# Patient Record
Sex: MALE | Race: WHITE | NOT HISPANIC OR LATINO | Employment: OTHER | URBAN - METROPOLITAN AREA
[De-identification: names, ages, dates, MRNs, and addresses within clinical notes are randomized per-mention and may not be internally consistent; named-entity substitution may affect disease eponyms.]

---

## 2017-01-11 ENCOUNTER — OFFICE VISIT (OUTPATIENT)
Dept: UROLOGY | Facility: CLINIC | Age: 82
End: 2017-01-11
Payer: MEDICARE

## 2017-01-11 VITALS — RESPIRATION RATE: 14 BRPM | HEIGHT: 66 IN | WEIGHT: 182.13 LBS | BODY MASS INDEX: 29.27 KG/M2

## 2017-01-11 DIAGNOSIS — N13.8 BPH WITH URINARY OBSTRUCTION: Primary | ICD-10-CM

## 2017-01-11 DIAGNOSIS — N40.1 BPH WITH URINARY OBSTRUCTION: Primary | ICD-10-CM

## 2017-01-11 PROCEDURE — 99999 PR PBB SHADOW E&M-EST. PATIENT-LVL III: CPT | Mod: PBBFAC,,, | Performed by: UROLOGY

## 2017-01-11 PROCEDURE — 1125F AMNT PAIN NOTED PAIN PRSNT: CPT | Mod: S$GLB,,, | Performed by: UROLOGY

## 2017-01-11 PROCEDURE — 1157F ADVNC CARE PLAN IN RCRD: CPT | Mod: S$GLB,,, | Performed by: UROLOGY

## 2017-01-11 PROCEDURE — 99213 OFFICE O/P EST LOW 20 MIN: CPT | Mod: S$GLB,,, | Performed by: UROLOGY

## 2017-01-11 PROCEDURE — 1160F RVW MEDS BY RX/DR IN RCRD: CPT | Mod: S$GLB,,, | Performed by: UROLOGY

## 2017-01-11 PROCEDURE — 1159F MED LIST DOCD IN RCRD: CPT | Mod: S$GLB,,, | Performed by: UROLOGY

## 2017-01-11 RX ORDER — TROSPIUM CHLORIDE 20 MG/1
20 TABLET, FILM COATED ORAL 2 TIMES DAILY
Qty: 60 TABLET | Refills: 11 | Status: SHIPPED | OUTPATIENT
Start: 2017-01-11 | End: 2018-02-09 | Stop reason: SDUPTHER

## 2017-01-11 RX ORDER — FINASTERIDE 5 MG/1
5 TABLET, FILM COATED ORAL DAILY
Qty: 90 TABLET | Refills: 3 | Status: SHIPPED | OUTPATIENT
Start: 2017-01-11 | End: 2018-01-12 | Stop reason: SDUPTHER

## 2017-01-11 NOTE — LETTER
January 11, 2017      Tiago Borja MD  [address]  [facility] [address]           [facility] - Urology 4th Floor  [address]  [facility] [address]  Phone: [phone]          Patient: Randall Strickland Jr.   MR Number: [id number]   YOB: 1927   Date of Visit: 1/11/2017       Dear Dr. Tiago Borja:    Thank you for referring Randall Strickland to me for evaluation. Attached you will find relevant portions of my assessment and plan of care.    If you have questions, please do not hesitate to call me. I look forward to following Randall Strickland along with you.    Sincerely,    Kev Srinivasan Jr., MD    Enclosure  CC:  No Recipients    If you would like to receive this communication electronically, please contact [email or web address] or [phone] to request more information on [employer] Link access.    For providers and/or their staff who would like to refer a patient to [facility], please contact us through our one-stop-shop provider referral line, [facility], at [phone].    If you feel you have received this communication in error or would no longer like to receive these types of communications, please e-mail [email or web address]

## 2017-01-11 NOTE — PROGRESS NOTES
Subjective:       Patient ID: Randall Strickland Jr. is a 89 y.o. male.    Chief Complaint: medication refills (ep of el . pt does not want to return to dr calvo)    HPI a she is here for refills on trospium and finasteride.  He is voiding well not having any frequency.  He has good stream and empties his bladder.  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    Stenosis of aortic and mitral valves      aortic DAHLIA 1.1 CM       Past Surgical History   Procedure Laterality Date    Knee surgery       Bilateral    Total hip arthroplasty       Bilaterial    Cararact  car    Cataract extraction bilateral w/ anterior vitrectomy       bilaterial    Cholecystectomy      Appendectomy      Eye surgery      Joint replacement      Cataract extraction w/  intraocular lens implant Bilateral     Cardiac catheterization      Cardiac valve surgery         Family History   Problem Relation Age of Onset    No Known Problems Father     No Known Problems Mother     No Known Problems Sister     No Known Problems Brother     No Known Problems Maternal Aunt     No Known Problems Maternal Uncle     No Known Problems Paternal Aunt     No Known Problems Paternal Uncle     No Known Problems Maternal Grandmother     No Known Problems Maternal Grandfather     No Known Problems Paternal Grandmother     No Known Problems Paternal Grandfather     Amblyopia Neg Hx     Blindness Neg Hx     Cancer Neg Hx     Cataracts Neg Hx     Diabetes Neg Hx     Glaucoma Neg Hx     Hypertension Neg Hx     Macular degeneration Neg Hx     Retinal detachment Neg Hx     Strabismus Neg Hx     Stroke Neg Hx     Thyroid disease Neg Hx     Anemia Neg Hx     Arrhythmia Neg Hx     Asthma Neg Hx     Clotting disorder Neg Hx      Fainting Neg Hx     Heart attack Neg Hx     Heart disease Neg Hx     Heart failure Neg Hx     Hyperlipidemia Neg Hx     Atrial Septal Defect Neg Hx        Social History     Social History    Marital status:      Spouse name: Sade    Number of children: N/A    Years of education: N/A     Occupational History    Retired Retired 1992     Social History Main Topics    Smoking status: Never Smoker    Smokeless tobacco: Never Used    Alcohol use Yes      Comment: occasional    Drug use: No    Sexual activity: Not on file     Other Topics Concern    Not on file     Social History Narrative       Allergies:  Review of patient's allergies indicates no known allergies.    Medications:    Current Outpatient Prescriptions:     amlodipine (NORVASC) 5 MG tablet, Take 1 tablet (5 mg total) by mouth once daily., Disp: 30 tablet, Rfl: 6    aspirin 81 MG Chew, Take 81 mg by mouth once daily.  , Disp: , Rfl:     CHOLESTYRAMINE LIGHT 4 gram packet, DISSOLVE 1 PACKET IN LIQUID AND DRINK TWICE DAILY, Disp: 60 packet, Rfl: 0    clopidogrel (PLAVIX) 75 mg tablet, Take 1 tablet (75 mg total) by mouth once daily. Take 4 tablets the night before the procedure, then 1 tab PO daily., Disp: 30 tablet, Rfl: 11    finasteride (PROSCAR) 5 mg tablet, Take 1 tablet (5 mg total) by mouth once daily., Disp: 90 tablet, Rfl: 3    gabapentin (NEURONTIN) 100 MG capsule, TAKE 1 CAPSULE BY MOUTH EVERY EVENING, Disp: 90 capsule, Rfl: 0    ipratropium (ATROVENT) 0.03 % nasal spray, 1-2 sprays @ lateral nostril BID prn rhinitis/nasal drip, Disp: 30 mL, Rfl: 2    omeprazole (PRILOSEC) 40 MG capsule, TAKE 1 CAPSULE(40 MG) BY MOUTH EVERY MORNING, Disp: 30 capsule, Rfl: 0    tamsulosin (FLOMAX) 0.4 mg Cp24, Take 1 capsule (0.4 mg total) by mouth once daily., Disp: 90 capsule, Rfl: 3    torsemide (DEMADEX) 5 MG Tab, Take 1 tablet (5 mg total) by mouth once daily., Disp: 30 tablet, Rfl: 3    trospium (SANCTURA) 20 mg Tab tablet,  Take 1 tablet (20 mg total) by mouth 2 (two) times daily., Disp: 60 tablet, Rfl: 11    Current Facility-Administered Medications:     dextranomer-hyaluronate-NaCl (SOLESTA) 50-15 mg/mL (4) GlIm 1 kit, 1 kit, Submucosal anal canal, 1 time in Clinic/HOD, Meir Bran MD    Review of Systems   Constitutional: Negative for activity change, appetite change, chills, diaphoresis, fatigue, fever and unexpected weight change.   HENT: Negative for congestion, dental problem, hearing loss, mouth sores, postnasal drip, rhinorrhea, sinus pressure and trouble swallowing.    Eyes: Negative for pain, discharge and itching.   Respiratory: Negative for apnea, cough, choking, chest tightness, shortness of breath and wheezing.    Cardiovascular: Negative for chest pain, palpitations and leg swelling.   Gastrointestinal: Negative for abdominal distention, abdominal pain, anal bleeding, blood in stool, constipation, diarrhea, nausea, rectal pain and vomiting.   Endocrine: Negative for polydipsia and polyuria.   Genitourinary: Negative for decreased urine volume, difficulty urinating, discharge, dysuria, enuresis, flank pain, frequency, genital sores, hematuria, penile pain, penile swelling, scrotal swelling, testicular pain and urgency.   Musculoskeletal: Negative for arthralgias, back pain and myalgias.   Skin: Negative for color change, rash and wound.   Neurological: Negative for dizziness, syncope, speech difficulty, light-headedness and headaches.   Hematological: Negative for adenopathy. Does not bruise/bleed easily.   Psychiatric/Behavioral: Negative for behavioral problems, confusion, hallucinations and sleep disturbance.       Objective:      Physical Exam   Constitutional: He appears well-developed.   HENT:   Head: Normocephalic.   Cardiovascular: Normal rate.    Pulmonary/Chest: Effort normal.   Abdominal: Soft.   Genitourinary: Prostate normal.   Genitourinary Comments: 30 g benign   Neurological: He is alert.   Skin:  Skin is warm.     Psychiatric: He has a normal mood and affect.       Assessment:       1. BPH with urinary obstruction        Plan:       Randall was seen today for medication refills.    Diagnoses and all orders for this visit:    BPH with urinary obstruction    Other orders  -     finasteride (PROSCAR) 5 mg tablet; Take 1 tablet (5 mg total) by mouth once daily.  -     trospium (SANCTURA) 20 mg Tab tablet; Take 1 tablet (20 mg total) by mouth 2 (two) times daily.        yearly check

## 2017-01-17 DIAGNOSIS — J30.0 VASOMOTOR RHINITIS: ICD-10-CM

## 2017-01-18 RX ORDER — IPRATROPIUM BROMIDE 21 UG/1
SPRAY, METERED NASAL
Qty: 30 ML | Refills: 0 | Status: SHIPPED | OUTPATIENT
Start: 2017-01-18 | End: 2017-07-06 | Stop reason: SDUPTHER

## 2017-01-24 RX ORDER — OMEPRAZOLE 40 MG/1
CAPSULE, DELAYED RELEASE ORAL
Qty: 30 CAPSULE | Refills: 0 | Status: SHIPPED | OUTPATIENT
Start: 2017-01-24 | End: 2017-02-23 | Stop reason: SDUPTHER

## 2017-02-05 RX ORDER — TAMSULOSIN HYDROCHLORIDE 0.4 MG/1
CAPSULE ORAL
Qty: 90 CAPSULE | Refills: 0 | Status: SHIPPED | OUTPATIENT
Start: 2017-02-05 | End: 2017-05-04 | Stop reason: SDUPTHER

## 2017-02-09 ENCOUNTER — TELEPHONE (OUTPATIENT)
Dept: UROLOGY | Facility: CLINIC | Age: 82
End: 2017-02-09

## 2017-02-09 ENCOUNTER — HOSPITAL ENCOUNTER (INPATIENT)
Facility: HOSPITAL | Age: 82
LOS: 1 days | Discharge: HOME | DRG: 872 | End: 2017-02-10
Attending: EMERGENCY MEDICINE | Admitting: HOSPITALIST
Payer: MEDICARE

## 2017-02-09 ENCOUNTER — OFFICE VISIT (OUTPATIENT)
Dept: CARDIOLOGY | Facility: CLINIC | Age: 82
DRG: 872 | End: 2017-02-09
Payer: MEDICARE

## 2017-02-09 VITALS
HEART RATE: 104 BPM | SYSTOLIC BLOOD PRESSURE: 141 MMHG | HEIGHT: 66 IN | WEIGHT: 185.44 LBS | OXYGEN SATURATION: 96 % | DIASTOLIC BLOOD PRESSURE: 63 MMHG | BODY MASS INDEX: 29.8 KG/M2

## 2017-02-09 DIAGNOSIS — I10 HYPERTENSION, ESSENTIAL: ICD-10-CM

## 2017-02-09 DIAGNOSIS — N13.8 BPH WITH URINARY OBSTRUCTION: ICD-10-CM

## 2017-02-09 DIAGNOSIS — N30.01 ACUTE CYSTITIS WITH HEMATURIA: Primary | ICD-10-CM

## 2017-02-09 DIAGNOSIS — I35.0 NONRHEUMATIC AORTIC VALVE STENOSIS: ICD-10-CM

## 2017-02-09 DIAGNOSIS — R50.9 FEVER: ICD-10-CM

## 2017-02-09 DIAGNOSIS — I50.32 CHRONIC DIASTOLIC CONGESTIVE HEART FAILURE: ICD-10-CM

## 2017-02-09 DIAGNOSIS — N17.9 AKI (ACUTE KIDNEY INJURY): ICD-10-CM

## 2017-02-09 DIAGNOSIS — N40.1 BPH WITH URINARY OBSTRUCTION: ICD-10-CM

## 2017-02-09 DIAGNOSIS — R31.9 URINARY TRACT INFECTION WITH HEMATURIA, SITE UNSPECIFIED: Primary | ICD-10-CM

## 2017-02-09 DIAGNOSIS — Z95.2 S/P AORTIC VALVE REPLACEMENT: ICD-10-CM

## 2017-02-09 DIAGNOSIS — N39.0 URINARY TRACT INFECTION WITH HEMATURIA, SITE UNSPECIFIED: Primary | ICD-10-CM

## 2017-02-09 DIAGNOSIS — D72.829 LEUKOCYTOSIS, UNSPECIFIED TYPE: ICD-10-CM

## 2017-02-09 PROBLEM — A41.9 SEPSIS: Status: ACTIVE | Noted: 2017-02-09

## 2017-02-09 PROBLEM — N18.30 CHRONIC KIDNEY DISEASE, STAGE III (MODERATE): Status: ACTIVE | Noted: 2017-02-09

## 2017-02-09 PROBLEM — E86.9 VOLUME DEPLETION: Status: ACTIVE | Noted: 2017-02-09

## 2017-02-09 LAB
ALBUMIN SERPL BCP-MCNC: 3.6 G/DL
ALP SERPL-CCNC: 100 U/L
ALT SERPL W/O P-5'-P-CCNC: 34 U/L
AMORPH CRY UR QL COMP ASSIST: ABNORMAL
ANION GAP SERPL CALC-SCNC: 7 MMOL/L
AST SERPL-CCNC: 44 U/L
BACTERIA #/AREA URNS AUTO: ABNORMAL /HPF
BASOPHILS # BLD AUTO: 0.02 K/UL
BASOPHILS NFR BLD: 0.1 %
BILIRUB SERPL-MCNC: 1.9 MG/DL
BILIRUB UR QL STRIP: NEGATIVE
BUN SERPL-MCNC: 24 MG/DL
CALCIUM SERPL-MCNC: 9.2 MG/DL
CHLORIDE SERPL-SCNC: 102 MMOL/L
CLARITY UR REFRACT.AUTO: ABNORMAL
CO2 SERPL-SCNC: 26 MMOL/L
COLOR UR AUTO: YELLOW
CREAT SERPL-MCNC: 1.7 MG/DL
DIFFERENTIAL METHOD: ABNORMAL
EOSINOPHIL # BLD AUTO: 0 K/UL
EOSINOPHIL NFR BLD: 0 %
ERYTHROCYTE [DISTWIDTH] IN BLOOD BY AUTOMATED COUNT: 13 %
EST. GFR  (AFRICAN AMERICAN): 40.4 ML/MIN/1.73 M^2
EST. GFR  (NON AFRICAN AMERICAN): 35 ML/MIN/1.73 M^2
GLUCOSE SERPL-MCNC: 106 MG/DL
GLUCOSE UR QL STRIP: NEGATIVE
HCT VFR BLD AUTO: 37.2 %
HGB BLD-MCNC: 12.7 G/DL
HGB UR QL STRIP: ABNORMAL
HYALINE CASTS UR QL AUTO: 1 /LPF
KETONES UR QL STRIP: NEGATIVE
LACTATE SERPL-SCNC: 1.9 MMOL/L
LEUKOCYTE ESTERASE UR QL STRIP: ABNORMAL
LYMPHOCYTES # BLD AUTO: 2.7 K/UL
LYMPHOCYTES NFR BLD: 9.8 %
MCH RBC QN AUTO: 33.6 PG
MCHC RBC AUTO-ENTMCNC: 34.1 %
MCV RBC AUTO: 98 FL
MICROSCOPIC COMMENT: ABNORMAL
MONOCYTES # BLD AUTO: 3.3 K/UL
MONOCYTES NFR BLD: 12.1 %
NEUTROPHILS # BLD AUTO: 21.4 K/UL
NEUTROPHILS NFR BLD: 78 %
NITRITE UR QL STRIP: POSITIVE
PH UR STRIP: 6 [PH] (ref 5–8)
PLATELET # BLD AUTO: 162 K/UL
PMV BLD AUTO: 10.6 FL
POTASSIUM SERPL-SCNC: 5.1 MMOL/L
PROT SERPL-MCNC: 8.1 G/DL
PROT UR QL STRIP: ABNORMAL
RBC # BLD AUTO: 3.78 M/UL
RBC #/AREA URNS AUTO: 12 /HPF (ref 0–4)
SODIUM SERPL-SCNC: 135 MMOL/L
SP GR UR STRIP: 1.01 (ref 1–1.03)
SQUAMOUS #/AREA URNS AUTO: 1 /HPF
URN SPEC COLLECT METH UR: ABNORMAL
UROBILINOGEN UR STRIP-ACNC: NEGATIVE EU/DL
WBC # BLD AUTO: 27.54 K/UL
WBC #/AREA URNS AUTO: >100 /HPF (ref 0–5)
WBC CLUMPS UR QL AUTO: ABNORMAL

## 2017-02-09 PROCEDURE — 81001 URINALYSIS AUTO W/SCOPE: CPT

## 2017-02-09 PROCEDURE — 87040 BLOOD CULTURE FOR BACTERIA: CPT | Mod: 59

## 2017-02-09 PROCEDURE — 83605 ASSAY OF LACTIC ACID: CPT

## 2017-02-09 PROCEDURE — 80053 COMPREHEN METABOLIC PANEL: CPT

## 2017-02-09 PROCEDURE — 87086 URINE CULTURE/COLONY COUNT: CPT

## 2017-02-09 PROCEDURE — 87088 URINE BACTERIA CULTURE: CPT

## 2017-02-09 PROCEDURE — 25000003 PHARM REV CODE 250: Performed by: HOSPITALIST

## 2017-02-09 PROCEDURE — 99220 PR INITIAL OBSERVATION CARE,LEVL III: CPT | Mod: ,,, | Performed by: HOSPITALIST

## 2017-02-09 PROCEDURE — 99214 OFFICE O/P EST MOD 30 MIN: CPT | Mod: S$GLB,,, | Performed by: INTERNAL MEDICINE

## 2017-02-09 PROCEDURE — 1126F AMNT PAIN NOTED NONE PRSNT: CPT | Mod: S$GLB,,, | Performed by: INTERNAL MEDICINE

## 2017-02-09 PROCEDURE — 1160F RVW MEDS BY RX/DR IN RCRD: CPT | Mod: S$GLB,,, | Performed by: INTERNAL MEDICINE

## 2017-02-09 PROCEDURE — 1157F ADVNC CARE PLAN IN RCRD: CPT | Mod: S$GLB,,, | Performed by: INTERNAL MEDICINE

## 2017-02-09 PROCEDURE — 63600175 PHARM REV CODE 636 W HCPCS: Performed by: PHYSICIAN ASSISTANT

## 2017-02-09 PROCEDURE — 99499 UNLISTED E&M SERVICE: CPT | Mod: S$GLB,,, | Performed by: INTERNAL MEDICINE

## 2017-02-09 PROCEDURE — G0378 HOSPITAL OBSERVATION PER HR: HCPCS

## 2017-02-09 PROCEDURE — 87077 CULTURE AEROBIC IDENTIFY: CPT

## 2017-02-09 PROCEDURE — 99999 PR PBB SHADOW E&M-EST. PATIENT-LVL III: CPT | Mod: PBBFAC,,, | Performed by: INTERNAL MEDICINE

## 2017-02-09 PROCEDURE — 63600175 PHARM REV CODE 636 W HCPCS: Performed by: HOSPITALIST

## 2017-02-09 PROCEDURE — 99285 EMERGENCY DEPT VISIT HI MDM: CPT | Mod: ,,, | Performed by: EMERGENCY MEDICINE

## 2017-02-09 PROCEDURE — 85025 COMPLETE CBC W/AUTO DIFF WBC: CPT

## 2017-02-09 PROCEDURE — 87186 SC STD MICRODIL/AGAR DIL: CPT

## 2017-02-09 PROCEDURE — 1159F MED LIST DOCD IN RCRD: CPT | Mod: S$GLB,,, | Performed by: INTERNAL MEDICINE

## 2017-02-09 RX ORDER — ENOXAPARIN SODIUM 100 MG/ML
INJECTION SUBCUTANEOUS
Status: DISPENSED
Start: 2017-02-09 | End: 2017-02-10

## 2017-02-09 RX ORDER — ENOXAPARIN SODIUM 100 MG/ML
30 INJECTION SUBCUTANEOUS EVERY 24 HOURS
Status: DISCONTINUED | OUTPATIENT
Start: 2017-02-09 | End: 2017-02-10 | Stop reason: HOSPADM

## 2017-02-09 RX ORDER — NAPROXEN SODIUM 220 MG/1
81 TABLET, FILM COATED ORAL DAILY
Status: DISCONTINUED | OUTPATIENT
Start: 2017-02-10 | End: 2017-02-10 | Stop reason: HOSPADM

## 2017-02-09 RX ORDER — TAMSULOSIN HYDROCHLORIDE 0.4 MG/1
1 CAPSULE ORAL DAILY
Status: DISCONTINUED | OUTPATIENT
Start: 2017-02-10 | End: 2017-02-10 | Stop reason: HOSPADM

## 2017-02-09 RX ORDER — SODIUM CHLORIDE, SODIUM LACTATE, POTASSIUM CHLORIDE, CALCIUM CHLORIDE 600; 310; 30; 20 MG/100ML; MG/100ML; MG/100ML; MG/100ML
INJECTION, SOLUTION INTRAVENOUS CONTINUOUS
Status: DISCONTINUED | OUTPATIENT
Start: 2017-02-09 | End: 2017-02-10

## 2017-02-09 RX ORDER — TORSEMIDE 5 MG/1
5 TABLET ORAL DAILY
Status: DISCONTINUED | OUTPATIENT
Start: 2017-02-10 | End: 2017-02-10 | Stop reason: HOSPADM

## 2017-02-09 RX ORDER — CHOLESTYRAMINE 4 G/4.8G
1 POWDER, FOR SUSPENSION ORAL 2 TIMES DAILY
Status: DISCONTINUED | OUTPATIENT
Start: 2017-02-09 | End: 2017-02-10 | Stop reason: HOSPADM

## 2017-02-09 RX ORDER — AMLODIPINE BESYLATE 5 MG/1
5 TABLET ORAL DAILY
Status: DISCONTINUED | OUTPATIENT
Start: 2017-02-10 | End: 2017-02-10 | Stop reason: HOSPADM

## 2017-02-09 RX ORDER — ONDANSETRON 4 MG/1
8 TABLET, ORALLY DISINTEGRATING ORAL EVERY 8 HOURS PRN
Status: DISCONTINUED | OUTPATIENT
Start: 2017-02-09 | End: 2017-02-10 | Stop reason: HOSPADM

## 2017-02-09 RX ORDER — FINASTERIDE 5 MG/1
5 TABLET, FILM COATED ORAL DAILY
Status: DISCONTINUED | OUTPATIENT
Start: 2017-02-10 | End: 2017-02-10 | Stop reason: HOSPADM

## 2017-02-09 RX ORDER — CLOPIDOGREL BISULFATE 75 MG/1
75 TABLET ORAL DAILY
Status: DISCONTINUED | OUTPATIENT
Start: 2017-02-10 | End: 2017-02-10 | Stop reason: HOSPADM

## 2017-02-09 RX ORDER — GABAPENTIN 100 MG/1
100 CAPSULE ORAL NIGHTLY
Status: DISCONTINUED | OUTPATIENT
Start: 2017-02-09 | End: 2017-02-10 | Stop reason: HOSPADM

## 2017-02-09 RX ORDER — SODIUM CHLORIDE 0.9 % (FLUSH) 0.9 %
3 SYRINGE (ML) INJECTION EVERY 6 HOURS PRN
Status: DISCONTINUED | OUTPATIENT
Start: 2017-02-09 | End: 2017-02-10 | Stop reason: HOSPADM

## 2017-02-09 RX ORDER — TROSPIUM CHLORIDE 20 MG/1
20 TABLET, FILM COATED ORAL DAILY
Status: DISCONTINUED | OUTPATIENT
Start: 2017-02-10 | End: 2017-02-09 | Stop reason: RX

## 2017-02-09 RX ORDER — PANTOPRAZOLE SODIUM 40 MG/1
40 TABLET, DELAYED RELEASE ORAL DAILY
Status: DISCONTINUED | OUTPATIENT
Start: 2017-02-10 | End: 2017-02-10 | Stop reason: HOSPADM

## 2017-02-09 RX ORDER — ACETAMINOPHEN 325 MG/1
650 TABLET ORAL EVERY 4 HOURS PRN
Status: DISCONTINUED | OUTPATIENT
Start: 2017-02-09 | End: 2017-02-10 | Stop reason: HOSPADM

## 2017-02-09 RX ADMIN — ENOXAPARIN SODIUM 30 MG: 100 INJECTION SUBCUTANEOUS at 05:02

## 2017-02-09 RX ADMIN — CEFTRIAXONE 1 G: 1 INJECTION, SOLUTION INTRAVENOUS at 04:02

## 2017-02-09 RX ADMIN — GABAPENTIN 100 MG: 100 CAPSULE ORAL at 11:02

## 2017-02-09 RX ADMIN — SODIUM CHLORIDE, SODIUM LACTATE, POTASSIUM CHLORIDE, AND CALCIUM CHLORIDE: .6; .31; .03; .02 INJECTION, SOLUTION INTRAVENOUS at 05:02

## 2017-02-09 NOTE — SUBJECTIVE & OBJECTIVE
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    Stenosis of aortic and mitral valves      aortic DAHLIA 1.1 CM       Past Surgical History   Procedure Laterality Date    Knee surgery       Bilateral    Total hip arthroplasty       Bilaterial    Cararact  car    Cataract extraction bilateral w/ anterior vitrectomy       bilaterial    Cholecystectomy      Appendectomy      Eye surgery      Joint replacement      Cataract extraction w/  intraocular lens implant Bilateral     Cardiac catheterization      Cardiac valve surgery         Review of patient's allergies indicates:  No Known Allergies    Current Facility-Administered Medications on File Prior to Encounter   Medication    [DISCONTINUED] dextranomer-hyaluronate-NaCl (SOLESTA) 50-15 mg/mL (4) GlIm 1 kit     Current Outpatient Prescriptions on File Prior to Encounter   Medication Sig    amlodipine (NORVASC) 5 MG tablet Take 1 tablet (5 mg total) by mouth once daily.    aspirin 81 MG Chew Take 81 mg by mouth once daily.      CHOLESTYRAMINE LIGHT 4 gram packet DISSOLVE 1 PACKET IN LIQUID AND DRINK TWICE DAILY    clopidogrel (PLAVIX) 75 mg tablet Take 1 tablet (75 mg total) by mouth once daily. Take 4 tablets the night before the procedure, then 1 tab PO daily.    finasteride (PROSCAR) 5 mg tablet Take 1 tablet (5 mg total) by mouth once daily.    gabapentin (NEURONTIN) 100 MG capsule TAKE 1 CAPSULE BY MOUTH EVERY EVENING    ipratropium (ATROVENT) 0.03 % nasal spray USE 1 TO 2 SPRAYS IN EACH NOSTRIL TWICE DAILY AS NEEDED    omeprazole (PRILOSEC) 40 MG capsule TAKE 1 CAPSULE(40 MG) BY MOUTH EVERY MORNING    tamsulosin (FLOMAX) 0.4 mg Cp24 TAKE ONE CAPSULE BY MOUTH DAILY    torsemide (DEMADEX) 5 MG Tab Take 1 tablet (5 mg total) by mouth once daily.    trospium  (SANCTURA) 20 mg Tab tablet Take 1 tablet (20 mg total) by mouth 2 (two) times daily.     Family History     Problem Relation (Age of Onset)    No Known Problems Father, Mother, Sister, Brother, Maternal Aunt, Maternal Uncle, Paternal Aunt, Paternal Uncle, Maternal Grandmother, Maternal Grandfather, Paternal Grandmother, Paternal Grandfather        Social History Main Topics    Smoking status: Never Smoker    Smokeless tobacco: Never Used    Alcohol use Yes      Comment: occasional    Drug use: No    Sexual activity: Not on file     Review of Systems   Constitutional: Positive for fever.   HENT: Negative for congestion.    Eyes: Negative for visual disturbance.   Respiratory: Negative for shortness of breath.    Cardiovascular: Negative for chest pain.   Gastrointestinal: Negative for abdominal pain.   Endocrine: Negative for cold intolerance.   Genitourinary: Positive for dysuria, frequency and hematuria. Negative for flank pain.   Musculoskeletal: Negative for arthralgias.   Skin: Negative for color change.   Allergic/Immunologic: Negative for immunocompromised state.   Neurological: Negative for dizziness.   Hematological: Does not bruise/bleed easily.   Psychiatric/Behavioral: Negative for agitation.     Objective:     Vital Signs (Most Recent):  Temp: 99.2 °F (37.3 °C) (02/09/17 1622)  Pulse: 91 (02/09/17 1550)  Resp: 18 (02/09/17 1550)  BP: 134/60 (02/09/17 1550)  SpO2: 98 % (02/09/17 1550) Vital Signs (24h Range):  Temp:  [98.8 °F (37.1 °C)-99.2 °F (37.3 °C)] 99.2 °F (37.3 °C)  Pulse:  [] 91  Resp:  [16-18] 18  SpO2:  [96 %-98 %] 98 %  BP: (125-141)/(60-63) 134/60     Weight: 81.6 kg (180 lb)  Body mass index is 29.05 kg/(m^2).    Physical Exam   Constitutional: He is oriented to person, place, and time. No distress.   HENT:   Head: Atraumatic.   Eyes: No scleral icterus.   Neck: No JVD present.   Cardiovascular: Normal rate and regular rhythm.    Murmur heard.  Pulmonary/Chest: Effort normal and  breath sounds normal. No respiratory distress.   Abdominal: Soft. Bowel sounds are normal. He exhibits no distension.   Musculoskeletal: He exhibits no edema.   Neurological: He is alert and oriented to person, place, and time.   Skin: Skin is warm and dry.   Psychiatric: He has a normal mood and affect.        Significant Labs: All pertinent labs within the past 24 hours have been reviewed.  None    Significant Imaging: I have reviewed and interpreted all pertinent imaging results/findings within the past 24 hours.

## 2017-02-09 NOTE — TELEPHONE ENCOUNTER
Pt with c/o dysuria and frequency. Pt is requesting a rx for treatment. P/ dr edouard pt will need appt for evaluation. appt made for today with sandra pennington np. Pt is agreeable

## 2017-02-09 NOTE — ED NOTES
Pt's wife:     [phone]    Went to  grandson and will call back r/t dc, or would like to be called on dc

## 2017-02-09 NOTE — IP AVS SNAPSHOT
[facility]  [address]  [facility] [address]  Phone: [phone]           Patient Discharge Instructions     Our goal is to set you up for success. This packet includes information on your condition, medications, and your home care. It will help you to care for yourself so you don't get sicker and need to go back to the hospital.     Please ask your nurse if you have any questions.        There are many details to remember when preparing to leave the hospital. Here is what you will need to do:    1. Take your medicine. If you are prescribed medications, review your Medication List in the following pages. You may have new medications to  at the pharmacy and others that you'll need to stop taking. Review the instructions for how and when to take your medications. Talk with your doctor or nurses if you are unsure of what to do.     2. Go to your follow-up appointments. Specific follow-up information is listed in the following pages. Your may be contacted by a transition nurse or clinical provider about future appointments. Be sure we have all of the phone numbers to reach you, if needed. Please contact your provider's office if you are unable to make an appointment.     3. Watch for warning signs. Your doctor or nurse will give you detailed warning signs to watch for and when to call for assistance. These instructions may also include educational information about your condition. If you experience any of warning signs to your health, call your doctor.               [facility]  Unless otherwise directed by your provider, please contact [facility], our nurse care line that is available for 24/7 assistance.     [phone] (toll-free)    Registered nurses in the [facility] provide clinical advisement, health education, appointment booking, and other advisory services.                    ** Verify the list of medication(s) below is accurate and up  to date. Carry this with you in case of emergency. If your medications have changed, please notify your healthcare provider.             Medication List      START taking these medications        Additional Info                      ciprofloxacin HCl 500 MG tablet   Commonly known as:  CIPRO   Quantity:  24 tablet   Refills:  0   Dose:  500 mg    Instructions:  Take 1 tablet (500 mg total) by mouth 2 (two) times daily.     Begin Date    AM    Noon    PM    Bedtime         CONTINUE taking these medications        Additional Info                      amlodipine 5 MG tablet   Commonly known as:  NORVASC   Quantity:  30 tablet   Refills:  6   Dose:  5 mg    Last time this was given:  5 mg on 2/10/2017  9:37 AM   Instructions:  Take 1 tablet (5 mg total) by mouth once daily.     Begin Date    AM    Noon    PM    Bedtime       aspirin 81 MG Chew   Refills:  0   Dose:  81 mg    Last time this was given:  81 mg on 2/10/2017  9:37 AM   Instructions:  Take 81 mg by mouth once daily.     Begin Date    AM    Noon    PM    Bedtime       CHOLESTYRAMINE LIGHT 4 gram Pwpk   Quantity:  60 packet   Refills:  0   Generic drug:  cholestyramine-aspartame    Instructions:  DISSOLVE 1 PACKET IN LIQUID AND DRINK TWICE DAILY     Begin Date    AM    Noon    PM    Bedtime       clopidogrel 75 mg tablet   Commonly known as:  PLAVIX   Quantity:  30 tablet   Refills:  11   Dose:  75 mg    Last time this was given:  75 mg on 2/10/2017  9:37 AM   Instructions:  Take 1 tablet (75 mg total) by mouth once daily. Take 4 tablets the night before the procedure, then 1 tab PO daily.     Begin Date    AM    Noon    PM    Bedtime       finasteride 5 mg tablet   Commonly known as:  PROSCAR   Quantity:  90 tablet   Refills:  3   Dose:  5 mg    Last time this was given:  5 mg on 2/10/2017  9:37 AM   Instructions:  Take 1 tablet (5 mg total) by mouth once daily.     Begin Date    AM    Noon    PM    Bedtime       gabapentin 100 MG capsule   Commonly known as:   NEURONTIN   Quantity:  90 capsule   Refills:  0    Last time this was given:  100 mg on 2/9/2017 11:41 PM   Instructions:  TAKE 1 CAPSULE BY MOUTH EVERY EVENING     Begin Date    AM    Noon    PM    Bedtime       ipratropium 0.03 % nasal spray   Commonly known as:  ATROVENT   Quantity:  30 mL   Refills:  0    Instructions:  USE 1 TO 2 SPRAYS IN EACH NOSTRIL TWICE DAILY AS NEEDED     Begin Date    AM    Noon    PM    Bedtime       omeprazole 40 MG capsule   Commonly known as:  PRILOSEC   Quantity:  30 capsule   Refills:  0    Instructions:  TAKE 1 CAPSULE(40 MG) BY MOUTH EVERY MORNING     Begin Date    AM    Noon    PM    Bedtime       tamsulosin 0.4 mg Cp24   Commonly known as:  FLOMAX   Quantity:  90 capsule   Refills:  0    Last time this was given:  0.4 mg on 2/10/2017  9:37 AM   Instructions:  TAKE ONE CAPSULE BY MOUTH DAILY     Begin Date    AM    Noon    PM    Bedtime       torsemide 5 MG Tab   Commonly known as:  DEMADEX   Quantity:  30 tablet   Refills:  3   Dose:  5 mg    Last time this was given:  5 mg on 2/10/2017  9:37 AM   Instructions:  Take 1 tablet (5 mg total) by mouth once daily.     Begin Date    AM    Noon    PM    Bedtime       trospium 20 mg Tab tablet   Commonly known as:  sanctura   Quantity:  60 tablet   Refills:  11   Dose:  20 mg    Instructions:  Take 1 tablet (20 mg total) by mouth 2 (two) times daily.     Begin Date    AM    Noon    PM    Bedtime            Where to Get Your Medications      These medications were sent to [facility] [address] [place] [address] [address] [place] [address]  [address] [place] [address]     Phone:  [phone]     ciprofloxacin HCl 500 MG tablet                  Please bring to all follow up appointments:    1. A copy of your discharge instructions.  2. All medicines you are currently taking in their original bottles.  3. Identification and insurance card.    Please arrive 15 minutes ahead of  scheduled appointment time.    Please call 24 hours in advance if you must reschedule your appointment and/or time.        Your Scheduled Appointments     Feb 22, 2017  9:00 AM CST   Established Patient Visit with Tiago Borja MD   [facility] ([facility])    [address]  [place] [address]   [phone]            Feb 22, 2017 10:00 AM CST   Health Assessment with HRA, NOM 3   [facility] ([facility])    [address]  [place] [address]   [phone]              Follow-up Information     Follow up with Hieu Monk MD. Schedule an appointment as soon as possible for a visit on 2/22/2017.    Specialty:  Cardiology    Why:  HOSPITAL FOLLOW UP with Dr Monk 2/22/2017 @ 9:00am     Contact information:    [address]  [place] [address]  [phone]          Discharge Instructions     Future Orders    Activity as tolerated     Call MD for:  difficulty breathing or increased cough     Call MD for:  increased confusion or weakness     Call MD for:  persistent dizziness, light-headedness, or visual disturbances     Call MD for:  persistent nausea and vomiting or diarrhea     Call MD for:  redness, tenderness, or signs of infection (pain, swelling, redness, odor or green/yellow discharge around incision site)     Call MD for:  severe persistent headache     Call MD for:  severe uncontrolled pain     Call MD for:  temperature >100.4     Call MD for:  worsening rash     Diet general     Questions:    Total calories:      Fat restriction, if any:      Protein restriction, if any:      Na restriction, if any:      Fluid restriction:      Additional restrictions:          Primary Diagnosis     Your primary diagnosis was:  Infection Or Inflammation Of Bladder      Admission Information     Date & Time Provider Department CSN    2/9/2017 12:32 PM Mando Garibay MD [facility]  "[facility] [id number]      Care Providers     Provider Role Specialty Primary office phone    Mando Garibay MD Attending Provider Hospitalist [phone]    Mando Garibay MD Team Attending  Hospitalist [phone]    Kaylin Boudreaux MD Team Attending  Hospitalist [phone]    Jaycee Worley MD Team Attending  Hospitalist [phone]      Your Vitals Were     BP Pulse Temp Resp Height Weight    144/70 (BP Location: Right arm, Patient Position: Lying, BP Method: Automatic) 89 99.1 °F (37.3 °C) (Oral) 18 5' 6" (1.676 m) 83.8 kg (184 lb 11.9 oz)    SpO2 BMI             95% 29.82 kg/m2         Recent Lab Values        3/26/2009 3/31/2010 4/8/2011 4/4/2012 4/10/2014               8:38 AM 10:05 AM 11:21 AM 10:36 AM 11:52 AM       A1C 5.6 5.6 5.6 5.8 5.7               Pending Labs     Order Current Status    Urine culture In process    Blood culture #1 **CANNOT BE ORDERED STAT** Preliminary result    Blood culture #2 **CANNOT BE ORDERED STAT** Preliminary result      Allergies as of 2/10/2017     No Known Allergies      Advance Directives     An advance directive is a document which, in the event you are no longer able to make decisions for yourself, tells your healthcare team what kind of treatment you do or do not want to receive, or who you would like to make those decisions for you.  If you do not currently have an advance directive, [facility] encourages you to create one.  For more information call:  [phone]), [phone] ([phone]),  or log on to [email or web address].        Language Assistance Services     ATTENTION: Language assistance services are available, free of charge. Please call [phone].      ATENCIÓN: Si habla español, tiene a clayton disposición servicios gratuitos de asistencia lingüística. Llame al [phone].     CHÚ Ý: N?u b?n nói Ti?ng Vi?t, có các d?ch v? h? tr? ngôn ng? mi?n phí dành cho b?n. G?i s? [phone].        Heart Failure " Education       Heart Failure: Being Active  You have a condition called heart failure. Being active doesnt mean that you have to wear yourself out. Even a little movement each day helps to strengthen your heart. If you cant get out to exercise, you can do simple stretching and strengthening exercises at home. These are good ways to keep you well-conditioned and prevent you and your heart from becoming excessively weak.    Ideas to get you started  · Add a little movement to things you do now. Walk to mail letters. Park your car at the far end of the parking lot and walk to the store. Walk up a flight of stairs instead of taking the elevator.  · Choose activities you enjoy. You might walk, swim, or ride an exercise bike. Things like gardening and washing the car count, too. Other possibilities include: washing dishes, walking the dog, walking around the mall, and doing aerobic activities with friends.  · Join a group exercise program at a [facility] or [facility], a senior center, or a community center. Or look into a hospital cardiac rehabilitation program. Ask your doctor if you qualify.  Tips to keep you going  · Get up and get dressed each day. Go to a coffee shop and read a newspaper or go somewhere that you'll be in the presence of other active people. Youll feel more like being active.  · Make a plan. Choose one or more activities that you enjoy and that you can easily do. Then plan to do at least one each day. You might write your plan on a calendar.  · Go with a friend or a group if you like company. This can help you feel supported and stay motivated, too.  · Plan social events that you enjoy. This will keep you mentally engaged as well as physically motivated to do things you find pleasure in.  For your safety  · Talk with your healthcare provider before starting an exercise program.  · Exercise indoors when its too hot or too cold outside, or when the air quality is poor. Try walking at a shopping mall.  · Wear  socks and sturdy shoes to maintain your balance and prevent falls.  · Start slowly. Do a few minutes several times a day at first. Increase your time and speed little by little.  · Stop and rest whenever you feel tired or get short of breath.  · Dont push yourself on days when you dont feel well.  Date Last Reviewed: 3/20/2016  © 1638-8849 [employer]. [address] [place] [address], [place] [address]. All rights reserved. This information is not intended as a substitute for professional medical care. Always follow your healthcare professional's instructions.              Heart Failure: Evaluating Your Heart  You have a condition called heart failure. To evaluate your condition, your doctor will examine you, ask questions, and do some tests. Along with looking for signs of heart failure, the doctor looks for any other health problems that may have led to heart failure. The results of your evaluation will help your doctor form a treatment plan.  Health history and physical exam  Your visit will start with a health history. Tell the doctor about any symptoms youve noticed and about all medicines you take. Then youll have a physical exam. This includes listening to your heartbeat and breathing. Youll also be checked for swelling (edema) in your legs and neck. When you have fluid buildup or fluid in the lungs, it may be called congestive heart failure.  Diagnosing heart failure     During an echocardiogram, sound waves bounce off the heart. These are converted into a picture on the screen.   The following may be done to help your doctor form a diagnosis:  · X-rays show the size and shape of your heart. These pictures can also show fluid in your lungs.  · An electrocardiogram (ECG or EKG) shows the pattern of your heartbeat. Small pads (electrodes) are placed on your chest, arms, and legs. Wires connect the pads to the ECG machine, which records your hearts electrical signals. This can give the doctor  information about heart function.  · An echocardiogram uses ultrasound waves to show the structure and movement of your heart muscle. This shows how well the heart pumps. It also shows the thickness of the heart walls, and if the heart is enlarged. It is one of the most useful, non-invasive tests as it provides information about the heart's general function. This helps your doctor make treatment decisions.  · Lab tests evaluate small amounts of blood or urine for signs of problems. A BNP lab test can help diagnose and evaluate heart failure. BNP stands for B-type natriuretic peptide. The ventricles secrete more BNP when heart failure worsens. Lab tests can also provide information about metabolic dysfunction or heart dysfunction.  Your treatment plan  Based on the results of your evaluation and tests, your doctor will develop a treatment plan. This plan is designed to relieve some of your heart failure symptoms and help make you more comfortable. Your treatment plan may include:  · Medicine to help your heart work better and improve your quality of life  · Changes in what you eat and drink to help prevent fluid from backing up in your body  · Daily monitoring of your weight and heart failure symptoms to see how well your treatment plan is working  · Exercise to help you stay healthy  · Help with quitting smoking  · Emotional and psychological support to help adjust to the changes  · Referrals to other specialists to make sure you are being treated comprehensively  Date Last Reviewed: 3/21/2016  © 3814-9531 [employer]. [address] [place] [address], [place] [address]. All rights reserved. This information is not intended as a substitute for professional medical care. Always follow your healthcare professional's instructions.              Heart Failure: Making Changes to Your Diet  You have a condition called heart failure. When you have heart failure, excess fluid is more likely to build up in your body  because your heart isn't working well. This makes the heart work harder to pump blood. Fluid buildup causes symptoms such as shortness of breath and swelling (edema). This is often referred to as congestive heart failure or CHF. Controlling the amount of salt (sodium) you eat may help stop fluid from building up. Your doctor may also tell you to reduce the amount of fluid you drink.  Reading food labels    Your healthcare provider will tell you how much sodium you can eat each day. Read food labels to keep track. Keep in mind that certain foods are high in salt. These include canned, frozen, and processed foods. Check the amount of sodium in each serving. Watch out for high-sodium ingredients. These include MSG (monosodium glutamate), baking soda, and sodium phosphate.   Eating less salt  Give yourself time to get used to eating less salt. It may take a little while. Here are some tips to help:  · Take the saltshaker off the table. Replace it with salt-free herb mixes and spices.  · Eat fresh or plain frozen vegetables. These have much less salt than canned vegetables.  · Choose low-sodium snacks like sodium-free pretzels, crackers, or air-popped popcorn.  · Dont add salt to your food when youre cooking. Instead, season your foods with pepper, lemon, garlic, or onion.  · When you eat out, ask that your food be cooked without added salt.  · Avoid eating fried foods as these often have a great deal of salt.  If youre told to limit fluids  You may need to limit how much fluid you have to help prevent swelling. This includes anything that is liquid at room temperature, such as ice cream and soup. If your doctor tells you to limit fluid, try these tips:  · Measure drinks in a measuring cup before you drink them. This will help you meet daily goals.  · Chill drinks to make them more refreshing.  · Suck on frozen lemon wedges to quench thirst.  · Only drink when youre thirsty.  · Chew sugarless gum or suck on hard  candy to keep your mouth moist.  · Weigh yourself daily to know if your body's fluid content is rising.  My sodium goal  Your healthcare provider may give you a sodium goal to meet each day. This includes sodium found in food as well as salt that you add. My goal is to eat no more than ___________ mg of sodium per day.     When to call your doctor  Call your doctor right away if you have any symptoms of worsening heart failure. These can include:  · Sudden weight gain  · Increased swelling of your legs or ankles  · Trouble breathing when youre resting or at night  · Increase in the number of pillows you have to sleep on  · Chest pain, pressure, discomfort, or pain in the jaw, neck, or back   Date Last Reviewed: 3/21/2016  © 8985-1271 [employer]. [address] [place] [address]. All rights reserved. This information is not intended as a substitute for professional medical care. Always follow your healthcare professional's instructions.              Heart Failure: Medicines to Help Your Heart    You have a condition called heart failure (also known as congestive heart failure, or CHF). Your doctor will likely prescribe medicines for heart failure and any underlying health problems you have. Most heart failure patients take one or more types of medicinen. Your healthcare provider will work to find the combination of medicines that works best for you.  Heart failure medicines  Here are the most common heart failure medicines:  · ACE inhibitors lower blood pressure and decrease strain on the heart. This makes it easier for the heart to pump. Angiotensin receptor blockers have similar effects. These are prescribed for some patients instead of ACE inhibitors.  · Beta-blockers relieve stress on the heart. They also improve symptoms. They may also improve the heart's pumping action over time.  · Diuretics (also called water pills) help rid your body of excess water. This can help rid your body of  swelling (edema). Having less fluid to pump means your heart doesnt have to work as hard. Some diuretics make your body lose a mineral called potassium. Your doctor will tell you if you need to take supplements or eat more foods high in potassium.  · Digoxin helps your heart pump with more strength. This helps your heart pump more blood with each beat. So, more oxygen-rich blood travels to the rest of the body.  · Aldosterone antagonists help alter hormones and decrease strain on the heart.  · Hydralazine and nitrates are two separate medicines used together to treat heart failure. They may come in one combination pill. They lower blood pressure and decrease how hard the heart has to pump.  Medicines for related conditions  Controlling other heart problems helps keep heart failure under control, too. Depending on other heart problems you have, medicines may be prescribed to:  · Lower blood pressure (antihypertensives).  · Lower cholesterol levels (statins).  · Prevent blood clots (anticoagulants or aspirin).  · Keep the heartbeat steady (antiarrhythmics).  Date Last Reviewed: 3/5/2016  © 5564-4561 [employer]. [address] [place] [address]. All rights reserved. This information is not intended as a substitute for professional medical care. Always follow your healthcare professional's instructions.              Heart Failure: Procedures That May Help    The heart is a muscle that pumps oxygen-rich blood to all parts of the body. When you have heart failure, the heart is not able to pump as well as it should. Blood and fluid may back up into the lungs (congestive heart failure), and some parts of the body dont get enough oxygen-rich blood to work normally. These problems lead to the symptoms of heart failure.     Certain procedures may help the heart pump better in some cases of heart failure. Some procedures are done to treat health problems that may have caused the heart failure such as  coronary artery disease or heart rhythm problems. For more serious heart failure, other options are available.  Treating artery and valve problems  If you have coronary artery disease or valve disease, procedures may be done to improve blood flow. This helps the heart pump better, which can improve heart failure symptoms. First, your doctor may do a cardiac catheterization to help detect clogged blood vessels or valve damage. During this procedure, a  thin tube (catheter) in inserted into a blood vessel and guided to the heart. There a dye is injected and a special type of X-ray (angiogram) is taken of the blood vessels. Procedures to open a blocked artery or fix damaged valves can also be done using catheterization.  · Angioplasty uses a balloon-tipped instrument at the end of the catheter. The balloon is inflated to widen the narrowed artery. In many cases, a stent is expanded to further support the narrowed artery. A stent is a metal mesh tube.  · Valve surgery repairs or replacement of faulty valves can also be done during catheterization so blood can flow properly through the chambers of the heart.  Bypass surgery is another option to help treat blocked arteries. It uses a healthy blood vessel from elsewhere in the body. The healthy blood vessel is attached above and below the blocked area so that blood can flow around the blocked artery.  Treating heart rhythm problems  A device may be placed in the chest to help a weak heart maintain a healthy, heartbeat so the heart can pump more effectively:  · Pacemaker. A pacemaker is an implanted device that regulates your heartbeat electronically. It monitors your heart's rhythm and generates a painless electric impulse that helps the heart beat in a regular rhythm. A pacemaker is programmed to meet your specific heart rhythm needs.  · Biventricular pacing/cardiac resynchronization therapy. A type of pacemaker that paces both pumping chambers of the heart at the same  time to coordinate contractions and to improve the heart's function. Some people with heart failure are candidates for this therapy.  · Implantable cardioverter defibrillator. A device similar to a pacemaker that senses when the heart is beating too fast and delivers an electrical shock to convert the fast rhythm to a normal rhythm. This can be a life saving device.  In severe cases  In more serious cases of heart failure when other treatments no longer work, other options may include:  · Ventricular assist devices (VADs). These are mechanical devices used to take over the pumping function for one or both of the heart's ventricles, or pumping chambers. A VAD may be necessary when heart failure progresses to the point that medicines and other treatments no longer help. In some cases, a VAD may be used as a bridge to transplant.  · Heart transplant. This is replacing the diseased heart with a healthy one from a donor. This is an option for a few people who are very sick. A heart transplant is very serious and not an option for all patients. Your doctor can tell you more.  Date Last Reviewed: 3/20/2016  © 2718-8075 [employer]. [address] [place] [address]. All rights reserved. This information is not intended as a substitute for professional medical care. Always follow your healthcare professional's instructions.              Heart Failure: Tracking Your Weight  You have a condition called heart failure. When you have heart failure, a sudden weight gain or a steady rise in weight is a warning sign that your body is retaining too much water and salt. This could mean your heart failure is getting worse. If left untreated, it can cause problems for your lungs and result in shortness of breath. Weighing yourself each day is the best way to know if youre retaining water. If your weight goes up quickly, call your doctor. You will be given instructions on how to get rid of the excess water. You  will likely need medicines and to avoid salt. This will help your heart work better.  Call your doctor if you gain more than 2 pounds in 1 day, more than 5 pounds in 1 week, or whatever weight gain you were told to report by your doctor. This is often a sign of worsening heart failure and needs to be evaluated and treated. Your doctor will tell you what to do next.   Tips for weighing yourself    · Weigh yourself at the same time each morning, wearing the same clothes. Weigh yourself after urinating and before eating.  · Use the same scale each day. Make sure the numbers are easy to read. Put the scale on a flat, hard surface -- not on a rug or carpet.  · Do not stop weighing yourself. If you forget one day, weigh again the next morning.  How to use your weight chart  · Keep your weight chart near the scale. Write your weight on the chart as soon as you get off the scale.  · Fill in the month and the start date on the chart. Then write down your weight each day. Your chart will look like this:    · If you miss a day, leave the space blank. Weigh yourself the next day and write your weight in the next space.  · Take your weight chart with you when you go to see your doctor.  Date Last Reviewed: 3/20/2016  © 2335-6215 [employer]. [address] [place] [address], [place] [address]. All rights reserved. This information is not intended as a substitute for professional medical care. Always follow your healthcare professional's instructions.              Heart Failure: Warning Signs of a Flare-Up  You have a condition called heart failure. Once you have heart failure, flare-ups can happen. Below are signs that can mean your heart failure is getting worse. If you notice any of these warning signs, call your healthcare provider.  Swelling    · Your feet, ankles, or lower legs get puffier.  · You notice skin changes on your lower legs.  · Your shoes feel too tight.  · Your clothes are tighter in the waist.  · You have  trouble getting rings on or off your fingers.  Shortness of breath  · You have to breathe harder even when youre doing your normal activities or when youre resting.  · You are short of breath walking up stairs or even short distances.  · You wake up at night short of breath or coughing.  · You need to use more pillows or sit up to sleep.  · You wake up tired or restless.  Other warning signs  · You feel weaker, dizzy, or more tired.  · You have chest pain or changes in your heartbeat.  · You have a cough that wont go away.  · You cant remember things or dont feel like eating.  Tracking your weight  Gaining weight is often the first warning sign that heart failure is getting worse. Gaining even a few pounds can be a sign that your body is retaining excess water and salt. Weighing yourself each day in the morning after you urinate and before you eat, is the best way to know if you're retaining water. Get a scale that is easy to read and make sure you wear the same clothes and use the same scale every time you weigh. Your healthcare provider will show you how to track your weight. Call your doctor if you gain more than 2 pounds in 1 day, 5 pounds in 1 week, or whatever weight gain you were told to report by your doctor. This is often a sign of worsening heart failure and needs to be evaluated and treated before it compromises your breathing. Your doctor will tell you what to do next.    Date Last Reviewed: 3/15/2016  © 6097-0149 [employer]. [address] [place] [address], [place] [address]. All rights reserved. This information is not intended as a substitute for professional medical care. Always follow your healthcare professional's instructions.              Chronic Kindey Disease Education             [employer] Sign-Up     Activating your [facility] account is as easy as 1-2-3!     1) Visit [email or web address], select Sign Up Now, enter this activation code and your date of birth, then select  Next.  [email or web address]  Expires: 3/26/2017  9:28 AM      2) Create a username and password to use when you visit [facility] in the future and select a security question in case you lose your password and select Next.    3) Enter your e-mail address and click Sign Up!    Additional Information  If you have questions, please e-mail [email or web address] or call [phone] to talk to our [facility] staff. Remember, [facility] is NOT to be used for urgent needs. For medical emergencies, dial 911.          [facility] complies with applicable Federal civil rights laws and does not discriminate on the basis of race, color, national origin, age, disability, or sex.

## 2017-02-09 NOTE — PROGRESS NOTES
[facility] Hospital Medicine  Progress Note    Patient Name: Randall Strickland Jr.  MRN: [id number]  Patient Class: OP- Observation   Admission Date: 2/9/2017  Length of Stay: 0 days  Attending Physician: Mando Garibay MD  Primary Care Provider: Hieu Monk MD    [facility] Medicine Team: [facility] MED  Mando Garibay MD    Subjective:     Principal Problem:Acute cystitis with hematuria    HPI:   Patient is a 89-year-old male with past medical history of HTN, congestive heart, aortic stenosis status post TAVR in Aug 2016 who presents the ED with fever and urinary difficulties and referral to ED from cardiologist.  Patient states that his symptoms started 2 days ago.  Initially he had increased urinary frequency with incontinence, then had dysuria and some spotty hematuria.  Last day his voiding decreased a lot  Patient had a fever of 101.3 last night.    He has been taking Excedrin and aspirin.  He denies any abdominal plain or flank pain.  He denies any diaphoresis, cough, SOB, chest pain, abdominal pain, nausea, vomiting, diarrhea, flank pain, paresthesias, or weakness.     Patient states that he saw his cardiologist today, and was told to report to the ED for further evaluation and management.       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    Stenosis of aortic and mitral valves      aortic DAHLIA 1.1 CM       Past Surgical History   Procedure Laterality Date    Knee surgery       Bilateral    Total hip arthroplasty       Bilaterial    Cararact  car    Cataract extraction bilateral w/ anterior vitrectomy       bilaterial    Cholecystectomy      Appendectomy      Eye surgery      Joint replacement      Cataract extraction w/  intraocular lens implant Bilateral      Cardiac catheterization      Cardiac valve surgery         Review of patient's allergies indicates:  No Known Allergies    Current Facility-Administered Medications on File Prior to Encounter   Medication    [DISCONTINUED] dextranomer-hyaluronate-NaCl (SOLESTA) 50-15 mg/mL (4) GlIm 1 kit     Current Outpatient Prescriptions on File Prior to Encounter   Medication Sig    amlodipine (NORVASC) 5 MG tablet Take 1 tablet (5 mg total) by mouth once daily.    aspirin 81 MG Chew Take 81 mg by mouth once daily.      CHOLESTYRAMINE LIGHT 4 gram packet DISSOLVE 1 PACKET IN LIQUID AND DRINK TWICE DAILY    clopidogrel (PLAVIX) 75 mg tablet Take 1 tablet (75 mg total) by mouth once daily. Take 4 tablets the night before the procedure, then 1 tab PO daily.    finasteride (PROSCAR) 5 mg tablet Take 1 tablet (5 mg total) by mouth once daily.    gabapentin (NEURONTIN) 100 MG capsule TAKE 1 CAPSULE BY MOUTH EVERY EVENING    ipratropium (ATROVENT) 0.03 % nasal spray USE 1 TO 2 SPRAYS IN EACH NOSTRIL TWICE DAILY AS NEEDED    omeprazole (PRILOSEC) 40 MG capsule TAKE 1 CAPSULE(40 MG) BY MOUTH EVERY MORNING    tamsulosin (FLOMAX) 0.4 mg Cp24 TAKE ONE CAPSULE BY MOUTH DAILY    torsemide (DEMADEX) 5 MG Tab Take 1 tablet (5 mg total) by mouth once daily.    trospium (SANCTURA) 20 mg Tab tablet Take 1 tablet (20 mg total) by mouth 2 (two) times daily.     Family History     Problem Relation (Age of Onset)    No Known Problems Father, Mother, Sister, Brother, Maternal Aunt, Maternal Uncle, Paternal Aunt, Paternal Uncle, Maternal Grandmother, Maternal Grandfather, Paternal Grandmother, Paternal Grandfather        Social History Main Topics    Smoking status: Never Smoker    Smokeless tobacco: Never Used    Alcohol use Yes      Comment: occasional    Drug use: No    Sexual activity: Not on file     Review of Systems   Constitutional: Positive for fever.   HENT: Negative for congestion.    Eyes: Negative for visual disturbance.    Respiratory: Negative for shortness of breath.    Cardiovascular: Negative for chest pain.   Gastrointestinal: Negative for abdominal pain.   Endocrine: Negative for cold intolerance.   Genitourinary: Positive for dysuria, frequency and hematuria. Negative for flank pain.   Musculoskeletal: Negative for arthralgias.   Skin: Negative for color change.   Allergic/Immunologic: Negative for immunocompromised state.   Neurological: Negative for dizziness.   Hematological: Does not bruise/bleed easily.   Psychiatric/Behavioral: Negative for agitation.     Objective:     Vital Signs (Most Recent):  Temp: 99.2 °F (37.3 °C) (02/09/17 1622)  Pulse: 91 (02/09/17 1550)  Resp: 18 (02/09/17 1550)  BP: 134/60 (02/09/17 1550)  SpO2: 98 % (02/09/17 1550) Vital Signs (24h Range):  Temp:  [98.8 °F (37.1 °C)-99.2 °F (37.3 °C)] 99.2 °F (37.3 °C)  Pulse:  [] 91  Resp:  [16-18] 18  SpO2:  [96 %-98 %] 98 %  BP: (125-141)/(60-63) 134/60     Weight: 81.6 kg (180 lb)  Body mass index is 29.05 kg/(m^2).    Physical Exam   Constitutional: He is oriented to person, place, and time. No distress.   HENT:   Head: Atraumatic.   Eyes: No scleral icterus.   Neck: No JVD present.   Cardiovascular: Normal rate and regular rhythm.    Murmur heard.  Pulmonary/Chest: Effort normal and breath sounds normal. No respiratory distress.   Abdominal: Soft. Bowel sounds are normal. He exhibits no distension.   Musculoskeletal: He exhibits no edema.   Neurological: He is alert and oriented to person, place, and time.   Skin: Skin is warm and dry.   Psychiatric: He has a normal mood and affect.        Significant Labs: All pertinent labs within the past 24 hours have been reviewed.  None    Significant Imaging: I have reviewed and interpreted all pertinent imaging results/findings within the past 24 hours.    Assessment/Plan:      * Acute cystitis with hematuria  BPH with urinary obstruction  Sepsis  UA >100 WBC, WBC 27k, fever at home, and mild  tachycardia        - abx and f/u culture  - bladder scan in case pt has obstruction and needs blevins          Chronic kidney disease, stage III (moderate)  Volume depletion  Cr 1.4 -> 1.7  - LR for one liter      Hypertension, essential  -- acceptable; continue with current treatment & monitor       Chronic diastolic congestive heart failure  -- chronic and stable; continue with home treatments & monitor       VTE Risk Mitigation         Ordered     enoxaparin injection 30 mg  Daily     Route:  Subcutaneous        02/09/17 1706     Medium Risk of VTE  Once      02/09/17 1706          Mando Garibay MD  Department of Hospital Medicine   [facility]

## 2017-02-09 NOTE — ED TRIAGE NOTES
Pt has been having urgency and frequency for several days.   Pt saw Dr. Monk in cards today and was sent to ER for admit for urinary frequency and urgency + fever.

## 2017-02-09 NOTE — ED NOTES
PT AAOx3, sitting in chair. No acute distress noted. Respirations appear even and unlabored. No current complaints voiced. Updated on plan of care. Will continue to monitor.

## 2017-02-09 NOTE — MR AVS SNAPSHOT
[facility] - Cardiology  [address]  [facility] [address]  Phone: [phone]                  Randall Strickland JrEllis   2017 9:30 AM   Office Visit    Description:  Male : 4/3/1927   Provider:  Hieu Monk MD   Department:  [facility] - Card[facility]           Reason for Visit     RUDOLPH (dyspnea on exertion)     Chronic diastolic congestive heart failure           Diagnoses this Visit        Comments    Acute cystitis with hematuria    -  Primary     BPH with urinary obstruction         Hypertension, essential         Nonrheumatic aortic valve stenosis         Chronic diastolic congestive heart failure         S/P aortic valve replacement                To Do List           Future Appointments        Provider Department Dept Phone    2017 1:20 PM Kimberly Machado NP [facility] Urolog [place] [phone]    2017 9:00 AM Tiago Borja MD [facility] Internal Medicine [phone]    2017 10:00 AM HRA, NOM 3 [facility] Internal [facility] [phone]      Goals (5 Years of Data)     None      Follow-Up and Disposition     Return in about 6 months (around 2017).      [facility]     [facility] On Call Nurse [facility] - 24/7 Assistance  Registered nurses in the [facility] On Call Center provide clinical advisement, health education, appointment booking, and other advisory services.  Call for this free service at [phone].             Medications           Message regarding Medications     Verify the changes and/or additions to your medication regime listed below are the same as discussed with your clinician today.  If any of these changes or additions are incorrect, please notify your healthcare provider.             Verify that the below list of medications is an accurate representation of the medications you are currently taking.  If none reported, the list may be blank. If incorrect, please contact your healthcare provider. Carry this list with you in case of emergency.     "       Current Medications     amlodipine (NORVASC) 5 MG tablet Take 1 tablet (5 mg total) by mouth once daily.    aspirin 81 MG Chew Take 81 mg by mouth once daily.      CHOLESTYRAMINE LIGHT 4 gram packet DISSOLVE 1 PACKET IN LIQUID AND DRINK TWICE DAILY    clopidogrel (PLAVIX) 75 mg tablet Take 1 tablet (75 mg total) by mouth once daily. Take 4 tablets the night before the procedure, then 1 tab PO daily.    finasteride (PROSCAR) 5 mg tablet Take 1 tablet (5 mg total) by mouth once daily.    gabapentin (NEURONTIN) 100 MG capsule TAKE 1 CAPSULE BY MOUTH EVERY EVENING    ipratropium (ATROVENT) 0.03 % nasal spray USE 1 TO 2 SPRAYS IN EACH NOSTRIL TWICE DAILY AS NEEDED    omeprazole (PRILOSEC) 40 MG capsule TAKE 1 CAPSULE(40 MG) BY MOUTH EVERY MORNING    tamsulosin (FLOMAX) 0.4 mg Cp24 TAKE ONE CAPSULE BY MOUTH DAILY    torsemide (DEMADEX) 5 MG Tab Take 1 tablet (5 mg total) by mouth once daily.    trospium (SANCTURA) 20 mg Tab tablet Take 1 tablet (20 mg total) by mouth 2 (two) times daily.           Clinical Reference Information           Your Vitals Were     BP Pulse Height Weight SpO2 BMI    141/63 (BP Location: Left arm, Patient Position: Sitting, BP Method: Automatic) 104 5' 6" (1.676 m) 84.1 kg (185 lb 6.5 oz) 96% 29.93 kg/m2      Blood Pressure          Most Recent Value    Right Arm BP - Sitting  131/65    Left Arm BP - Sitting  141/63    BP  (!)  141/63      Allergies as of 2/9/2017     No Known Allergies      Immunizations Administered on Date of Encounter - 2/9/2017     None      Orders Placed During Today's Visit     Future Labs/Procedures Expected by Expires    CBC auto differential  8/11/2017 2/9/2018    Comprehensive metabolic panel  8/11/2017 (Approximate) 2/9/2018      [facility] Sign-Up     Activating your [facility] account is as easy as 1-2-3!     1) Visit [email or web address], select Sign Up Now, enter this activation code and your date of birth, then select Next.  [email or web address]  Expires: 3/26/2017  " 9:28 AM      2) Create a username and password to use when you visit [facility] in the future and select a security question in case you lose your password and select Next.    3) Enter your e-mail address and click Sign Up!    Additional Information  If you have questions, please e-mail [email or web address] or call [phone] to talk to our [email or web address] staff. Remember, [facility] is NOT to be used for urgent needs. For medical emergencies, dial 911.         Language Assistance Services     ATTENTION: Language assistance services are available, free of charge. Please call [phone].      ATENCIÓN: Si habla estefaníareilly, tiene a clayton disposición servicios gratuitos de asistencia lingüística. Llame al [phone].     CHÚ Ý: N?u b?n nói Ti?ng Vi?t, có các d?ch v? h? tr? ngôn ng? mi?n phí dành cho b?n. G?i s? [phone].         [facility] complies with applicable Federal civil rights laws and does not discriminate on the basis of race, color, national origin, age, disability, or sex.

## 2017-02-09 NOTE — ED NOTES
Pt identifiers Randall Strickland Jr. was checked and is correct  LOC: The patient is awake, alert, aware of environment with an appropriate affect. Oriented x3, speaking appropriately  APPEARANCE: pt complaints of urinary frequency and urgency with hematuria.  Is sent to ER for admission from Dr. Monk office.  pt in no acute distress, pt is clean and well groomed, clothing properly fastened  SKIN: Skin warm, dry and intact, normal skin turgor, moist mucus membranes  RESPIRATORY: Airway is open and patent, respirations are spontaneous, even and unlabored, normal effort and rate.  Lungs clear bilaterally.   CARDIAC: Normal rate, murmur noted.    ABDOMEN: Soft, nontender, nondistended. Bowel sounds present. Pt complaints of urinary frequency and urgency x several days with hematuria.   NEUROLOGIC: patient moving all extremities spontaneously.  Follows all commands appropriately  MUSCULOSKELETAL: No obvious deformities.

## 2017-02-09 NOTE — TELEPHONE ENCOUNTER
----- Message from Tammy Machado sent at 2/9/2017  8:23 AM CST -----  Contact: pt [phone]  Pt is asking to speak with Dr Craig liao and will not give anymore details. Please call pt.

## 2017-02-09 NOTE — PROGRESS NOTES
Subjective:    Patient ID:  Randall Strickland Jr. is a 89 y.o. male who presents for follow-up of RUDOLPH (dyspnea on exertion) (3 months fu) and Chronic diastolic congestive heart failure      HPI  89 year old male who is remarkedly active is followed with progressive AS and referred for evaluation of TAVR. His LHC was unremarkable and is scheduled for the TAVR 8/30. His RUDOLPH as improves some. He continues to go the gym 2-3 times a week. He has yet resume tennis. He has 2 days of fever frequency urgency and hematuria for 2 days. No chills.    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    aspirin 81 MG Chew Take 81 mg by mouth once daily.      CHOLESTYRAMINE LIGHT 4 gram packet DISSOLVE 1 PACKET IN LIQUID AND DRINK TWICE DAILY    clopidogrel (PLAVIX) 75 mg tablet Take 1 tablet (75 mg total) by mouth once daily. Take 4 tablets the night before the procedure, then 1 tab PO daily.    finasteride (PROSCAR) 5 mg tablet Take 1 tablet (5 mg total) by mouth once daily.    gabapentin (NEURONTIN) 100 MG capsule TAKE 1 CAPSULE BY MOUTH EVERY EVENING    ipratropium (ATROVENT) 0.03 % nasal spray USE 1 TO 2 SPRAYS IN EACH NOSTRIL TWICE DAILY AS NEEDED    omeprazole (PRILOSEC) 40 MG capsule TAKE 1 CAPSULE(40 MG) BY MOUTH EVERY MORNING    tamsulosin (FLOMAX) 0.4 mg Cp24 TAKE ONE CAPSULE BY MOUTH DAILY    torsemide (DEMADEX) 5 MG Tab Take 1 tablet (5 mg total) by mouth once daily.    trospium (SANCTURA) 20 mg Tab tablet Take 1 tablet (20 mg total) by mouth 2 (two) times daily.     Current Facility-Administered Medications   Medication     "dextranomer-hyaluronate-NaCl (SOLESTA) 50-15 mg/mL (4) GlIm 1 kit             Review of Systems   Constitution: Positive for fever. Negative for decreased appetite, diaphoresis, weakness, malaise/fatigue, weight gain and weight loss.   HENT: Negative for congestion, ear discharge, ear pain, headaches and nosebleeds.    Eyes: Negative for blurred vision, double vision and visual disturbance.   Cardiovascular: Positive for dyspnea on exertion. Negative for chest pain, claudication, cyanosis, irregular heartbeat, leg swelling, near-syncope, orthopnea, palpitations, paroxysmal nocturnal dyspnea and syncope.   Respiratory: Negative for cough, hemoptysis, shortness of breath, sleep disturbances due to breathing, snoring, sputum production and wheezing.    Endocrine: Negative for polydipsia, polyphagia and polyuria.   Hematologic/Lymphatic: Negative for adenopathy and bleeding problem. Does not bruise/bleed easily.   Skin: Negative for color change, nail changes, poor wound healing and rash.   Musculoskeletal: Negative for muscle cramps and muscle weakness.   Gastrointestinal: Negative for abdominal pain, anorexia, change in bowel habit, hematochezia, nausea and vomiting.   Genitourinary: Positive for bladder incontinence, dysuria, frequency, hematuria, hesitancy and incomplete emptying.   Neurological: Negative for brief paralysis, difficulty with concentration, excessive daytime sleepiness, dizziness, focal weakness, light-headedness, seizures and vertigo.   Psychiatric/Behavioral: Negative for altered mental status and depression.   Allergic/Immunologic: Negative for persistent infections.        Objective:  Visit Vitals    BP (!) 141/63 (BP Location: Left arm, Patient Position: Sitting, BP Method: Automatic)    Pulse 104    Ht 5' 6" (1.676 m)    Wt 84.1 kg (185 lb 6.5 oz)    SpO2 96%    BMI 29.93 kg/m2       Physical Exam   Constitutional: He is oriented to person, place, and time. He appears well-developed and " well-nourished.   Appears ill   HENT:   Head: Normocephalic.   Right Ear: External ear normal.   Left Ear: External ear normal.   Nose: Nose normal.   Inspection of lips, teeth and gums normal   Eyes: EOM are normal. Pupils are equal, round, and reactive to light. No scleral icterus.   Neck: Normal range of motion. Neck supple. No JVD present. No tracheal deviation present. No thyromegaly present.   Cardiovascular: Normal rate, regular rhythm and intact distal pulses.  Exam reveals no gallop and no friction rub.    Murmur heard.   Harsh midsystolic murmur is present with a grade of 1/6  at the upper right sternal border, upper left sternal border  Pulmonary/Chest: Effort normal and breath sounds normal.   Abdominal: Bowel sounds are normal. He exhibits no distension. There is no hepatosplenomegaly. There is no tenderness. There is no guarding.   Musculoskeletal: Normal range of motion. He exhibits no edema or tenderness.   Lymphadenopathy:   Palpation of neck and groin lymph nodes normal   Neurological: He is alert and oriented to person, place, and time. No cranial nerve deficit. He exhibits normal muscle tone. Coordination normal.   Skin: Skin is dry.   Palpation of skin normal   Psychiatric: His behavior is normal. Judgment and thought content normal.         Assessment:       1. Acute cystitis with hematuria    2. BPH with urinary obstruction    3. Hypertension, essential    4. Nonrheumatic aortic valve stenosis    5. Chronic diastolic congestive heart failure    6. S/P aortic valve replacement         Plan:       Randall was seen today for victoria (dyspnea on exertion) and chronic diastolic congestive heart failure.    Diagnoses and all orders for this visit:    Acute cystitis with hematuria    BPH with urinary obstruction    Hypertension, essential  -     Comprehensive metabolic panel; Future; Expected date: 8/11/17  -     CBC auto differential; Future; Expected date: 8/11/17    Nonrheumatic aortic valve stenosis  -      CBC auto differential; Future; Expected date: 8/11/17    Chronic diastolic congestive heart failure    S/P aortic valve replacement  -     CBC auto differential; Future; Expected date: 8/11/17    To ED for admit. Urology notified

## 2017-02-09 NOTE — ED PROVIDER NOTES
"Encounter Date: 2/9/2017       History     Chief Complaint   Patient presents with    Urinary Frequency     "I have been urinating a lot and they say I have a little fever" x 1 week.      Review of patient's allergies indicates:  No Known Allergies  HPI Comments: Patient is a 89-year-old male with past medical history of HTN, congestive heart failure, aortic stenosis s/p TAVR in Aug 2016 who presents the ED with fever, urinary difficulties, and referral to ED from cardiologist.  Patient states that his symptoms started 2 days ago.  He complains of urinary frequency, incontinence, dysuria, and hematuria.  Patient had a fever of 101.3 last night; he has been taking Excedrin and aspirin.  He denies any abdominal plain or flank pain.  Patient states that he saw his cardiologist today, and was told to report to the ED for further evaluation and management for concerns of infection and recent valve replacement.  He denies any diaphoresis, cough, SOB, chest pain, abdominal pain, nausea, vomiting, diarrhea, flank pain, paresthesias, or weakness.    The history is provided by the patient.     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    Stenosis of aortic and mitral valves      aortic DAHLIA 1.1 CM     Past Medical History Pertinent Negatives   Diagnosis Date Noted    Amblyopia 4/4/2014    Diabetes mellitus 4/4/2014    Diabetic retinopathy 4/4/2014    Glaucoma 4/4/2014    Retinal detachment 4/4/2014    Strabismus 4/4/2014    Uveitis 4/4/2014     Past Surgical History   Procedure Laterality Date    Knee surgery       Bilateral    Total hip arthroplasty       Bilaterial    Cararact  car    Cataract extraction bilateral w/ anterior vitrectomy       bilaterial    Cholecystectomy      Appendectomy      Eye surgery      Joint " replacement      Cataract extraction w/  intraocular lens implant Bilateral     Cardiac catheterization      Cardiac valve surgery       Family History   Problem Relation Age of Onset    No Known Problems Father     No Known Problems Mother     No Known Problems Sister     No Known Problems Brother     No Known Problems Maternal Aunt     No Known Problems Maternal Uncle     No Known Problems Paternal Aunt     No Known Problems Paternal Uncle     No Known Problems Maternal Grandmother     No Known Problems Maternal Grandfather     No Known Problems Paternal Grandmother     No Known Problems Paternal Grandfather     Amblyopia Neg Hx     Blindness Neg Hx     Cancer Neg Hx     Cataracts Neg Hx     Diabetes Neg Hx     Glaucoma Neg Hx     Hypertension Neg Hx     Macular degeneration Neg Hx     Retinal detachment Neg Hx     Strabismus Neg Hx     Stroke Neg Hx     Thyroid disease Neg Hx     Anemia Neg Hx     Arrhythmia Neg Hx     Asthma Neg Hx     Clotting disorder Neg Hx     Fainting Neg Hx     Heart attack Neg Hx     Heart disease Neg Hx     Heart failure Neg Hx     Hyperlipidemia Neg Hx     Atrial Septal Defect Neg Hx      Social History   Substance Use Topics    Smoking status: Never Smoker    Smokeless tobacco: Never Used    Alcohol use Yes      Comment: occasional     Review of Systems   Constitutional: Positive for chills and fever.   HENT: Negative for ear pain and sore throat.    Respiratory: Negative for cough and shortness of breath.         RUDOLPH at baseline   Cardiovascular: Negative for chest pain, palpitations and leg swelling.   Gastrointestinal: Negative for abdominal pain.   Endocrine: Negative for polyphagia.   Genitourinary: Positive for difficulty urinating, dysuria, frequency, hematuria and urgency. Negative for flank pain.   Musculoskeletal: Negative for back pain and neck pain.   Skin: Negative for pallor, rash and wound.   Neurological: Negative for weakness,  light-headedness, numbness and headaches.       Physical Exam   Initial Vitals   BP Pulse Resp Temp SpO2   02/09/17 1038 02/09/17 1038 02/09/17 1038 02/09/17 1038 02/09/17 1038   125/60 89 16 98.8 °F (37.1 °C) 96 %     Physical Exam    Nursing note and vitals reviewed.  Constitutional: He appears well-developed and well-nourished.   HENT:   Head: Normocephalic and atraumatic.   Nose: Nose normal.   Eyes: Conjunctivae and EOM are normal.   Neck: Normal range of motion.   Cardiovascular: Normal rate, regular rhythm and intact distal pulses. Exam reveals no friction rub.    Murmur heard.  Pulmonary/Chest: Breath sounds normal. No respiratory distress. He has no wheezes. He has no rales.   Abdominal: Soft. Bowel sounds are normal. He exhibits no distension. There is no tenderness.   Musculoskeletal: Normal range of motion.   Neurological: He is alert and oriented to person, place, and time. He has normal strength. No sensory deficit.   Skin: Skin is warm and dry. No erythema.   Psychiatric: He has a normal mood and affect. His behavior is normal. Judgment and thought content normal.         ED Course   Procedures  Labs Reviewed   CULTURE, BLOOD   CULTURE, BLOOD   URINALYSIS   CBC W/ AUTO DIFFERENTIAL   COMPREHENSIVE METABOLIC PANEL   LACTIC ACID, PLASMA             Medical Decision Making:   History:   Old Medical Records: I decided to obtain old medical records.  Clinical Tests:   Lab Tests: Ordered and Reviewed  Radiological Study: Ordered and Reviewed    Imaging Results         X-Ray Chest PA And Lateral (Final result) Result time:  02/09/17 13:50:20    Final result by Deidre Rahman Jr., MD (02/09/17 13:50:20)    Impression:     No acute abnormality.      Electronically signed by: DEIDRE RAHMAN MD  Date:     02/09/17  Time:    13:50     Narrative:    Chest 2 views compared to May 2009.  Heart size and pulmonary vascularity is similar.  The lungs are fairly well aerated.  No significant pleural fluid.  Aortic valve  replacement noted.                 APC / Resident Notes:   Patient is a 89-year-old male with past medical history of HTN, congestive heart failure, aortic stenosis s/p TAVR in Aug 2016 who presents the ED with fever, urinary difficulties, and referral to ED from cardiologist.  Temperature of 98.8.  Regular rate and rhythm.  Murmur heard.  Abdomen soft, nontender nondistended.  DDX includes but is not limited to UTI and pyelonephritis.  Will order labs and imaging, blood culture, chest x-ray, and reassess.    UA shows positive nitrite, 3+ leukocytes, and 2+ blood.  12 RBCs, > 100 WBCs, and moderate bacteria on microscopy.  Urine culture pending.  Lactic acid WNL at 1.9.  CBC with leukocytosis of 27.54 with a left shift at 78%. H/H at 12.7/37.2.  CMP with no significant electrolyte abnormalities.  Creatinine of 1.7; last creatinine 4 months ago was 1.4.  Chest x-ray shows no acute abnormality.    1g of Rocephin given here.  Patient will be placed in observation with hospital medicine  for further management and evaluationfor of UTI and ALICIA.              ED Course     Clinical Impression:   The primary encounter diagnosis was Urinary tract infection with hematuria, site unspecified. Diagnoses of Fever, ALICIA (acute kidney injury), and Leukocytosis, unspecified type were also pertinent to this visit.    Disposition:   Disposition: Placed in Observation  Condition: Stable       Ksenia Hoang PA-C  02/09/17 1053

## 2017-02-09 NOTE — ASSESSMENT & PLAN NOTE
BPH with urinary obstruction  Sepsis  UA >100 WBC, WBC 27k, fever at home, and mild tachycardia        - abx and f/u culture  - bladder scan in case pt has obstruction and needs blevins

## 2017-02-10 VITALS
DIASTOLIC BLOOD PRESSURE: 70 MMHG | SYSTOLIC BLOOD PRESSURE: 144 MMHG | TEMPERATURE: 99 F | RESPIRATION RATE: 18 BRPM | WEIGHT: 184.75 LBS | BODY MASS INDEX: 29.69 KG/M2 | HEART RATE: 89 BPM | HEIGHT: 66 IN | OXYGEN SATURATION: 95 %

## 2017-02-10 PROBLEM — E83.39 HYPOPHOSPHATEMIA: Status: ACTIVE | Noted: 2017-02-10

## 2017-02-10 LAB
ALBUMIN SERPL BCP-MCNC: 2.9 G/DL
ALP SERPL-CCNC: 84 U/L
ALT SERPL W/O P-5'-P-CCNC: 24 U/L
ANION GAP SERPL CALC-SCNC: 6 MMOL/L
ANISOCYTOSIS BLD QL SMEAR: SLIGHT
AST SERPL-CCNC: 35 U/L
BASOPHILS # BLD AUTO: ABNORMAL K/UL
BASOPHILS NFR BLD: 0 %
BILIRUB SERPL-MCNC: 1.8 MG/DL
BUN SERPL-MCNC: 27 MG/DL
CALCIUM SERPL-MCNC: 8.5 MG/DL
CHLORIDE SERPL-SCNC: 103 MMOL/L
CO2 SERPL-SCNC: 27 MMOL/L
CREAT SERPL-MCNC: 1.6 MG/DL
DIFFERENTIAL METHOD: ABNORMAL
EOSINOPHIL # BLD AUTO: ABNORMAL K/UL
EOSINOPHIL NFR BLD: 1 %
ERYTHROCYTE [DISTWIDTH] IN BLOOD BY AUTOMATED COUNT: 13.1 %
EST. GFR  (AFRICAN AMERICAN): 43.5 ML/MIN/1.73 M^2
EST. GFR  (NON AFRICAN AMERICAN): 37.6 ML/MIN/1.73 M^2
GLUCOSE SERPL-MCNC: 96 MG/DL
HCT VFR BLD AUTO: 33.4 %
HGB BLD-MCNC: 11.2 G/DL
HYPOCHROMIA BLD QL SMEAR: ABNORMAL
LYMPHOCYTES # BLD AUTO: ABNORMAL K/UL
LYMPHOCYTES NFR BLD: 16 %
MAGNESIUM SERPL-MCNC: 1.7 MG/DL
MCH RBC QN AUTO: 33.1 PG
MCHC RBC AUTO-ENTMCNC: 33.5 %
MCV RBC AUTO: 99 FL
MONOCYTES # BLD AUTO: ABNORMAL K/UL
MONOCYTES NFR BLD: 8 %
MYELOCYTES NFR BLD MANUAL: 1 %
NEUTROPHILS NFR BLD: 74 %
OVALOCYTES BLD QL SMEAR: ABNORMAL
PHOSPHATE SERPL-MCNC: 2 MG/DL
PLATELET # BLD AUTO: 133 K/UL
PMV BLD AUTO: 10.5 FL
POIKILOCYTOSIS BLD QL SMEAR: SLIGHT
POLYCHROMASIA BLD QL SMEAR: ABNORMAL
POTASSIUM SERPL-SCNC: 3.7 MMOL/L
PROT SERPL-MCNC: 6.4 G/DL
RBC # BLD AUTO: 3.38 M/UL
SODIUM SERPL-SCNC: 136 MMOL/L
WBC # BLD AUTO: 19.25 K/UL

## 2017-02-10 PROCEDURE — 83735 ASSAY OF MAGNESIUM: CPT

## 2017-02-10 PROCEDURE — 36415 COLL VENOUS BLD VENIPUNCTURE: CPT

## 2017-02-10 PROCEDURE — 99217 PR OBSERVATION CARE DISCHARGE: CPT | Mod: ,,, | Performed by: HOSPITALIST

## 2017-02-10 PROCEDURE — 25000003 PHARM REV CODE 250: Performed by: HOSPITALIST

## 2017-02-10 PROCEDURE — 85025 COMPLETE CBC W/AUTO DIFF WBC: CPT

## 2017-02-10 PROCEDURE — 84100 ASSAY OF PHOSPHORUS: CPT

## 2017-02-10 PROCEDURE — 11000001 HC ACUTE MED/SURG PRIVATE ROOM

## 2017-02-10 PROCEDURE — 63600175 PHARM REV CODE 636 W HCPCS: Performed by: HOSPITALIST

## 2017-02-10 PROCEDURE — 80053 COMPREHEN METABOLIC PANEL: CPT

## 2017-02-10 RX ORDER — CIPROFLOXACIN 500 MG/1
500 TABLET ORAL 2 TIMES DAILY
Qty: 24 TABLET | Refills: 0 | Status: SHIPPED | OUTPATIENT
Start: 2017-02-10 | End: 2017-02-22

## 2017-02-10 RX ORDER — SODIUM CHLORIDE 9 MG/ML
INJECTION, SOLUTION INTRAVENOUS CONTINUOUS
Status: DISCONTINUED | OUTPATIENT
Start: 2017-02-10 | End: 2017-02-10

## 2017-02-10 RX ADMIN — AMLODIPINE BESYLATE 5 MG: 5 TABLET ORAL at 09:02

## 2017-02-10 RX ADMIN — POTASSIUM PHOSPHATE, MONOBASIC AND POTASSIUM PHOSPHATE, DIBASIC 40 MMOL: 224; 236 INJECTION, SOLUTION, CONCENTRATE INTRAVENOUS at 11:02

## 2017-02-10 RX ADMIN — TAMSULOSIN HYDROCHLORIDE 0.4 MG: 0.4 CAPSULE ORAL at 09:02

## 2017-02-10 RX ADMIN — ASPIRIN 81 MG CHEWABLE TABLET 81 MG: 81 TABLET CHEWABLE at 09:02

## 2017-02-10 RX ADMIN — TORSEMIDE 5 MG: 5 TABLET ORAL at 09:02

## 2017-02-10 RX ADMIN — PANTOPRAZOLE SODIUM 40 MG: 40 TABLET, DELAYED RELEASE ORAL at 09:02

## 2017-02-10 RX ADMIN — SODIUM CHLORIDE: 0.9 INJECTION, SOLUTION INTRAVENOUS at 09:02

## 2017-02-10 RX ADMIN — CEFTRIAXONE 1 G: 1 INJECTION, SOLUTION INTRAVENOUS at 09:02

## 2017-02-10 RX ADMIN — CLOPIDOGREL 75 MG: 75 TABLET, FILM COATED ORAL at 09:02

## 2017-02-10 RX ADMIN — FINASTERIDE 5 MG: 5 TABLET, FILM COATED ORAL at 09:02

## 2017-02-10 NOTE — ASSESSMENT & PLAN NOTE
BPH with urinary obstruction  Sepsis  UA >100 WBC, WBC 27k, fever at home, and mild tachycardia.  After a liter of fluids and IV ceftriaxone he felt better and his UOP improved.  UCx was NGTD at [place].

## 2017-02-10 NOTE — ED NOTES
The following is an abbreviated admit report that is to be used to help guide a smooth transition to inpatient status from the Emergency Department:    Admitting Team: Hospital Med L  Diagnosis for Admit:  UTI with hematuria, fever, ALICIA, leukocytosis  Pertinent History: CHF, HTN, BPH, aortic and mitral stenosis  Situation leading to ED visit: urinary frequency  Focused Assessment: urinary frequency, urgency, alert, oriented, ambulatory, unable to urinate in ED- straight cath urine  Current Vital Signs and Pertinent Trends (include pain scale): 0/10 temp 99.2 HR 88 R 186o2 96% room air /65  Oxygen supplement?: no  Pertinent Lab Values (Glucose & Abnormals): na 135, BUN 24 Creatinine 1.7 bili 1.9, WBC 27.5, H/h 12.7/37.2, urine positive for nitrite, leukocytes, bacteria  Procedures/Tests completed: x-ray  Procedures/Tests outstanding: none  Medications due: 0900; received lovenox and rocephin in ED  IV lines in place and drips running L AC LR 125ml/hr  Code Status: full  Diet: regular  Toileting Status (Incontinent/Gold/Independent): urge incontinence   Ambulation Status: ambulatory    Forms/Consents/Ticket to ride with patient !!!    Emergency Department Direct Line - [phone]  Emergency Nurse SpectraLink #: [id number]

## 2017-02-10 NOTE — PLAN OF CARE
Patient is an 89 year old male admitted through the ER from his Cardiologist's appointment with Acute Cystitis.  Patient treated with antibiotics and is expected to discharge home today with no needs.    Patient lives in a one story home with his wife, Sade, who will care for him and obtain anything he needs after discharge. Follow up appts on AVS.    Future Appointments  Date Time Provider Department Center   2/22/2017 9:00 AM Tiago Borja MD [facility]   2/22/2017 10:00 AM [facility]O [facility] 3 [facility]       PCP  Hieu Monk MD  [address] [facility] [address] [phone]      [facility] [id number] [place] [address] [address] [place] [address]  [address]  [facility] [address]  Phone: [phone] Fax: [phone]      Extended Emergency Contact Information  Primary Emergency Contact: Sade Strickland  Address: [address] [place] [address] [facility]  Home Phone: [phone]  Relation: Spouse       02/10/17 1607   Discharge Assessment   Assessment Type Discharge Planning Assessment   Confirmed/corrected address and phone number on facesheet? Yes   Assessment information obtained from? Patient;Medical Record   Expected Length of Stay (days) 2   Prior to hospitilization cognitive status: Alert/Oriented   Prior to hospitalization functional status: Independent   Current cognitive status: Alert/Oriented   Current Functional Status: Independent   Arrived From home or self-care   Lives With spouse   Able to Return to Prior Arrangements yes   Is patient able to care for self after discharge? Yes   How many people do you have in your home that can help with your care after discharge? 1   Who are your caregiver(s) and their phone number(s)? wife   Patient's perception of discharge disposition home or selfcare   Does the patient currently use HME? No   Equipment Currently Used at  Home shower chair;walker, rolling   Do you have any problems affording any of your prescribed medications? No   Is the patient taking medications as prescribed? yes   Do you have any financial concerns preventing you from receiving the healthcare you need? No   Does the patient have transportation to healthcare appointments? Yes   Transportation Available family or friend will provide   On Dialysis? No   Does the patient receive services at the Coumadin Clinic? No   Are there any open cases? No   Discharge Plan A Home with family   Discharge Plan B Home with family;Home Health   Patient/Family In Agreement With Plan yes

## 2017-02-10 NOTE — SUBJECTIVE & OBJECTIVE
Interval History:   Feels better, urinating better      Review of Systems   Constitutional: Negative for fever.   Genitourinary: Negative for dysuria.     Objective:     Vital Signs (Most Recent):  Temp: 99.1 °F (37.3 °C) (02/10/17 0816)  Pulse: 89 (02/10/17 0816)  Resp: 18 (02/10/17 0816)  BP: (!) 144/70 (02/10/17 0816)  SpO2: 95 % (02/10/17 0816) Vital Signs (24h Range):  Temp:  [98.3 °F (36.8 °C)-99.3 °F (37.4 °C)] 99.1 °F (37.3 °C)  Pulse:  [77-95] 89  Resp:  [16-20] 18  SpO2:  [95 %-98 %] 95 %  BP: (112-144)/(53-87) 144/70     Weight: 83.8 kg (184 lb 11.9 oz)  Body mass index is 29.82 kg/(m^2).    Intake/Output Summary (Last 24 hours) at 02/10/17 1030  Last data filed at 02/10/17 0600   Gross per 24 hour   Intake             1000 ml   Output              375 ml   Net              625 ml      Physical Exam   Constitutional: No distress.   Pulmonary/Chest: No respiratory distress. He has no wheezes. He has no rales.       Significant Labs: All pertinent labs within the past 24 hours have been reviewed.    Significant Imaging: I have reviewed and interpreted all pertinent imaging results/findings within the past 24 hours.

## 2017-02-10 NOTE — PROGRESS NOTES
"Patient arrived on the floor at 2003, patient "dropped off" and no report received for patient.  Will report to house supervisor that transport did not put patient in bed, and that no report was called for patient.  "

## 2017-02-10 NOTE — PROGRESS NOTES
[facility] Hospital Medicine  Progress Note    Patient Name: Randall Strickland Jr.  MRN: [id number]  Patient Class: OP- Observation   Admission Date: 2/9/2017  Length of Stay: 0 days  Attending Physician: Mando Garibay MD  Primary Care Provider: Hieu Monk MD    [facility] Medicine Team: [facility] MED  Mando Garibay MD    Subjective:     Principal Problem:Acute cystitis with hematuria    HPI:   Patient is a 89-year-old male with past medical history of HTN, congestive heart, aortic stenosis status post TAVR in Aug 2016 who presents the ED with fever and urinary difficulties and referral to ED from cardiologist.  Patient states that his symptoms started 2 days ago.  Initially he had increased urinary frequency with incontinence, then had dysuria and some spotty hematuria.  Last day his voiding decreased a lot  Patient had a fever of 101.3 last night.    He has been taking Excedrin and aspirin.  He denies any abdominal plain or flank pain.  He denies any diaphoresis, cough, SOB, chest pain, abdominal pain, nausea, vomiting, diarrhea, flank pain, paresthesias, or weakness.     Patient states that he saw his cardiologist today, and was told to report to the ED for further evaluation and management.       Hospital Course:  See a/p    Interval History:   Feels better, urinating better      Review of Systems   Constitutional: Negative for fever.   Genitourinary: Negative for dysuria.     Objective:     Vital Signs (Most Recent):  Temp: 99.1 °F (37.3 °C) (02/10/17 0816)  Pulse: 89 (02/10/17 0816)  Resp: 18 (02/10/17 0816)  BP: (!) 144/70 (02/10/17 0816)  SpO2: 95 % (02/10/17 0816) Vital Signs (24h Range):  Temp:  [98.3 °F (36.8 °C)-99.3 °F (37.4 °C)] 99.1 °F (37.3 °C)  Pulse:  [77-95] 89  Resp:  [16-20] 18  SpO2:  [95 %-98 %] 95 %  BP: (112-144)/(53-87) 144/70     Weight: 83.8 kg (184 lb 11.9 oz)  Body mass index is 29.82 kg/(m^2).    Intake/Output Summary (Last 24 hours) at 02/10/17 1030  Last  data filed at 02/10/17 0600   Gross per 24 hour   Intake             1000 ml   Output              375 ml   Net              625 ml      Physical Exam   Constitutional: No distress.   Pulmonary/Chest: No respiratory distress. He has no wheezes. He has no rales.       Significant Labs: All pertinent labs within the past 24 hours have been reviewed.    Significant Imaging: I have reviewed and interpreted all pertinent imaging results/findings within the past 24 hours.    Assessment/Plan:      * Acute cystitis with hematuria  BPH with urinary obstruction  Sepsis  UA >100 WBC, WBC 27k, fever at home, and mild tachycardia.  After a liter of fluids and IV ceftriaxone he felt better and his UOP improved.  UCx was NGTD at [place].          Chronic kidney disease, stage III (moderate)  Volume depletion  Cr 1.4 -> 1.7 -> 1.6 after LR for one liter      Hypertension, essential  -- acceptable; continue with current treatment & monitor       Chronic diastolic congestive heart failure  -- chronic and stable; continue with home treatments & monitor       Hypophosphatemia  Replaced at IN      VTE Risk Mitigation         Ordered     enoxaparin injection 30 mg  Daily     Route:  Subcutaneous        02/09/17 1706     Medium Risk of VTE  Once      02/09/17 1706          Mando Garibay MD  Department of Hospital Medicine   [facility]

## 2017-02-10 NOTE — ASSESSMENT & PLAN NOTE
BPH with urinary obstruction  Sepsis  UA >100 WBC, WBC 27k, fever at home, and mild tachycardia.  After a liter of fluids and IV ceftriaxone he felt better and his UOP improved.  UCx was NGTD at OR.

## 2017-02-10 NOTE — DISCHARGE SUMMARY
[facility] Hospital Medicine  Discharge Summary      Patient Name: Randall Strickland Jr.  MRN: [id number]  Admission Date: 2/9/2017  Hospital Length of Stay: 0 days  Discharge Date and Time:  02/10/2017 10:34 AM  Attending Physician: Mando Garibay MD   Discharging Provider: Mando Garibay MD  Primary Care Provider: Hieu Monk MD  [facility] Medicine Team: [facility] MED  Mando Garibay MD    HPI:    Patient is a 89-year-old male with past medical history of HTN, congestive heart, aortic stenosis status post TAVR in Aug 2016 who presents the ED with fever and urinary difficulties and referral to ED from cardiologist.  Patient states that his symptoms started 2 days ago.  Initially he had increased urinary frequency with incontinence, then had dysuria and some spotty hematuria.  Last day his voiding decreased a lot  Patient had a fever of 101.3 last night.    He has been taking Excedrin and aspirin.  He denies any abdominal plain or flank pain.  He denies any diaphoresis, cough, SOB, chest pain, abdominal pain, nausea, vomiting, diarrhea, flank pain, paresthesias, or weakness.     Patient states that he saw his cardiologist today, and was told to report to the ED for further evaluation and management.       * No surgery found *      Indwelling Lines/Drains at time of discharge:   Lines/Drains/Airways          No matching active lines, drains, or airways        Hospital Course:   See a/p     Consults:         Pending Diagnostic Studies:     None        Final Active Diagnoses:    Diagnosis Date Noted POA    PRINCIPAL PROBLEM:  Acute cystitis with hematuria [N30.01] 02/09/2017 Yes    Sepsis [A41.9] 02/09/2017 Yes    BPH with urinary obstruction [N40.1, N13.8] 09/30/2013 Yes    Chronic kidney disease, stage III (moderate) [N18.3] 02/09/2017 Yes    Volume depletion [E86.9] 02/09/2017 Yes    Hypophosphatemia [E83.39] 02/10/2017 Yes    Chronic diastolic congestive heart failure [I50.32]  04/07/2016 Yes    Hypertension, essential [I10] 10/19/2015 Yes      Problems Resolved During this Admission:    Diagnosis Date Noted Date Resolved POA      * Acute cystitis with hematuria  BPH with urinary obstruction  Sepsis  UA >100 WBC, WBC 27k, fever at home, and mild tachycardia.  After a liter of fluids and IV ceftriaxone he felt better and his UOP improved.  UCx was NGTD at [place].          Chronic kidney disease, stage III (moderate)  Volume depletion  Cr 1.4 -> 1.7 -> 1.6 after LR for one liter      Hypertension, essential  -- acceptable; continue with current treatment & monitor       Chronic diastolic congestive heart failure  -- chronic and stable; continue with home treatments & monitor       Hypophosphatemia  Replaced at [place]        Discharged Condition: good    Disposition: Home or Self Care    Follow Up:  Follow-up Information     Schedule an appointment as soon as possible for a visit with Hieu Monk MD.    Specialty:  Cardiology    Contact information:    [id number]  [facility] [address]  [phone]          Patient Instructions:     Diet general     Activity as tolerated     Call MD for:  increased confusion or weakness     Call MD for:  persistent dizziness, light-headedness, or visual disturbances     Call MD for:  worsening rash     Call MD for:  severe persistent headache     Call MD for:  difficulty breathing or increased cough     Call MD for:  redness, tenderness, or signs of infection (pain, swelling, redness, odor or green/yellow discharge around incision site)     Call MD for:  severe uncontrolled pain     Call MD for:  persistent nausea and vomiting or diarrhea     Call MD for:  temperature >100.4       Medications:  Reconciled Home Medications:   Current Discharge Medication List      START taking these medications    Details   ciprofloxacin HCl (CIPRO) 500 MG tablet Take 1 tablet (500 mg total) by mouth 2 (two) times daily.  Qty: 24 tablet, Refills: 0         CONTINUE  these medications which have NOT CHANGED    Details   amlodipine (NORVASC) 5 MG tablet Take 1 tablet (5 mg total) by mouth once daily.  Qty: 30 tablet, Refills: 6      aspirin 81 MG Chew Take 81 mg by mouth once daily.        CHOLESTYRAMINE LIGHT 4 gram packet DISSOLVE 1 PACKET IN LIQUID AND DRINK TWICE DAILY  Qty: 60 packet, Refills: 0      clopidogrel (PLAVIX) 75 mg tablet Take 1 tablet (75 mg total) by mouth once daily. Take 4 tablets the night before the procedure, then 1 tab PO daily.  Qty: 30 tablet, Refills: 11      finasteride (PROSCAR) 5 mg tablet Take 1 tablet (5 mg total) by mouth once daily.  Qty: 90 tablet, Refills: 3      gabapentin (NEURONTIN) 100 MG capsule TAKE 1 CAPSULE BY MOUTH EVERY EVENING  Qty: 90 capsule, Refills: 0      ipratropium (ATROVENT) 0.03 % nasal spray USE 1 TO 2 SPRAYS IN EACH NOSTRIL TWICE DAILY AS NEEDED  Qty: 30 mL, Refills: 0    Associated Diagnoses: Vasomotor rhinitis      omeprazole (PRILOSEC) 40 MG capsule TAKE 1 CAPSULE(40 MG) BY MOUTH EVERY MORNING  Qty: 30 capsule, Refills: 0      tamsulosin (FLOMAX) 0.4 mg Cp24 TAKE ONE CAPSULE BY MOUTH DAILY  Qty: 90 capsule, Refills: 0      torsemide (DEMADEX) 5 MG Tab Take 1 tablet (5 mg total) by mouth once daily.  Qty: 30 tablet, Refills: 3    Associated Diagnoses: Nonrheumatic aortic valve stenosis; Chronic diastolic congestive heart failure      trospium (SANCTURA) 20 mg Tab tablet Take 1 tablet (20 mg total) by mouth 2 (two) times daily.  Qty: 60 tablet, Refills: 11           Time spent on the discharge of patient: 33 minutes    Mando Garibay MD  Department of Hospital Medicine  [facility]

## 2017-02-12 ENCOUNTER — PATIENT OUTREACH (OUTPATIENT)
Dept: ADMINISTRATIVE | Facility: CLINIC | Age: 82
End: 2017-02-12
Payer: MEDICARE

## 2017-02-12 LAB — BACTERIA UR CULT: NORMAL

## 2017-02-12 RX ORDER — CHOLESTYRAMINE LIGHT 4 G/5.7G
POWDER, FOR SUSPENSION ORAL
Qty: 60 PACKET | Refills: 2 | Status: SHIPPED | OUTPATIENT
Start: 2017-02-12 | End: 2017-08-31 | Stop reason: SDUPTHER

## 2017-02-12 NOTE — PROGRESS NOTES
Unable to schedule HOSFU appt with Dr. Monk, Informed pt that the appt. on 2/22/17 @ 9:00 hrs  in d/c materials is NOT for Dr. Monk but for Dr. Borja in [facility] IM and is an est. pt visit.  Plan to send message to the cardiology staff re: need for HOSFU appt.

## 2017-02-12 NOTE — PATIENT INSTRUCTIONS
Hematuria: Possible Causes     Many things can lead to blood in the urine (hematuria). The blood may be seen with the eye. Or it may only be seen when the urine is looked at under a microscope. Some of the most common causes of blood in the urine are listed below. Often, no cause for the blood can be found. This is called idiopathic hematuria.  · Kidney or bladder stones are collections of crystals. They form in the urine. Stones may be found anywhere in the urinary tract. But they form most often in the kidneys or bladder. In addition to blood in the urine, they can cause severe pain.  · BPH stands for benign prostatic hyperplasia. It is enlargement of the prostate gland. It happens as men age. BPH often causes problems with urination. It sometimes causes blood in the urine.  · A urinary tract infection (UTI) is due to bacteria growing in the urinary tract. It can cause blood in the urine. Other symptoms include burning or pain with urination. You may need to urinate often or urgently. You may also have a fever.  · Damage to the urinary tract may cause blood in the urine. This damage may be due to a blow or accident. It may also result from the use of a urinary catheter. Very hard exercise may sometimes irritate the urinary tract and cause bleeding.  · Cancer may occur anywhere in the urinary tract. A tumor may sometimes cause no symptoms other than bleeding.     Other possible causes of bleeding include:  · Prostatitis (infection of the prostate gland)  · Taking anticoagulant medications  · Blockage in the urinary tract  · Disease or inflammation of the kidney  · Cystic diseases of the kidneys  · Sickle cell anemia  · Tumors of the urinary tract  Date Last Reviewed: 9/12/2014  © 8263-8042 [employer]. [address] [place] [address], [place] [address]. All rights reserved. This information is not intended as a substitute for professional medical care. Always follow your healthcare professional's  instructions.        Blood in the Urine    Blood in the urine (hematuria) has many possible causes. If it occurs after an injury (such as a car accident or fall), it is most often a sign of bruising to the kidney or bladder. Common causes of blood in the urine include urinary tract infections, kidney stones, inflammation, tumors, or certain other diseases of the kidney or bladder. Menstruation can cause blood to appear in the urine sample, although it is not coming from the urinary tract.  If only a trace amount of blood is present, it will show up on the urine test, even though the urine may be yellow and not pink or red. This may occur with any of the above conditions, as well as heavy exercise or high fever. In this case, your doctor may want to repeat the urine test on another day. This will show if the blood is still present. If it is, then other tests can be done to find out the cause.  Home care  Follow these home care guidelines:  · If your urine does not appear bloody (pink, brown or red) then you do not need to restrict your activity in any way.  · If you can see blood in your urine, rest and avoid heavy exertion until your next exam. Do not use aspirin, blood thinners, or anti-platelet or anti-inflammatory medicines. These include ibuprofen and naproxen. These thin the blood and may increase bleeding.  Follow-up care  Follow up with your healthcare provider, or as advised. If you were injured and had blood in your urine, you should have a repeat urine test in 1 to 2 days. Contact your doctor for this test.  A radiologist will review any X-rays that were taken. You will be told of any new findings that may affect your care.  When to seek medical advice  Call your healthcare provider right away if any of these occur:  · Bright red blood or blood clots in the urine (if you did not have this before)  · Weakness, dizziness or fainting  · New groin, abdominal, or back pain  · Fever of 100.4ºF (38ºC) or higher,  or as directed by your healthcare provider  · Repeated vomiting  · Bleeding from the nose or gums or easy bruising  Date Last Reviewed: 9/1/2016 © 2000-2016 [employer]. [address] [place] [address], [place] [address]. All rights reserved. This information is not intended as a substitute for professional medical care. Always follow your healthcare professional's instructions.

## 2017-02-14 LAB
BACTERIA BLD CULT: NORMAL
BACTERIA BLD CULT: NORMAL

## 2017-02-22 ENCOUNTER — OFFICE VISIT (OUTPATIENT)
Dept: INTERNAL MEDICINE | Facility: CLINIC | Age: 82
End: 2017-02-22
Payer: MEDICARE

## 2017-02-22 VITALS
OXYGEN SATURATION: 97 % | DIASTOLIC BLOOD PRESSURE: 50 MMHG | SYSTOLIC BLOOD PRESSURE: 112 MMHG | HEIGHT: 66 IN | WEIGHT: 184.94 LBS | TEMPERATURE: 98 F | HEART RATE: 106 BPM | BODY MASS INDEX: 29.72 KG/M2

## 2017-02-22 VITALS
HEIGHT: 66 IN | HEART RATE: 106 BPM | BODY MASS INDEX: 29.72 KG/M2 | SYSTOLIC BLOOD PRESSURE: 112 MMHG | WEIGHT: 184.94 LBS | DIASTOLIC BLOOD PRESSURE: 50 MMHG

## 2017-02-22 DIAGNOSIS — I10 HYPERTENSION, ESSENTIAL: ICD-10-CM

## 2017-02-22 DIAGNOSIS — I42.9 CARDIOMYOPATHY, IDIOPATHIC: ICD-10-CM

## 2017-02-22 DIAGNOSIS — N39.0 URINARY TRACT INFECTION WITHOUT HEMATURIA, SITE UNSPECIFIED: ICD-10-CM

## 2017-02-22 DIAGNOSIS — Z95.2 S/P AORTIC VALVE REPLACEMENT: ICD-10-CM

## 2017-02-22 DIAGNOSIS — R06.09 DOE (DYSPNEA ON EXERTION): ICD-10-CM

## 2017-02-22 DIAGNOSIS — I35.0 NONRHEUMATIC AORTIC VALVE STENOSIS: ICD-10-CM

## 2017-02-22 DIAGNOSIS — E46 HYPOALBUMINEMIA DUE TO PROTEIN-CALORIE MALNUTRITION: ICD-10-CM

## 2017-02-22 DIAGNOSIS — M47.816 OSTEOARTHRITIS OF LUMBAR SPINE, UNSPECIFIED SPINAL OSTEOARTHRITIS COMPLICATION STATUS: ICD-10-CM

## 2017-02-22 DIAGNOSIS — M54.50 CHRONIC LOW BACK PAIN WITHOUT SCIATICA, UNSPECIFIED BACK PAIN LATERALITY: ICD-10-CM

## 2017-02-22 DIAGNOSIS — I50.32 CHRONIC DIASTOLIC CONGESTIVE HEART FAILURE: ICD-10-CM

## 2017-02-22 DIAGNOSIS — I77.9 BILATERAL CAROTID ARTERY DISEASE: ICD-10-CM

## 2017-02-22 DIAGNOSIS — R41.3 MEMORY LOSS: ICD-10-CM

## 2017-02-22 DIAGNOSIS — M19.019 OSTEOARTHRITIS OF SHOULDER, UNSPECIFIED LATERALITY, UNSPECIFIED OSTEOARTHRITIS TYPE: ICD-10-CM

## 2017-02-22 DIAGNOSIS — E88.09 HYPOALBUMINEMIA DUE TO PROTEIN-CALORIE MALNUTRITION: ICD-10-CM

## 2017-02-22 DIAGNOSIS — N18.30 CHRONIC KIDNEY DISEASE, STAGE III (MODERATE): ICD-10-CM

## 2017-02-22 DIAGNOSIS — M54.50 CHRONIC BILATERAL LOW BACK PAIN WITHOUT SCIATICA: Primary | ICD-10-CM

## 2017-02-22 DIAGNOSIS — G89.29 CHRONIC BILATERAL LOW BACK PAIN WITHOUT SCIATICA: Primary | ICD-10-CM

## 2017-02-22 DIAGNOSIS — G89.29 CHRONIC LOW BACK PAIN WITHOUT SCIATICA, UNSPECIFIED BACK PAIN LATERALITY: ICD-10-CM

## 2017-02-22 DIAGNOSIS — Z00.00 ENCOUNTER FOR PREVENTIVE HEALTH EXAMINATION: Primary | ICD-10-CM

## 2017-02-22 DIAGNOSIS — I70.0 AORTIC ATHEROSCLEROSIS: ICD-10-CM

## 2017-02-22 PROBLEM — N30.01 ACUTE CYSTITIS WITH HEMATURIA: Status: RESOLVED | Noted: 2017-02-09 | Resolved: 2017-02-22

## 2017-02-22 PROBLEM — A41.9 SEPSIS: Status: RESOLVED | Noted: 2017-02-09 | Resolved: 2017-02-22

## 2017-02-22 PROBLEM — E86.9 VOLUME DEPLETION: Status: RESOLVED | Noted: 2017-02-09 | Resolved: 2017-02-22

## 2017-02-22 PROCEDURE — 1157F ADVNC CARE PLAN IN RCRD: CPT | Mod: S$GLB,,, | Performed by: FAMILY MEDICINE

## 2017-02-22 PROCEDURE — 1126F AMNT PAIN NOTED NONE PRSNT: CPT | Mod: S$GLB,,, | Performed by: FAMILY MEDICINE

## 2017-02-22 PROCEDURE — 1159F MED LIST DOCD IN RCRD: CPT | Mod: S$GLB,,, | Performed by: FAMILY MEDICINE

## 2017-02-22 PROCEDURE — 99499 UNLISTED E&M SERVICE: CPT | Mod: S$GLB,,, | Performed by: NURSE PRACTITIONER

## 2017-02-22 PROCEDURE — 99999 PR PBB SHADOW E&M-EST. PATIENT-LVL IV: CPT | Mod: PBBFAC,,, | Performed by: NURSE PRACTITIONER

## 2017-02-22 PROCEDURE — 99999 PR PBB SHADOW E&M-EST. PATIENT-LVL III: CPT | Mod: PBBFAC,,, | Performed by: FAMILY MEDICINE

## 2017-02-22 PROCEDURE — 1160F RVW MEDS BY RX/DR IN RCRD: CPT | Mod: S$GLB,,, | Performed by: FAMILY MEDICINE

## 2017-02-22 PROCEDURE — G0439 PPPS, SUBSEQ VISIT: HCPCS | Mod: S$GLB,,, | Performed by: NURSE PRACTITIONER

## 2017-02-22 PROCEDURE — 99214 OFFICE O/P EST MOD 30 MIN: CPT | Mod: S$GLB,,, | Performed by: FAMILY MEDICINE

## 2017-02-22 PROCEDURE — 99499 UNLISTED E&M SERVICE: CPT | Mod: S$GLB,,, | Performed by: FAMILY MEDICINE

## 2017-02-22 NOTE — PATIENT INSTRUCTIONS
Counseling and Referral of Other Preventative  (Italic type indicates deductible and co-insurance are waived)    Patient Name: Randall Strickland  Today's Date: 2/22/2017      SERVICE LIMITATIONS RECOMMENDATION    Vaccines    · Pneumococcal (once after 65)    · Influenza (annually)    · Hepatitis B (if medium/high risk)    · Prevnar 13      Hepatitis B medium/high risk factors:       - End-stage renal disease       - Hemophiliacs who received Factor VII or         IX concentrates       - Clients of institutions for the mentally             retarded       - Persons who live in the same house as          a HepB carrier       - Homosexual men       - Illicit injectable drug abusers     Pneumococcal: Done, no repeat necessary     Influenza: Done, repeat in one year     Hepatitis B: N/A Not indicated     Prevnar 13: N/A Done, no repeat necessary    Prostate cancer screening (annually to age 75)     Prostate specific antigen (PSA) Shared decision making with Provider. Sometimes a co-pay may be required if the patient decides to have this test. The USPSTF no longer recommends prostate cancer screening routinely in medicine: not to follow    Colorectal cancer screening (to age 75)    · Fecal occult blood test (annual)  · Flexible sigmoidoscopy (5y)  · Screening colonoscopy (10y)  · Barium enema   N/A Not indicated    Diabetes self-management training (no USPSTF recommendations)  Requires referral by treating physician for patient with diabetes or renal disease. 10 hours of initial DSMT sessions of no less than 30 minutes each in a continuous 12-month period. 2 hours of follow-up DSMT in subsequent years.  N/A Not indicated    Glaucoma screening (no USPSTF recommendation)  Diabetes mellitus, family history   , age 50 or over    American, age 65 or over  Last done 4/18/16, recommend to repeat every 1  years    Medical nutrition therapy for diabetes or renal disease (no recommended schedule)  Requires referral  by treating physician for patient with diabetes or renal disease or kidney transplant within the past 3 years.  Can be provided in same year as diabetes self-management training (DSMT), and CMS recommends medical nutrition therapy take place after DSMT. Up to 3 hours for initial year and 2 hours in subsequent years.  N/A Not indicated    Cardiovascular screening blood tests (every 5 years)  · Fasting lipid panel  Order as a panel if possible  Last done 3/11/16, recommend to repeat every 3-5  years    Diabetes screening tests (at least every 3 years, Medicare covers annually or at 6-month intervals for prediabetic patients)  · Fasting blood sugar (FBS) or glucose tolerance test (GTT)  Patient must be diagnosed with one of the following:       - Hypertension       - Dyslipidemia       - Obesity (BMI 30kg/m2)       - Previous elevated impaired FBS or GTT       ... or any two of the following:       - Overweight (BMI 25 but <30)       - Family history of diabetes       - Age 65 or older       - History of gestational diabetes or birth of baby weighing more than 9 pounds  Last done 3/11/16, recommend to repeat every 1  years    Abdominal aortic aneurysm screening (once)  · Sonogram   Limited to patients who meet one of the following criteria:       - Men who are 65-75 years old and have smoked more than 100 cigarette in their lifetime       - Anyone with a family history of abdominal aortic aneurysm       - Anyone recommended for screening by the USPSTF  N/A Not indicated    HIV screening (annually for increased risk patients)  · HIV-1 and HIV-2 by EIA, or PATRICIA, rapid antibody test or oral mucosa transudate  Patients must be at increased risk for HIV infection per USPSTF guidelines or pregnant. Tests covered annually for patient at increased risk or as requested by the patient. Pregnant patients may receive up to 3 tests during pregnancy.  Risks discussed, screening is not recommended    Smoking cessation counseling (up  to 8 sessions per year)  Patients must be asymptomatic of tobacco-related conditions to receive as a preventative service.  Not indicated    Subsequent annual wellness visit  At least 12 months since last AWV  Return in one year     The following information is provided to all patients.  This information is to help you find resources for any of the problems found today that may be affecting your health:                Living healthy guide: www.[facility].[place].[facility]      Understanding Diabetes: www.diabetes.org      Eating healthy: www.cdc.gov/healthyweight      [facility] home safety checklist: www.cdc.gov/steadi/patient.html      Agency on Aging: www.goea.[place].[facility]      Alcoholics anonymous (AA): www.aa.org      Physical Activity: www.reji.nih.gov/xk8iaxz      Tobacco use: www.quitwithusla.org

## 2017-02-22 NOTE — PROGRESS NOTES
Patient, Randall Strickland Jr. (MRN #[id number]), presented with a recent Platelet count less than 150 K/uL consistent with the definition of thrombocytopenia (ICD10 - D69.6).    Platelets   Date Value Ref Range Status   02/10/2017 133 (L) 150 - 350 K/uL Final     The patient's thrombocytopenia was monitored, evaluated, addressed and/or treated. This addendum to the medical record is made on 02/22/2017.

## 2017-02-22 NOTE — PROGRESS NOTES
Subjective:       Patient ID: Randall Strickland Jr. is a 89 y.o. male.    Chief Complaint: Follow-up    HPI  Review of Systems   Constitutional: Positive for fatigue. Negative for chills and fever.   HENT: Negative for congestion and trouble swallowing.    Eyes: Negative for redness.   Respiratory: Positive for shortness of breath. Negative for cough and chest tightness.    Cardiovascular: Negative for chest pain, palpitations and leg swelling.   Gastrointestinal: Negative for abdominal pain and blood in stool.   Genitourinary: Negative for hematuria.   Musculoskeletal: Positive for arthralgias, back pain, neck pain and neck stiffness. Negative for gait problem, joint swelling and myalgias.   Skin: Negative for color change and rash.   Neurological: Negative for tremors, speech difficulty, weakness, numbness and headaches.   Hematological: Negative for adenopathy. Does not bruise/bleed easily.   Psychiatric/Behavioral: Negative for behavioral problems, confusion and sleep disturbance. The patient is not nervous/anxious.        Objective:      Physical Exam   Constitutional: He is oriented to person, place, and time. He appears well-developed and well-nourished. No distress.   Neck: Neck supple.   Pulmonary/Chest: Effort normal.   Musculoskeletal: He exhibits no edema.        Right shoulder: He exhibits normal range of motion, no tenderness and normal strength.        Left shoulder: He exhibits tenderness. He exhibits normal range of motion and normal strength.        Cervical back: He exhibits decreased range of motion. He exhibits no tenderness.        Right lower leg: He exhibits no edema.        Left lower leg: He exhibits no edema.   Neurological: He is alert and oriented to person, place, and time. No sensory deficit. He exhibits normal muscle tone. Coordination abnormal. Gait normal. GCS eye subscore is 4. GCS verbal subscore is 5. GCS motor subscore is 6.   Skin: Skin is warm and dry. No rash noted.   Psychiatric:  He has a normal mood and affect. His behavior is normal. Judgment and thought content normal.   Nursing note and vitals reviewed.      Assessment:       1. Chronic bilateral low back pain without sciatica    2. Hypertension, essential    3. Osteoarthritis of lumbar spine, unspecified spinal osteoarthritis complication status    4. S/P aortic valve replacement    5. Osteoarthritis of shoulder, unspecified laterality, unspecified osteoarthritis type    6. Nonrheumatic aortic valve stenosis    7. Urinary tract infection without hematuria, site unspecified    8. Chronic kidney disease, stage III (moderate)    9. RUDOLPH (dyspnea on exertion)        Plan:   Randall was seen today for follow-up.    Diagnoses and all orders for this visit:    Chronic bilateral low back pain without sciatica  -     Ambulatory referral to Pain Clinic    Hypertension, essential    Osteoarthritis of lumbar spine, unspecified spinal osteoarthritis complication status    S/P aortic valve replacement    Osteoarthritis of shoulder, unspecified laterality, unspecified osteoarthritis type  -     Ambulatory referral to Pain Clinic    Nonrheumatic aortic valve stenosis    Urinary tract infection without hematuria, site unspecified  -     CBC auto differential; Future    Chronic kidney disease, stage III (moderate)  -     Basic metabolic panel; Future    RUDOLPH (dyspnea on exertion)      See meds, orders, follow up, routing and instructions sections of encounter.  An  89-year-old established male patient.  He is in for followup.  He had a   recent UTI and was placed on ciprofloxacin, I asked him to continue that to   completion.  During that hospitalization, he had an elevated white blood cell   count and elevated creatinine, which we will recheck at this time.  The patient notes that he gets very little help from his gabapentin and we will   discontinue that.  He is on low dose, I do not think tapering at this point is   necessary.  He will follow up with me as  indicated, he has consultant   appointments pending.  He is complaining of chronic neck and back pain and we   will refer him back to the Pain Clinic to consider further injections.      LUCINA/HN  dd: 02/22/2017 10:37:22 (CST)  td: 02/23/2017 05:03:15 (CST)  Doc ID   #[id number]  Job ID #[id number]    CC:

## 2017-02-22 NOTE — MR AVS SNAPSHOT
[facility]  [address]  [facility] [address]  Phone: [phone]  Fax: [phone]                  Randall Strickland Jr.   2017 9:00 AM   Office Visit    Description:  Male : 4/3/1927   Provider:  Tiago Borja MD   Department:  [facility]           Reason for Visit     Follow-up           Diagnoses this Visit        Comments    Chronic bilateral low back pain without sciatica    -  Primary     Hypertension, essential         Osteoarthritis of lumbar spine, unspecified spinal osteoarthritis complication status         S/P aortic valve replacement         Osteoarthritis of shoulder, unspecified laterality, unspecified osteoarthritis type         Nonrheumatic aortic valve stenosis         Urinary tract infection without hematuria, site unspecified         Chronic kidney disease, stage III (moderate)         RUDOLPH (dyspnea on exertion)                To Do List           Goals (5 Years of Data)     None      Follow-Up and Disposition     Return in about 6 months (around 2017) for to reassess treatment for condition or medication, Keep appointments with specialty providers..    Follow-up and Disposition History      [facility]     [facility] Nurse Care Line -  Assistance  Registered nurses in the [facility] provide clinical advisement, health education, appointment booking, and other advisory services.  Call for this free service at [phone].             Medications           Message regarding Medications     Verify the changes and/or additions to your medication regime listed below are the same as discussed with your clinician today.  If any of these changes or additions are incorrect, please notify your healthcare provider.        STOP taking these medications     gabapentin (NEURONTIN) 100 MG capsule TAKE 1 CAPSULE BY MOUTH EVERY EVENING           Verify that the below list of medications is an accurate representation  "of the medications you are currently taking.  If none reported, the list may be blank. If incorrect, please contact your healthcare provider. Carry this list with you in case of emergency.           Current Medications     amlodipine (NORVASC) 5 MG tablet Take 1 tablet (5 mg total) by mouth once daily.    aspirin 81 MG Chew Take 81 mg by mouth once daily.      CHOLESTYRAMINE LIGHT 4 gram packet DISSOLVE ONE PACKET IN LIQUID AND DRINK TWICE DAILY    ciprofloxacin HCl (CIPRO) 500 MG tablet Take 1 tablet (500 mg total) by mouth 2 (two) times daily.    clopidogrel (PLAVIX) 75 mg tablet Take 1 tablet (75 mg total) by mouth once daily. Take 4 tablets the night before the procedure, then 1 tab PO daily.    finasteride (PROSCAR) 5 mg tablet Take 1 tablet (5 mg total) by mouth once daily.    ipratropium (ATROVENT) 0.03 % nasal spray USE 1 TO 2 SPRAYS IN EACH NOSTRIL TWICE DAILY AS NEEDED    omeprazole (PRILOSEC) 40 MG capsule TAKE 1 CAPSULE(40 MG) BY MOUTH EVERY MORNING    tamsulosin (FLOMAX) 0.4 mg Cp24 TAKE ONE CAPSULE BY MOUTH DAILY    torsemide (DEMADEX) 5 MG Tab Take 1 tablet (5 mg total) by mouth once daily.    trospium (SANCTURA) 20 mg Tab tablet Take 1 tablet (20 mg total) by mouth 2 (two) times daily.           Clinical Reference Information           Your Vitals Were     BP Pulse Temp Height Weight SpO2    112/50 106 97.7 °F (36.5 °C) 5' 6" (1.676 m) 83.9 kg (184 lb 15.5 oz) 97%    BMI                29.85 kg/m2          Blood Pressure          Most Recent Value    BP  (!)  112/50      Allergies as of 2/22/2017     No Known Allergies      Immunizations Administered on Date of Encounter - 2/22/2017     None      Orders Placed During Today's Visit      Normal Orders This Visit    Ambulatory referral to Pain Clinic     Future Labs/Procedures Expected by Expires    Basic metabolic panel  2/22/2017 2/22/2018    CBC auto differential  2/22/2017 2/22/2018      [facility] Sign-Up     Activating your [facility] account is as " easy as 1-2-3!     1) Visit [email or web address], select Sign Up Now, enter this activation code and your date of birth, then select Next.  [email or web address]  Expires: 3/26/2017  9:28 AM      2) Create a username and password to use when you visit [facility] in the future and select a security question in case you lose your password and select Next.    3) Enter your e-mail address and click Sign Up!    Additional Information  If you have questions, please e-mail "[email or web address] or call [phone] to talk to our [facility] staff. Remember, [facility] is NOT to be used for urgent needs. For medical emergencies, dial 911.         Language Assistance Services     ATTENTION: Language assistance services are available, free of charge. Please call [phone].      ATENCIÓN: Si habla español, tiene a clayton disposición servicios gratuitos de asistencia lingüística. Llame al [phone].     CHÚ Ý: N?u b?n nói Ti?ng Vi?t, có các d?ch v? h? tr? ngôn ng? mi?n phí dành cho b?n. G?i s? [phone].         [facility] complies with applicable Federal civil rights laws and does not discriminate on the basis of race, color, national origin, age, disability, or sex.

## 2017-02-22 NOTE — Clinical Note
Here for HRA today after seeing you. Mini-Cog score 2. He reported increasing memory problems. Stated he go lost driving here today - he said it was the first time this had happened. thanks

## 2017-02-22 NOTE — PROGRESS NOTES
"Randall Strickland presented for a  Medicare AWV and comprehensive Health Risk Assessment today. The following components were reviewed and updated:    · Medical history  · Family History  · Social history  · Allergies and Current Medications  · Health Risk Assessment  · Health Maintenance  · Care Team     ** See Completed Assessments for Annual Wellness Visit within the encounter summary.**       The following assessments were completed:  · Living Situation  · CAGE  · Depression Screening  · Timed Get Up and Go  · Whisper Test  · Cognitive Function Screening  ·   ·   ·   · Nutrition Screening  · ADL Screening  · PAQ Screening    Vitals:    02/22/17 1014   BP: (!) 112/50   BP Location: Left arm   Patient Position: Sitting   BP Method: Manual   Pulse: 106   Weight: 83.9 kg (184 lb 15.5 oz)   Height: 5' 6" (1.676 m)     Body mass index is 29.85 kg/(m^2).  Physical Exam   Constitutional: He is oriented to person, place, and time. He appears well-developed.   Musculoskeletal:   Gait antalgic   Neurological: He is alert and oriented to person, place, and time.   Skin: Skin is dry.         Diagnoses and health risks identified today and associated recommendations/orders:    1. Encounter for preventive health examination  Here for Health Risk Assessment. Follow up in one year.    2. Hypertension, essential  Chronic, stable on current medications. Followed by PCP.    3. Nonrheumatic aortic valve stenosis  Stable. S/P AVR. Followed by Cardiology.    4. Chronic diastolic congestive heart failure  Chronic, stable on current medications. Followed by Cardiology.    5. Cardiomyopathy, idiopathic  Chronic, stable on current medications. Followed by Cardiology.    6. Aortic atherosclerosis  Chronic, stable on current medications. Noted on CT abdomen/pelvis 7/01/09. Followed by PCP.    7. Bilateral carotid artery disease  Chronic, stable on current medications. 1-39% stenosis bilaterally noted on Ultrasound 4/01/10. Followed by PCP.    8. " Chronic kidney disease, stage III (moderate)  Chronic, stable. Followed by PCP.    9. Osteoarthritis of lumbar spine, unspecified spinal osteoarthritis complication status  Chronic, stable with prn medication. Followed by PCP.    10. Chronic low back pain without sciatica, unspecified back pain laterality  Chronic, stable with prn medication. Followed by PCP.    11. Hypoalbuminemia due to protein-calorie malnutrition  New onset. Albumin 2.8. Weight stable.  Followed by PCP.    12. Memory loss  New onset. Mini-Cog score 2. Reports increasing memory problems and reports he got lost driving to clinic today. PCP notified.      Provided Randall with a 5-10 year written screening schedule and personal prevention plan. Recommendations were developed using the USPSTF age appropriate recommendations. Education, counseling, and referrals were provided as needed. After Visit Summary printed and given to patient which includes a list of additional screenings\tests needed.    Return in about 6 months (around 8/22/2017).with PCP    Tiffanie Hernandez NP

## 2017-02-23 RX ORDER — OMEPRAZOLE 40 MG/1
CAPSULE, DELAYED RELEASE ORAL
Qty: 30 CAPSULE | Refills: 0 | Status: SHIPPED | OUTPATIENT
Start: 2017-02-23 | End: 2017-03-27 | Stop reason: SDUPTHER

## 2017-03-06 RX ORDER — GABAPENTIN 100 MG/1
CAPSULE ORAL
Qty: 90 CAPSULE | Refills: 0 | Status: SHIPPED | OUTPATIENT
Start: 2017-03-06 | End: 2017-09-25 | Stop reason: DRUGHIGH

## 2017-03-27 RX ORDER — OMEPRAZOLE 40 MG/1
CAPSULE, DELAYED RELEASE ORAL
Qty: 30 CAPSULE | Refills: 0 | Status: SHIPPED | OUTPATIENT
Start: 2017-03-27 | End: 2017-04-23 | Stop reason: SDUPTHER

## 2017-04-24 RX ORDER — OMEPRAZOLE 40 MG/1
CAPSULE, DELAYED RELEASE ORAL
Qty: 30 CAPSULE | Refills: 0 | Status: SHIPPED | OUTPATIENT
Start: 2017-04-24 | End: 2017-05-25 | Stop reason: SDUPTHER

## 2017-05-04 RX ORDER — TAMSULOSIN HYDROCHLORIDE 0.4 MG/1
CAPSULE ORAL
Qty: 90 CAPSULE | Refills: 3 | Status: SHIPPED | OUTPATIENT
Start: 2017-05-04 | End: 2018-05-14 | Stop reason: SDUPTHER

## 2017-05-05 ENCOUNTER — OFFICE VISIT (OUTPATIENT)
Dept: OPTOMETRY | Facility: CLINIC | Age: 82
End: 2017-05-05
Payer: MEDICARE

## 2017-05-05 DIAGNOSIS — H04.123 BILATERAL DRY EYES: Primary | ICD-10-CM

## 2017-05-05 DIAGNOSIS — H35.033 HYPERTENSIVE RETINOPATHY, BILATERAL: ICD-10-CM

## 2017-05-05 DIAGNOSIS — H52.4 MYOPIA WITH ASTIGMATISM AND PRESBYOPIA, BILATERAL: ICD-10-CM

## 2017-05-05 DIAGNOSIS — H52.203 MYOPIA WITH ASTIGMATISM AND PRESBYOPIA, BILATERAL: ICD-10-CM

## 2017-05-05 DIAGNOSIS — H52.13 MYOPIA WITH ASTIGMATISM AND PRESBYOPIA, BILATERAL: ICD-10-CM

## 2017-05-05 PROCEDURE — 99499 UNLISTED E&M SERVICE: CPT | Mod: S$GLB,,, | Performed by: OPTOMETRIST

## 2017-05-05 PROCEDURE — 92015 DETERMINE REFRACTIVE STATE: CPT | Mod: S$GLB,,, | Performed by: OPTOMETRIST

## 2017-05-05 PROCEDURE — 99999 PR PBB SHADOW E&M-EST. PATIENT-LVL II: CPT | Mod: PBBFAC,,, | Performed by: OPTOMETRIST

## 2017-05-05 PROCEDURE — 92014 COMPRE OPH EXAM EST PT 1/>: CPT | Mod: S$GLB,,, | Performed by: OPTOMETRIST

## 2017-05-05 RX ORDER — FLUOROMETHOLONE 1 MG/ML
1 SUSPENSION/ DROPS OPHTHALMIC 3 TIMES DAILY
Qty: 5 ML | Refills: 0 | Status: SHIPPED | OUTPATIENT
Start: 2017-05-05 | End: 2017-05-15

## 2017-05-05 NOTE — PROGRESS NOTES
HPI     Eye Problem    Additional comments: irritation ou           Comments   Pt is here for hypertensive eye exam.  Pt  Complaints that his eyes are extremely dry OU.  Using tears several times a day but still bothersome  (-)Flashes (+)Floaters  (+)Itch, (+)tear, (+)burn, (+)Dryness. (+) OTC Drops unknown   (-)Photophobia  (-)Glare (-)diplopia (-) headaches             Last edited by Bernardo Atwood, OD on 5/5/2017 11:14 AM. (History)        ROS     Negative for: Constitutional, Gastrointestinal, Neurological, Skin,   Genitourinary, Musculoskeletal, HENT, Endocrine, Cardiovascular, Eyes,   Respiratory, Psychiatric, Allergic/Imm, Heme/Lymph    Last edited by Bernardo Atwood, OD on 5/5/2017 10:40 AM. (History)        Assessment /Plan     For exam results, see Encounter Report.    Bilateral dry eyes  -     fluorometholone 0.1% (FML) 0.1 % DrpS; Place 1 drop into both eyes 3 (three) times daily.  Dispense: 5 mL; Refill: 0    Hypertensive retinopathy, bilateral    Myopia with astigmatism and presbyopia, bilateral      1. STart FML drops 1 drop 3x/day x 7-10 days. RTC 1 week if no better, if better can restart art tears 3-4x/day.  2. Monitor condition. Patient to report any changes. RTC 1 year recheck.  3. Spec Rx given. Different lens options discussed with patient. RTC 1 year full exam.

## 2017-05-25 RX ORDER — OMEPRAZOLE 40 MG/1
CAPSULE, DELAYED RELEASE ORAL
Qty: 30 CAPSULE | Refills: 0 | Status: SHIPPED | OUTPATIENT
Start: 2017-05-25 | End: 2017-06-25 | Stop reason: SDUPTHER

## 2017-05-30 ENCOUNTER — OFFICE VISIT (OUTPATIENT)
Dept: INTERNAL MEDICINE | Facility: CLINIC | Age: 82
End: 2017-05-30
Payer: MEDICARE

## 2017-05-30 ENCOUNTER — LAB VISIT (OUTPATIENT)
Dept: LAB | Facility: HOSPITAL | Age: 82
End: 2017-05-30
Attending: FAMILY MEDICINE
Payer: MEDICARE

## 2017-05-30 VITALS
HEART RATE: 76 BPM | DIASTOLIC BLOOD PRESSURE: 62 MMHG | WEIGHT: 182.13 LBS | HEIGHT: 68 IN | SYSTOLIC BLOOD PRESSURE: 123 MMHG | BODY MASS INDEX: 27.6 KG/M2

## 2017-05-30 DIAGNOSIS — R79.89 ABNORMAL COMPLETE BLOOD COUNT: ICD-10-CM

## 2017-05-30 DIAGNOSIS — M54.50 CHRONIC LOW BACK PAIN WITHOUT SCIATICA, UNSPECIFIED BACK PAIN LATERALITY: ICD-10-CM

## 2017-05-30 DIAGNOSIS — Z00.00 ANNUAL PHYSICAL EXAM: Primary | ICD-10-CM

## 2017-05-30 DIAGNOSIS — I50.32 CHRONIC DIASTOLIC CONGESTIVE HEART FAILURE: ICD-10-CM

## 2017-05-30 DIAGNOSIS — I10 HYPERTENSION, ESSENTIAL: ICD-10-CM

## 2017-05-30 DIAGNOSIS — R60.9 EDEMA, UNSPECIFIED TYPE: ICD-10-CM

## 2017-05-30 DIAGNOSIS — E78.5 HYPERLIPIDEMIA, UNSPECIFIED HYPERLIPIDEMIA TYPE: ICD-10-CM

## 2017-05-30 DIAGNOSIS — G89.29 CHRONIC LOW BACK PAIN WITHOUT SCIATICA, UNSPECIFIED BACK PAIN LATERALITY: ICD-10-CM

## 2017-05-30 LAB
ANION GAP SERPL CALC-SCNC: 8 MMOL/L
BUN SERPL-MCNC: 14 MG/DL
CALCIUM SERPL-MCNC: 9.3 MG/DL
CHLORIDE SERPL-SCNC: 106 MMOL/L
CHOLEST/HDLC SERPL: 4.8 {RATIO}
CO2 SERPL-SCNC: 27 MMOL/L
CREAT SERPL-MCNC: 1.2 MG/DL
ERYTHROCYTE [DISTWIDTH] IN BLOOD BY AUTOMATED COUNT: 13.4 %
EST. GFR  (AFRICAN AMERICAN): >60 ML/MIN/1.73 M^2
EST. GFR  (NON AFRICAN AMERICAN): 52.9 ML/MIN/1.73 M^2
GLUCOSE SERPL-MCNC: 87 MG/DL
HCT VFR BLD AUTO: 39 %
HDL/CHOLESTEROL RATIO: 20.6 %
HDLC SERPL-MCNC: 160 MG/DL
HDLC SERPL-MCNC: 33 MG/DL
HGB BLD-MCNC: 12.8 G/DL
LDLC SERPL CALC-MCNC: 86.8 MG/DL
MCH RBC QN AUTO: 33.4 PG
MCHC RBC AUTO-ENTMCNC: 32.8 %
MCV RBC AUTO: 102 FL
NONHDLC SERPL-MCNC: 127 MG/DL
PLATELET # BLD AUTO: 171 K/UL
PMV BLD AUTO: 10.1 FL
POTASSIUM SERPL-SCNC: 4.9 MMOL/L
RBC # BLD AUTO: 3.83 M/UL
SODIUM SERPL-SCNC: 141 MMOL/L
TRIGL SERPL-MCNC: 201 MG/DL
TSH SERPL DL<=0.005 MIU/L-ACNC: 1.68 UIU/ML
WBC # BLD AUTO: 7.02 K/UL

## 2017-05-30 PROCEDURE — 80061 LIPID PANEL: CPT

## 2017-05-30 PROCEDURE — 84443 ASSAY THYROID STIM HORMONE: CPT

## 2017-05-30 PROCEDURE — 36415 COLL VENOUS BLD VENIPUNCTURE: CPT

## 2017-05-30 PROCEDURE — 80048 BASIC METABOLIC PNL TOTAL CA: CPT

## 2017-05-30 PROCEDURE — 85027 COMPLETE CBC AUTOMATED: CPT

## 2017-05-30 PROCEDURE — G0009 ADMIN PNEUMOCOCCAL VACCINE: HCPCS | Mod: S$GLB,,, | Performed by: FAMILY MEDICINE

## 2017-05-30 PROCEDURE — 99999 PR PBB SHADOW E&M-EST. PATIENT-LVL III: CPT | Mod: PBBFAC,,, | Performed by: FAMILY MEDICINE

## 2017-05-30 PROCEDURE — 99499 UNLISTED E&M SERVICE: CPT | Mod: S$GLB,,, | Performed by: FAMILY MEDICINE

## 2017-05-30 PROCEDURE — 90732 PPSV23 VACC 2 YRS+ SUBQ/IM: CPT | Mod: S$GLB,,, | Performed by: FAMILY MEDICINE

## 2017-05-30 PROCEDURE — 99397 PER PM REEVAL EST PAT 65+ YR: CPT | Mod: S$GLB,,, | Performed by: FAMILY MEDICINE

## 2017-05-30 NOTE — PROGRESS NOTES
Subjective:       Patient ID: Randall Strickland Jr. is a 89 y.o. male.    Chief Complaint: Follow-up    Established patient for an annual wellness check/physical exam. No specific complaints, please see ROS.        Review of Systems   Constitutional: Positive for fatigue. Negative for chills and fever.   HENT: Negative for congestion and trouble swallowing.    Eyes: Negative for redness.   Respiratory: Positive for shortness of breath. Negative for cough and chest tightness.    Cardiovascular: Positive for leg swelling. Negative for chest pain and palpitations.   Gastrointestinal: Negative for abdominal pain and blood in stool.   Genitourinary: Negative for hematuria.   Musculoskeletal: Positive for neck pain and neck stiffness. Negative for arthralgias, back pain, gait problem, joint swelling and myalgias.   Skin: Negative for color change and rash.   Neurological: Negative for tremors, speech difficulty, weakness, numbness and headaches.   Hematological: Negative for adenopathy. Does not bruise/bleed easily.   Psychiatric/Behavioral: Negative for behavioral problems, confusion and sleep disturbance. The patient is not nervous/anxious.        Objective:      Physical Exam   Constitutional: He is oriented to person, place, and time. He appears well-developed and well-nourished.   Eyes: No scleral icterus.   Neck: Normal range of motion. Neck supple. No JVD present. Carotid bruit is not present. No tracheal deviation present. No thyromegaly present.   Cardiovascular: Normal rate, regular rhythm and intact distal pulses.  Exam reveals no gallop and no friction rub.    Murmur heard.  Pulmonary/Chest: Effort normal. No respiratory distress. He has decreased breath sounds. He has no wheezes. He has no rales.   Abdominal: Soft. Bowel sounds are normal. He exhibits no distension and no mass. There is no tenderness. There is no rebound and no guarding.   Musculoskeletal: He exhibits edema.   Lymphadenopathy:     He has no  cervical adenopathy.   Neurological: He is alert and oriented to person, place, and time. He displays no tremor. No cranial nerve deficit. Coordination and gait normal.   Skin: Skin is warm and dry. No rash noted. He is not diaphoretic. No erythema.   Psychiatric: He has a normal mood and affect. His behavior is normal. Judgment and thought content normal.   Nursing note and vitals reviewed.      Assessment:       1. Annual physical exam    2. Hypertension, essential    3. Chronic low back pain without sciatica, unspecified back pain laterality    4. Chronic diastolic congestive heart failure    5. Abnormal complete blood count    6. Edema, unspecified type    7. Hyperlipidemia, unspecified hyperlipidemia type        Plan:   Randall was seen today for follow-up.    Diagnoses and all orders for this visit:    Annual physical exam    Hypertension, essential  -     Basic metabolic panel; Future  -     TSH; Future    Chronic low back pain without sciatica, unspecified back pain laterality    Chronic diastolic congestive heart failure    Abnormal complete blood count  -     CBC Without Differential; Future    Edema, unspecified type  -     TSH; Future    Hyperlipidemia, unspecified hyperlipidemia type  -     Lipid panel; Future    Other orders  -     Pneumococcal Polysaccharide Vaccine (23 Valent) (SQ/IM)      See meds, orders, follow up, routing and instructions sections of encounter.  A  89-year-old patient for annual physical examination and followup.    I reviewed my last record.  He was a hospital discharge.  He had abnormal   laboratory.  I ordered  CBC and BMP, but apparently these were not done and he   will cancel that at this point for unclear reasoning.  We will repeat those   laboratories today with others.  The patient complains of dry mouth, postnasal   drip, chronic fatigue, swelling in the legs and dyspnea on exertion with   walking, but not cycling.  His symptoms are slightly worsening.  He does have a    Cardiology followup appointment in approximately one month.  He has no other   acute complaints at this time.    RECOMMENDATIONS:  See orders.  Presumed six-month followup. Keep consultant   visits.  Suggested an OTC Claritin p.r.n.  Consider ENT consult and he sees an   outside pain management specialist, Dr. Euceda.      LUCINA/NELLY  dd: 05/30/2017 17:26:10 (CDT)  td: 05/31/2017 04:38:56 (CDT)  Doc ID   #[id number]  Job ID #[id number]    CC:

## 2017-05-30 NOTE — Clinical Note
Noted on his physical this year, not on a statin - would you like me to add (understanding his advanced age, etc.)??  Thank you. MM

## 2017-06-20 RX ORDER — AMLODIPINE BESYLATE 5 MG/1
TABLET ORAL
Qty: 30 TABLET | Refills: 6 | Status: SHIPPED | OUTPATIENT
Start: 2017-06-20 | End: 2018-01-20 | Stop reason: SDUPTHER

## 2017-06-26 RX ORDER — OMEPRAZOLE 40 MG/1
CAPSULE, DELAYED RELEASE ORAL
Qty: 30 CAPSULE | Refills: 0 | Status: SHIPPED | OUTPATIENT
Start: 2017-06-26 | End: 2017-07-25 | Stop reason: SDUPTHER

## 2017-07-06 ENCOUNTER — OFFICE VISIT (OUTPATIENT)
Dept: OTOLARYNGOLOGY | Facility: CLINIC | Age: 82
End: 2017-07-06
Payer: MEDICARE

## 2017-07-06 VITALS — TEMPERATURE: 98 F | SYSTOLIC BLOOD PRESSURE: 131 MMHG | DIASTOLIC BLOOD PRESSURE: 66 MMHG | HEART RATE: 61 BPM

## 2017-07-06 DIAGNOSIS — Z97.4 WEARS HEARING AID: ICD-10-CM

## 2017-07-06 DIAGNOSIS — R05.9 COUGH: ICD-10-CM

## 2017-07-06 DIAGNOSIS — R09.89 CHRONIC THROAT CLEARING: ICD-10-CM

## 2017-07-06 DIAGNOSIS — J30.0 VASOMOTOR RHINITIS: ICD-10-CM

## 2017-07-06 PROCEDURE — 99213 OFFICE O/P EST LOW 20 MIN: CPT | Mod: 25,S$GLB,, | Performed by: OTOLARYNGOLOGY

## 2017-07-06 PROCEDURE — 69210 REMOVE IMPACTED EAR WAX UNI: CPT | Mod: S$GLB,,, | Performed by: OTOLARYNGOLOGY

## 2017-07-06 PROCEDURE — 1126F AMNT PAIN NOTED NONE PRSNT: CPT | Mod: S$GLB,,, | Performed by: OTOLARYNGOLOGY

## 2017-07-06 PROCEDURE — 1159F MED LIST DOCD IN RCRD: CPT | Mod: S$GLB,,, | Performed by: OTOLARYNGOLOGY

## 2017-07-06 PROCEDURE — 99999 PR PBB SHADOW E&M-EST. PATIENT-LVL III: CPT | Mod: PBBFAC,,, | Performed by: OTOLARYNGOLOGY

## 2017-07-06 RX ORDER — FLUOROMETHOLONE 1 MG/ML
SUSPENSION/ DROPS OPHTHALMIC
Status: ON HOLD | COMMUNITY
Start: 2017-06-27 | End: 2019-12-18 | Stop reason: HOSPADM

## 2017-07-06 RX ORDER — IPRATROPIUM BROMIDE 21 UG/1
SPRAY, METERED NASAL
Qty: 60 ML | Refills: 1 | Status: SHIPPED | OUTPATIENT
Start: 2017-07-06 | End: 2018-12-17

## 2017-07-06 RX ORDER — IPRATROPIUM BROMIDE 21 UG/1
SPRAY, METERED NASAL
Qty: 30 ML | Refills: 1 | Status: SHIPPED | OUTPATIENT
Start: 2017-07-06 | End: 2017-07-06 | Stop reason: SDUPTHER

## 2017-07-06 NOTE — PROGRESS NOTES
CC:Multiple  HPI:Mr. Strickland is a 89-year-old  male who attended the [employer] [employer].   He  c/o anterior rhinorrhea as an ongoing sx. He had been prescribed Atrovent nasal spray for control.  He would like another prescription of this medication.  He also c/o dry mouth and a sensation of swollen lips.  It was suggested to him that the Atrovent may be causing these symptoms.  He weas BTE hearing aids. He requests that his ears be cleaned.  He also c/o cough and chronic throat clearing.    He reminds me that his father was an ENT physician in [facility].   His 58 year old ( former confirmed bachelor) son just got .  He as examined by Dr. THANIA Borja 5/3-/17 for an annual wellness examination.  He denied significant nasal congestion or dysphagia symptoms.  He was diagnosed with essential hypertension, chronic low back pain, chronic diastolic congestive heart failure, abnormal CBC, edema and hyperlipidemia.  The notes indicate the patient's complaints of dry mouth, postnasal drip, chronic fatigue, swelling in the legs and dyspnea on exertion with walking.  OTC Claritin was suggested.  An ENT consultation was considered.    His medication list includes Plavix and Prilosec 40 mg    PE:/66 P 61 T 98.2  Gen: Alert and oriented gentleman in no acute distress  Otologic exam is performed.  Cerumen was extracted from the right ear canal.  Cerumen was also extracted from left ear canal.  The patient was examined in the micro-procedure room.  Both eardrums are intact and clear as visualized directly.  Nasal exam reveals evidence for more patent right nasal passage.  This crusting noted at the NATALIYA the floor of the anterior nasal vestibule where transitions into the respiratory mucosa.  There is no evidence of collin bleeding or clotting there.  There is evidence for a left nasal septal deviation/deflection with compromise of the anterior left nasal passage.  I make note of prominent lower lateral  cartilages.  The oropharyngeal exam is unremarkable for inflammation infection or ulceration.  I make note of significant mandibular joycelyn.  There is adequate saliva in the oral cavity.  I did not detect evidence for lip swelling or inflammation.  A mirror exam of the larynx was attempted after Cetacaine application to the soft palate.  There is no significant laryngeal pathology noted on this brief glimpse exam.  The epiglottis appeared appears within normal limits.  There is no evidence of vocal cord paresis or paralysis.  The neck is palpated; there is no significant anterior posterior cervical adenopathy.    DIAGNOSIS:     ICD-10-CM ICD-9-CM    1. Anterior rhinorrhea  478.19 ipratropium (ATROVENT) 0.03 % nasal spray   2. Vasomotor rhinitis J30.0 477.9 ipratropium (ATROVENT) 0.03 % nasal spray   3. Wears hearing aid Z97.4 V45.89    4. Chronic throat clearing R68.89 784.99    5. Cough R05 786.2      PLAN: Atrovent nasal spray re- prescribed; 1-2 sprays @ lateral nostril BID prn nasal drip  AYR nasal mist may help lubricate nasal tissues  Pt. may apply AYR gel or Bacitracin ointment to right nasal vestibule floor ( or both) nightly x 2 weeks  Ears cleaned; cerumen impactions removed from deep eacs with microforceps  Consider office endoscopy pending course  RTC yearly for ear cleaning/prn

## 2017-07-06 NOTE — PATIENT INSTRUCTIONS
Atrovent nasal spray re- prescribed; 1-2 sprays @ lateral nostril BID prn nasal drip  AYR nasal mist may help lubricate nasal tissues  Pt. may apply AYR gel or Bacitracin ointment to right nasal vestibule floor ( or both) nightly x 2 weeks  Ears cleaned; cerumen impactions removed from deep eacs with microforceps  Consider office endoscopy pending course  RTC yearly for ear cleaning/prn

## 2017-07-13 ENCOUNTER — OUTPATIENT CASE MANAGEMENT (OUTPATIENT)
Dept: ADMINISTRATIVE | Facility: OTHER | Age: 82
End: 2017-07-13

## 2017-07-13 NOTE — PROGRESS NOTES
For your information:    The following patient has been assigned to Paulina Springer RN with [facility] for high risk screening.    Reason: High Risk    Please contact hospitals at [phone] with any questions.    Thank you,  Windy Colvin

## 2017-07-18 ENCOUNTER — OUTPATIENT CASE MANAGEMENT (OUTPATIENT)
Dept: ADMINISTRATIVE | Facility: OTHER | Age: 82
End: 2017-07-18

## 2017-07-18 NOTE — PROGRESS NOTES
7/18/17  Call to pt number no answer message left requesting call back.  No other contact numbers listed in demographics

## 2017-07-19 ENCOUNTER — OUTPATIENT CASE MANAGEMENT (OUTPATIENT)
Dept: ADMINISTRATIVE | Facility: OTHER | Age: 82
End: 2017-07-19

## 2017-07-25 RX ORDER — OMEPRAZOLE 40 MG/1
CAPSULE, DELAYED RELEASE ORAL
Qty: 30 CAPSULE | Refills: 0 | Status: SHIPPED | OUTPATIENT
Start: 2017-07-25 | End: 2017-08-23 | Stop reason: SDUPTHER

## 2017-08-23 RX ORDER — OMEPRAZOLE 40 MG/1
CAPSULE, DELAYED RELEASE ORAL
Qty: 30 CAPSULE | Refills: 0 | Status: SHIPPED | OUTPATIENT
Start: 2017-08-23 | End: 2017-09-24 | Stop reason: SDUPTHER

## 2017-08-31 RX ORDER — CHOLESTYRAMINE LIGHT 4 G/5.7G
POWDER, FOR SUSPENSION ORAL
Qty: 60 PACKET | Refills: 5 | Status: SHIPPED | OUTPATIENT
Start: 2017-08-31 | End: 2017-08-31 | Stop reason: SDUPTHER

## 2017-08-31 RX ORDER — CHOLESTYRAMINE LIGHT 4 G/5.7G
POWDER, FOR SUSPENSION ORAL
Qty: 60 PACKET | Refills: 5 | Status: SHIPPED | OUTPATIENT
Start: 2017-08-31 | End: 2018-09-07 | Stop reason: SDUPTHER

## 2017-09-11 ENCOUNTER — OFFICE VISIT (OUTPATIENT)
Dept: CARDIOLOGY | Facility: CLINIC | Age: 82
End: 2017-09-11
Payer: MEDICARE

## 2017-09-11 VITALS
DIASTOLIC BLOOD PRESSURE: 67 MMHG | HEIGHT: 68 IN | SYSTOLIC BLOOD PRESSURE: 138 MMHG | WEIGHT: 180.75 LBS | BODY MASS INDEX: 27.39 KG/M2 | HEART RATE: 77 BPM

## 2017-09-11 DIAGNOSIS — N18.30 CHRONIC KIDNEY DISEASE, STAGE III (MODERATE): ICD-10-CM

## 2017-09-11 DIAGNOSIS — I50.32 CHRONIC DIASTOLIC CONGESTIVE HEART FAILURE: Primary | ICD-10-CM

## 2017-09-11 DIAGNOSIS — N13.8 BPH WITH URINARY OBSTRUCTION: ICD-10-CM

## 2017-09-11 DIAGNOSIS — I77.9 BILATERAL CAROTID ARTERY DISEASE: ICD-10-CM

## 2017-09-11 DIAGNOSIS — I10 HYPERTENSION, ESSENTIAL: ICD-10-CM

## 2017-09-11 DIAGNOSIS — N40.1 BPH WITH URINARY OBSTRUCTION: ICD-10-CM

## 2017-09-11 DIAGNOSIS — Z95.2 S/P AORTIC VALVE REPLACEMENT: ICD-10-CM

## 2017-09-11 DIAGNOSIS — I35.0 NONRHEUMATIC AORTIC VALVE STENOSIS: ICD-10-CM

## 2017-09-11 DIAGNOSIS — I70.0 AORTIC ATHEROSCLEROSIS: ICD-10-CM

## 2017-09-11 DIAGNOSIS — S61.412A LACERATION OF LEFT HAND WITHOUT FOREIGN BODY, INITIAL ENCOUNTER: ICD-10-CM

## 2017-09-11 DIAGNOSIS — R26.81 UNSTEADY GAIT: ICD-10-CM

## 2017-09-11 PROCEDURE — 99214 OFFICE O/P EST MOD 30 MIN: CPT | Mod: S$GLB,,, | Performed by: INTERNAL MEDICINE

## 2017-09-11 PROCEDURE — 99999 PR PBB SHADOW E&M-EST. PATIENT-LVL IV: CPT | Mod: PBBFAC,,, | Performed by: INTERNAL MEDICINE

## 2017-09-11 PROCEDURE — 3008F BODY MASS INDEX DOCD: CPT | Mod: S$GLB,,, | Performed by: INTERNAL MEDICINE

## 2017-09-11 PROCEDURE — 1159F MED LIST DOCD IN RCRD: CPT | Mod: S$GLB,,, | Performed by: INTERNAL MEDICINE

## 2017-09-11 PROCEDURE — 1126F AMNT PAIN NOTED NONE PRSNT: CPT | Mod: S$GLB,,, | Performed by: INTERNAL MEDICINE

## 2017-09-11 NOTE — PROGRESS NOTES
Subjective:    Patient ID:  Randall Strickland Jr. is a 89 y.o. 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 He is able to exercise in a bike but is having unsteady gait since his TAVR and fell today sustains a small interdigital laceration left hand. He reports no RUDOLPH but has occasional mild edema left foot.    CONCLUSIONS     1 - Normal left ventricular systolic function (EF 65-70%).     2 - Concentric remodeling.     3 - Left ventricular diastolic dysfunction.     4 - Biatrial enlargement.     5 - Normal right ventricular systolic function .     6 - Trivial mitral regurgitation.     7 - Trivial to mild tricuspid regurgitation.     8 - Trivial pulmonic regurgitation.     9 - The estimated PA systolic pressure is 34 mmHg.             This document has been electronically    SIGNED BY: Flip Mratines MD On: 10/04/2016 09:48  Lab Results   Component Value Date     05/30/2017    K 4.9 05/30/2017     05/30/2017    CO2 27 05/30/2017    BUN 14 05/30/2017    CREATININE 1.2 05/30/2017    GLU 87 05/30/2017    HGBA1C 5.7 04/10/2014    MG 1.7 02/10/2017    AST 35 02/10/2017    ALT 24 02/10/2017    ALBUMIN 2.9 (L) 02/10/2017    PROT 6.4 02/10/2017    BILITOT 1.8 (H) 02/10/2017    WBC 7.02 05/30/2017    HGB 12.8 (L) 05/30/2017    HCT 39.0 (L) 05/30/2017     (H) 05/30/2017     05/30/2017    INR 1.0 08/29/2016    PSA 0.6 03/07/2005    TSH 1.677 05/30/2017         Lab Results   Component Value Date    CHOL 160 05/30/2017    HDL 33 (L) 05/30/2017    TRIG 201 (H) 05/30/2017       Lab Results   Component Value Date    LDLCALC 86.8 05/30/2017       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  , Disp: , Rfl:     CHOLESTYRAMINE LIGHT 4 gram packet, DISSOLVE 1 PACKET IN LIQUID AND DRINK TWICE DAILY, Disp: 60 packet, Rfl: 5    finasteride (PROSCAR) 5 mg tablet, Take 1 tablet (5 mg total) by mouth once daily., Disp: 90 tablet, Rfl: 3    fluorometholone 0.1% (FML) 0.1 % DrpS, , Disp: , Rfl:     gabapentin (NEURONTIN) 100 MG capsule, TAKE 1 CAPSULE BY MOUTH EVERY EVENING, Disp: 90 capsule, Rfl: 0    ipratropium (ATROVENT) 0.03 % nasal spray, SPRAY 1-2 SPRAYS IN EACH NOSTRIL TWICE DAILY AS NEEDED FOR NASAL DRIP, Disp: 60 mL, Rfl: 1    omeprazole (PRILOSEC) 40 MG capsule, TAKE 1 CAPSULE(40 MG) BY MOUTH EVERY MORNING, Disp: 30 capsule, Rfl: 0    tamsulosin (FLOMAX) 0.4 mg Cp24, TAKE ONE CAPSULE BY MOUTH DAILY, Disp: 90 capsule, Rfl: 3    torsemide (DEMADEX) 5 MG Tab, Take 1 tablet (5 mg total) by mouth once daily., Disp: 30 tablet, Rfl: 3    trospium (SANCTURA) 20 mg Tab tablet, Take 1 tablet (20 mg total) by mouth 2 (two) times daily., Disp: 60 tablet, Rfl: 11        Review of Systems   Constitution: Negative for decreased appetite, diaphoresis, fever, weakness, malaise/fatigue, weight gain and weight loss.   HENT: Negative for congestion, ear discharge, ear pain and nosebleeds.    Eyes: Negative for blurred vision, double vision and visual disturbance.   Cardiovascular: Negative for chest pain, claudication, cyanosis, dyspnea on exertion, irregular heartbeat, leg swelling, near-syncope, orthopnea, palpitations, paroxysmal nocturnal dyspnea and syncope.   Respiratory: Negative for cough, hemoptysis, shortness of breath, sleep disturbances due to breathing, snoring, sputum production and wheezing.    Endocrine: Negative for polydipsia, polyphagia and polyuria.   Hematologic/Lymphatic:  "Negative for adenopathy and bleeding problem. Does not bruise/bleed easily.   Skin: Negative for color change, nail changes, poor wound healing and rash.   Musculoskeletal: Negative for muscle cramps and muscle weakness.   Gastrointestinal: Negative for abdominal pain, anorexia, change in bowel habit, hematochezia, nausea and vomiting.   Genitourinary: Negative for dysuria, frequency and hematuria.   Neurological: Positive for disturbances in coordination and loss of balance. Negative for brief paralysis, difficulty with concentration, excessive daytime sleepiness, dizziness, focal weakness, headaches, light-headedness, seizures and vertigo.   Psychiatric/Behavioral: Negative for altered mental status and depression.   Allergic/Immunologic: Negative for persistent infections.        Objective:/67 (BP Location: Left arm, Patient Position: Sitting, BP Method: Small (Automatic))   Pulse 77   Ht 5' 8" (1.727 m)   Wt 82 kg (180 lb 12.4 oz)   BMI 27.49 kg/m²           Physical Exam   Constitutional: He is oriented to person, place, and time. He appears well-developed and well-nourished.   HENT:   Head: Normocephalic.   Right Ear: External ear normal.   Left Ear: External ear normal.   Nose: Nose normal.   Inspection of lips, teeth and gums normal   Eyes: EOM are normal. Pupils are equal, round, and reactive to light. No scleral icterus.   Neck: Normal range of motion. Neck supple. No JVD present. No tracheal deviation present. No thyromegaly present.   Cardiovascular: Normal rate, regular rhythm and intact distal pulses.  Exam reveals no gallop and no friction rub.    Murmur heard.   Harsh midsystolic murmur is present with a grade of 1/6  at the upper left sternal border radiating to the neck  Pulses:       Dorsalis pedis pulses are 2+ on the right side, and 2+ on the left side.   Pulmonary/Chest: Effort normal and breath sounds normal.   Abdominal: Bowel sounds are normal. He exhibits no distension. There is " no hepatosplenomegaly. There is no tenderness. There is no guarding.   Musculoskeletal: Normal range of motion. He exhibits no edema or tenderness.   Lymphadenopathy:   Palpation of neck and groin lymph nodes normal   Neurological: He is alert and oriented to person, place, and time. No cranial nerve deficit. He exhibits normal muscle tone. Coordination normal.   Skin: Skin is dry.        Palpation of skin normal   Psychiatric: His behavior is normal. Judgment and thought content normal.         Assessment:       No diagnosis found.     Plan:       There are no diagnoses linked to this encounter.

## 2017-09-12 ENCOUNTER — HOSPITAL ENCOUNTER (EMERGENCY)
Facility: HOSPITAL | Age: 82
Discharge: HOME | End: 2017-09-12
Attending: EMERGENCY MEDICINE
Payer: MEDICARE

## 2017-09-12 VITALS
HEART RATE: 97 BPM | DIASTOLIC BLOOD PRESSURE: 78 MMHG | OXYGEN SATURATION: 98 % | RESPIRATION RATE: 16 BRPM | SYSTOLIC BLOOD PRESSURE: 139 MMHG | HEIGHT: 68 IN | WEIGHT: 175 LBS | TEMPERATURE: 99 F | BODY MASS INDEX: 26.52 KG/M2
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
## 2017-09-12 DIAGNOSIS — S61.412A LACERATION OF LEFT HAND WITHOUT FOREIGN BODY, INITIAL ENCOUNTER: Primary | ICD-10-CM

## 2017-09-12 PROCEDURE — 99283 EMERGENCY DEPT VISIT LOW MDM: CPT

## 2017-09-12 PROCEDURE — 99283 EMERGENCY DEPT VISIT LOW MDM: CPT | Mod: 25,,, | Performed by: EMERGENCY MEDICINE

## 2017-09-12 PROCEDURE — 25000003 PHARM REV CODE 250: Performed by: EMERGENCY MEDICINE

## 2017-09-12 PROCEDURE — 12001 RPR S/N/AX/GEN/TRNK 2.5CM/<: CPT | Mod: ,,, | Performed by: EMERGENCY MEDICINE

## 2017-09-12 RX ORDER — CEPHALEXIN 500 MG/1
500 CAPSULE ORAL 4 TIMES DAILY
Qty: 20 CAPSULE | Refills: 0 | Status: SHIPPED | OUTPATIENT
Start: 2017-09-12 | End: 2017-09-17

## 2017-09-12 RX ORDER — CEPHALEXIN 500 MG/1
500 CAPSULE ORAL
Status: COMPLETED | OUTPATIENT
Start: 2017-09-12 | End: 2017-09-12

## 2017-09-12 RX ADMIN — CEPHALEXIN 500 MG: 500 CAPSULE ORAL at 10:09

## 2017-09-12 NOTE — DISCHARGE INSTRUCTIONS
Because it is greater than 24 hours since you sustained  your laceration we have steri-stripped it together.  The strips should stay in place for at least 7-10 days.  Avoid getting your hand wet.  See your doctor in 2 days for a wound recheck.

## 2017-09-12 NOTE — ED PROVIDER NOTES
Encounter Date: 9/12/2017    SCRIBE #1 NOTE: I, Divya Bui, am scribing for, and in the presence of,  Dr. Botello. I have scribed the entire note.       History     Chief Complaint   Patient presents with    Laceration     Time seen by provider: 10:02 AM    This is a 89 y.o. male with PM Hx of HTN who presents with complaint of cut to his left hand s/p fall yesterday morning. Pt fell from standing position after losing his balance and cut his hand with a plant. UTD on tetanus per physician who he saw yesterday. Wound was cleaned at this time but no sutures were placed.       The history is provided by the patient and medical records.     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    Stenosis of aortic and mitral valves     aortic DAHLIA 1.1 CM     Past Surgical History:   Procedure Laterality Date    APPENDECTOMY      cararact  car    CARDIAC CATHETERIZATION      CARDIAC VALVE SURGERY      CATARACT EXTRACTION BILATERAL W/ ANTERIOR VITRECTOMY      bilaterial    CATARACT EXTRACTION W/  INTRAOCULAR LENS IMPLANT Bilateral     CHOLECYSTECTOMY      EYE SURGERY      JOINT REPLACEMENT      bilateral hip    KNEE SURGERY      Bilateral    TOTAL HIP ARTHROPLASTY      Bilaterial     Family History   Problem Relation Age of Onset    No Known Problems Father     No Known Problems Mother     No Known Problems Sister     No Known Problems Brother     No Known Problems Maternal Aunt     No Known Problems Maternal Uncle     No Known Problems Paternal Aunt     No Known Problems Paternal Uncle     No Known Problems Maternal Grandmother     No Known Problems Maternal Grandfather     No Known Problems Paternal Grandmother     No Known Problems Paternal Grandfather      No Known Problems Daughter     No Known Problems Son     No Known Problems Daughter     No Known Problems Son     Amblyopia Neg Hx     Blindness Neg Hx     Cancer Neg Hx     Cataracts Neg Hx     Diabetes Neg Hx     Glaucoma Neg Hx     Hypertension Neg Hx     Macular degeneration Neg Hx     Retinal detachment Neg Hx     Strabismus Neg Hx     Stroke Neg Hx     Thyroid disease Neg Hx     Anemia Neg Hx     Arrhythmia Neg Hx     Asthma Neg Hx     Clotting disorder Neg Hx     Fainting Neg Hx     Heart attack Neg Hx     Heart disease Neg Hx     Heart failure Neg Hx     Hyperlipidemia Neg Hx     Atrial Septal Defect Neg Hx      Social History   Substance Use Topics    Smoking status: Never Smoker    Smokeless tobacco: Never Used    Alcohol use Yes      Comment: less than one  drink weekly     Review of Systems   Constitutional: Negative for fever.   Skin: Positive for wound (laceration to left hand).       Physical Exam     Initial Vitals [09/12/17 0948]   BP Pulse Resp Temp SpO2   139/78 97 16 98.8 °F (37.1 °C) 98 %      MAP       98.33         Physical Exam    Nursing note and vitals reviewed.  Constitutional: He appears well-developed and well-nourished. He is not diaphoretic. No distress.   Musculoskeletal:   3/4 of an inch vertical laceration at the edge of the web space between middle and ring fingers. Minimal erythema surrounding laceration. No bony tenderness either dorsal or volar surfaces. Distal tendon function intact and motor sensory intact.    Neurological: He has normal strength. No cranial nerve deficit or sensory deficit.         ED Course   Lac Repair  Date/Time: 9/12/2017 10:12 AM  Performed by: HIRA CRESPO  Authorized by: HIRA CRESPO   Body area: upper extremity  Location details: left hand  Laceration length: 1.9 cm  Irrigation solution: saline  Comments: Tincture benzoin applied to create a sticky surface. Wound loosely steri stripped together. External dressing  applied.         Labs Reviewed - No data to display          Medical Decision Making:   History:   Old Medical Records: I decided to obtain old medical records.  Initial Assessment:   As laceration is greater than 24 hours old, and there is mild erythema at the skin edges possibly indicative of early infection, I will not suture. Will Rx Keflex 500 mg PO QID and have him see his PCP in two days for a wound check.             Scribe Attestation:   Scribe #1: I performed the above scribed service and the documentation accurately describes the services I performed. I attest to the accuracy of the note.    Attending Attestation:           Physician Attestation for Scribe:  Physician Attestation Statement for Scribe #1: I, Dr. Botello, reviewed documentation, as scribed by Divya Bui in my presence, and it is both accurate and complete.                 ED Course      Clinical Impression:   The encounter diagnosis was Laceration of left hand without foreign body, initial encounter.    Disposition:   Disposition: Discharged  Condition: Stable                        Anshul Botello MD  09/14/17 0017

## 2017-09-12 NOTE — ED NOTES
Patient states that he lost his balance on yesterday and fell causing a laceration to the left hand.

## 2017-09-12 NOTE — ED NOTES
Patient identifiers verified and correct for Randall Strickland Jr..    LOC: The patient is awake, alert and aware of environment with an appropriate affect, the patient is oriented x 3 and speaking appropriately.  APPEARANCE: Patient resting comfortably and in no acute distress, patient is clean and well groomed, patient's clothing is properly fastened.  SKIN: The skin is warm and dry, color consistent with ethnicity, patient has normal skin turgor and moist mucus membranes, skin intact, no breakdown or bruising noted. Laceration noted to the left hand (1.5 cm in length)   MUSCULOSKELETAL: Patient moving all extremities spontaneously, no obvious swelling or deformities noted.

## 2017-09-14 ENCOUNTER — OFFICE VISIT (OUTPATIENT)
Dept: INTERNAL MEDICINE | Facility: CLINIC | Age: 82
End: 2017-09-14
Payer: MEDICARE

## 2017-09-14 VITALS
SYSTOLIC BLOOD PRESSURE: 113 MMHG | HEART RATE: 103 BPM | BODY MASS INDEX: 27.39 KG/M2 | WEIGHT: 180.75 LBS | HEIGHT: 68 IN | DIASTOLIC BLOOD PRESSURE: 68 MMHG

## 2017-09-14 DIAGNOSIS — M54.40 CHRONIC BILATERAL LOW BACK PAIN WITH SCIATICA, SCIATICA LATERALITY UNSPECIFIED: ICD-10-CM

## 2017-09-14 DIAGNOSIS — S61.412D LACERATION OF SKIN OF LEFT HAND, SUBSEQUENT ENCOUNTER: Primary | ICD-10-CM

## 2017-09-14 DIAGNOSIS — G89.29 CHRONIC BILATERAL LOW BACK PAIN WITH SCIATICA, SCIATICA LATERALITY UNSPECIFIED: ICD-10-CM

## 2017-09-14 PROCEDURE — 99999 PR PBB SHADOW E&M-EST. PATIENT-LVL V: CPT | Mod: PBBFAC,,, | Performed by: PHYSICIAN ASSISTANT

## 2017-09-14 PROCEDURE — 99213 OFFICE O/P EST LOW 20 MIN: CPT | Mod: S$GLB,,, | Performed by: PHYSICIAN ASSISTANT

## 2017-09-14 PROCEDURE — 3008F BODY MASS INDEX DOCD: CPT | Mod: S$GLB,,, | Performed by: PHYSICIAN ASSISTANT

## 2017-09-14 PROCEDURE — 1126F AMNT PAIN NOTED NONE PRSNT: CPT | Mod: S$GLB,,, | Performed by: PHYSICIAN ASSISTANT

## 2017-09-14 PROCEDURE — 1159F MED LIST DOCD IN RCRD: CPT | Mod: S$GLB,,, | Performed by: PHYSICIAN ASSISTANT

## 2017-09-14 RX ORDER — MUPIROCIN 20 MG/G
OINTMENT TOPICAL 2 TIMES DAILY
Qty: 30 G | Refills: 0 | Status: SHIPPED | OUTPATIENT
Start: 2017-09-14 | End: 2018-08-22

## 2017-09-14 RX ORDER — CLOPIDOGREL BISULFATE 75 MG/1
TABLET ORAL
Qty: 90 TABLET | Refills: 6 | Status: SHIPPED | OUTPATIENT
Start: 2017-09-14 | End: 2018-09-29 | Stop reason: SDUPTHER

## 2017-09-14 RX ORDER — CLOPIDOGREL BISULFATE 75 MG/1
75 TABLET ORAL DAILY
Qty: 30 TABLET | Refills: 6 | Status: SHIPPED | OUTPATIENT
Start: 2017-09-14 | End: 2017-09-14 | Stop reason: SDUPTHER

## 2017-09-14 NOTE — PATIENT INSTRUCTIONS
Wound Care  Taking proper care of your wound will help it heal. Your healthcare provider may show you how to clean and dress the wound. He or she will also explain how to tell if the wound is healing normally. If you are unsure of how to take care of the wound, be sure to clarify what dressing to use and how often you should change the bandages. Here are the basic steps.     A wound that's not healing normally may be dark in color or have white streaks.   Wash your hands  Tips for washing your hands include:  · Use liquid soap and lather for 2 minutes. Scrub between your fingers and under your nails.  · Rinse with warm water, keeping your fingers pointing down.  · Use a paper towel to dry your hands and to turn off the faucet.  Remove the used dressing  Here are suggestions for removing the dressing:  · If dressing changes cause you pain, be sure to take your pain medicine as prescribed by your healthcare provider 30 minutes before dressing changes.  · Set up your supplies.  · Put on disposable gloves if youre dressing a wound for someone else or your wound is infected.  · Loosen the tape by pulling gently toward the wound.  · Gently take off the old dressing. If the dressing is stuck to the wound, moisten it with saline (if available) or clean water.  · If you have a drain or tube in the wound, be careful not to pull on it.  · Remove the dressing 1 layer at a time and put it in a plastic bag.  · Remove your gloves.  Inspect and dress the wound  Check the wound carefully:  · Each time you change the dressing, inspect the wound carefully to be sure its healing normally by making sure your wound appears to be pink and moist, and is free of infection.    · Wash your hands again. Put on a new pair of gloves.  · Clean and dress the wound as directed by your healthcare provider or nurse. Do not put anything in the wound that is not prescribed or directed by your healthcare provider. If you have a drain or tube, be  careful not to pull on it. Make sure to secure the drain or tube as well.  · Put all supplies in a plastic bag. Seal the bag and put it in the trash.  · Be sure to wash your hands again.  Call your healthcare provider  Call your healthcare provider if you see any of the following signs of a problem:  · Bleeding that soaks the dressing  · Pink fluid weeping from the wound  · Increased drainage or drainage that is yellow, yellow-green, or foul-smelling  · Increased swelling or pain, or redness or swelling in the skin around the wound  · A change in the color of the wound, or if streaks develop in a direction away from the wound  · The area between any stitches opens up  · An increase in the size of the wound  · A fever of 100.4°F (38°C) or higher, or as directed by your healthcare provider  · Chills, increased fatigue, or a loss of appetite      Date Last Reviewed: 7/30/2015  © 3688-5170 [employer]. [address] [place] [address], [place] [address]. All rights reserved. This information is not intended as a substitute for professional medical care. Always follow your healthcare professional's instructions.

## 2017-09-14 NOTE — PROGRESS NOTES
Subjective:       Patient ID: Randall Strickland Jr. is a 89 y.o. male.        Chief Complaint: Follow-up    Randall Strickland Jr. is an established patient of Hieu Monk MD here today for 2 day f/u from ED visit.    Sustained a laceration to left hand after a fall.  Seen in ED 9/12/17 but it had already been 24 hours since injury so no sutures were placed.  Steri strips were placed after cleansing the wound.  He actually removed the Steri-strips this morning.  He did not realize he was supposed to leave them in place.  Fortunately, the wound has adhered and there is no active bleeding.  The entire laceration has crusted over.  He is on Keflex.  No pain, redness, discharge from the wound.  He is feeling well.    At end of visit he mentions that he has been having chronic back pain that often radiates into the left leg.  It really bothers him at night when trying to sleep.  In review, Dr. Borja has referred him to pain clinic several times for this but no appointment was ever scheduled, unclear why.  In further review there is a note that he has an outside pain specialist.  He tells me today that he does not see anyone else and wants to go to [facility].  Thus, will supply another pain clinic referral per patient request.           Review of Systems   Constitutional: Negative for appetite change, chills, fatigue and fever.   HENT: Negative for congestion and sore throat.    Eyes: Negative for visual disturbance.   Respiratory: Negative for cough, chest tightness and shortness of breath.    Cardiovascular: Negative for chest pain, palpitations and leg swelling.   Gastrointestinal: Negative for abdominal pain, blood in stool, constipation, diarrhea, nausea and vomiting.   Genitourinary: Negative for dysuria, frequency, hematuria and urgency.   Musculoskeletal: Positive for back pain. Negative for arthralgias.   Skin: Positive for wound. Negative for rash.   Neurological: Negative for dizziness, syncope, weakness and  headaches.   Psychiatric/Behavioral: Negative for dysphoric mood and sleep disturbance. The patient is not nervous/anxious.        Objective:      Physical Exam   Constitutional: He appears well-developed and well-nourished. No distress.   HENT:   Head: Normocephalic and atraumatic.   Right Ear: Tympanic membrane, external ear and ear canal normal.   Left Ear: Tympanic membrane, external ear and ear canal normal.   Nose: Nose normal.   Mouth/Throat: Oropharynx is clear and moist.   Eyes: Conjunctivae are normal. Pupils are equal, round, and reactive to light.   Cardiovascular: Normal rate and regular rhythm.  Exam reveals no gallop and no friction rub.    Murmur heard.  Pulmonary/Chest: Effort normal and breath sounds normal. No respiratory distress.   Abdominal: Soft. There is no tenderness. There is no rebound and no guarding.   Musculoskeletal: He exhibits no edema.   Neurological: He is alert.   Skin: Skin is warm and dry. He is not diaphoretic.   3/4 of an inch vertical laceration at the edge of the web space between middle and ring fingers.  No redness.  Wound edges well adhered and crusted over.  No active bleeding.  Neurovascularly intact.   Psychiatric: He has a normal mood and affect.   Nursing note and vitals reviewed.      Assessment:       1. Laceration of skin of left hand, subsequent encounter    2. Chronic bilateral low back pain with sciatica, sciatica laterality unspecified        Plan:       Randall was seen today for follow-up.    Diagnoses and all orders for this visit:    Laceration of skin of left hand, subsequent encounter  -     mupirocin (BACTROBAN) 2 % ointment; Apply topically 2 (two) times daily.    Chronic bilateral low back pain with sciatica, sciatica laterality unspecified  -     Ambulatory consult to Pain Clinic    Pt has been given instructions populated from [employer] database and has verbalized understanding of the after visit summary and information contained wherein.    Follow up  "with a primary care provider. May go to ER for acute shortness of breath, lightheadedness, fever, or any other emergent complaints or changes in condition.    "This note will be shared with the patient"    Future Appointments  Date Time Provider Department Center   9/25/2017 9:30 AM Anali Quinteros MD [facility]               "

## 2017-09-25 ENCOUNTER — OFFICE VISIT (OUTPATIENT)
Dept: PAIN MEDICINE | Facility: CLINIC | Age: 82
End: 2017-09-25
Attending: ANESTHESIOLOGY
Payer: MEDICARE

## 2017-09-25 VITALS
WEIGHT: 180.81 LBS | HEART RATE: 100 BPM | BODY MASS INDEX: 27.4 KG/M2 | RESPIRATION RATE: 20 BRPM | DIASTOLIC BLOOD PRESSURE: 69 MMHG | HEIGHT: 68 IN | SYSTOLIC BLOOD PRESSURE: 112 MMHG | TEMPERATURE: 98 F

## 2017-09-25 DIAGNOSIS — M70.62 GREATER TROCHANTERIC BURSITIS OF LEFT HIP: ICD-10-CM

## 2017-09-25 DIAGNOSIS — M54.16 LEFT LUMBAR RADICULITIS: ICD-10-CM

## 2017-09-25 DIAGNOSIS — M51.36 DDD (DEGENERATIVE DISC DISEASE), LUMBAR: Primary | ICD-10-CM

## 2017-09-25 DIAGNOSIS — G89.4 CHRONIC PAIN DISORDER: ICD-10-CM

## 2017-09-25 PROCEDURE — 99999 PR PBB SHADOW E&M-EST. PATIENT-LVL III: CPT | Mod: PBBFAC,,, | Performed by: ANESTHESIOLOGY

## 2017-09-25 PROCEDURE — 1159F MED LIST DOCD IN RCRD: CPT | Mod: S$GLB,,, | Performed by: ANESTHESIOLOGY

## 2017-09-25 PROCEDURE — 1125F AMNT PAIN NOTED PAIN PRSNT: CPT | Mod: S$GLB,,, | Performed by: ANESTHESIOLOGY

## 2017-09-25 PROCEDURE — 3008F BODY MASS INDEX DOCD: CPT | Mod: S$GLB,,, | Performed by: ANESTHESIOLOGY

## 2017-09-25 PROCEDURE — 99204 OFFICE O/P NEW MOD 45 MIN: CPT | Mod: GC,S$GLB,, | Performed by: ANESTHESIOLOGY

## 2017-09-25 RX ORDER — GABAPENTIN 300 MG/1
300 CAPSULE ORAL NIGHTLY
Qty: 30 CAPSULE | Refills: 5 | Status: SHIPPED | OUTPATIENT
Start: 2017-09-25 | End: 2018-04-22 | Stop reason: SDUPTHER

## 2017-09-25 RX ORDER — OMEPRAZOLE 40 MG/1
CAPSULE, DELAYED RELEASE ORAL
Qty: 30 CAPSULE | Refills: 0 | Status: SHIPPED | OUTPATIENT
Start: 2017-09-25 | End: 2017-10-26 | Stop reason: SDUPTHER

## 2017-09-25 NOTE — PROGRESS NOTES
Subjective:     Patient ID: Randall Strickland Jr. is a 89 y.o. male.    Chief Complaint: Pain    Consulted by: Dorota Correa PA-C     Disclaimer: This note was generated using voice recognition software.  There may be a typographical errors that were missed during proofreading.      HPI:    Randall Strickland Jr. is a 89 y.o. male who presents today with low back pain.  Started 6 weeks ago only at night.  Constant hip pain that moved down through his knee and then down to his ankle.  In the last 2 weeks the pain switched to day time.  The pain is described as achy but when the pain is in his ankle, it is the worst and is described as sharp.  When the pain arrives in his ankle, standing up relieves it.      Pt explains that he used to [facility] for years, last was 5-6 months ago and would receive ? PEPE? With several weeks of relief from back pain.  Pt cannot report any exacerbating or alleviating factors. Physical activity brings exhaustion that is new since his TAVR last year in August. Pt reports RUDOLPH with walking on flat ground.  The pain in his back is at his baseline. Pt recently fell from standing height while talking to his wife standing in his front yard. Does not recall if he felt faint before he fell or if his legs felt weak.     Physical Therapy: has done in the past for his back with some relief.  Works out at the gym frequently and rides the bike, but is having significant difficulty with walking recently.     Non-pharmacologic Treatment: none          · TENS? None     Pain Medications:         · Currently taking: neurontin 100mg once every morning     · Has tried in the past:  Pt has not tried any medication for his current pain    · Has not tried: opioids,  NSAIDs, Tylenol, Muscle relaxants, TCAs, SNRIs, anticonvulsants, topical creams    Blood thinners: Plavix     Interventional Therapies: ?PEPE 6mo ago?    Relevant Surgeries: bilateral hip replacement, 3 operations on  knees    Affecting sleep? Yes     Affecting daily activities? Yes, patient doesn't want to go anywhere anymore due to inability to walk well, this is new. Quit playing tennis one yr ago     Depressive symptoms? Yes           · SI/HI? No    Work status: retired     Pain Scales  Best: 0/10  Worst: 10/10  Usually: 7/10  Today: 7/10    Low-back Pain   This is a chronic problem. The current episode started more than 1 year ago. The problem occurs intermittently. The problem has been gradually worsening since onset. The pain is present in the lumbar spine. The pain radiates to the left knee and left thigh. The quality of the pain is described as aching and shooting (throbbing/). The pain is at a severity of 7/10. The pain is moderate. The pain is the same all the time. The symptoms are aggravated by lying down (getting up from sitting position ). Pertinent negatives include no chest pain, fever, headaches or weight loss. He has tried injection treatment and NSAIDs (gabapentin) for the symptoms. The treatment provided mild relief. Physical therapy was ineffective.      Last 3 PDI Scores 9/25/2017   Pain Disability Index (PDI) 17   ]    Review of Systems   Constitution: Negative for chills, fever, malaise/fatigue, weight gain and weight loss.   HENT: Negative for ear pain and hoarse voice.    Eyes: Negative for blurred vision, pain and visual disturbance.   Cardiovascular: Negative for chest pain, dyspnea on exertion and irregular heartbeat.   Respiratory: Negative for cough, shortness of breath and wheezing.    Endocrine: Negative for cold intolerance and heat intolerance.   Hematologic/Lymphatic: Negative for adenopathy and bleeding problem. Does not bruise/bleed easily.   Skin: Negative for color change, itching and rash.   Musculoskeletal: Positive for back pain. Negative for neck pain.   Gastrointestinal: Negative for change in bowel habit, diarrhea, hematemesis, hematochezia, melena and vomiting.   Genitourinary:  Negative for flank pain, frequency, hematuria and urgency.   Neurological: Negative for difficulty with concentration, dizziness, headaches, loss of balance and seizures.   Psychiatric/Behavioral: Negative for altered mental status, depression and suicidal ideas. The patient is not nervous/anxious.    Allergic/Immunologic: Negative for HIV exposure.             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    Stenosis of aortic and mitral valves     aortic DAHLIA 1.1 CM       Past Surgical History:   Procedure Laterality Date    APPENDECTOMY      cararact  car    CARDIAC CATHETERIZATION      CARDIAC VALVE SURGERY      CATARACT EXTRACTION BILATERAL W/ ANTERIOR VITRECTOMY      bilaterial    CATARACT EXTRACTION W/  INTRAOCULAR LENS IMPLANT Bilateral     CHOLECYSTECTOMY      EYE SURGERY      JOINT REPLACEMENT      bilateral hip    KNEE SURGERY      Bilateral    TOTAL HIP ARTHROPLASTY      Bilaterial       Review of patient's allergies indicates:  No Known Allergies    Current Outpatient Prescriptions   Medication Sig Dispense Refill    amlodipine (NORVASC) 5 MG tablet TAKE 1 TABLET(5 MG) BY MOUTH EVERY DAY 30 tablet 6    aspirin 81 MG Chew Take 81 mg by mouth once daily.        CHOLESTYRAMINE LIGHT 4 gram packet DISSOLVE 1 PACKET IN LIQUID AND DRINK TWICE DAILY 60 packet 5    clopidogrel (PLAVIX) 75 mg tablet TAKE 1 TABLET BY MOUTH ONCE DAILY 90 tablet 6    finasteride (PROSCAR) 5 mg tablet Take 1 tablet (5 mg total) by mouth once daily. 90 tablet 3    fluorometholone 0.1% (FML) 0.1 % DrpS       gabapentin (NEURONTIN) 100 MG capsule TAKE 1 CAPSULE BY MOUTH EVERY EVENING 90 capsule 0    ipratropium (ATROVENT) 0.03 % nasal spray SPRAY 1-2 SPRAYS IN EACH NOSTRIL TWICE DAILY AS NEEDED FOR NASAL DRIP 60 mL 1    mupirocin  (BACTROBAN) 2 % ointment Apply topically 2 (two) times daily. 30 g 0    omeprazole (PRILOSEC) 40 MG capsule TAKE 1 CAPSULE(40 MG) BY MOUTH EVERY MORNING 30 capsule 0    tamsulosin (FLOMAX) 0.4 mg Cp24 TAKE ONE CAPSULE BY MOUTH DAILY 90 capsule 3    torsemide (DEMADEX) 5 MG Tab Take 1 tablet (5 mg total) by mouth once daily. 30 tablet 3    trospium (SANCTURA) 20 mg Tab tablet Take 1 tablet (20 mg total) by mouth 2 (two) times daily. 60 tablet 11     No current facility-administered medications for this visit.        Family History   Problem Relation Age of Onset    No Known Problems Father     No Known Problems Mother     No Known Problems Sister     No Known Problems Brother     No Known Problems Maternal Aunt     No Known Problems Maternal Uncle     No Known Problems Paternal Aunt     No Known Problems Paternal Uncle     No Known Problems Maternal Grandmother     No Known Problems Maternal Grandfather     No Known Problems Paternal Grandmother     No Known Problems Paternal Grandfather     No Known Problems Daughter     No Known Problems Son     No Known Problems Daughter     No Known Problems Son     Amblyopia Neg Hx     Blindness Neg Hx     Cancer Neg Hx     Cataracts Neg Hx     Diabetes Neg Hx     Glaucoma Neg Hx     Hypertension Neg Hx     Macular degeneration Neg Hx     Retinal detachment Neg Hx     Strabismus Neg Hx     Stroke Neg Hx     Thyroid disease Neg Hx     Anemia Neg Hx     Arrhythmia Neg Hx     Asthma Neg Hx     Clotting disorder Neg Hx     Fainting Neg Hx     Heart attack Neg Hx     Heart disease Neg Hx     Heart failure Neg Hx     Hyperlipidemia Neg Hx     Atrial Septal Defect Neg Hx        Social History     Social History    Marital status:      Spouse name: Sade    Number of children: N/A    Years of education: N/A     Occupational History    Retired Retired 1992     Social History Main Topics    Smoking status: Never Smoker    Smokeless  "tobacco: Never Used    Alcohol use Yes      Comment: less than one  drink weekly    Drug use: No    Sexual activity: Not on file     Other Topics Concern    Not on file     Social History Narrative    No narrative on file       Objective:     Vitals:    09/25/17 0936   BP: 112/69   Pulse: 100   Resp: 20   Temp: 97.7 °F (36.5 °C)   TempSrc: Oral   Weight: 82 kg (180 lb 12.8 oz)   Height: 5' 8" (1.727 m)   PainSc:   7   PainLoc: Back       GEN:  Well developed, well nourished.  No acute distress. no pain behavior.  HEENT:  No trauma.  Mucous membranes moist.  Nares patent bilaterally.  PSYCH: Normal affect. Thought content appropriate.  CHEST:  Breathing symmetric.  No audible wheezing.  ABD: Soft, non-tender, non-distended.  SKIN:  Warm, pink, dry.  No rash on exposed areas.    EXT:  No cyanosis, clubbing, or edema.  No color change or changes in nail or hair growth.  NEURO/MUSCULOSKELETAL:  Fully alert, oriented, and appropriate. Speech normal madisyn. No cranial nerve deficits.   Gait: unsteady.  2+ pitting edema to LE bilaterally through the knee.   Motor Strength: 5/5 motor strength throughout lower extremities.   Sensory: negative sensory deficit in the lower extremities.   Reflexes:  2+ and symmetric throughout. No clonus or spasticity.  L-Spine:  decreased ROM with pain on extension. Mildly decreased ROM on flexion. + facet loading bilaterally.  negative SLR bilaterally.    SI Joint/Hip: negative EMIGDIO bilaterally.  negative FADIR bilaterally. Decreased ROM hips bilaterally.   negative TTP over lumbar paraspinals, bilateral SI joints, hips, piriformis muscles, or + TTP left GTB.            Imaging:      Xray Lumbar spine:  9/2016  Lumbar spine AP lateral, comparison 8/5/09.  The lumbar vertebra are intact.  No compression fracture is seen.  AP view shows moderate dextroscoliosis similar to before.  Lateral view demonstrates grade 1 retrolisthesis at L2-3.  Multilevel degenerative changes are again " identified, most notable at L1-2 and L2-3 disc spaces with narrowing and proliferative change at the endplates.  Narrowing and sclerosis are also seen in the lower facet joints.  No obvious paraspinal lesion is seen.  A hip arthroplasty on the left is partially demonstrated.   Impression      Multilevel degenerative spine changes similar to old exam.  ______________________________________     Electronically signed by: MIRIAM KEEN MD  Date: 09/07/16  Time: 11:24          Assessment:     Encounter Diagnoses   Name Primary?    DDD (degenerative disc disease), lumbar Yes    Left lumbar radiculitis     Greater trochanteric bursitis of left hip     Chronic pain disorder        Plan:     Diagnoses and all orders for this visit:    DDD (degenerative disc disease), lumbar  -     gabapentin (NEURONTIN) 300 MG capsule; Take 1 capsule (300 mg total) by mouth every evening.    Left lumbar radiculitis  -     gabapentin (NEURONTIN) 300 MG capsule; Take 1 capsule (300 mg total) by mouth every evening.    Greater trochanteric bursitis of left hip  -     gabapentin (NEURONTIN) 300 MG capsule; Take 1 capsule (300 mg total) by mouth every evening.    Chronic pain disorder         Leg pain is consistent with left greater trochanteric bursitis and left lumbar radiculitis.    We discussed the assessment and recommendations.  All available images were reviewed. We discussed the disease process, prognosis, treatment plan, and risks and benefits. The patient is aware of the risks and benefits of the medications being prescribed, common side effects, and proper usage. The following is the plan we agreed on:     1. Obtain records from Dr. Euceda with [facility]  1. Will plan to schedule an injection at this visit, most likely whatever one he had most recently with Dr. Euceda  2. Can consider GTB injection  3. Increased gabapentin to 300mg QHS  4. RTC in 3-6 weeks or sooner if needed with me as I might do a GTB  injection.    Thank you for allowing me to participate in the care of this patient.   Please do not hesitate to call me at [phone] with any questions or concerns.    Greater than 25 minutes spent in total in todays visit with the patient, with more than half that time direct face to face counseling and education with the patient today. We discussed the disease process, prognosis, treatment plan, and risks and benefits.    I have seen the patient with the resident physician.  I have performed my own history and physical exam and we have come up with the above plan.  The patient is in agreement with our plan.     The above plan and management options were discussed at length with patient. Patient is in agreement with the above and verbalized understanding. It will be communicated with the referring physician via electronic record, fax, or mail.    Alis Rogers PGY3

## 2017-09-25 NOTE — LETTER
September 27, 2017      Dorota Correa PA-C  [address]  [facility] [address]           [facility]  [address]  [facility] [address]  Phone: [phone]  Fax: [phone]          Patient: Randall Strickland Jr.   MR Number: [id number]   YOB: 1927   Date of Visit: 9/25/2017       Dear Dorota Correa:    Thank you for referring Randall Strickland to me for evaluation. Attached you will find relevant portions of my assessment and plan of care.    If you have questions, please do not hesitate to call me. I look forward to following Randall Strickland along with you.    Sincerely,    Anali Quinteros MD    Enclosure  CC:  No Recipients    If you would like to receive this communication electronically, please contact [email or web address] or [phone] to request more information on [employer] access.    For providers and/or their staff who would like to refer a patient to [facility], please contact us through our one-stop-shop provider referral line, [facility], at [phone].    If you feel you have received this communication in error or would no longer like to receive these types of communications, please e-mail [email or web address]

## 2017-10-09 ENCOUNTER — OFFICE VISIT (OUTPATIENT)
Dept: PAIN MEDICINE | Facility: CLINIC | Age: 82
End: 2017-10-09
Attending: ANESTHESIOLOGY
Payer: MEDICARE

## 2017-10-09 VITALS
HEART RATE: 80 BPM | SYSTOLIC BLOOD PRESSURE: 122 MMHG | WEIGHT: 180 LBS | DIASTOLIC BLOOD PRESSURE: 69 MMHG | TEMPERATURE: 98 F | BODY MASS INDEX: 27.28 KG/M2 | HEIGHT: 68 IN

## 2017-10-09 DIAGNOSIS — G89.4 CHRONIC PAIN DISORDER: ICD-10-CM

## 2017-10-09 DIAGNOSIS — M51.36 DDD (DEGENERATIVE DISC DISEASE), LUMBAR: ICD-10-CM

## 2017-10-09 DIAGNOSIS — M47.816 FACET ARTHRITIS OF LUMBAR REGION: Primary | ICD-10-CM

## 2017-10-09 DIAGNOSIS — M54.16 LEFT LUMBAR RADICULITIS: ICD-10-CM

## 2017-10-09 PROCEDURE — 99214 OFFICE O/P EST MOD 30 MIN: CPT | Mod: S$GLB,,, | Performed by: ANESTHESIOLOGY

## 2017-10-09 PROCEDURE — 99999 PR PBB SHADOW E&M-EST. PATIENT-LVL III: CPT | Mod: PBBFAC,,, | Performed by: ANESTHESIOLOGY

## 2017-10-09 NOTE — PROGRESS NOTES
Subjective:     Patient ID: Randall Strickland Jr. is a 89 y.o. male.    Chief Complaint: Pain    Consulted by: Dorota Correa PA-C     Disclaimer: This note was generated using voice recognition software.  There may be a typographical errors that were missed during proofreading.      HPI:    Randall Strickland Jr. is a 89 y.o. male who presents today with low back pain.  Started 6 weeks ago only at night.  Constant hip pain that moved down through his knee and then down to his ankle.  In the last 2 weeks the pain switched to day time.  The pain is described as achy but when the pain is in his ankle, it is the worst and is described as sharp.  When the pain arrives in his ankle, standing up relieves it.      Pt explains that he used to follow with [facility] for years, last was 5-6 months ago and would receive ? PEPE? With several weeks of relief from back pain.  Pt cannot report any exacerbating or alleviating factors. Physical activity brings exhaustion that is new since his TAVR last year in August. Pt reports RUDOLPH with walking on flat ground.  The pain in his back is at his baseline. Pt recently fell from standing height while talking to his wife standing in his front yard. Does not recall if he felt faint before he fell or if his legs felt weak.     Interval History (10/11/2017):  He returns today for follow up.  He reports that increasing the gabapentin has been helpful for the pain.  His primary complaint today is axial back pain.    Records review from Dr. Euceda reveals:   · Bilateral L5 and S1 TFESI 10/2012  · Bilateral L4 and L5 TFESI 1/6/2016 and 1/27/2016  · Bilateral L3-5 medial branch blocks 5/18/2017:  Great, temporary relief    Physical Therapy: has done in the past for his back with some relief.  Works out at the gym frequently and rides the bike, but is having significant difficulty with walking recently.     Non-pharmacologic Treatment: none          · TENS? None     Pain  Medications:         · Currently taking: neurontin 300mg QHS    · Has tried in the past:  Pt has not tried any medication for his current pain    · Has not tried: opioids,  NSAIDs, Tylenol, Muscle relaxants, TCAs, SNRIs, anticonvulsants, topical creams    Blood thinners: Plavix     Interventional Therapies:   · Bilateral L5 and S1 TFESI 10/2012  · Bilateral L4 and L5 TFESI 1/6/2016 and 1/27/2016  · Bilateral L3-5 medial branch blocks 5/18/2017:  Great, temporary relief    Relevant Surgeries: bilateral hip replacement, 3 operations on knees    Affecting sleep? Yes     Affecting daily activities? Yes, patient doesn't want to go anywhere anymore due to inability to walk well, this is new. Quit playing tennis one yr ago     Depressive symptoms? Yes           · SI/HI? No    Work status: retired     Pain Scales  Best: 3/10  Worst: 9/10  Usually: 5/10  Today: 5/10    Low-back Pain   This is a chronic problem. The current episode started more than 1 year ago. The problem occurs intermittently. The problem has been gradually worsening since onset. The pain is present in the lumbar spine. The pain radiates to the left knee and left thigh. The quality of the pain is described as aching and shooting (throbbing/). The pain is at a severity of 7/10. The pain is moderate. The pain is the same all the time. The symptoms are aggravated by lying down (getting up from sitting position ). Pertinent negatives include no chest pain, fever, headaches or weight loss. He has tried injection treatment and NSAIDs (gabapentin) for the symptoms. The treatment provided mild relief. Physical therapy was ineffective.      Last 3 PDI Scores 10/9/2017 9/25/2017   Pain Disability Index (PDI) 0 17   ]    Review of Systems   Constitution: Negative for chills, fever, malaise/fatigue, weight gain and weight loss.   HENT: Negative for ear pain and hoarse voice.    Eyes: Negative for blurred vision, pain and visual disturbance.   Cardiovascular: Negative  for chest pain, dyspnea on exertion and irregular heartbeat.   Respiratory: Negative for cough, shortness of breath and wheezing.    Endocrine: Negative for cold intolerance and heat intolerance.   Hematologic/Lymphatic: Negative for adenopathy and bleeding problem. Does not bruise/bleed easily.   Skin: Negative for color change, itching and rash.   Musculoskeletal: Positive for back pain. Negative for neck pain.   Gastrointestinal: Negative for change in bowel habit, diarrhea, hematemesis, hematochezia, melena and vomiting.   Genitourinary: Negative for flank pain, frequency, hematuria and urgency.   Neurological: Negative for difficulty with concentration, dizziness, headaches, loss of balance and seizures.   Psychiatric/Behavioral: Negative for altered mental status, depression and suicidal ideas. The patient is not nervous/anxious.    Allergic/Immunologic: Negative for HIV exposure.   All other systems reviewed and are negative.            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    Stenosis of aortic and mitral valves     aortic DAHLIA 1.1 CM       Past Surgical History:   Procedure Laterality Date    APPENDECTOMY      cararact  car    CARDIAC CATHETERIZATION      CARDIAC VALVE SURGERY      CATARACT EXTRACTION BILATERAL W/ ANTERIOR VITRECTOMY      bilaterial    CATARACT EXTRACTION W/  INTRAOCULAR LENS IMPLANT Bilateral     CHOLECYSTECTOMY      EYE SURGERY      JOINT REPLACEMENT      bilateral hip    KNEE SURGERY      Bilateral    TOTAL HIP ARTHROPLASTY      Bilaterial       Review of patient's allergies indicates:  No Known Allergies    Current Outpatient Prescriptions   Medication Sig Dispense Refill    amlodipine (NORVASC) 5 MG tablet TAKE 1 TABLET(5 MG) BY MOUTH EVERY DAY 30 tablet 6    aspirin 81 MG Chew  Take 81 mg by mouth once daily.        CHOLESTYRAMINE LIGHT 4 gram packet DISSOLVE 1 PACKET IN LIQUID AND DRINK TWICE DAILY 60 packet 5    clopidogrel (PLAVIX) 75 mg tablet TAKE 1 TABLET BY MOUTH ONCE DAILY 90 tablet 6    finasteride (PROSCAR) 5 mg tablet Take 1 tablet (5 mg total) by mouth once daily. 90 tablet 3    fluorometholone 0.1% (FML) 0.1 % DrpS       gabapentin (NEURONTIN) 300 MG capsule Take 1 capsule (300 mg total) by mouth every evening. 30 capsule 5    ipratropium (ATROVENT) 0.03 % nasal spray SPRAY 1-2 SPRAYS IN EACH NOSTRIL TWICE DAILY AS NEEDED FOR NASAL DRIP 60 mL 1    mupirocin (BACTROBAN) 2 % ointment Apply topically 2 (two) times daily. 30 g 0    omeprazole (PRILOSEC) 40 MG capsule TAKE 1 CAPSULE(40 MG) BY MOUTH EVERY MORNING 30 capsule 0    tamsulosin (FLOMAX) 0.4 mg Cp24 TAKE ONE CAPSULE BY MOUTH DAILY 90 capsule 3    torsemide (DEMADEX) 5 MG Tab Take 1 tablet (5 mg total) by mouth once daily. 30 tablet 3    trospium (SANCTURA) 20 mg Tab tablet Take 1 tablet (20 mg total) by mouth 2 (two) times daily. 60 tablet 11     No current facility-administered medications for this visit.        Family History   Problem Relation Age of Onset    No Known Problems Father     No Known Problems Mother     No Known Problems Sister     No Known Problems Brother     No Known Problems Maternal Aunt     No Known Problems Maternal Uncle     No Known Problems Paternal Aunt     No Known Problems Paternal Uncle     No Known Problems Maternal Grandmother     No Known Problems Maternal Grandfather     No Known Problems Paternal Grandmother     No Known Problems Paternal Grandfather     No Known Problems Daughter     No Known Problems Son     No Known Problems Daughter     No Known Problems Son     Amblyopia Neg Hx     Blindness Neg Hx     Cancer Neg Hx     Cataracts Neg Hx     Diabetes Neg Hx     Glaucoma Neg Hx     Hypertension Neg Hx     Macular degeneration Neg Hx     Retinal  "detachment Neg Hx     Strabismus Neg Hx     Stroke Neg Hx     Thyroid disease Neg Hx     Anemia Neg Hx     Arrhythmia Neg Hx     Asthma Neg Hx     Clotting disorder Neg Hx     Fainting Neg Hx     Heart attack Neg Hx     Heart disease Neg Hx     Heart failure Neg Hx     Hyperlipidemia Neg Hx     Atrial Septal Defect Neg Hx        Social History     Social History    Marital status:      Spouse name: Sade    Number of children: N/A    Years of education: N/A     Occupational History    Retired Retired 1992     Social History Main Topics    Smoking status: Never Smoker    Smokeless tobacco: Never Used    Alcohol use Yes      Comment: less than one  drink weekly    Drug use: No    Sexual activity: Not on file     Other Topics Concern    Not on file     Social History Narrative    No narrative on file       Objective:     Vitals:    10/09/17 1023   BP: 122/69   Pulse: 80   Temp: 98.3 °F (36.8 °C)   TempSrc: Oral   Weight: 81.6 kg (180 lb)   Height: 5' 8" (1.727 m)   PainSc:   5   PainLoc: Back       GEN:  Well developed, well nourished.  No acute distress. no pain behavior.  HEENT:  No trauma.  Mucous membranes moist.  Nares patent bilaterally.  PSYCH: Normal affect. Thought content appropriate.  CHEST:  Breathing symmetric.  No audible wheezing.  ABD: Soft, non-tender, non-distended.  SKIN:  Warm, pink, dry.  No rash on exposed areas.    EXT:  No cyanosis, clubbing, or edema.  No color change or changes in nail or hair growth.  NEURO/MUSCULOSKELETAL:  Fully alert, oriented, and appropriate. Speech normal madisyn. No cranial nerve deficits.   Gait: unsteady.  2+ pitting edema to LE bilaterally through the knee.   Motor Strength: 5/5 motor strength throughout lower extremities.   Sensory: negative sensory deficit in the lower extremities.   Reflexes:  2+ and symmetric throughout. No clonus or spasticity.  L-Spine:  decreased ROM with pain on extension. Mildly decreased ROM on flexion. + " facet loading bilaterally.  negative SLR bilaterally.    SI Joint/Hip: negative EMIGDIO bilaterally.  negative FADIR bilaterally. Decreased ROM hips bilaterally.   negative TTP over lumbar paraspinals, bilateral SI joints, hips, piriformis muscles, or + TTP left GTB.            Imaging:      Xray Lumbar spine:  9/2016  Lumbar spine AP lateral, comparison 8/5/09.  The lumbar vertebra are intact.  No compression fracture is seen.  AP view shows moderate dextroscoliosis similar to before.  Lateral view demonstrates grade 1 retrolisthesis at L2-3.  Multilevel degenerative changes are again identified, most notable at L1-2 and L2-3 disc spaces with narrowing and proliferative change at the endplates.  Narrowing and sclerosis are also seen in the lower facet joints.  No obvious paraspinal lesion is seen.  A hip arthroplasty on the left is partially demonstrated.   Impression      Multilevel degenerative spine changes similar to old exam.  ______________________________________     Electronically signed by: MIRIAM KEEN MD  Date: 09/07/16  Time: 11:24        Outside MRI Lumbar Spine dated 8/24/2009  L1/2: Large central disc extrusion at L1/2 with moderate central canal stenosis and right > left neural foraminal narrowing  L2/3: Facet degenerative changes with extruded disc material that extends into the left neural foramen  L3/4: facet degenerative changes with ligamentum flavum thickening and a foraminal disc protrusion on the left.  L4/5: moderate central canal stenosis, left greater than right foraminal narrowing.  L5/S1: There are facet degenerative changes with a bulge and bilateral foraminal narrowing      Assessment:     Encounter Diagnoses   Name Primary?    Facet arthritis of lumbar region Yes    DDD (degenerative disc disease), lumbar     Left lumbar radiculitis     Chronic pain disorder        Plan:     Randall was seen today for low-back pain.    Diagnoses and all orders for this visit:    Facet arthritis  of lumbar region    DDD (degenerative disc disease), lumbar    Left lumbar radiculitis    Chronic pain disorder         Leg pain is consistent with left greater trochanteric bursitis and left lumbar radiculitis.    We discussed the assessment and recommendations.  All available images were reviewed. We discussed the disease process, prognosis, treatment plan, and risks and benefits. The patient is aware of the risks and benefits of the medications being prescribed, common side effects, and proper usage. The following is the plan we agreed on:     1. Schedule for repeat bilateral L3-5 LMBB  1. If positive, RFA  2. If negative, update MRI  2. Can consider GTB injection  3. Continue gabapentin to 300mg QHS, consider increasing further  4. RTC in 3-6 weeks or sooner if needed with me as I might do a GTB injection.    Thank you for allowing me to participate in the care of this patient.   Please do not hesitate to call me at [phone] with any questions or concerns.    Greater than 25 minutes spent in total in todays visit with the patient, with more than half that time direct face to face counseling and education with the patient today. We discussed the disease process, prognosis, treatment plan, and risks and benefits.    I have seen the patient with the resident physician.  I have performed my own history and physical exam and we have come up with the above plan.  The patient is in agreement with our plan.     The above plan and management options were discussed at length with patient. Patient is in agreement with the above and verbalized understanding. It will be communicated with the referring physician via electronic record, fax, or mail.    Cece Wu PGY3     Ortho/SPM Exam

## 2017-10-10 ENCOUNTER — SURGERY (OUTPATIENT)
Age: 82
End: 2017-10-10

## 2017-10-10 ENCOUNTER — HOSPITAL ENCOUNTER (OUTPATIENT)
Facility: OTHER | Age: 82
Discharge: HOME | End: 2017-10-10
Attending: ANESTHESIOLOGY | Admitting: ANESTHESIOLOGY
Payer: MEDICARE

## 2017-10-10 VITALS
BODY MASS INDEX: 27.47 KG/M2 | OXYGEN SATURATION: 98 % | TEMPERATURE: 97 F | DIASTOLIC BLOOD PRESSURE: 69 MMHG | WEIGHT: 175 LBS | HEIGHT: 67 IN | SYSTOLIC BLOOD PRESSURE: 121 MMHG | RESPIRATION RATE: 18 BRPM | HEART RATE: 88 BPM

## 2017-10-10 DIAGNOSIS — G89.29 CHRONIC PAIN: ICD-10-CM

## 2017-10-10 DIAGNOSIS — M47.816 OSTEOARTHRITIS OF LUMBAR SPINE, UNSPECIFIED SPINAL OSTEOARTHRITIS COMPLICATION STATUS: Primary | ICD-10-CM

## 2017-10-10 PROCEDURE — 64493 INJ PARAVERT F JNT L/S 1 LEV: CPT | Mod: 50,,, | Performed by: ANESTHESIOLOGY

## 2017-10-10 PROCEDURE — 64493 INJ PARAVERT F JNT L/S 1 LEV: CPT | Mod: 50 | Performed by: ANESTHESIOLOGY

## 2017-10-10 PROCEDURE — 64495 INJ PARAVERT F JNT L/S 3 LEV: CPT | Mod: 50,,, | Performed by: ANESTHESIOLOGY

## 2017-10-10 PROCEDURE — 25000003 PHARM REV CODE 250: Performed by: PHYSICAL MEDICINE & REHABILITATION

## 2017-10-10 PROCEDURE — 64495 INJ PARAVERT F JNT L/S 3 LEV: CPT | Mod: 50 | Performed by: ANESTHESIOLOGY

## 2017-10-10 PROCEDURE — 64494 INJ PARAVERT F JNT L/S 2 LEV: CPT | Mod: 50,,, | Performed by: ANESTHESIOLOGY

## 2017-10-10 PROCEDURE — 64494 INJ PARAVERT F JNT L/S 2 LEV: CPT | Mod: 50 | Performed by: ANESTHESIOLOGY

## 2017-10-10 RX ORDER — LIDOCAINE HYDROCHLORIDE 10 MG/ML
10 INJECTION INFILTRATION; PERINEURAL
Status: COMPLETED | OUTPATIENT
Start: 2017-10-10 | End: 2017-10-10

## 2017-10-10 RX ORDER — SODIUM CHLORIDE 9 MG/ML
500 INJECTION, SOLUTION INTRAVENOUS CONTINUOUS
Status: DISCONTINUED | OUTPATIENT
Start: 2017-10-10 | End: 2017-10-10 | Stop reason: HOSPADM

## 2017-10-10 RX ORDER — METHYLPREDNISOLONE ACETATE 80 MG/ML
80 INJECTION, SUSPENSION INTRA-ARTICULAR; INTRALESIONAL; INTRAMUSCULAR; SOFT TISSUE ONCE
Status: DISCONTINUED | OUTPATIENT
Start: 2017-10-10 | End: 2017-10-10 | Stop reason: HOSPADM

## 2017-10-10 RX ORDER — BUPIVACAINE HYDROCHLORIDE 2.5 MG/ML
10 INJECTION, SOLUTION EPIDURAL; INFILTRATION; INTRACAUDAL ONCE
Status: COMPLETED | OUTPATIENT
Start: 2017-10-10 | End: 2017-10-10

## 2017-10-10 RX ADMIN — BUPIVACAINE HYDROCHLORIDE 10 ML: 2.5 INJECTION, SOLUTION EPIDURAL; INFILTRATION; INTRACAUDAL; PERINEURAL at 02:10

## 2017-10-10 RX ADMIN — LIDOCAINE HYDROCHLORIDE 10 ML: 10 INJECTION, SOLUTION INFILTRATION; PERINEURAL at 02:10

## 2017-10-10 NOTE — DISCHARGE INSTRUCTIONS
Home Care Instructions Pain Management:    1. DIET:   You may resume your normal diet today.   2. BATHING:   You may shower with luke warm water. No tub baths or anything that will soak injection sites under water for 24 hours.  3. DRESSING:   You may remove your bandage today.   4. ACTIVITY LEVEL:   You may resume your normal activities 24 hrs after your procedure. Nothing strenuous today.  5. MEDICATIONS:   You may resume your normal medications today. To restart blood thinners, ask your doctor.  6. SPECIAL INSTRUCTIONS:   No heat to the injection site for 24 hrs including, bath or shower, heating pad, moist heat, or hot tubs.    Use ice pack to injection site for any pain or discomfort.  Apply ice packs for 20 minute intervals as needed.     If you have received any sedatives by mouth today you may not drive for 12 hours.    If you have received any sedation through your IV, you may not drive for 24 hrs.     PLEASE CALL YOUR DOCTOR IF:  1. Redness or swelling around the injection site.  2. Fever of 101 degrees or more  3. Drainage (pus) from the injection site.  4. For any continuous bleeding (some dried blood over the incision is normal.)    FOR EMERGENCIES:   If any unusual problems or difficulties occur during clinic hours, call [phone] or 221.

## 2017-10-10 NOTE — PLAN OF CARE
Patient instructed to go to ER with any signs of complication such as bleeding, loss of bowel or bladder function, anything that is not normal for him and to call the clinic to inform them. Patient instructed that he may have to discontinue any blood thinner for future procedures and he will need clearance from his provider. The clinic will give him further instruction.

## 2017-10-10 NOTE — PLAN OF CARE
Pt tolerated procedure well. Pt reports 0/10 pain after procedure. Assisted pt up for first time. Steady on feet. All discharge instructions given. Dressing clean, dry, intact. Patient did not stop Plavix.

## 2017-10-10 NOTE — OP NOTE
Date of Procedure: 10/10/2017    Procedure: Bilateral L3-5 Lumbar medial branch blocks    Pre-op diagnosis: Facet arthritis, degenerative, lumbar spine [M47.896]    Post-op diagnosis: Facet arthritis, degenerative, lumbar spine [M47.896]     Physician: Dr. Anali Quinteros     Assistant: Dr. Jensen    Anesthestia: local    EBL: None    Specimens: None    All medications, allergies, and relevant histories were reviewed. No recent antibiotics or infections.  A time-out was taken to verify the correct patient, procedure, laterality, and appropriate medications/allergies.    Lumbar Medial Branch Block:   The procedure risks, benefits, and possible complications were discussed with the patient including nerve damage, spinal headache, bleeding, infection, and failure of pain relief.   Patient was placed in the prone position with the midriff elevated. Oblique view of the spine was obtained with fluoroscopy. Entry sites marked over the skin. The skin was prepped with chlorhexidine x3 and draped. Sterile precautions observed throughout the procedure. Xylocaine 1% was infiltrated locally over the entry site. A 22-gauge spinal needle was introduced at an angle, and the needle was placed onto the junction of the superior articular process and the most supermedial point on the transverse process. Placement was confirmed with a fluoroscopic view. After negative aspiration, 1cc of bupivacaine 0.5% was injected at each level. Needle was restyletted and removed. Procedure performed at the levels indicated: Right L3-5, Left:L3-5.     Patient tolerated the procedure well and there were no complications.   The patient was discharged home with a responsible adult.      Future Management:   If good results, can proceed to RFA.  If no relief, can follow up to discuss options.    I certify that I provided the above services.  I was present for the entire procedure, which was performed by the fellow physician under my supervision.  There were  no parts of the procedure that were performed not by myself or without my direct supervision.

## 2017-10-11 ENCOUNTER — TELEPHONE (OUTPATIENT)
Dept: PAIN MEDICINE | Facility: CLINIC | Age: 82
End: 2017-10-11

## 2017-10-11 NOTE — TELEPHONE ENCOUNTER
Patient was contacted staff spoke with patient's wife she stated the pain diary was as follows       1st hour 0  2nd hour 4  3rd hour 3  4th hour 2  5th hour 2  6th hour 2  12 hour 6  24 hour 3    Patient's wife stated that she feels like she received over 85% relief. Patient's wife was informed that this message will be forwarded to  and the office will contact her with a recommendation

## 2017-10-11 NOTE — TELEPHONE ENCOUNTER
----- Message from Ryanne Fermin sent at 10/11/2017  3:04 PM CDT -----  Contact: pt  x 1st Request  _ 2nd Request  _ 3rd Request    Who: pt    Why: has completed his blood diary. Pt is needing a call back    What Number to Call Back: [phone]    When to Expect a call back: (Before the end of the day)  -- if call after 3:00 call back will be tomorrow.

## 2017-10-13 NOTE — TELEPHONE ENCOUNTER
Great! That is a positive diagnostic block. The next step is the RFA, L3-5, bilaterally, Coolief. IV sedation, 30 min. He will need to stop his Plavix for 7 days prior to this. Thanks! - LW

## 2017-10-26 ENCOUNTER — SURGERY (OUTPATIENT)
Age: 82
End: 2017-10-26

## 2017-10-26 ENCOUNTER — HOSPITAL ENCOUNTER (OUTPATIENT)
Facility: OTHER | Age: 82
Discharge: HOME | End: 2017-10-26
Attending: ANESTHESIOLOGY | Admitting: ANESTHESIOLOGY
Payer: MEDICARE

## 2017-10-26 VITALS
TEMPERATURE: 98 F | RESPIRATION RATE: 18 BRPM | BODY MASS INDEX: 27.47 KG/M2 | DIASTOLIC BLOOD PRESSURE: 64 MMHG | SYSTOLIC BLOOD PRESSURE: 145 MMHG | WEIGHT: 175 LBS | HEART RATE: 70 BPM | HEIGHT: 67 IN | OXYGEN SATURATION: 98 %

## 2017-10-26 DIAGNOSIS — M47.816 FACET ARTHRITIS, DEGENERATIVE, LUMBAR SPINE: Primary | ICD-10-CM

## 2017-10-26 PROCEDURE — 63600175 PHARM REV CODE 636 W HCPCS: Performed by: ANESTHESIOLOGY

## 2017-10-26 PROCEDURE — 25000003 PHARM REV CODE 250: Performed by: ANESTHESIOLOGY

## 2017-10-26 PROCEDURE — 64636 DESTROY L/S FACET JNT ADDL: CPT | Mod: 50,,, | Performed by: ANESTHESIOLOGY

## 2017-10-26 PROCEDURE — 99152 MOD SED SAME PHYS/QHP 5/>YRS: CPT | Mod: ,,, | Performed by: ANESTHESIOLOGY

## 2017-10-26 PROCEDURE — A4649 SURGICAL SUPPLIES: HCPCS | Performed by: ANESTHESIOLOGY

## 2017-10-26 PROCEDURE — 64635 DESTROY LUMB/SAC FACET JNT: CPT | Mod: 50 | Performed by: ANESTHESIOLOGY

## 2017-10-26 PROCEDURE — 64636 DESTROY L/S FACET JNT ADDL: CPT | Mod: 50 | Performed by: ANESTHESIOLOGY

## 2017-10-26 PROCEDURE — S0020 INJECTION, BUPIVICAINE HYDRO: HCPCS | Performed by: ANESTHESIOLOGY

## 2017-10-26 PROCEDURE — 64635 DESTROY LUMB/SAC FACET JNT: CPT | Mod: 50,,, | Performed by: ANESTHESIOLOGY

## 2017-10-26 RX ORDER — DEXAMETHASONE SODIUM PHOSPHATE 10 MG/ML
10 INJECTION INTRAMUSCULAR; INTRAVENOUS ONCE
Status: COMPLETED | OUTPATIENT
Start: 2017-10-26 | End: 2017-10-26

## 2017-10-26 RX ORDER — MIDAZOLAM HYDROCHLORIDE 1 MG/ML
INJECTION INTRAMUSCULAR; INTRAVENOUS
Status: DISCONTINUED | OUTPATIENT
Start: 2017-10-26 | End: 2017-10-26 | Stop reason: HOSPADM

## 2017-10-26 RX ORDER — FENTANYL CITRATE 50 UG/ML
INJECTION, SOLUTION INTRAMUSCULAR; INTRAVENOUS
Status: DISCONTINUED | OUTPATIENT
Start: 2017-10-26 | End: 2017-10-26 | Stop reason: HOSPADM

## 2017-10-26 RX ORDER — LIDOCAINE HYDROCHLORIDE 20 MG/ML
5 INJECTION, SOLUTION INFILTRATION; PERINEURAL
Status: COMPLETED | OUTPATIENT
Start: 2017-10-26 | End: 2017-10-26

## 2017-10-26 RX ORDER — OMEPRAZOLE 40 MG/1
CAPSULE, DELAYED RELEASE ORAL
Qty: 30 CAPSULE | Refills: 0 | Status: SHIPPED | OUTPATIENT
Start: 2017-10-26 | End: 2017-11-28 | Stop reason: SDUPTHER

## 2017-10-26 RX ORDER — BUPIVACAINE HYDROCHLORIDE 5 MG/ML
3 INJECTION, SOLUTION EPIDURAL; INTRACAUDAL ONCE
Status: COMPLETED | OUTPATIENT
Start: 2017-10-26 | End: 2017-10-26

## 2017-10-26 RX ADMIN — MIDAZOLAM HYDROCHLORIDE 0.5 MG: 1 INJECTION, SOLUTION INTRAMUSCULAR; INTRAVENOUS at 09:10

## 2017-10-26 RX ADMIN — DEXAMETHASONE SODIUM PHOSPHATE 10 MG: 10 INJECTION, SOLUTION INTRAMUSCULAR; INTRAVENOUS at 09:10

## 2017-10-26 RX ADMIN — LIDOCAINE HYDROCHLORIDE 5 ML: 20 INJECTION, SOLUTION INFILTRATION; PERINEURAL at 09:10

## 2017-10-26 RX ADMIN — FENTANYL CITRATE 25 MCG: 50 INJECTION, SOLUTION INTRAMUSCULAR; INTRAVENOUS at 09:10

## 2017-10-26 RX ADMIN — BUPIVACAINE HYDROCHLORIDE 10 ML: 5 INJECTION, SOLUTION EPIDURAL; INTRACAUDAL; PERINEURAL at 09:10

## 2017-10-26 NOTE — DISCHARGE SUMMARY
Discharge Note  Short Stay      SUMMARY     Admit Date: 10/26/2017    Attending Physician: Anali Quinteros      Discharge Physician: Anali Quinteros      Discharge Date: 10/26/2017 10:36 AM    Final Diagnosis: Other osteoarthritis of spine, lumbar region [M47.896]  Chronic pain syndrome [G89.4]    Disposition: Home or self care    Patient Instructions:   Discharge Medication List as of 10/26/2017 10:14 AM      CONTINUE these medications which have NOT CHANGED    Details   amlodipine (NORVASC) 5 MG tablet TAKE 1 TABLET(5 MG) BY MOUTH EVERY DAY, Normal      aspirin 81 MG Chew Take 81 mg by mouth once daily.  , Until Discontinued, Historical Med      CHOLESTYRAMINE LIGHT 4 gram packet DISSOLVE 1 PACKET IN LIQUID AND DRINK TWICE DAILY, Normal      clopidogrel (PLAVIX) 75 mg tablet TAKE 1 TABLET BY MOUTH ONCE DAILY, Normal      finasteride (PROSCAR) 5 mg tablet Take 1 tablet (5 mg total) by mouth once daily., Starting 1/11/2017, Until Discontinued, Normal      fluorometholone 0.1% (FML) 0.1 % DrpS Starting Tue 6/27/2017, Historical Med      gabapentin (NEURONTIN) 300 MG capsule Take 1 capsule (300 mg total) by mouth every evening., Starting Mon 9/25/2017, Until Sat 3/24/2018, Normal      ipratropium (ATROVENT) 0.03 % nasal spray SPRAY 1-2 SPRAYS IN EACH NOSTRIL TWICE DAILY AS NEEDED FOR NASAL DRIP, Normal      mupirocin (BACTROBAN) 2 % ointment Apply topically 2 (two) times daily., Starting Thu 9/14/2017, Normal      omeprazole (PRILOSEC) 40 MG capsule TAKE 1 CAPSULE(40 MG) BY MOUTH EVERY MORNING, Normal      tamsulosin (FLOMAX) 0.4 mg Cp24 TAKE ONE CAPSULE BY MOUTH DAILY, Normal      torsemide (DEMADEX) 5 MG Tab Take 1 tablet (5 mg total) by mouth once daily., Starting 10/13/2016, Until Discontinued, Normal      trospium (SANCTURA) 20 mg Tab tablet Take 1 tablet (20 mg total) by mouth 2 (two) times daily., Starting 1/11/2017, Until Discontinued, Normal             Resume home diet and activity

## 2017-10-26 NOTE — OP NOTE
Date of Procedure: 10/26/2017    Procedure: Bilateral L3-5 Lumbar Medial Branch Nerve Coolief Thermal Radiofrequency Ablation    Pre-op diagnosis: Other osteoarthritis of spine, lumbar region [M47.896]  Chronic pain syndrome [G89.4]    Post-op diagnosis: Other osteoarthritis of spine, lumbar region [M47.896]  Chronic pain syndrome [G89.4]     Physician: Dr. Anali Quinteros     Assistant: Dr. French    Anesthestia: local/IV sedation:  Versed 2 mg and fentanyl 50 mcg IV.  Conscious sedation provided by MD and monitored by RN.  Total sedation time was less than 45 minutes. (See nurse documentation and case log for sedation time)    EBL: None    Specimens: None    All medications, allergies, and relevant histories were reviewed. No recent antibiotics or infections.  A time-out was taken to verify the correct patient, procedure, laterality, and appropriate medications/allergies.    Procedure: Lumbar RFA    Lumbar Medial Branch Block with radiofrequency ablation, levels L3-5, Bilateral     The procedure risks, benefits, and possible complications were discussed with the patient including nerve damage, infection, spinal headache, and paresis.     Patient was placed in the prone position with the midriff elevated. Skin was prepped with CHG and draped. Oblique view of the spine was obtained with fluoroscopy. Entry sites were marked over the skin and Xylocaine 1% was used to anesthetize the skin and subcutaneous tissues.     A 17-gauge Halyard Coolief RF needle was introduced at an angle to parallel the medial branches in the groove between superior articular process and transverse process, and L5 primary dorsal ramus at the junction of the S1 superior articular process and sacral ala.    Multifidus stim elicited at each level.  No distal motor stimulation was elicited at any level at 2V with a frequency of 2Hz.  All impedances were within the acceptable range.    1cc of 2% lidocaine was injected at each level.  Coolief  Thermal RF was then conducted at each level at 60 degrees at the probe, 80 degree lesion, for 2:30 minutes     1 cc of a mixture of 0.5% bupivacaine with dexamethasone 5 mg was injected at each level.    Patient tolerated the procedure well and there were no complications.      Future Management:   If helpful, can repeat as needed.  Follow up with me in 4-6 weeks.      I certify that I provided the above services.  I was present for the entire procedure, which was performed by myself with the assistance of the resident physician.  There were no parts of the procedure that were performed not by myself or without my direct supervision.

## 2017-10-26 NOTE — H&P
"HPI  Randall Strickland Jr. is a 89 y.o. male with a pmhx of chronic low back pain, CHF, HTN and s/p AVR. He reports last taking plavix on 10/19/17. Patient instructed to restart plavix tomorrow.  He is presenting for bilateral L3-5 RFA.    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    Stenosis of aortic and mitral valves     aortic DAHLIA 1.1 CM       PMHx, PSHx, Allergies, Medications reviewed in epic    ROS negative except pain complaints in HPI    OBJECTIVE:    /63   Pulse 63   Temp 98 °F (36.7 °C) (Oral)   Resp 18   Ht 5' 7" (1.702 m)   Wt 79.4 kg (175 lb)   SpO2 97%   BMI 27.41 kg/m²     PHYSICAL EXAMINATION:  GEN:  Well developed, well nourished.  No acute distress. no pain behavior.  HEENT:  No trauma.  Mucous membranes moist.  Nares patent bilaterally.  PSYCH: Normal affect. Thought content appropriate.  CHEST:  Breathing symmetric.  No audible wheezing.  ABD: Soft, non-tender, non-distended.  SKIN:  Warm, pink, dry.  No rash on exposed areas.    EXT:  No cyanosis, clubbing, or edema.  No color change or changes in nail or hair growth.  NEURO/MUSCULOSKELETAL:  Fully alert, oriented, and appropriate. Speech normal madisyn. No cranial nerve deficits.   Gait: unsteady.  2+ pitting edema to LE bilaterally through the knee.   Motor Strength: 5/5 motor strength throughout lower extremities.   Sensory: negative sensory deficit in the lower extremities.   Reflexes:  2+ and symmetric throughout. No clonus or spasticity.  L-Spine:  decreased ROM with pain on extension. Mildly decreased ROM on flexion. + facet loading bilaterally.  negative SLR bilaterally.    SI Joint/Hip: negative EMIGDIO bilaterally.  negative FADIR bilaterally. Decreased ROM hips bilaterally.   negative TTP over lumbar paraspinals, bilateral SI joints, hips, " piriformis muscles, or + TTP left GTB.       Plan:    Proceed with procedure as planned    Vannessa French  10/26/2017    I have seen the patient with the resident physician.  We have come up with the above plan.  The patient is in agreement with our plan.

## 2017-10-26 NOTE — DISCHARGE INSTRUCTIONS

## 2017-11-01 ENCOUNTER — OFFICE VISIT (OUTPATIENT)
Dept: OTOLARYNGOLOGY | Facility: CLINIC | Age: 82
End: 2017-11-01
Payer: MEDICARE

## 2017-11-01 VITALS — TEMPERATURE: 98 F | HEART RATE: 70 BPM | DIASTOLIC BLOOD PRESSURE: 64 MMHG | SYSTOLIC BLOOD PRESSURE: 123 MMHG

## 2017-11-01 DIAGNOSIS — R68.2 DRY MOUTH: ICD-10-CM

## 2017-11-01 DIAGNOSIS — J34.2 NASAL SEPTAL DEVIATION: Primary | ICD-10-CM

## 2017-11-01 DIAGNOSIS — K13.0 DRY LIPS: ICD-10-CM

## 2017-11-01 PROCEDURE — 99999 PR PBB SHADOW E&M-EST. PATIENT-LVL III: CPT | Mod: PBBFAC,,, | Performed by: OTOLARYNGOLOGY

## 2017-11-01 PROCEDURE — 99213 OFFICE O/P EST LOW 20 MIN: CPT | Mod: S$GLB,,, | Performed by: OTOLARYNGOLOGY

## 2017-11-01 NOTE — PROGRESS NOTES
CC:Throat pain +  HPI:Mr. Strickland is a 89 year old CM whose father was an ENT physician in [facility].  His PCP used to be Dr. Tam Goldberg.  His ENT symptoms include a sensation of a dry mouth and a sensation of swollen dry lips as previously indicated during in his initial visit with me 7/6/17.   He makes today reference to throat pain symptoms.  He had complained of cough and chronic throat clearing at his previous examination.  He had also had complained of anterior rhinorrhea treated with Atrovent nasal spray.  His medication list includes Plavix and 40 mg Prilosec.  He makes reference to my previous diagnosis of a significant  nasal septal deviation.  He is now aware that that is not fixable problem other than with surgery.    His medical problem list includes essential hypertension, chronic diastolic congestive heart failure, post surgical dumping syndrome, status post aortic valve replacement procedure, ADEBAYO, dyspnea on exertion, BPH, idiopathic cardiomyopathy, chronic low back pain, CKD stage III, hypophosphatemia, bilateral carotid artery disease, hypoalbuminemia and memory loss.    PE: Blood pressure 123/64 pulse 70 temperature 97.6  Gen.: Alert and oriented gentleman in no acute distress  Nasal exam reveals evidence for a more patent right nasal airway due to a septal deflection to the left with moderate obstruction there.  There is evidence for a glob of  blood/mucous in the left anterior nasal passage which is suctioned off  the left septum; this is an area of slightly granular hypervascularity without collin bleeding which is left alone.  The mucosa appears dry.  The oral cavity is inspected; there is no significant inflammation infection or ulceration of the oral Cavity.  There is no evidence of thrush.  The floor of the mouth is supple.  There is no evidence of pharyngitis or uvula swelling.   A mirror exam of larynx was performed.  There is no evidence of epiglottitis or supraglottitis.   Arytenoids are not significantly inflamed or edematous.    He has an excellent glottic airway.  There are no significant vocal cord lesions on this brief glimpse exam.  The neck is palpated; there is no significant anterior posterior cervical adenopathy.  Endoscopy was not performed today    DIAGNOSIS:     ICD-10-CM ICD-9-CM    1. Nasal septal deviation, left J34.2 470    2. Dry mouth R68.2 527.7    3. Dry lips K13.0 528.5    4.      Hx anterior rhinorrhea  5.      Bloody mucous, right nasal passage  PLAN: AYR nasal mist/gel use encouraged h.s ( estefanía. for left nasal passage)   Humidification may help at night during cold dry winter months  Gentle nose blowing only( Plavix)   RTC for office endoscopy prn.  Judicious use of Atrovent for rhinorrhea; spray laterally 1-2 sprays BID prn

## 2017-11-01 NOTE — PATIENT INSTRUCTIONS
AYR nasal mist/gel use encouraged h.s ( estefanía for left nasal passage)   Humidification may help at night during cold dry winter months  Gentle nose blowing only( Plavix)   RTC for office endoscopy prn  Judicious use of Atrovent for rhinorrhea; spray laterally 1-2 sprays BID prn

## 2017-11-17 ENCOUNTER — OFFICE VISIT (OUTPATIENT)
Dept: PAIN MEDICINE | Facility: CLINIC | Age: 82
End: 2017-11-17
Payer: MEDICARE

## 2017-11-17 VITALS
HEIGHT: 67 IN | BODY MASS INDEX: 28.48 KG/M2 | SYSTOLIC BLOOD PRESSURE: 110 MMHG | TEMPERATURE: 98 F | DIASTOLIC BLOOD PRESSURE: 59 MMHG | OXYGEN SATURATION: 99 % | WEIGHT: 181.44 LBS | RESPIRATION RATE: 18 BRPM | HEART RATE: 82 BPM

## 2017-11-17 DIAGNOSIS — M51.36 DDD (DEGENERATIVE DISC DISEASE), LUMBAR: ICD-10-CM

## 2017-11-17 DIAGNOSIS — M47.816 FACET ARTHRITIS, DEGENERATIVE, LUMBAR SPINE: Primary | ICD-10-CM

## 2017-11-17 DIAGNOSIS — M47.816 LUMBAR SPONDYLOSIS: ICD-10-CM

## 2017-11-17 PROCEDURE — 99999 PR PBB SHADOW E&M-EST. PATIENT-LVL III: CPT | Mod: PBBFAC,,, | Performed by: NURSE PRACTITIONER

## 2017-11-17 PROCEDURE — 99213 OFFICE O/P EST LOW 20 MIN: CPT | Mod: S$GLB,,, | Performed by: NURSE PRACTITIONER

## 2017-11-17 NOTE — PROGRESS NOTES
Subjective:     Patient ID: Randall Strickland Jr. is a 89 y.o. male.    Chief Complaint: Pain    Consulted by: Dorota Correa PA-C     Disclaimer: This note was generated using voice recognition software.  There may be a typographical errors that were missed during proofreading.      HPI:    Randall Strickland Jr. is a 89 y.o. male who presents today with low back pain.  Started 6 weeks ago only at night.  Constant hip pain that moved down through his knee and then down to his ankle.  In the last 2 weeks the pain switched to day time.  The pain is described as achy but when the pain is in his ankle, it is the worst and is described as sharp.  When the pain arrives in his ankle, standing up relieves it.      Pt explains that [facility] used [facility] for years, last was 5-6 months ago and would receive ? PEPE? With several weeks of relief from back pain.  Pt cannot report any exacerbating or alleviating factors. Physical activity brings exhaustion that is new since his TAVR last year in August. Pt reports RUDOLPH with walking on flat ground.  The pain in his back is at his baseline. Pt recently fell from standing height while talking to his wife standing in his front yard. Does not recall if he felt faint before he fell or if his legs felt weak.     Interval History (10/11/2017):  He returns today for follow up.  He reports that increasing the gabapentin has been helpful for the pain.  His primary complaint today is axial back pain.    Interval History (11/17/2017):  The patient returns to clinic for follow up. He is s/p bilateral L3,4,5 cooled RFA on 10/26/2017. He reports 60% relief of his low back pain. He reports that he has been able to sleep better since the procedure. He also reports decreased pain with prolonged standing or walking. He reports mild left sided buttock pain but states that this is tolerable at this time. He denies any radiating leg pain. He denies any other health  changes.    Records review from Dr. Euceda reveals:   · Bilateral L5 and S1 TFESI 10/2012  · Bilateral L4 and L5 TFESI 1/6/2016 and 1/27/2016  · Bilateral L3-5 medial branch blocks 5/18/2017:  Great, temporary relief    Physical Therapy: has done in the past for his back with some relief.  Works out at the gym frequently and rides the bike, but is having significant difficulty with walking recently.     Non-pharmacologic Treatment: none          · TENS? None     Pain Medications:         · Currently taking: neurontin 300mg QHS    · Has tried in the past:  Pt has not tried any medication for his current pain    · Has not tried: opioids,  NSAIDs, Tylenol, Muscle relaxants, TCAs, SNRIs, anticonvulsants, topical creams    Blood thinners: Plavix     Interventional Therapies:   · Bilateral L5 and S1 TFESI 10/2012  · Bilateral L4 and L5 TFESI 1/6/2016 and 1/27/2016  · Bilateral L3-5 medial branch blocks 5/18/2017:  Great, temporary relief  · Bilateral L3-5 RFA 10/26/2017- 60% relief       Relevant Surgeries: bilateral hip replacement, 3 operations on knees    Affecting sleep? Yes     Affecting daily activities? Yes, patient doesn't want to go anywhere anymore due to inability to walk well, this is new. Quit playing tennis one yr ago     Depressive symptoms? Yes           · SI/HI? No    Work status: retired     Pain Scales  Best: 3/10  Worst: 9/10  Usually: 5/10  Today: 4/10    Low-back Pain   This is a chronic problem. The current episode started more than 1 year ago. The problem occurs intermittently. The problem has been gradually worsening since onset. The pain is present in the lumbar spine. The pain radiates to the left knee and left thigh. The quality of the pain is described as aching and shooting (throbbing/). The pain is at a severity of 7/10. The pain is moderate. The pain is the same all the time. The symptoms are aggravated by lying down (getting up from sitting position ). Pertinent negatives include no chest  pain, fever, headaches or weight loss. He has tried injection treatment and NSAIDs (gabapentin) for the symptoms. The treatment provided mild relief. Physical therapy was ineffective.      Last 3 PDI Scores 11/17/2017 10/9/2017 9/25/2017   Pain Disability Index (PDI) 12 0 17       Review of Systems   Constitution: Negative for chills, fever, malaise/fatigue, weight gain and weight loss.   HENT: Negative for ear pain and hoarse voice.    Eyes: Negative for blurred vision, pain and visual disturbance.   Cardiovascular: Negative for chest pain, dyspnea on exertion and irregular heartbeat.   Respiratory: Negative for cough, shortness of breath and wheezing.    Endocrine: Negative for cold intolerance and heat intolerance.   Hematologic/Lymphatic: Negative for adenopathy and bleeding problem. Does not bruise/bleed easily.   Skin: Negative for color change, itching and rash.   Musculoskeletal: Positive for back pain. Negative for neck pain.   Gastrointestinal: Negative for change in bowel habit, diarrhea, hematemesis, hematochezia, melena and vomiting.   Genitourinary: Negative for flank pain, frequency, hematuria and urgency.   Neurological: Negative for difficulty with concentration, dizziness, headaches, loss of balance and seizures.   Psychiatric/Behavioral: Negative for altered mental status, depression and suicidal ideas. The patient is not nervous/anxious.    Allergic/Immunologic: Negative for HIV exposure.   All other systems reviewed and are negative.            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    Stenosis of aortic and mitral valves     aortic DAHLIA 1.1 CM       Past Surgical History:   Procedure Laterality Date    APPENDECTOMY      cararact  car    CARDIAC CATHETERIZATION      CARDIAC VALVE SURGERY       CATARACT EXTRACTION BILATERAL W/ ANTERIOR VITRECTOMY      bilaterial    CATARACT EXTRACTION W/  INTRAOCULAR LENS IMPLANT Bilateral     CHOLECYSTECTOMY      EYE SURGERY      JOINT REPLACEMENT      bilateral hip    KNEE SURGERY      Bilateral    TOTAL HIP ARTHROPLASTY      Bilaterial       Review of patient's allergies indicates:  No Known Allergies    Current Outpatient Prescriptions   Medication Sig Dispense Refill    amlodipine (NORVASC) 5 MG tablet TAKE 1 TABLET(5 MG) BY MOUTH EVERY DAY 30 tablet 6    aspirin 81 MG Chew Take 81 mg by mouth once daily.        CHOLESTYRAMINE LIGHT 4 gram packet DISSOLVE 1 PACKET IN LIQUID AND DRINK TWICE DAILY 60 packet 5    clopidogrel (PLAVIX) 75 mg tablet TAKE 1 TABLET BY MOUTH ONCE DAILY 90 tablet 6    finasteride (PROSCAR) 5 mg tablet Take 1 tablet (5 mg total) by mouth once daily. 90 tablet 3    fluorometholone 0.1% (FML) 0.1 % DrpS       FLUZONE HIGH-DOSE 2017-18, PF, 180 mcg/0.5 mL vaccine       gabapentin (NEURONTIN) 300 MG capsule Take 1 capsule (300 mg total) by mouth every evening. 30 capsule 5    ipratropium (ATROVENT) 0.03 % nasal spray SPRAY 1-2 SPRAYS IN EACH NOSTRIL TWICE DAILY AS NEEDED FOR NASAL DRIP 60 mL 1    mupirocin (BACTROBAN) 2 % ointment Apply topically 2 (two) times daily. 30 g 0    omeprazole (PRILOSEC) 40 MG capsule TAKE 1 CAPSULE(40 MG) BY MOUTH EVERY MORNING 30 capsule 0    tamsulosin (FLOMAX) 0.4 mg Cp24 TAKE ONE CAPSULE BY MOUTH DAILY 90 capsule 3    torsemide (DEMADEX) 5 MG Tab Take 1 tablet (5 mg total) by mouth once daily. 30 tablet 3    trospium (SANCTURA) 20 mg Tab tablet Take 1 tablet (20 mg total) by mouth 2 (two) times daily. 60 tablet 11     No current facility-administered medications for this visit.        Family History   Problem Relation Age of Onset    No Known Problems Father     No Known Problems Mother     No Known Problems Sister     No Known Problems Brother     No Known Problems Maternal Aunt     No  "Known Problems Maternal Uncle     No Known Problems Paternal Aunt     No Known Problems Paternal Uncle     No Known Problems Maternal Grandmother     No Known Problems Maternal Grandfather     No Known Problems Paternal Grandmother     No Known Problems Paternal Grandfather     No Known Problems Daughter     No Known Problems Son     No Known Problems Daughter     No Known Problems Son     Amblyopia Neg Hx     Blindness Neg Hx     Cancer Neg Hx     Cataracts Neg Hx     Diabetes Neg Hx     Glaucoma Neg Hx     Hypertension Neg Hx     Macular degeneration Neg Hx     Retinal detachment Neg Hx     Strabismus Neg Hx     Stroke Neg Hx     Thyroid disease Neg Hx     Anemia Neg Hx     Arrhythmia Neg Hx     Asthma Neg Hx     Clotting disorder Neg Hx     Fainting Neg Hx     Heart attack Neg Hx     Heart disease Neg Hx     Heart failure Neg Hx     Hyperlipidemia Neg Hx     Atrial Septal Defect Neg Hx        Social History     Social History    Marital status:      Spouse name: Sade    Number of children: N/A    Years of education: N/A     Occupational History    Retired Retired 1992     Social History Main Topics    Smoking status: Never Smoker    Smokeless tobacco: Never Used    Alcohol use Yes      Comment: less than one  drink weekly    Drug use: No    Sexual activity: Not on file     Other Topics Concern    Not on file     Social History Narrative    No narrative on file       Objective:     Vitals:    11/17/17 1055   BP: (!) 110/59   Pulse: 82   Resp: 18   Temp: 98 °F (36.7 °C)   TempSrc: Oral   SpO2: 99%   Weight: 82.3 kg (181 lb 7 oz)   Height: 5' 7" (1.702 m)   PainSc:   4   PainLoc: Back       GEN:  Well developed, well nourished.  No acute distress. no pain behavior.  HEENT:  No trauma.  Mucous membranes moist.  Nares patent bilaterally.  PSYCH: Normal affect. Thought content appropriate.  CHEST:  Breathing symmetric.  No audible wheezing.  ABD: Soft, non-tender, " non-distended.  SKIN:  Warm, pink, dry.  No rash on exposed areas.    EXT:  No cyanosis, clubbing, or edema.  No color change or changes in nail or hair growth.  NEURO/MUSCULOSKELETAL:  Fully alert, oriented, and appropriate. Speech normal madisyn. No cranial nerve deficits.   Gait: unsteady.  2+ pitting edema to LE bilaterally through the knee.   Motor Strength: 5/5 motor strength throughout lower extremities.   Sensory: negative sensory deficit in the lower extremities.   Reflexes:  2+ and symmetric throughout. No clonus or spasticity.  L-Spine:  Decreased ROM with mild pain on extension. Positive facet loading bilaterally, left greater than right.  Negative SLR bilaterally.    SI Joint/Hip: negative EMIGDIO bilaterally.  negative FADIR bilaterally. Decreased ROM hips bilaterally.   Negative TTP over lumbar paraspinals, bilateral SI joints, hips, piriformis muscles, or GT bursas.            Imaging:      Xray Lumbar spine:  9/2016  Lumbar spine AP lateral, comparison 8/5/09.  The lumbar vertebra are intact.  No compression fracture is seen.  AP view shows moderate dextroscoliosis similar to before.  Lateral view demonstrates grade 1 retrolisthesis at L2-3.  Multilevel degenerative changes are again identified, most notable at L1-2 and L2-3 disc spaces with narrowing and proliferative change at the endplates.  Narrowing and sclerosis are also seen in the lower facet joints.  No obvious paraspinal lesion is seen.  A hip arthroplasty on the left is partially demonstrated.   Impression      Multilevel degenerative spine changes similar to old exam.  ______________________________________     Electronically signed by: MIRIAM KEEN MD  Date: 09/07/16  Time: 11:24        Outside MRI Lumbar Spine dated 8/24/2009  L1/2: Large central disc extrusion at L1/2 with moderate central canal stenosis and right > left neural foraminal narrowing  L2/3: Facet degenerative changes with extruded disc material that extends into the  left neural foramen  L3/4: facet degenerative changes with ligamentum flavum thickening and a foraminal disc protrusion on the left.  L4/5: moderate central canal stenosis, left greater than right foraminal narrowing.  L5/S1: There are facet degenerative changes with a bulge and bilateral foraminal narrowing      Assessment:     Encounter Diagnoses   Name Primary?    Facet arthritis, degenerative, lumbar spine Yes    Lumbar spondylosis     DDD (degenerative disc disease), lumbar        Plan:     Randall was seen today for low-back pain.    Diagnoses and all orders for this visit:    Facet arthritis, degenerative, lumbar spine    Lumbar spondylosis    DDD (degenerative disc disease), lumbar         We discussed the assessment and recommendations.  All available images were reviewed. We discussed the disease process, prognosis, treatment plan, and risks and benefits. The patient is aware of the risks and benefits of the medications being prescribed, common side effects, and proper usage. The following is the plan we agreed on:     1. He is s/p bilateral L3-5 RFA with significant relief.  2. Continue home exercise routine.   3. Continue gabapentin to 300mg QHS, consider increasing further in the future.   4. RTC PRN.     The above plan and management options were discussed at length with patient. Patient is in agreement with the above and verbalized understanding.     Keisha Siegel NP  11/17/2017          Ortho/SPM Exam

## 2017-11-28 RX ORDER — OMEPRAZOLE 40 MG/1
CAPSULE, DELAYED RELEASE ORAL
Qty: 30 CAPSULE | Refills: 0 | Status: SHIPPED | OUTPATIENT
Start: 2017-11-28 | End: 2017-12-29 | Stop reason: SDUPTHER

## 2017-12-19 ENCOUNTER — TELEPHONE (OUTPATIENT)
Dept: NEUROLOGY | Facility: CLINIC | Age: 82
End: 2017-12-19

## 2017-12-19 ENCOUNTER — OFFICE VISIT (OUTPATIENT)
Dept: NEUROLOGY | Facility: CLINIC | Age: 82
End: 2017-12-19
Payer: MEDICARE

## 2017-12-19 VITALS
SYSTOLIC BLOOD PRESSURE: 139 MMHG | HEART RATE: 59 BPM | DIASTOLIC BLOOD PRESSURE: 67 MMHG | HEIGHT: 67 IN | WEIGHT: 183.44 LBS | BODY MASS INDEX: 28.79 KG/M2

## 2017-12-19 DIAGNOSIS — F03.90 DEMENTIA WITHOUT BEHAVIORAL DISTURBANCE, UNSPECIFIED DEMENTIA TYPE: Primary | ICD-10-CM

## 2017-12-19 DIAGNOSIS — I70.0 AORTIC ATHEROSCLEROSIS: ICD-10-CM

## 2017-12-19 DIAGNOSIS — R26.9 GAIT DISORDER: ICD-10-CM

## 2017-12-19 DIAGNOSIS — Z95.2 S/P AORTIC VALVE REPLACEMENT: ICD-10-CM

## 2017-12-19 PROCEDURE — 99999 PR PBB SHADOW E&M-EST. PATIENT-LVL III: CPT | Mod: PBBFAC,,, | Performed by: PSYCHIATRY & NEUROLOGY

## 2017-12-19 PROCEDURE — 99204 OFFICE O/P NEW MOD 45 MIN: CPT | Mod: S$GLB,,, | Performed by: PSYCHIATRY & NEUROLOGY

## 2017-12-19 PROCEDURE — 99499 UNLISTED E&M SERVICE: CPT | Mod: S$GLB,,, | Performed by: PSYCHIATRY & NEUROLOGY

## 2017-12-19 NOTE — ASSESSMENT & PLAN NOTE
Mild imbalance which I suspect is due to peripheral neuropathy, based on exam and phenotype of gait.  I have low suspicion, but will evaluate for, NPH.    -> CT head

## 2017-12-19 NOTE — TELEPHONE ENCOUNTER
----- Message from Henok Mendoza sent at 12/19/2017  3:51 PM CST -----  Contact: Mrs. Strickland (wife) @ [phone]  Mrs. Strickland states she's returning a call to the doctor.

## 2017-12-19 NOTE — PROGRESS NOTES
Randall Strickland Jr.  I. Chief Complaints during this visit:  New Patient visit for  Gait disturbance    Hieu Monk MD  [address] [place] [address]    Primary Care Physician  Hieu Monk MD  [address]  [place] [address]      History of present illness:   89 y.o.  M seen in consultation at the request of  Dr. Monk for evaluation of gait disorder.  Had aortic valve replacement and since then, he has more trouble walking, a couple falls (last was a month ago).  Worse with eyes closed and in dark.    Has history of back problems with RFA in recent time, bilateral knee surgery.  Does not use cane or walker and resistent to using them.    Feet remain swollen.    No syncope or presyncope.    Has known aortic atherosclerosis and aortic valve replacement.  Bilateral carotid disease is on problem list, but I can't find study to confirm this.      II.  Review of systems:  As in HPI,  otherwise, balance 10 systems reviewed and are negative.    III.  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    Stenosis of aortic and mitral valves     aortic DAHLIA 1.1 CM     Family History   Problem Relation Age of Onset    No Known Problems Father     No Known Problems Mother     No Known Problems Sister     No Known Problems Brother     No Known Problems Maternal Aunt     No Known Problems Maternal Uncle     No Known Problems Paternal Aunt     No Known Problems Paternal Uncle     No Known Problems Maternal Grandmother     No Known Problems Maternal Grandfather     No Known Problems Paternal Grandmother     No Known Problems Paternal Grandfather     No Known Problems Daughter     No Known Problems Son     No Known Problems Daughter     No Known Problems Son     Amblyopia Neg Hx     Blindness Neg Hx      Cancer Neg Hx     Cataracts Neg Hx     Diabetes Neg Hx     Glaucoma Neg Hx     Hypertension Neg Hx     Macular degeneration Neg Hx     Retinal detachment Neg Hx     Strabismus Neg Hx     Stroke Neg Hx     Thyroid disease Neg Hx     Anemia Neg Hx     Arrhythmia Neg Hx     Asthma Neg Hx     Clotting disorder Neg Hx     Fainting Neg Hx     Heart attack Neg Hx     Heart disease Neg Hx     Heart failure Neg Hx     Hyperlipidemia Neg Hx     Atrial Septal Defect Neg Hx      Social History     Social History    Marital status:      Spouse name: Sade    Number of children: N/A    Years of education: N/A     Occupational History    Retired Retired 1992     Social History Main Topics    Smoking status: Never Smoker    Smokeless tobacco: Never Used    Alcohol use Yes      Comment: less than one  drink weekly    Drug use: No    Sexual activity: Not Asked     Other Topics Concern    None     Social History Narrative    None         Current Outpatient Prescriptions on File Prior to Visit   Medication Sig Dispense Refill    amlodipine (NORVASC) 5 MG tablet TAKE 1 TABLET(5 MG) BY MOUTH EVERY DAY 30 tablet 6    aspirin 81 MG Chew Take 81 mg by mouth once daily.        CHOLESTYRAMINE LIGHT 4 gram packet DISSOLVE 1 PACKET IN LIQUID AND DRINK TWICE DAILY 60 packet 5    clopidogrel (PLAVIX) 75 mg tablet TAKE 1 TABLET BY MOUTH ONCE DAILY 90 tablet 6    finasteride (PROSCAR) 5 mg tablet Take 1 tablet (5 mg total) by mouth once daily. 90 tablet 3    fluorometholone 0.1% (FML) 0.1 % DrpS       FLUZONE HIGH-DOSE 2017-18, PF, 180 mcg/0.5 mL vaccine       gabapentin (NEURONTIN) 300 MG capsule Take 1 capsule (300 mg total) by mouth every evening. 30 capsule 5    ipratropium (ATROVENT) 0.03 % nasal spray SPRAY 1-2 SPRAYS IN EACH NOSTRIL TWICE DAILY AS NEEDED FOR NASAL DRIP 60 mL 1    mupirocin (BACTROBAN) 2 % ointment Apply topically 2 (two) times daily. 30 g 0    omeprazole (PRILOSEC) 40 MG  "capsule TAKE 1 CAPSULE(40 MG) BY MOUTH EVERY MORNING 30 capsule 0    tamsulosin (FLOMAX) 0.4 mg Cp24 TAKE ONE CAPSULE BY MOUTH DAILY 90 capsule 3    torsemide (DEMADEX) 5 MG Tab Take 1 tablet (5 mg total) by mouth once daily. 30 tablet 3    trospium (SANCTURA) 20 mg Tab tablet Take 1 tablet (20 mg total) by mouth 2 (two) times daily. 60 tablet 11     No current facility-administered medications on file prior to visit.        PRIOR problem-specific medications tried:  na    Review of patient's allergies indicates:  No Known Allergies    IV. Physical Exam    Vitals:    12/19/17 1328   BP: 139/67   Pulse: (!) 59   Weight: 83.2 kg (183 lb 6.8 oz)   Height: 5' 7" (1.702 m)     General appearance: Well nourished, well developed, no acute distress.         Cardiovascular:  pedal pulses 2, no edema or cyanosis, heart regular rate and rhythym, no carotid bruits.         -------------------------------------------------------------  Facial Expression: normal       Affect: full       Orientation to time & place:  Oriented to time, place, person and situation       Attention & concentration:  Normal attention span and concentration       Memory:  0/5          Language: Spontaneous, fluent; able to repeat and name objects        Fund of knowledge:  Aware of current events        Speech:  normal (not dysarthric)  -------------------------------------------------------  Cranial nerves: wears glasses, visual fields full, optic discs not visualized, pupils equal round and reactive, extraocular movements intact,       facial sensation intact, face symmetrical, hearing intact to whisper, palate raises midline, shoulder shrug strength normal, tongue protrudes midline.        -------------------------------------------------------  Musculoskeletal  Muscle tone: all 4 extremities normal        Muscle Bulk: all 4 extremities normal        Muscle strength:  5/5 in all 4 extremities        No pronator drift  Sensation: loss in vibration " up to knees       Deep tendon Reflexes: 1 bilateral biceps, triceps, none at patella and ankles        --------------------------------------------------------------  Cerebellar and Coordination  Gait:  Slight decrease step height, mild imbalance, but no cane, no enbloc turn, no festination.       Finger-nose: no dysmetria       Rapid Alternating Movements (pronation/supination):  R normal; L normal  --------------------------------------------------------------  MOVEMENT DISORDERS FOCUSED EXAM  Abnormality of movement (bradykinesia, hyperkinesia) present? No    Tremor present?   No   Posture:  normal  Postural stability: + Rhomberg    V.  Laboratory/ Radiological Data: (no brain imaging)         Lab Results   Component Value Date    TSH 1.677 05/30/2017     Lab Results   Component Value Date    ODDHJBJV60 677 04/22/2015     Lab Results   Component Value Date    HGBA1C 5.7 04/10/2014         VI. Assessment and Plan            Problem List Items Addressed This Visit        1 - High    Gait disorder    Current Assessment & Plan     Mild imbalance which I suspect is due to peripheral neuropathy, based on exam and phenotype of gait.  I have low suspicion, but will evaluate for, NPH.    -> CT head            2     Dementia - Primary    Overview     Mini-Cog score 2 (2/22/17)  Benedict Cognitive Assessment:   20/30 (12/2017)         Current Assessment & Plan     Dementia on exam today, though mild and still conversationally functional.  I am more concerned about this diagnosis than the gait disorder and asked him to have wife come with him to all visits.  Given his significant cardio-vascular history, his impairment may be due to vascular dementia.   -> ct head, labs         Relevant Orders    TSH    Vitamin B12    Vitamin B1    CT Head Without Contrast       3     Aortic atherosclerosis    S/P aortic valve replacement                Return in about 3 months (around 3/19/2018) for dementia.              ___________________________________________________________    I appreciate the opportunity to participate in the care of this patient and will communicate my assessment and plan back to the referring physician via copy of this note.

## 2017-12-19 NOTE — ASSESSMENT & PLAN NOTE
Dementia on exam today, though mild and still conversationally functional.  I am more concerned about this diagnosis than the gait disorder and asked him to have wife come with him to all visits.  Given his significant cardio-vascular history, his impairment may be due to vascular dementia.   -> ct head, labs

## 2017-12-22 ENCOUNTER — TELEPHONE (OUTPATIENT)
Dept: NEUROLOGY | Facility: CLINIC | Age: 82
End: 2017-12-22

## 2017-12-26 ENCOUNTER — HOSPITAL ENCOUNTER (OUTPATIENT)
Dept: RADIOLOGY | Facility: HOSPITAL | Age: 82
Discharge: HOME | End: 2017-12-26
Attending: PSYCHIATRY & NEUROLOGY
Payer: MEDICARE

## 2017-12-26 DIAGNOSIS — F03.90 DEMENTIA WITHOUT BEHAVIORAL DISTURBANCE, UNSPECIFIED DEMENTIA TYPE: ICD-10-CM

## 2017-12-26 PROCEDURE — 70450 CT HEAD/BRAIN W/O DYE: CPT | Mod: TC

## 2017-12-26 PROCEDURE — 70450 CT HEAD/BRAIN W/O DYE: CPT | Mod: 26,,, | Performed by: RADIOLOGY

## 2017-12-29 RX ORDER — OMEPRAZOLE 40 MG/1
CAPSULE, DELAYED RELEASE ORAL
Qty: 30 CAPSULE | Refills: 0 | Status: SHIPPED | OUTPATIENT
Start: 2017-12-29 | End: 2018-01-30 | Stop reason: SDUPTHER

## 2018-01-11 ENCOUNTER — TELEPHONE (OUTPATIENT)
Dept: NEUROLOGY | Facility: CLINIC | Age: 83
End: 2018-01-11

## 2018-01-11 NOTE — TELEPHONE ENCOUNTER
Will discuss in March appt.  NPH is high on list, but it is difficult to explain this disorder and it's surgical treatment by phone.    MRI brain 12/26/17:  Mild prominence of the lateral and third ventricles which may be compensatory to volume loss however slightly disproportionate cannot exclude component of hydrocephalus.    There is a small hypodensity in the left cerebellum suggestive for remote infarction.

## 2018-01-12 RX ORDER — FINASTERIDE 5 MG/1
TABLET, FILM COATED ORAL
Qty: 90 TABLET | Refills: 3 | Status: SHIPPED | OUTPATIENT
Start: 2018-01-12 | End: 2018-06-25 | Stop reason: SDUPTHER

## 2018-01-20 RX ORDER — AMLODIPINE BESYLATE 5 MG/1
TABLET ORAL
Qty: 30 TABLET | Refills: 0 | Status: SHIPPED | OUTPATIENT
Start: 2018-01-20 | End: 2018-02-19 | Stop reason: SDUPTHER

## 2018-01-23 ENCOUNTER — TELEPHONE (OUTPATIENT)
Dept: NEUROLOGY | Facility: CLINIC | Age: 83
End: 2018-01-23

## 2018-01-23 NOTE — TELEPHONE ENCOUNTER
----- Message from Elder Asif sent at 1/23/2018  9:04 AM CST -----  Contact: Patient @ [phone]  Patient is calling to get lab results, pls call

## 2018-01-23 NOTE — LETTER
January 24, 2018    Randall Strickland Jr.  [address]  [facility] [address]             [facility]  [address] [place] [address]  Phone: [phone]  Fax: [phone]   Dear Randall Strickland Jr.,    Your blood tests were all ok, except for thiamine (also known as vitamin B1) which was on the low end of the normal range. I recommend that you  this vitamin at the drug store and take 100mg (usually one pill) daily.  Low thiamine levels can cause memory loss.    Component Date Value Ref Range    TSH 12/19/2017 1.423  0.400 - 4.000 uIU/mL    Vitamin B-12 12/19/2017 355  210 - 950 pg/mL    Thiamine 12/19/2017 40  38 - 122 ug/L         If you have any questions about this result, feel free to email me back or we can discuss at your next appointment.         Swapna Delarosa MD

## 2018-01-25 ENCOUNTER — PES CALL (OUTPATIENT)
Dept: ADMINISTRATIVE | Facility: CLINIC | Age: 83
End: 2018-01-25

## 2018-01-30 RX ORDER — OMEPRAZOLE 40 MG/1
CAPSULE, DELAYED RELEASE ORAL
Qty: 30 CAPSULE | Refills: 0 | Status: SHIPPED | OUTPATIENT
Start: 2018-01-30 | End: 2018-02-28 | Stop reason: SDUPTHER

## 2018-02-09 DIAGNOSIS — R35.0 FREQUENCY OF URINATION: Primary | ICD-10-CM

## 2018-02-09 RX ORDER — TROSPIUM CHLORIDE 20 MG/1
TABLET, FILM COATED ORAL
Qty: 60 TABLET | Refills: 11 | Status: SHIPPED | OUTPATIENT
Start: 2018-02-09 | End: 2018-02-26 | Stop reason: CLARIF

## 2018-02-20 RX ORDER — AMLODIPINE BESYLATE 5 MG/1
TABLET ORAL
Qty: 30 TABLET | Refills: 6 | Status: SHIPPED | OUTPATIENT
Start: 2018-02-20 | End: 2018-09-27 | Stop reason: SDUPTHER

## 2018-02-26 RX ORDER — OXYBUTYNIN CHLORIDE 5 MG/1
5 TABLET ORAL 3 TIMES DAILY
Qty: 90 TABLET | Refills: 11 | Status: SHIPPED | OUTPATIENT
Start: 2018-02-26 | End: 2018-03-12

## 2018-02-28 RX ORDER — OMEPRAZOLE 40 MG/1
CAPSULE, DELAYED RELEASE ORAL
Qty: 30 CAPSULE | Refills: 0 | Status: SHIPPED | OUTPATIENT
Start: 2018-02-28 | End: 2018-03-30 | Stop reason: SDUPTHER

## 2018-03-12 RX ORDER — TOLTERODINE 4 MG/1
4 CAPSULE, EXTENDED RELEASE ORAL DAILY
Qty: 30 CAPSULE | Refills: 11 | Status: SHIPPED | OUTPATIENT
Start: 2018-03-12 | End: 2019-03-26 | Stop reason: SDUPTHER

## 2018-03-23 ENCOUNTER — OFFICE VISIT (OUTPATIENT)
Dept: NEUROLOGY | Facility: CLINIC | Age: 83
End: 2018-03-23
Payer: MEDICARE

## 2018-03-23 VITALS
BODY MASS INDEX: 28.72 KG/M2 | SYSTOLIC BLOOD PRESSURE: 153 MMHG | HEIGHT: 67 IN | WEIGHT: 183 LBS | HEART RATE: 81 BPM | DIASTOLIC BLOOD PRESSURE: 78 MMHG

## 2018-03-23 DIAGNOSIS — E53.8 LOW SERUM VITAMIN B12: ICD-10-CM

## 2018-03-23 DIAGNOSIS — F03.90 DEMENTIA WITHOUT BEHAVIORAL DISTURBANCE, UNSPECIFIED DEMENTIA TYPE: ICD-10-CM

## 2018-03-23 DIAGNOSIS — G91.9 DEMENTIA IN HYDROCEPHALUS: ICD-10-CM

## 2018-03-23 DIAGNOSIS — R26.9 GAIT DISORDER: Primary | ICD-10-CM

## 2018-03-23 DIAGNOSIS — F02.80 DEMENTIA IN HYDROCEPHALUS: ICD-10-CM

## 2018-03-23 DIAGNOSIS — E51.9 THIAMINE DEFICIENCY: ICD-10-CM

## 2018-03-23 PROCEDURE — 99499 UNLISTED E&M SERVICE: CPT | Mod: S$GLB,,, | Performed by: PSYCHIATRY & NEUROLOGY

## 2018-03-23 PROCEDURE — 99999 PR PBB SHADOW E&M-EST. PATIENT-LVL III: CPT | Mod: PBBFAC,,, | Performed by: PSYCHIATRY & NEUROLOGY

## 2018-03-23 PROCEDURE — 99214 OFFICE O/P EST MOD 30 MIN: CPT | Mod: S$GLB,,, | Performed by: PSYCHIATRY & NEUROLOGY

## 2018-03-23 NOTE — ASSESSMENT & PLAN NOTE
Mild imbalance which I suspect is due to peripheral neuropathy and arthropathy in back, but the CT is suggestive of NPH   -> cisternogram

## 2018-03-23 NOTE — PROGRESS NOTES
Randall Stevens I. Chief Complaints during this visit:  f/u Patient visit for  Gait disturbance    Tiago Borja MD  [address] [place] [address]    Primary Care Physician  Tiago Borja MD  [address]  [place] [address]      History of present illness:   89 y.o.  M seen in f/u for dementia and gait disturbance.  Unaccompanied, but I spoke with wife by phone.  Here today for f/u results.  He has started the vitamin supplements.    No falls.      Interval history 12/19/17:  consultation at the request of  Dr. Monk for evaluation of gait disorder.  Had aortic valve replacement and since then, he has more trouble walking, a couple falls (last was a month ago).  Worse with eyes closed and in dark.  Has history of back problems with RFA in recent time, bilateral knee surgery.  Does not use cane or walker and resistent to using them.  Feet remain swollen.  No syncope or presyncope.    Has known aortic atherosclerosis and aortic valve replacement.  Bilateral carotid disease is on problem list, but I can't find study to confirm this.      II.  Review of systems:  As in HPI,  otherwise, balance 3 systems reviewed and are negative.    III.  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    Stenosis of aortic and mitral valves     aortic DAHLIA 1.1 CM     Family History   Problem Relation Age of Onset    No Known Problems Father     No Known Problems Mother     No Known Problems Sister     No Known Problems Brother     No Known Problems Maternal Aunt     No Known Problems Maternal Uncle     No Known Problems Paternal Aunt     No Known Problems Paternal Uncle     No Known Problems Maternal Grandmother     No Known Problems Maternal Grandfather     No Known Problems Paternal Grandmother     No  Known Problems Paternal Grandfather     No Known Problems Daughter     No Known Problems Son     No Known Problems Daughter     No Known Problems Son     Amblyopia Neg Hx     Blindness Neg Hx     Cancer Neg Hx     Cataracts Neg Hx     Diabetes Neg Hx     Glaucoma Neg Hx     Hypertension Neg Hx     Macular degeneration Neg Hx     Retinal detachment Neg Hx     Strabismus Neg Hx     Stroke Neg Hx     Thyroid disease Neg Hx     Anemia Neg Hx     Arrhythmia Neg Hx     Asthma Neg Hx     Clotting disorder Neg Hx     Fainting Neg Hx     Heart attack Neg Hx     Heart disease Neg Hx     Heart failure Neg Hx     Hyperlipidemia Neg Hx     Atrial Septal Defect Neg Hx      Social History     Social History    Marital status:      Spouse name: Sade    Number of children: N/A    Years of education: N/A     Occupational History    Retired Retired 1992     Social History Main Topics    Smoking status: Never Smoker    Smokeless tobacco: Never Used    Alcohol use Yes      Comment: less than one  drink weekly    Drug use: No    Sexual activity: Not Asked     Other Topics Concern    None     Social History Narrative    None         Current Outpatient Prescriptions on File Prior to Visit   Medication Sig Dispense Refill    amLODIPine (NORVASC) 5 MG tablet TAKE 1 TABLET(5 MG) BY MOUTH EVERY DAY 30 tablet 6    aspirin 81 MG Chew Take 81 mg by mouth once daily.        CHOLESTYRAMINE LIGHT 4 gram packet DISSOLVE 1 PACKET IN LIQUID AND DRINK TWICE DAILY 60 packet 5    clopidogrel (PLAVIX) 75 mg tablet TAKE 1 TABLET BY MOUTH ONCE DAILY 90 tablet 6    finasteride (PROSCAR) 5 mg tablet TAKE 1 TABLET(5 MG) BY MOUTH EVERY DAY 90 tablet 3    fluorometholone 0.1% (FML) 0.1 % DrpS       FLUZONE HIGH-DOSE 2017-18, PF, 180 mcg/0.5 mL vaccine       gabapentin (NEURONTIN) 300 MG capsule Take 1 capsule (300 mg total) by mouth every evening. 30 capsule 5    ipratropium (ATROVENT) 0.03 % nasal spray  "SPRAY 1-2 SPRAYS IN EACH NOSTRIL TWICE DAILY AS NEEDED FOR NASAL DRIP 60 mL 1    mupirocin (BACTROBAN) 2 % ointment Apply topically 2 (two) times daily. 30 g 0    omeprazole (PRILOSEC) 40 MG capsule TAKE 1 CAPSULE(40 MG) BY MOUTH EVERY MORNING 30 capsule 0    tamsulosin (FLOMAX) 0.4 mg Cp24 TAKE ONE CAPSULE BY MOUTH DAILY 90 capsule 3    tolterodine (DETROL LA) 4 MG 24 hr capsule Take 1 capsule (4 mg total) by mouth once daily. 30 capsule 11    torsemide (DEMADEX) 5 MG Tab Take 1 tablet (5 mg total) by mouth once daily. 30 tablet 3     No current facility-administered medications on file prior to visit.        PRIOR problem-specific medications tried:  na    Review of patient's allergies indicates:  No Known Allergies    IV. Physical Exam    Vitals:    03/23/18 0955   BP: (!) 153/78   Pulse: 81   Weight: 83 kg (182 lb 15.7 oz)   Height: 5' 7" (1.702 m)     General appearance: Well nourished, well developed, no acute distress.         Cardiovascular:  pedal pulses 2, no edema or cyanosis, heart regular rate and rhythym, no carotid bruits.         -------------------------------------------------------------  Facial Expression: normal       Affect: full       Orientation to time & place:  Oriented to time, place, person and situation       Attention & concentration:  Normal attention span and concentration       Memory:  0/5  Language: Spontaneous, fluent; able to repeat and name objects        Fund of knowledge:  Aware of current events        Speech:  normal (not dysarthric)  -------------------------------------------------------  Cranial nerves: wears glasses, visual fields full, optic discs not visualized, pupils equal round and reactive, extraocular movements intact,       facial sensation intact, face symmetrical, hearing intact to whisper, palate raises midline, shoulder shrug strength normal, tongue protrudes midline.        -------------------------------------------------------  Musculoskeletal  Muscle " tone: all 4 extremities normal        Muscle Bulk: all 4 extremities normal        Muscle strength:  5/5 in all 4 extremities        No pronator drift  Sensation: loss in vibration up to knees       Deep tendon Reflexes: 1 bilateral biceps, triceps, none at patella and ankles        --------------------------------------------------------------  Cerebellar and Coordination  Gait:  Slight decrease step height, mild imbalance, but no cane, no enbloc turn, no festination.       Finger-nose: no dysmetria       Rapid Alternating Movements (pronation/supination):  R normal; L normal  --------------------------------------------------------------  MOVEMENT DISORDERS FOCUSED EXAM  Abnormality of movement (bradykinesia, hyperkinesia) present? No    Tremor present?   No   Posture:  normal  Postural stability: + Rhomberg    V.  Laboratory/ Radiological Data:          CT head 12/26/17:  Age-appropriate generalized cerebral volume loss  Mild prominence of the lateral and third ventricles which may be compensatory to volume loss however slightly disproportionate cannot exclude component of hydrocephalus.  There is a small hypodensity in the left cerebellum suggestive for remote infarction.      Lab Results   Component Value Date    TSH 1.423 12/19/2017     Lab Results   Component Value Date    [id number] 355 12/19/2017     Lab Results   Component Value Date    HGBA1C 5.7 04/10/2014        MRI brain 12/26/17:  Mild prominence of the lateral and third ventricles which may be compensatory to volume loss however slightly disproportionate cannot exclude component of hydrocephalus.    There is a small hypodensity in the left cerebellum suggestive for remote infarction.        VI. 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

## 2018-03-23 NOTE — LETTER
March 23, 2018      Tiago Borja MD  [address]  [facility] [address]           [facility]  [address]  [facility] [address]  Phone: [phone]  Fax: [phone]          Patient: Randall Strickland Jr.   MR Number: [id number]   YOB: 1927   Date of Visit: 3/23/2018       Dear Dr. Tiago Borja:    Thank you for referring Randall Strickland to me for evaluation. Attached you will find relevant portions of my assessment and plan of care.    If you have questions, please do not hesitate to call me. I look forward to following Randall Strickland along with you.    Sincerely,    Swapna Delarosa MD    Enclosure  CC:  No Recipients    If you would like to receive this communication electronically, please contact [email or web address] or [phone] to request more information on [employer] access.    For providers and/or their staff who would like to refer a patient to [facility], please contact us through our one-stop-shop provider referral line, [facility], at [phone].    If you feel you have received this communication in error or would no longer like to receive these types of communications, please e-mail [email or web address]

## 2018-03-29 ENCOUNTER — TELEPHONE (OUTPATIENT)
Dept: NEUROLOGY | Facility: CLINIC | Age: 83
End: 2018-03-29

## 2018-03-29 NOTE — TELEPHONE ENCOUNTER
----- Message from Angy Weiss sent at 3/29/2018 12:17 PM CDT -----  Contact: self  Pt called in about wanting to speak with nurse about appt. Pt would like the nurse to give him a call back in regards to this matter    Pt can be reached at [phone]      TY

## 2018-03-29 NOTE — TELEPHONE ENCOUNTER
Called and spoke to  regarding his testing. I stated that he can do the test the April 17 18  And 19 at 3PM.

## 2018-03-31 RX ORDER — OMEPRAZOLE 40 MG/1
CAPSULE, DELAYED RELEASE ORAL
Qty: 30 CAPSULE | Refills: 0 | Status: SHIPPED | OUTPATIENT
Start: 2018-03-31 | End: 2018-05-04 | Stop reason: SDUPTHER

## 2018-04-02 ENCOUNTER — TELEPHONE (OUTPATIENT)
Dept: NEUROLOGY | Facility: CLINIC | Age: 83
End: 2018-04-02

## 2018-04-02 NOTE — TELEPHONE ENCOUNTER
----- Message from Elder Zhang MA sent at 4/2/2018  2:58 PM CDT -----  Contact: Pt   Pt called and would like to reschedule the appt for [id number] to another day because he will be out of town.    Pt can be reached at [phone].    Thanks

## 2018-04-16 ENCOUNTER — TELEPHONE (OUTPATIENT)
Dept: NEUROLOGY | Facility: CLINIC | Age: 83
End: 2018-04-16

## 2018-04-16 NOTE — TELEPHONE ENCOUNTER
Spoke with Pawan Torrez. In regards to patient's scheduled cisternogram. The radiopharmaceutical drug needed to perform the procedure is unavailable currently (nationwide shortage) and it will not be available until 4/24/18. I stated an understanding and made Dr. Delarosa aware as well. Coleman stated he would contact the patient and r/s the cisternogram accordingly.

## 2018-04-22 DIAGNOSIS — J30.0 VASOMOTOR RHINITIS: ICD-10-CM

## 2018-04-22 DIAGNOSIS — M51.36 DDD (DEGENERATIVE DISC DISEASE), LUMBAR: ICD-10-CM

## 2018-04-22 DIAGNOSIS — M70.62 GREATER TROCHANTERIC BURSITIS OF LEFT HIP: ICD-10-CM

## 2018-04-22 DIAGNOSIS — M54.16 LEFT LUMBAR RADICULITIS: ICD-10-CM

## 2018-04-23 RX ORDER — GABAPENTIN 300 MG/1
CAPSULE ORAL
Qty: 30 CAPSULE | Refills: 5 | Status: SHIPPED | OUTPATIENT
Start: 2018-04-23 | End: 2018-10-16 | Stop reason: SDUPTHER

## 2018-04-24 ENCOUNTER — HOSPITAL ENCOUNTER (OUTPATIENT)
Dept: RADIOLOGY | Facility: HOSPITAL | Age: 83
Discharge: HOME | End: 2018-04-24
Attending: PSYCHIATRY & NEUROLOGY
Payer: MEDICARE

## 2018-04-24 DIAGNOSIS — F02.80 DEMENTIA IN HYDROCEPHALUS: ICD-10-CM

## 2018-04-24 DIAGNOSIS — G91.9 DEMENTIA IN HYDROCEPHALUS: ICD-10-CM

## 2018-04-24 PROCEDURE — 62270 DX LMBR SPI PNXR: CPT | Mod: ,,, | Performed by: RADIOLOGY

## 2018-04-24 PROCEDURE — 77003 FLUOROGUIDE FOR SPINE INJECT: CPT | Mod: 26,,, | Performed by: RADIOLOGY

## 2018-04-24 PROCEDURE — 62270 DX LMBR SPI PNXR: CPT | Mod: TC

## 2018-04-24 PROCEDURE — 78630 CEREBROSPINAL FLUID SCAN: CPT | Mod: 26,,, | Performed by: RADIOLOGY

## 2018-04-24 NOTE — H&P
Radiology History & Physical      SUBJECTIVE:     Chief Complaint: Gait disturbance    History of Present Illness:  Randall Strickland Jr. is a 89 y.o. male who presents for Cysternogram.  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    Stenosis of aortic and mitral valves     aortic DAHLIA 1.1 CM     Past Surgical History:   Procedure Laterality Date    APPENDECTOMY      cararact  car    CARDIAC CATHETERIZATION      CARDIAC VALVE SURGERY      CATARACT EXTRACTION BILATERAL W/ ANTERIOR VITRECTOMY      bilaterial    CATARACT EXTRACTION W/  INTRAOCULAR LENS IMPLANT Bilateral     CHOLECYSTECTOMY      EYE SURGERY      JOINT REPLACEMENT      bilateral hip    KNEE SURGERY      Bilateral    TOTAL HIP ARTHROPLASTY      Bilaterial       Home Meds:   Prior to Admission medications    Medication Sig Start Date End Date Taking? Authorizing Provider   amLODIPine (NORVASC) 5 MG tablet TAKE 1 TABLET(5 MG) BY MOUTH EVERY DAY 2/20/18   Hieu Monk MD   aspirin 81 MG Chew Take 81 mg by mouth once daily.      Historical Provider, MD   CHOLESTYRAMINE LIGHT 4 gram packet DISSOLVE 1 PACKET IN LIQUID AND DRINK TWICE DAILY 8/31/17   Tiago Borja MD   clopidogrel (PLAVIX) 75 mg tablet TAKE 1 TABLET BY MOUTH ONCE DAILY 9/14/17   Hieu Monk MD   finasteride (PROSCAR) 5 mg tablet TAKE 1 TABLET(5 MG) BY MOUTH EVERY DAY 1/12/18   Kev Srinivasan Jr., MD   fluorometholone 0.1% (FML) 0.1 % DrpS  6/27/17   Historical Provider, MD   FLUZONE HIGH-DOSE 2017-18, PF, 180 mcg/0.5 mL vaccine  9/14/17   Historical Provider, MD   gabapentin (NEURONTIN) 300 MG capsule TAKE 1 CAPSULE(300 MG) BY MOUTH EVERY EVENING 4/23/18 10/20/18  Anali Quinteros MD   ipratropium (ATROVENT) 0.03 % nasal spray SPRAY 1-2 SPRAYS IN EACH NOSTRIL TWICE DAILY AS NEEDED  FOR NASAL DRIP 7/6/17   Praneeth Dowell III, MD   mupirocin (BACTROBAN) 2 % ointment Apply topically 2 (two) times daily. 9/14/17   Dorota Correa PA-C   omeprazole (PRILOSEC) 40 MG capsule TAKE 1 CAPSULE(40 MG) BY MOUTH EVERY MORNING 3/31/18   Tiago Borja MD   tamsulosin (FLOMAX) 0.4 mg Cp24 TAKE ONE CAPSULE BY MOUTH DAILY 5/4/17   Kev Srinivasan Jr., MD   tolterodine (DETROL LA) 4 MG 24 hr capsule Take 1 capsule (4 mg total) by mouth once daily. 3/12/18 3/12/19  Kev Srinivasan Jr., MD   torsemide (DEMADEX) 5 MG Tab Take 1 tablet (5 mg total) by mouth once daily. 10/13/16   Hieu Monk MD     Anticoagulants/Antiplatelets: no anticoagulation    Allergies: Review of patient's allergies indicates:  No Known Allergies  Sedation History:  no adverse reactions    Review of Systems:   Hematological: no known coagulopathies  Respiratory: no shortness of breath  Cardiovascular: no chest pain  Gastrointestinal: no abdominal pain  Genito-Urinary: no dysuria  Musculoskeletal: negative  Neurological: no TIA or stroke symptoms         OBJECTIVE:     Vital Signs (Most Recent)       Physical Exam:  ASA: 2  Mallampati: 2    General: no acute distress  Mental Status: alert and oriented to person, place and time  HEENT: normocephalic, atraumatic  Chest: unlabored breathing  Heart: regular heart rate  Abdomen: nondistended  Extremity: moves all extremities    Laboratory  Lab Results   Component Value Date    INR 1.0 08/29/2016       Lab Results   Component Value Date    WBC 7.02 05/30/2017    HGB 12.8 (L) 05/30/2017    HCT 39.0 (L) 05/30/2017     (H) 05/30/2017     05/30/2017      Lab Results   Component Value Date    GLU 87 05/30/2017     05/30/2017    K 4.9 05/30/2017     05/30/2017    CO2 27 05/30/2017    BUN 14 05/30/2017    CREATININE 1.2 05/30/2017    CALCIUM 9.3 05/30/2017    MG 1.7 02/10/2017    ALT 24 02/10/2017    AST 35 02/10/2017    ALBUMIN 2.9 (L) 02/10/2017    BILITOT 1.8  "(H) 02/10/2017    BILIDIR 0.2 05/24/2006       ASSESSMENT/PLAN:     Sedation Plan: Sub Q lidocaine  Patient will undergo Lumbar puncture with injection of intrathecal radiotracer.    Trell Ro MD ([facility])  Radiology PGY-4  [phone]      "

## 2018-04-24 NOTE — PROCEDURES
"Radiology Post-Procedure Note    Pre Op Diagnosis: Gait disrubance    Post Op Diagnosis: Same    Procedure: lumbar puncture    Procedure performed by: Arnold    Written Informed Consent Obtained: Yes    Specimen Removed: 0 mL CSF    Estimated Blood Loss: Minimal    Findings: Following written informed consent and sterile prep and drape, a 22 gauge spinal needle was inserted at L2 - L3 intralaminar space under fluoroscopic surveillance.  Once clear CSF fluid was visualized and opening pressure of 10 cmH2O was obtained. A nuclear medicine tachnologist was then present for administration of 500 uCi of In-111 DTPA.  There were no complications.    Patient tolerated procedure well.    Trell Ro MD ([facility])  Radiology PGY-4  [phone]      "

## 2018-04-25 ENCOUNTER — HOSPITAL ENCOUNTER (OUTPATIENT)
Dept: RADIOLOGY | Facility: HOSPITAL | Age: 83
Discharge: HOME | End: 2018-04-25
Attending: PSYCHIATRY & NEUROLOGY
Payer: MEDICARE

## 2018-04-25 RX ORDER — IPRATROPIUM BROMIDE 21 UG/1
SPRAY, METERED NASAL
Qty: 30 ML | Refills: 0 | Status: SHIPPED | OUTPATIENT
Start: 2018-04-25 | End: 2018-05-07 | Stop reason: CLARIF

## 2018-04-26 ENCOUNTER — HOSPITAL ENCOUNTER (OUTPATIENT)
Dept: RADIOLOGY | Facility: HOSPITAL | Age: 83
Discharge: HOME | End: 2018-04-26
Attending: PSYCHIATRY & NEUROLOGY
Payer: MEDICARE

## 2018-04-26 PROCEDURE — 78630 CEREBROSPINAL FLUID SCAN: CPT | Mod: TC

## 2018-04-26 PROCEDURE — A9548 IN111 PENTETATE: HCPCS

## 2018-04-27 ENCOUNTER — TELEPHONE (OUTPATIENT)
Dept: NEUROLOGY | Facility: CLINIC | Age: 83
End: 2018-04-27

## 2018-05-01 ENCOUNTER — TELEPHONE (OUTPATIENT)
Dept: NEUROLOGY | Facility: CLINIC | Age: 83
End: 2018-05-01

## 2018-05-01 NOTE — TELEPHONE ENCOUNTER
----- Message from Linh Li sent at 5/1/2018 10:16 AM CDT -----  Contact: self @ [phone]  Pt says he is returning Dr Delarosa's call concerning his test.

## 2018-05-01 NOTE — TELEPHONE ENCOUNTER
Called and spoke to  regarding an appt with . I stated to Marco A come in on May 14 at 9:00AM to see

## 2018-05-02 ENCOUNTER — TELEPHONE (OUTPATIENT)
Dept: ORTHOPEDICS | Facility: CLINIC | Age: 83
End: 2018-05-02

## 2018-05-02 DIAGNOSIS — M25.551 PAIN OF BOTH HIP JOINTS: Primary | ICD-10-CM

## 2018-05-02 DIAGNOSIS — M25.552 PAIN OF BOTH HIP JOINTS: Primary | ICD-10-CM

## 2018-05-02 DIAGNOSIS — M25.561 RIGHT KNEE PAIN, UNSPECIFIED CHRONICITY: ICD-10-CM

## 2018-05-02 NOTE — TELEPHONE ENCOUNTER
----- Message from Chelsey Sánchezgham sent at 5/2/2018  7:32 AM CDT -----  Contact: self  Patient states seen by Dr [facility] in the past had both hips replaced.  Patient states experiencing pain in the right knee unable to walk.  Patient states  need appointment for today with Dr [facility].   Please call pt at [phone]    Dr [facility] not in  He is in surgery patient has 3 ascopes on his knee  Not a total knee   , his knee gives out when he gets up from the table    Dr Harry Sheikh has an opening tomorrow in [employer] @ 3:20 pm  He took it  Appointment booked

## 2018-05-02 NOTE — TELEPHONE ENCOUNTER
----- Message from Chelsey Eason sent at 5/2/2018  7:32 AM CDT -----  Contact: self  Patient states seen by Dr [facility] in the past had both hips replaced.  Patient states experiencing pain in the right knee unable to walk.  Patient states  need appointment for today with Dr [facility].   Please call pt at [phone]

## 2018-05-03 ENCOUNTER — OFFICE VISIT (OUTPATIENT)
Dept: ORTHOPEDICS | Facility: CLINIC | Age: 83
End: 2018-05-03
Payer: MEDICARE

## 2018-05-03 ENCOUNTER — HOSPITAL ENCOUNTER (OUTPATIENT)
Dept: RADIOLOGY | Facility: HOSPITAL | Age: 83
Discharge: HOME | End: 2018-05-03
Attending: ORTHOPAEDIC SURGERY
Payer: MEDICARE

## 2018-05-03 VITALS — HEIGHT: 67 IN | BODY MASS INDEX: 28.56 KG/M2 | WEIGHT: 182 LBS

## 2018-05-03 DIAGNOSIS — M25.552 PAIN OF BOTH HIP JOINTS: ICD-10-CM

## 2018-05-03 DIAGNOSIS — M17.31 POST-TRAUMATIC OSTEOARTHRITIS OF RIGHT KNEE: Primary | ICD-10-CM

## 2018-05-03 DIAGNOSIS — M25.551 PAIN OF BOTH HIP JOINTS: ICD-10-CM

## 2018-05-03 DIAGNOSIS — M25.561 RIGHT KNEE PAIN, UNSPECIFIED CHRONICITY: ICD-10-CM

## 2018-05-03 PROCEDURE — 73560 X-RAY EXAM OF KNEE 1 OR 2: CPT | Mod: 26,XS,LT, | Performed by: RADIOLOGY

## 2018-05-03 PROCEDURE — 73562 X-RAY EXAM OF KNEE 3: CPT | Mod: TC,PO,RT

## 2018-05-03 PROCEDURE — 73560 X-RAY EXAM OF KNEE 1 OR 2: CPT | Mod: TC,59,PO,LT

## 2018-05-03 PROCEDURE — 73562 X-RAY EXAM OF KNEE 3: CPT | Mod: 26,RT,, | Performed by: RADIOLOGY

## 2018-05-03 PROCEDURE — 73521 X-RAY EXAM HIPS BI 2 VIEWS: CPT | Mod: 26,,, | Performed by: RADIOLOGY

## 2018-05-03 PROCEDURE — 73521 X-RAY EXAM HIPS BI 2 VIEWS: CPT | Mod: TC,PO

## 2018-05-03 PROCEDURE — 99202 OFFICE O/P NEW SF 15 MIN: CPT | Mod: S$GLB,,, | Performed by: ORTHOPAEDIC SURGERY

## 2018-05-03 PROCEDURE — 99999 PR PBB SHADOW E&M-EST. PATIENT-LVL II: CPT | Mod: PBBFAC,,, | Performed by: ORTHOPAEDIC SURGERY

## 2018-05-03 NOTE — PROGRESS NOTES
Subjective:      Patient ID: Randall Strickland Jr. is a 89 y.o. male.    Chief Complaint: Right knee pain       HPI     They have experienced problems with their right knee over the past 2 days. The patient reports relevant history of injury/aggravation.  The patient felt a pop in his right knee after rising from a chair, after prolonged sitting. Pain is located diffusely Associated symptoms include pseudolocking. They have been treated with cane/walker.   Symptoms have recently improved. Ambulation reportedly has not been impaired. Self care ADLs are not painful.  History relevant for open meniscectomy years ago     Review of Systems   Constitution: Negative for fever and weight loss.   HENT: Negative for congestion.    Eyes: Negative for visual disturbance.   Cardiovascular: Negative for chest pain.   Respiratory: Negative for shortness of breath.    Hematologic/Lymphatic: Negative for bleeding problem. Does not bruise/bleed easily.   Skin: Negative for poor wound healing.   Musculoskeletal: Positive for joint pain.   Gastrointestinal: Negative for abdominal pain.   Genitourinary: Negative for dysuria.   Neurological: Negative for seizures.   Psychiatric/Behavioral: Negative for altered mental status.   Allergic/Immunologic: Negative for persistent infections.         Objective:            Ortho/SPM Exam    Right Knee    There were no vitals filed for this visit.    The patient is not in acute distress.   Body habitus is normal.   The patient walks with a limp.  Resisted SLR negative.   The skin over the knee is intact.  Knee effusion trace  Tendernes is located absent  Range of motion- Flexion 10 deg, Extension 100 deg,   Ligament exam:   MCL intact   Lachman intact              Post sag intact    LCL intact  Patellar apprehension negative.  Popliteal cyst negative  Patellar crepitation present.  Flexion/pinch negative.  Pulses DP present, PT present.  Motor normal 5/5 strength in all tested muscle groups.   Sensory  normal.    PACS system is down-report noted for osteoarthritis of the right knee and intact right total hip      There were no vitals filed for this visit.              Assessment:       Encounter Diagnosis   Name Primary?    Post-traumatic osteoarthritis of right knee Yes          The patient had a flare of arthritis secondary to pseudolocking  Plan:       Randall was seen today for pain, pain, pain and pain.    Diagnoses and all orders for this visit:    Post-traumatic osteoarthritis of right knee        I explained my diagnostic impression and the reasoning behind it in detail, using layman's terms.      Continue walker    Home exercise program    Any persistent symptoms would require consideration of injection-I explained this, and the patient doesn't feel that its necessary at this time

## 2018-05-04 RX ORDER — OMEPRAZOLE 40 MG/1
CAPSULE, DELAYED RELEASE ORAL
Qty: 30 CAPSULE | Refills: 0 | Status: SHIPPED | OUTPATIENT
Start: 2018-05-04 | End: 2018-06-03 | Stop reason: SDUPTHER

## 2018-05-07 ENCOUNTER — OFFICE VISIT (OUTPATIENT)
Dept: OPTOMETRY | Facility: CLINIC | Age: 83
End: 2018-05-07
Payer: COMMERCIAL

## 2018-05-07 DIAGNOSIS — H52.4 MYOPIA WITH ASTIGMATISM AND PRESBYOPIA, BILATERAL: ICD-10-CM

## 2018-05-07 DIAGNOSIS — H04.123 BILATERAL DRY EYES: Primary | ICD-10-CM

## 2018-05-07 DIAGNOSIS — H35.033 HYPERTENSIVE RETINOPATHY, BILATERAL: ICD-10-CM

## 2018-05-07 DIAGNOSIS — H52.203 MYOPIA WITH ASTIGMATISM AND PRESBYOPIA, BILATERAL: ICD-10-CM

## 2018-05-07 DIAGNOSIS — H52.13 MYOPIA WITH ASTIGMATISM AND PRESBYOPIA, BILATERAL: ICD-10-CM

## 2018-05-07 PROCEDURE — 99499 UNLISTED E&M SERVICE: CPT | Mod: S$GLB,,, | Performed by: OPTOMETRIST

## 2018-05-07 PROCEDURE — 92015 DETERMINE REFRACTIVE STATE: CPT | Mod: S$GLB,,, | Performed by: OPTOMETRIST

## 2018-05-07 PROCEDURE — 92014 COMPRE OPH EXAM EST PT 1/>: CPT | Mod: S$GLB,,, | Performed by: OPTOMETRIST

## 2018-05-07 PROCEDURE — 99999 PR PBB SHADOW E&M-EST. PATIENT-LVL II: CPT | Mod: PBBFAC,,, | Performed by: OPTOMETRIST

## 2018-05-07 RX ORDER — TROSPIUM CHLORIDE 20 MG/1
TABLET, FILM COATED ORAL
Refills: 11 | COMMUNITY
Start: 2018-04-13 | End: 2018-06-25 | Stop reason: SDUPTHER

## 2018-05-07 NOTE — PROGRESS NOTES
TONO RAI 05/05/2017 Hasn't noticed any vision changes.  Glasses about 4 or 5   yrs. Old. Would like new glasses today. Occasionally uses AT's.  Tears give relief for dry eyes    Last edited by Bernardo Atwood, OD on 5/7/2018 10:46 AM. (History)            Assessment /Plan     For exam results, see Encounter Report.    Bilateral dry eyes    Hypertensive retinopathy, bilateral    Myopia with astigmatism and presbyopia, bilateral      1. Recommend continue artificial tears. 1 drop 1-2x per day. Chronicity of disease and treatment discussed.  2. Mild. Monitor condition. Patient to report any changes. RTC 1 year recheck.  3. Spec Rx given. Different lens options discussed with patient. RTC 1 year full exam.

## 2018-05-14 ENCOUNTER — OFFICE VISIT (OUTPATIENT)
Dept: NEUROLOGY | Facility: CLINIC | Age: 83
End: 2018-05-14
Payer: MEDICARE

## 2018-05-14 VITALS
HEIGHT: 67 IN | BODY MASS INDEX: 28.75 KG/M2 | WEIGHT: 183.19 LBS | DIASTOLIC BLOOD PRESSURE: 65 MMHG | HEART RATE: 101 BPM | SYSTOLIC BLOOD PRESSURE: 116 MMHG

## 2018-05-14 DIAGNOSIS — M47.816 FACET ARTHRITIS, DEGENERATIVE, LUMBAR SPINE: ICD-10-CM

## 2018-05-14 DIAGNOSIS — R26.9 GAIT DISORDER: ICD-10-CM

## 2018-05-14 DIAGNOSIS — F03.90 DEMENTIA WITHOUT BEHAVIORAL DISTURBANCE, UNSPECIFIED DEMENTIA TYPE: Primary | ICD-10-CM

## 2018-05-14 PROCEDURE — 99499 UNLISTED E&M SERVICE: CPT | Mod: S$GLB,,, | Performed by: PSYCHIATRY & NEUROLOGY

## 2018-05-14 PROCEDURE — 99999 PR PBB SHADOW E&M-EST. PATIENT-LVL III: CPT | Mod: PBBFAC,,, | Performed by: PSYCHIATRY & NEUROLOGY

## 2018-05-14 PROCEDURE — 99214 OFFICE O/P EST MOD 30 MIN: CPT | Mod: S$GLB,,, | Performed by: PSYCHIATRY & NEUROLOGY

## 2018-05-14 RX ORDER — TAMSULOSIN HYDROCHLORIDE 0.4 MG/1
CAPSULE ORAL
Qty: 90 CAPSULE | Refills: 0 | Status: SHIPPED | OUTPATIENT
Start: 2018-05-14 | End: 2018-05-30 | Stop reason: SDUPTHER

## 2018-05-14 RX ORDER — RIVASTIGMINE TARTRATE 1.5 MG/1
1.5 CAPSULE ORAL 2 TIMES DAILY
Qty: 60 CAPSULE | Refills: 11 | Status: SHIPPED | OUTPATIENT
Start: 2018-05-14 | End: 2018-08-13

## 2018-05-14 RX ORDER — TAMSULOSIN HYDROCHLORIDE 0.4 MG/1
CAPSULE ORAL
Qty: 90 CAPSULE | Refills: 0 | Status: SHIPPED | OUTPATIENT
Start: 2018-05-14 | End: 2018-08-22 | Stop reason: SDUPTHER

## 2018-05-14 NOTE — ASSESSMENT & PLAN NOTE
Mild imbalance which I suspect is due to peripheral neuropathy, cortical atrophy and arthropathy in back, but today he was more obviously parkinsonian on exam than he has in past visits.  This would be secondary to dementia, so not as likely to respond to dopaminergics as idiopathic PD.   -> start exelon for gait and memory   -> consider levodopa if symptoms become more clearly parkinsonian.

## 2018-05-14 NOTE — LETTER
May 14, 2018      Tiago Borja MD  [address]  [facility] [address]           [facility]  [address]  [facility] [address]  Phone: [phone]  Fax: [phone]          Patient: Randall Strickland Jr.   MR Number: [id number]   YOB: 1927   Date of Visit: 5/14/2018       Dear Dr. Tiago Borja:    Thank you for referring Randall Strickland to me for evaluation. Attached you will find relevant portions of my assessment and plan of care.    If you have questions, please do not hesitate to call me. I look forward to following Randall Strickland along with you.    Sincerely,    Swapna Delarosa MD    Enclosure  CC:  No Recipients    If you would like to receive this communication electronically, please contact [email or web address] or [phone] to request more information on [employer] Link access.    For providers and/or their staff who would like to refer a patient to [facility], please contact us through our one-stop-shop provider referral line, [facility], at [phone].    If you feel you have received this communication in error or would no longer like to receive these types of communications, please e-mail [email or web address]

## 2018-05-14 NOTE — PROGRESS NOTES
Randall Stevens I. Chief Complaints during this visit:  f/u Patient visit for  Gait disturbance    Tiago Borja MD  [address] [place] [address]    Primary Care Physician  Tiago Borja MD  [address]  [place] [address]      History of present illness:   89 y.o.  M seen in f/u for dementia and gait disturbance.  Accompanied by wife and here to review the results of cisternogram.  No falls.      Denies heart failure or cardiomyopathy.  Still has low back pain that worsens as he walks.    From my note note 3/23/18:  Unaccompanied, but I spoke with wife by phone.  Here today for f/u results.  He has started the vitamin supplements.  No falls.    Interval history 12/19/17:  consultation at the request of  Dr. Monk for evaluation of gait disorder.  Had aortic valve replacement and since then, he has more trouble walking, a couple falls (last was a month ago).  Worse with eyes closed and in dark.  Has history of back problems with RFA in recent time, bilateral knee surgery.  Does not use cane or walker and resistent to using them.  Feet remain swollen.  No syncope or presyncope.    Has known aortic atherosclerosis and aortic valve replacement.  Bilateral carotid disease is on problem list, but I can't find study to confirm this.      II.  Review of systems:  As in HPI,  otherwise, balance 3 systems reviewed and are negative.    III.  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    Stenosis of aortic and mitral valves     aortic DAHLIA 1.1 CM     Family History   Problem Relation Age of Onset    No Known Problems Father     No Known Problems Mother     No Known Problems Sister     No Known Problems Brother     No Known Problems Maternal Aunt     No Known Problems Maternal Uncle     No Known  Problems Paternal Aunt     No Known Problems Paternal Uncle     No Known Problems Maternal Grandmother     No Known Problems Maternal Grandfather     No Known Problems Paternal Grandmother     No Known Problems Paternal Grandfather     No Known Problems Daughter     No Known Problems Son     No Known Problems Daughter     No Known Problems Son     Amblyopia Neg Hx     Blindness Neg Hx     Cancer Neg Hx     Cataracts Neg Hx     Diabetes Neg Hx     Glaucoma Neg Hx     Hypertension Neg Hx     Macular degeneration Neg Hx     Retinal detachment Neg Hx     Strabismus Neg Hx     Stroke Neg Hx     Thyroid disease Neg Hx     Anemia Neg Hx     Arrhythmia Neg Hx     Asthma Neg Hx     Clotting disorder Neg Hx     Fainting Neg Hx     Heart attack Neg Hx     Heart disease Neg Hx     Heart failure Neg Hx     Hyperlipidemia Neg Hx     Atrial Septal Defect Neg Hx      Social History     Social History    Marital status:      Spouse name: Sade    Number of children: N/A    Years of education: N/A     Occupational History    Retired Retired 1992     Social History Main Topics    Smoking status: Never Smoker    Smokeless tobacco: Never Used    Alcohol use Yes      Comment: less than one  drink weekly    Drug use: No    Sexual activity: Not Asked     Other Topics Concern    None     Social History Narrative    None         Current Outpatient Prescriptions on File Prior to Visit   Medication Sig Dispense Refill    amLODIPine (NORVASC) 5 MG tablet TAKE 1 TABLET(5 MG) BY MOUTH EVERY DAY 30 tablet 6    aspirin 81 MG Chew Take 81 mg by mouth once daily.        CHOLESTYRAMINE LIGHT 4 gram packet DISSOLVE 1 PACKET IN LIQUID AND DRINK TWICE DAILY 60 packet 5    clopidogrel (PLAVIX) 75 mg tablet TAKE 1 TABLET BY MOUTH ONCE DAILY 90 tablet 6    finasteride (PROSCAR) 5 mg tablet TAKE 1 TABLET(5 MG) BY MOUTH EVERY DAY 90 tablet 3    fluorometholone 0.1% (FML) 0.1 % DrpS       FLUZONE  "HIGH-DOSE 2017-18, PF, 180 mcg/0.5 mL vaccine       gabapentin (NEURONTIN) 300 MG capsule TAKE 1 CAPSULE(300 MG) BY MOUTH EVERY EVENING 30 capsule 5    ipratropium (ATROVENT) 0.03 % nasal spray SPRAY 1-2 SPRAYS IN EACH NOSTRIL TWICE DAILY AS NEEDED FOR NASAL DRIP 60 mL 1    mupirocin (BACTROBAN) 2 % ointment Apply topically 2 (two) times daily. 30 g 0    omeprazole (PRILOSEC) 40 MG capsule TAKE 1 CAPSULE(40 MG) BY MOUTH EVERY MORNING 30 capsule 0    tamsulosin (FLOMAX) 0.4 mg Cp24 TAKE ONE CAPSULE BY MOUTH DAILY 90 capsule 3    tolterodine (DETROL LA) 4 MG 24 hr capsule Take 1 capsule (4 mg total) by mouth once daily. 30 capsule 11    torsemide (DEMADEX) 5 MG Tab Take 1 tablet (5 mg total) by mouth once daily. 30 tablet 3    trospium (SANCTURA) 20 mg Tab tablet   11     No current facility-administered medications on file prior to visit.        PRIOR problem-specific medications tried:  na    Review of patient's allergies indicates:  No Known Allergies    IV. Physical Exam    Vitals:    05/14/18 0905   BP: 116/65   Pulse: 101   Weight: 83.1 kg (183 lb 3.2 oz)   Height: 5' 7" (1.702 m)     General appearance: Well nourished, well developed, no acute distress.         Cardiovascular:  pedal pulses 2, no edema or cyanosis, heart regular rate and rhythym, no carotid bruits.         -------------------------------------------------------------  Facial Expression: normal       Affect: full       Orientation to time & place:  Oriented to time, place, person and situation       Attention & concentration:  Normal attention span and concentration       Memory:  0/5  Language: Spontaneous, fluent; able to repeat and name objects        Fund of knowledge:  Aware of current events        Speech:  normal (not dysarthric)  -------------------------------------------------------  Cranial nerves: wears glasses, visual fields full, optic discs not visualized, pupils equal round and reactive, extraocular movements intact,      "  facial sensation intact, face symmetrical, hearing intact to whisper, palate raises midline, shoulder shrug strength normal, tongue protrudes midline.        -------------------------------------------------------  Musculoskeletal  Muscle tone: 1: Slight cogwheel Rigidity only detected with activation maneuver. RUE        Muscle Bulk: all 4 extremities normal        Muscle strength:  5/5 in all 4 extremities        No pronator drift  Sensation: loss in vibration up to knees       Deep tendon Reflexes: 1 bilateral biceps, triceps, none at patella and ankles        --------------------------------------------------------------  Cerebellar and Coordination  Gait:  low step height, mild imbalance, + enbloc turn, no festination.       Finger-nose: no dysmetria       Rapid Alternating Movements (pronation/supination):  R slowing; L normal  --------------------------------------------------------------  MOVEMENT DISORDERS FOCUSED EXAM  Abnormality of movement (bradykinesia, hyperkinesia) present? No    Tremor present?   No   Posture:  2: Mild: Definite flexion, scoliosis or leaning to one side, but patient can correct posture to  normal posture when asked to do so.    Postural stability: + Rhomberg    V.  Laboratory/ Radiological Data:          Cisternogram 4/26/18:  No evidence of normal pressure hydrocephalus.      CT head 12/26/17:  Age-appropriate generalized cerebral volume loss  Mild prominence of the lateral and third ventricles which may be compensatory to volume loss however slightly disproportionate cannot exclude component of hydrocephalus.  There is a small hypodensity in the left cerebellum suggestive for remote infarction.      Lab Results   Component Value Date    TSH 1.423 12/19/2017     Lab Results   Component Value Date    AZJOJGDB04 355 12/19/2017     Lab Results   Component Value Date    HGBA1C 5.7 04/10/2014        MRI brain 12/26/17:  Mild prominence of the lateral and third ventricles which may be  compensatory to volume loss however slightly disproportionate cannot exclude component of hydrocephalus.    There is a small hypodensity in the left cerebellum suggestive for remote infarction.        VI. Assessment and Plan            Problem List Items Addressed This Visit        1 - High    Facet arthritis, degenerative, lumbar spine    Gait disorder    Overview     Mild imbalance, possibly from cortical atrophy.  NPH ruled out 4/2018         Current Assessment & Plan     Mild imbalance which I suspect is due to peripheral neuropathy, cortical atrophy and arthropathy in back, but today he was more obviously parkinsonian on exam than he has in past visits.  This would be secondary to dementia, so not as likely to respond to dopaminergics as idiopathic PD.   -> start exelon for gait and memory   -> consider levodopa if symptoms become more clearly parkinsonian.            2     Dementia - Primary    Overview     Mini-Cog score 2 (2/22/17)  Berkey Cognitive Assessment:   20/30 (12/2017)         Relevant Medications    rivastigmine tartrate (EXELON) 1.5 MG capsule              Follow-up in about 3 months (around 8/14/2018) for gait, memory loss.

## 2018-05-24 ENCOUNTER — TELEPHONE (OUTPATIENT)
Dept: OPTOMETRY | Facility: CLINIC | Age: 83
End: 2018-05-24

## 2018-05-24 NOTE — TELEPHONE ENCOUNTER
----- Message from Dee Dee Fuentes sent at 5/24/2018  2:54 PM CDT -----  Contact: Pt  Patient Requesting Order    Who Called: Pt    Order Needed: Call Back    Communication Preference (Call Back # and timeframe): He can be reached at [phone]    Additional Information: Mr. Strickland states that his recently prescribed glasses seems to not be working well for him. He would like to know if he can come back for a recheck

## 2018-05-30 ENCOUNTER — LAB VISIT (OUTPATIENT)
Dept: LAB | Facility: HOSPITAL | Age: 83
End: 2018-05-30
Attending: INTERNAL MEDICINE
Payer: MEDICARE

## 2018-05-30 ENCOUNTER — OFFICE VISIT (OUTPATIENT)
Dept: INTERNAL MEDICINE | Facility: CLINIC | Age: 83
End: 2018-05-30
Attending: FAMILY MEDICINE
Payer: MEDICARE

## 2018-05-30 VITALS
HEIGHT: 67 IN | HEART RATE: 61 BPM | OXYGEN SATURATION: 98 % | WEIGHT: 183 LBS | TEMPERATURE: 98 F | SYSTOLIC BLOOD PRESSURE: 114 MMHG | BODY MASS INDEX: 28.72 KG/M2 | DIASTOLIC BLOOD PRESSURE: 54 MMHG

## 2018-05-30 DIAGNOSIS — R60.9 EDEMA, UNSPECIFIED TYPE: ICD-10-CM

## 2018-05-30 DIAGNOSIS — M17.0 OSTEOARTHRITIS OF BOTH KNEES, UNSPECIFIED OSTEOARTHRITIS TYPE: ICD-10-CM

## 2018-05-30 DIAGNOSIS — I50.32 CHRONIC DIASTOLIC CONGESTIVE HEART FAILURE: ICD-10-CM

## 2018-05-30 DIAGNOSIS — M25.561 ACUTE PAIN OF RIGHT KNEE: ICD-10-CM

## 2018-05-30 DIAGNOSIS — M47.816 OSTEOARTHRITIS OF LUMBAR SPINE, UNSPECIFIED SPINAL OSTEOARTHRITIS COMPLICATION STATUS: ICD-10-CM

## 2018-05-30 DIAGNOSIS — I70.0 AORTIC ATHEROSCLEROSIS: ICD-10-CM

## 2018-05-30 DIAGNOSIS — Z95.2 S/P AORTIC VALVE REPLACEMENT: ICD-10-CM

## 2018-05-30 DIAGNOSIS — N18.30 CHRONIC KIDNEY DISEASE, STAGE III (MODERATE): ICD-10-CM

## 2018-05-30 DIAGNOSIS — E46 HYPOALBUMINEMIA DUE TO PROTEIN-CALORIE MALNUTRITION: ICD-10-CM

## 2018-05-30 DIAGNOSIS — I77.9 BILATERAL CAROTID ARTERY DISEASE: ICD-10-CM

## 2018-05-30 DIAGNOSIS — I42.9 CARDIOMYOPATHY, IDIOPATHIC: ICD-10-CM

## 2018-05-30 DIAGNOSIS — I10 HYPERTENSION, ESSENTIAL: ICD-10-CM

## 2018-05-30 DIAGNOSIS — E88.09 HYPOALBUMINEMIA DUE TO PROTEIN-CALORIE MALNUTRITION: ICD-10-CM

## 2018-05-30 DIAGNOSIS — F03.90 DEMENTIA WITHOUT BEHAVIORAL DISTURBANCE, UNSPECIFIED DEMENTIA TYPE: ICD-10-CM

## 2018-05-30 DIAGNOSIS — N13.8 BPH WITH URINARY OBSTRUCTION: ICD-10-CM

## 2018-05-30 DIAGNOSIS — Z00.00 ANNUAL PHYSICAL EXAM: Primary | ICD-10-CM

## 2018-05-30 DIAGNOSIS — N40.1 BPH WITH URINARY OBSTRUCTION: ICD-10-CM

## 2018-05-30 PROBLEM — G89.29 CHRONIC PAIN: Status: RESOLVED | Noted: 2017-10-10 | Resolved: 2018-05-30

## 2018-05-30 LAB
ALBUMIN SERPL BCP-MCNC: 3.7 G/DL
ALP SERPL-CCNC: 103 U/L
ALT SERPL W/O P-5'-P-CCNC: 15 U/L
ANION GAP SERPL CALC-SCNC: 7 MMOL/L
AST SERPL-CCNC: 22 U/L
BASOPHILS # BLD AUTO: 0.05 K/UL
BASOPHILS NFR BLD: 0.7 %
BILIRUB SERPL-MCNC: 0.7 MG/DL
BUN SERPL-MCNC: 20 MG/DL
CALCIUM SERPL-MCNC: 9.8 MG/DL
CHLORIDE SERPL-SCNC: 106 MMOL/L
CHOLEST SERPL-MCNC: 135 MG/DL
CHOLEST/HDLC SERPL: 3.6 {RATIO}
CO2 SERPL-SCNC: 28 MMOL/L
CREAT SERPL-MCNC: 1.3 MG/DL
DIFFERENTIAL METHOD: ABNORMAL
EOSINOPHIL # BLD AUTO: 0.2 K/UL
EOSINOPHIL NFR BLD: 2.6 %
ERYTHROCYTE [DISTWIDTH] IN BLOOD BY AUTOMATED COUNT: 12.5 %
EST. GFR  (AFRICAN AMERICAN): 55.1 ML/MIN/1.73 M^2
EST. GFR  (NON AFRICAN AMERICAN): 47.7 ML/MIN/1.73 M^2
GLUCOSE SERPL-MCNC: 84 MG/DL
HCT VFR BLD AUTO: 37.9 %
HDLC SERPL-MCNC: 37 MG/DL
HDLC SERPL: 27.4 %
HGB BLD-MCNC: 12.4 G/DL
LDLC SERPL CALC-MCNC: 81.4 MG/DL
LYMPHOCYTES # BLD AUTO: 2.6 K/UL
LYMPHOCYTES NFR BLD: 37.6 %
MCH RBC QN AUTO: 33.4 PG
MCHC RBC AUTO-ENTMCNC: 32.7 G/DL
MCV RBC AUTO: 102 FL
MONOCYTES # BLD AUTO: 1.5 K/UL
MONOCYTES NFR BLD: 21.4 %
NEUTROPHILS # BLD AUTO: 2.6 K/UL
NEUTROPHILS NFR BLD: 37.4 %
NONHDLC SERPL-MCNC: 98 MG/DL
NRBC BLD-RTO: 0 /100 WBC
PLATELET # BLD AUTO: 185 K/UL
PMV BLD AUTO: 10.4 FL
POTASSIUM SERPL-SCNC: 5.2 MMOL/L
PROT SERPL-MCNC: 8 G/DL
RBC # BLD AUTO: 3.71 M/UL
SODIUM SERPL-SCNC: 141 MMOL/L
TRIGL SERPL-MCNC: 83 MG/DL
WBC # BLD AUTO: 6.97 K/UL

## 2018-05-30 PROCEDURE — 85025 COMPLETE CBC W/AUTO DIFF WBC: CPT

## 2018-05-30 PROCEDURE — 36415 COLL VENOUS BLD VENIPUNCTURE: CPT

## 2018-05-30 PROCEDURE — 99999 PR PBB SHADOW E&M-EST. PATIENT-LVL IV: CPT | Mod: PBBFAC,,, | Performed by: FAMILY MEDICINE

## 2018-05-30 PROCEDURE — 80061 LIPID PANEL: CPT

## 2018-05-30 PROCEDURE — 99397 PER PM REEVAL EST PAT 65+ YR: CPT | Mod: S$GLB,,, | Performed by: FAMILY MEDICINE

## 2018-05-30 PROCEDURE — 99499 UNLISTED E&M SERVICE: CPT | Mod: S$PBB,,, | Performed by: FAMILY MEDICINE

## 2018-05-30 PROCEDURE — 80053 COMPREHEN METABOLIC PANEL: CPT

## 2018-05-30 NOTE — PROGRESS NOTES
Subjective:       Patient ID: Randall Strickland Jr. is a 89 y.o. male.    Chief Complaint: Follow-up    Established patient for an annual wellness check/physical exam and also chronic disease management. Specific complaints - see dictation and please see ROS.  Established patient follows up for management of chronic medical illnesses with complaints today. Please see dictation and ROS for interval problems, specific complaints and disease management discussion.        Review of Systems   Constitutional: Negative for chills, fatigue and fever.   HENT: Negative for congestion and trouble swallowing.    Eyes: Negative for redness.   Respiratory: Negative for cough, chest tightness and shortness of breath.    Cardiovascular: Positive for leg swelling. Negative for chest pain and palpitations.   Gastrointestinal: Negative for abdominal pain and blood in stool.   Genitourinary: Positive for difficulty urinating. Negative for hematuria.   Musculoskeletal: Positive for arthralgias and gait problem. Negative for back pain, joint swelling, myalgias and neck pain.   Skin: Negative for color change and rash.   Neurological: Negative for tremors, speech difficulty, weakness, numbness and headaches.   Hematological: Negative for adenopathy. Does not bruise/bleed easily.   Psychiatric/Behavioral: Negative for behavioral problems, confusion and sleep disturbance. The patient is not nervous/anxious.        Objective:      Physical Exam   Constitutional: He is oriented to person, place, and time. He appears well-developed and well-nourished. No distress.   Eyes: No scleral icterus.   Neck: Normal range of motion. Neck supple. Carotid bruit is not present.   Cardiovascular: Normal rate, regular rhythm and intact distal pulses.  Exam reveals no gallop and no friction rub.    Murmur heard.  Pulmonary/Chest: Effort normal and breath sounds normal. No respiratory distress. He has no wheezes. He has no rales.   Abdominal: Soft. Bowel sounds are  normal.   Musculoskeletal: He exhibits edema.        Right knee: He exhibits decreased range of motion and swelling. No tenderness found.        Left knee: He exhibits decreased range of motion. No tenderness found.        Right lower leg: He exhibits no edema.        Left lower leg: He exhibits no edema.   Neurological: He is alert and oriented to person, place, and time. He displays no tremor. No cranial nerve deficit. Coordination and gait normal.   Skin: Skin is warm and dry. No rash noted. He is not diaphoretic. No erythema.   Psychiatric: He has a normal mood and affect. His behavior is normal. Judgment and thought content normal.   Nursing note and vitals reviewed.      Assessment:       1. Annual physical exam    2. Cardiomyopathy, idiopathic    3. Chronic diastolic congestive heart failure    4. Dementia without behavioral disturbance, unspecified dementia type    5. Hypertension, essential    6. S/P aortic valve replacement    7. Aortic atherosclerosis    8. Bilateral carotid artery disease    9. Osteoarthritis of lumbar spine, unspecified spinal osteoarthritis complication status    10. Hypoalbuminemia due to protein-calorie malnutrition    11. Edema, unspecified type    12. Acute pain of right knee    13. BPH with urinary obstruction    14. Osteoarthritis of both knees, unspecified osteoarthritis type        Plan:   Randall was seen today for follow-up.    Diagnoses and all orders for this visit:    Annual physical exam    Cardiomyopathy, idiopathic  -     Ambulatory referral to Cardiology    Chronic diastolic congestive heart failure  -     Ambulatory referral to Cardiology    Dementia without behavioral disturbance, unspecified dementia type  Comments:  under care of Dr. Delarosa    Hypertension, essential    S/P aortic valve replacement    Aortic atherosclerosis    Bilateral carotid artery disease  Comments:  homogenous plaque 2010 study    Osteoarthritis of lumbar spine, unspecified spinal osteoarthritis  complication status    Hypoalbuminemia due to protein-calorie malnutrition    Edema, unspecified type  -     CAR Ultrasound doppler venous legs bilat; Future    Acute pain of right knee    BPH with urinary obstruction  -     Ambulatory referral to Urology    Osteoarthritis of both knees, unspecified osteoarthritis type      See meds, orders, follow up, routing and instructioA 89-year-old established male patient.  He is in for an annual physical   examination somewhat lost to follow up.  He had a TAVR in the past.  He had a   CTA predating this in June 2016, which revealed some ASCVD and I reviewed that.    He is due for labs with Dr. Monk and a followup with Cardiology.  He had an   echo in October 2016 demonstrating diastolic dysfunction with a normal ejection   fraction.  He had a TSH and B12 level recently drawn by Dr. Delarosa.  She is   following him for dementia and gait abnormality.    I reviewed the patient's medication list versus what he states he is taking at   home.  He was complaining of some edema in the lower extremities, seems to be   worse on the right than the left.  There was no evidence of DVT, however.  He is   not currently taking torsemide, though this is listed on his chart.  I would   recommend a followup with Cardiology to discuss this.  Check ultrasound of the   lower extremities.  Follow up with me presumptively in four months.  Other   consults were placed.      LUCINA/HN  dd: 05/30/2018 17:14:14 (CDT)  td: 05/31/2018 12:50:07 (CDT)  Doc ID   #[id number]  Job ID #[id number]    CC:

## 2018-06-04 RX ORDER — OMEPRAZOLE 40 MG/1
CAPSULE, DELAYED RELEASE ORAL
Qty: 30 CAPSULE | Refills: 0 | Status: SHIPPED | OUTPATIENT
Start: 2018-06-04 | End: 2018-07-05 | Stop reason: SDUPTHER

## 2018-06-07 ENCOUNTER — CLINICAL SUPPORT (OUTPATIENT)
Dept: CARDIOLOGY | Facility: CLINIC | Age: 83
End: 2018-06-07
Attending: FAMILY MEDICINE
Payer: MEDICARE

## 2018-06-07 DIAGNOSIS — R60.9 EDEMA, UNSPECIFIED TYPE: ICD-10-CM

## 2018-06-07 PROCEDURE — 93970 EXTREMITY STUDY: CPT | Mod: S$GLB,,, | Performed by: INTERNAL MEDICINE

## 2018-06-08 ENCOUNTER — TELEPHONE (OUTPATIENT)
Dept: INTERNAL MEDICINE | Facility: CLINIC | Age: 83
End: 2018-06-08

## 2018-06-08 NOTE — TELEPHONE ENCOUNTER
----- Message from Brown Nielsen MA sent at 6/8/2018  8:37 AM CDT -----  Please see below     Chart review --> card proc --> CAR Ultrasound doppler venous legs bilat [[id number]]      ----- Message -----  From: Tiago Borja MD  Sent: 6/7/2018   5:37 PM  To: Jonh GONZALEZ Staff    Please call patient and report normal test/lab results. Thanks.

## 2018-06-12 ENCOUNTER — OFFICE VISIT (OUTPATIENT)
Dept: OPTOMETRY | Facility: CLINIC | Age: 83
End: 2018-06-12
Payer: MEDICARE

## 2018-06-12 DIAGNOSIS — H52.13 MYOPIA WITH ASTIGMATISM AND PRESBYOPIA, BILATERAL: Primary | ICD-10-CM

## 2018-06-12 DIAGNOSIS — H52.4 MYOPIA WITH ASTIGMATISM AND PRESBYOPIA, BILATERAL: Primary | ICD-10-CM

## 2018-06-12 DIAGNOSIS — H52.203 MYOPIA WITH ASTIGMATISM AND PRESBYOPIA, BILATERAL: Primary | ICD-10-CM

## 2018-06-12 PROCEDURE — 99999 PR PBB SHADOW E&M-EST. PATIENT-LVL II: CPT | Mod: PBBFAC,,, | Performed by: OPTOMETRIST

## 2018-06-12 PROCEDURE — 99499 UNLISTED E&M SERVICE: CPT | Mod: S$GLB,,, | Performed by: OPTOMETRIST

## 2018-06-12 NOTE — PROGRESS NOTES
HPI     Blur with new spec rx  Sees better with old    Last edited by Bernardo Atwood, OD on 6/12/2018 10:13 AM. (History)            Assessment /Plan     For exam results, see Encounter Report.    Myopia with astigmatism and presbyopia, bilateral      1. Remake spec rx. Glasses do not match what was written.

## 2018-06-25 ENCOUNTER — OFFICE VISIT (OUTPATIENT)
Dept: UROLOGY | Facility: CLINIC | Age: 83
End: 2018-06-25
Payer: MEDICARE

## 2018-06-25 VITALS
HEIGHT: 67 IN | HEART RATE: 67 BPM | SYSTOLIC BLOOD PRESSURE: 121 MMHG | WEIGHT: 183 LBS | BODY MASS INDEX: 28.72 KG/M2 | DIASTOLIC BLOOD PRESSURE: 69 MMHG

## 2018-06-25 DIAGNOSIS — N40.1 BPH WITH URINARY OBSTRUCTION: Primary | ICD-10-CM

## 2018-06-25 DIAGNOSIS — N13.8 BPH WITH URINARY OBSTRUCTION: Primary | ICD-10-CM

## 2018-06-25 PROCEDURE — 99213 OFFICE O/P EST LOW 20 MIN: CPT | Mod: S$GLB,,, | Performed by: UROLOGY

## 2018-06-25 PROCEDURE — 99999 PR PBB SHADOW E&M-EST. PATIENT-LVL III: CPT | Mod: PBBFAC,,, | Performed by: UROLOGY

## 2018-06-25 RX ORDER — TROSPIUM CHLORIDE 20 MG/1
20 TABLET, FILM COATED ORAL DAILY
Qty: 30 TABLET | Refills: 11 | Status: SHIPPED | OUTPATIENT
Start: 2018-06-25 | End: 2019-01-30

## 2018-06-25 RX ORDER — FINASTERIDE 5 MG/1
TABLET, FILM COATED ORAL
Qty: 90 TABLET | Refills: 3 | Status: SHIPPED | OUTPATIENT
Start: 2018-06-25 | End: 2019-08-07 | Stop reason: SDUPTHER

## 2018-06-25 NOTE — PROGRESS NOTES
Subjective:       Patient ID: Randall Strickland Jr. is a 89 y.o. male.    Chief Complaint: Benign Prostatic Hypertrophy (yrly ck)    HPI     Randall Strickland Jr. is a 89 y.o. male with history of BPH here today forhere for refills on trospium and finasteride. Symptom score is 13. He is voiding well not having any frequency.  He has good stream and empties his bladder. Nocturia x3-4. He does report some post void dribble but does occur often. Ua is neg    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    Stenosis of aortic and mitral valves     aortic DAHLIA 1.1 CM       Past Surgical History:   Procedure Laterality Date    APPENDECTOMY      cararact  car    CARDIAC CATHETERIZATION      CARDIAC VALVE SURGERY      CATARACT EXTRACTION BILATERAL W/ ANTERIOR VITRECTOMY      bilaterial    CATARACT EXTRACTION W/  INTRAOCULAR LENS IMPLANT Bilateral     CHOLECYSTECTOMY      EYE SURGERY      JOINT REPLACEMENT      bilateral hip    KNEE SURGERY      Bilateral    TOTAL HIP ARTHROPLASTY      Bilaterial       Family History   Problem Relation Age of Onset    No Known Problems Father     No Known Problems Mother     No Known Problems Sister     No Known Problems Brother     No Known Problems Maternal Aunt     No Known Problems Maternal Uncle     No Known Problems Paternal Aunt     No Known Problems Paternal Uncle     No Known Problems Maternal Grandmother     No Known Problems Maternal Grandfather     No Known Problems Paternal Grandmother     No Known Problems Paternal Grandfather     No Known Problems Daughter     No Known Problems Son     No Known Problems Daughter     No Known Problems Son     Amblyopia Neg Hx     Blindness Neg Hx     Cancer Neg Hx     Cataracts Neg Hx     Diabetes Neg Hx     Glaucoma Neg Hx      Hypertension Neg Hx     Macular degeneration Neg Hx     Retinal detachment Neg Hx     Strabismus Neg Hx     Stroke Neg Hx     Thyroid disease Neg Hx     Anemia Neg Hx     Arrhythmia Neg Hx     Asthma Neg Hx     Clotting disorder Neg Hx     Fainting Neg Hx     Heart attack Neg Hx     Heart disease Neg Hx     Heart failure Neg Hx     Hyperlipidemia Neg Hx     Atrial Septal Defect Neg Hx        Social History     Social History    Marital status:      Spouse name: Sade    Number of children: N/A    Years of education: N/A     Occupational History    Retired Retired 1992     Social History Main Topics    Smoking status: Never Smoker    Smokeless tobacco: Never Used    Alcohol use Yes      Comment: less than one  drink weekly    Drug use: No    Sexual activity: Not Asked     Other Topics Concern    None     Social History Narrative    None       Current Outpatient Prescriptions   Medication Sig Dispense Refill    trospium (SANCTURA) 20 mg Tab tablet Take 1 tablet (20 mg total) by mouth once daily. 30 tablet 11    amLODIPine (NORVASC) 5 MG tablet TAKE 1 TABLET(5 MG) BY MOUTH EVERY DAY 30 tablet 6    aspirin 81 MG Chew Take 81 mg by mouth once daily.        CHOLESTYRAMINE LIGHT 4 gram packet DISSOLVE 1 PACKET IN LIQUID AND DRINK TWICE DAILY 60 packet 5    clopidogrel (PLAVIX) 75 mg tablet TAKE 1 TABLET BY MOUTH ONCE DAILY 90 tablet 6    finasteride (PROSCAR) 5 mg tablet TAKE 1 TABLET(5 MG) BY MOUTH EVERY DAY 90 tablet 3    fluorometholone 0.1% (FML) 0.1 % DrpS       FLUZONE HIGH-DOSE 2017-18, PF, 180 mcg/0.5 mL vaccine       gabapentin (NEURONTIN) 300 MG capsule TAKE 1 CAPSULE(300 MG) BY MOUTH EVERY EVENING 30 capsule 5    ipratropium (ATROVENT) 0.03 % nasal spray SPRAY 1-2 SPRAYS IN EACH NOSTRIL TWICE DAILY AS NEEDED FOR NASAL DRIP 60 mL 1    mupirocin (BACTROBAN) 2 % ointment Apply topically 2 (two) times daily. 30 g 0    omeprazole (PRILOSEC) 40 MG capsule TAKE 1  CAPSULE(40 MG) BY MOUTH EVERY MORNING 30 capsule 0    rivastigmine tartrate (EXELON) 1.5 MG capsule Take 1 capsule (1.5 mg total) by mouth 2 (two) times daily. 60 capsule 11    tamsulosin (FLOMAX) 0.4 mg Cp24 TAKE ONE CAPSULE BY MOUTH DAILY 90 capsule 0    tolterodine (DETROL LA) 4 MG 24 hr capsule Take 1 capsule (4 mg total) by mouth once daily. 30 capsule 11    torsemide (DEMADEX) 5 MG Tab Take 1 tablet (5 mg total) by mouth once daily. 30 tablet 3     No current facility-administered medications for this visit.        Review of patient's allergies indicates:  No Known Allergies    Review of Systems   Constitutional: Negative for activity change, appetite change, chills, diaphoresis, fatigue, fever and unexpected weight change.   HENT: Negative for congestion, dental problem, hearing loss, mouth sores, postnasal drip, rhinorrhea, sinus pressure and trouble swallowing.    Eyes: Negative for pain, discharge and itching.   Respiratory: Negative for apnea, cough, choking, chest tightness, shortness of breath and wheezing.    Cardiovascular: Negative for chest pain, palpitations and leg swelling.   Gastrointestinal: Negative for abdominal distention, abdominal pain, anal bleeding, blood in stool, constipation, diarrhea, nausea, rectal pain and vomiting.   Endocrine: Negative for polydipsia and polyuria.   Genitourinary: Negative for decreased urine volume, difficulty urinating, discharge, dysuria, enuresis, flank pain, frequency, genital sores, hematuria, penile pain, penile swelling and scrotal swelling.   Musculoskeletal: Negative for arthralgias, back pain and myalgias.   Skin: Negative for color change, rash and wound.   Neurological: Negative for dizziness, syncope, speech difficulty, light-headedness and headaches.   Hematological: Negative for adenopathy. Does not bruise/bleed easily.   Psychiatric/Behavioral: Negative for behavioral problems, hallucinations and sleep disturbance.       Objective:       Physical Exam   Constitutional: He appears well-developed.   HENT:   Head: Normocephalic.   Eyes: No scleral icterus.   Neck: Neck supple.   Cardiovascular: Normal rate.    Pulmonary/Chest: Effort normal. No respiratory distress.   Abdominal: Soft.   Genitourinary: Prostate normal.   Genitourinary Comments: Prostate was smooth without nodularity. No rectal masses.  25 grams. Normal perineum.   Neurological: He is alert.   Skin: Skin is warm.     Psychiatric: He has a normal mood and affect.       Assessment:       1. BPH with urinary obstruction        Plan:     Randall was seen today for benign prostatic hypertrophy.    Diagnoses and all orders for this visit:    BPH with urinary obstruction  -     finasteride (PROSCAR) 5 mg tablet; TAKE 1 TABLET(5 MG) BY MOUTH EVERY DAY  -     trospium (SANCTURA) 20 mg Tab tablet; Take 1 tablet (20 mg total) by mouth once daily.       Return to clinic 1 year or sooner p.r.Daksha Torres, am acting as a scribe on this patient encounter in the presence and under the supervision of Dr. Srinivasan.  I, Dr. Srinivasan, personally performed the services described in this documentation.   All medical record entries made by the scribe were at my direction and in my presence.   I have reviewed the chart and agree that the record is accurate and complete.   Kev Srinivasan MD.  9:30 AM 06/25/2018 06/25/2018 9:05 AM

## 2018-06-25 NOTE — LETTER
June 25, 2018      Tiago Borja MD  [address]  [place] [address]           [facility] - Urology 4th Floor  [address]  [place] [address]  Phone: [phone]          Patient: Randall Strickland Jr.   MR Number: [id number]   YOB: 1927   Date of Visit: 6/25/2018       Dear Dr. Tiago Borja:    Thank you for referring Randall Strickland to me for evaluation. Attached you will find relevant portions of my assessment and plan of care.    If you have questions, please do not hesitate to call me. I look forward to following Randall Strickland along with you.    Sincerely,    Kev Srinivasan Jr., MD    Enclosure  CC:  No Recipients    If you would like to receive this communication electronically, please contact [email or web address] or [phone] to request more information on [employer] access.    For providers and/or their staff who would like to refer a patient to [facility], please contact us through our one-stop-shop provider referral line, [facility], at [phone].    If you feel you have received this communication in error or would no longer like to receive these types of communications, please e-mail [email or web address]

## 2018-06-26 ENCOUNTER — TELEPHONE (OUTPATIENT)
Dept: GASTROENTEROLOGY | Facility: CLINIC | Age: 83
End: 2018-06-26

## 2018-06-26 NOTE — TELEPHONE ENCOUNTER
----- Message from Jaguar Bernal MD sent at 6/25/2018  2:59 PM CDT -----  Contact: self  [phone]  Would be first available, certainly I am happy to see  ----- Message -----  From: Ksenia Rawls MA  Sent: 6/25/2018   2:56 PM  To: Jaguar Bernal MD    He has never seen you.   referred him.  Has been having diarrhea for several years, no pain.  Stated he was seeing  but  will not see him anymore for this.  ----- Message -----  From: Jocelynn Sanchez MA  Sent: 6/25/2018   2:26 PM  To: [facility] Staff    Patient Requesting Sooner Appointment.     Reason for sooner appt.:   Follow up   When is the first available appointment?  Nurse to schedule  Communication Preference:  Phone# above  Additional Information:  na

## 2018-06-26 NOTE — TELEPHONE ENCOUNTER
Spoke with Mrs. Strickland.  Mr. Strickland is scheduled to see Dr.Matthew Lowe on 8/17 for 2:00.  Aware  is also on the cancellation list if something comes available sooner.

## 2018-06-26 NOTE — TELEPHONE ENCOUNTER
----- Message from Jaguar Bernal MD sent at 6/25/2018  2:59 PM CDT -----  Contact: self  [phone]  Would be first available, certainly I am happy to see  ----- Message -----  From: Ksenia Rawls MA  Sent: 6/25/2018   2:56 PM  To: Jaguar Bernal MD    He has never seen you.   referred him.  Has been having diarrhea for several years, no pain.  Stated he was seeing  but  will not see him anymore for this.  ----- Message -----  From: Jocelynn Sanchez MA  Sent: 6/25/2018   2:26 PM  To: Gabe GAONA Staff    Patient Requesting Sooner Appointment.     Reason for sooner appt.:   Follow up   When is the first available appointment?  Nurse to schedule  Communication Preference:  Phone# above  Additional Information:  na

## 2018-06-26 NOTE — TELEPHONE ENCOUNTER
Spoke with patient and wife.  Aware Dr.James Bernal has no appointment time available anytime soon but will try our best to get him in to see someone soon.

## 2018-07-02 RX ORDER — OMEPRAZOLE 40 MG/1
CAPSULE, DELAYED RELEASE ORAL
Qty: 30 CAPSULE | Refills: 0 | Status: CANCELLED | OUTPATIENT
Start: 2018-07-02

## 2018-07-05 ENCOUNTER — TELEPHONE (OUTPATIENT)
Dept: NEUROLOGY | Facility: CLINIC | Age: 83
End: 2018-07-05

## 2018-07-05 NOTE — TELEPHONE ENCOUNTER
Called and spoketo  on Tuesday regarding his appt on August 6 with . I stated to him that  will not be here on that day and we have to reschedule. I stated that we have an opening for this Thursday for 9:40AM. He agreed to come for July 5 at 9:40AM

## 2018-07-07 RX ORDER — OMEPRAZOLE 40 MG/1
CAPSULE, DELAYED RELEASE ORAL
Qty: 30 CAPSULE | Refills: 0 | Status: SHIPPED | OUTPATIENT
Start: 2018-07-07 | End: 2018-08-22

## 2018-07-07 RX ORDER — OMEPRAZOLE 40 MG/1
CAPSULE, DELAYED RELEASE ORAL
Qty: 90 CAPSULE | Refills: 0
Start: 2018-07-07

## 2018-07-10 ENCOUNTER — TELEPHONE (OUTPATIENT)
Dept: NEUROLOGY | Facility: CLINIC | Age: 83
End: 2018-07-10

## 2018-07-10 NOTE — TELEPHONE ENCOUNTER
----- Message from Lashanda Britton sent at 7/10/2018  3:08 PM CDT -----  Contact: pt @ [phone]  Calling to reschedule an appt with Dr. Delarosa, please call.

## 2018-07-16 ENCOUNTER — OFFICE VISIT (OUTPATIENT)
Dept: CARDIOLOGY | Facility: CLINIC | Age: 83
End: 2018-07-16
Payer: MEDICARE

## 2018-07-16 VITALS
DIASTOLIC BLOOD PRESSURE: 61 MMHG | HEART RATE: 82 BPM | BODY MASS INDEX: 27.8 KG/M2 | HEIGHT: 68 IN | SYSTOLIC BLOOD PRESSURE: 118 MMHG | WEIGHT: 183.44 LBS

## 2018-07-16 DIAGNOSIS — N18.30 CHRONIC KIDNEY DISEASE, STAGE III (MODERATE): ICD-10-CM

## 2018-07-16 DIAGNOSIS — Z95.2 S/P AORTIC VALVE REPLACEMENT: ICD-10-CM

## 2018-07-16 DIAGNOSIS — I77.9 BILATERAL CAROTID ARTERY DISEASE: ICD-10-CM

## 2018-07-16 DIAGNOSIS — I35.0 NONRHEUMATIC AORTIC VALVE STENOSIS: ICD-10-CM

## 2018-07-16 DIAGNOSIS — I10 HYPERTENSION, ESSENTIAL: ICD-10-CM

## 2018-07-16 DIAGNOSIS — I50.32 CHRONIC DIASTOLIC CONGESTIVE HEART FAILURE: Primary | ICD-10-CM

## 2018-07-16 PROCEDURE — 99214 OFFICE O/P EST MOD 30 MIN: CPT | Mod: S$GLB,,, | Performed by: INTERNAL MEDICINE

## 2018-07-16 PROCEDURE — 99499 UNLISTED E&M SERVICE: CPT | Mod: HCNC,S$GLB,, | Performed by: INTERNAL MEDICINE

## 2018-07-16 PROCEDURE — 99999 PR PBB SHADOW E&M-EST. PATIENT-LVL III: CPT | Mod: PBBFAC,,, | Performed by: INTERNAL MEDICINE

## 2018-07-16 NOTE — PROGRESS NOTES
Subjective:    Patient ID:  Randall Strickland Jr. is a 89 y.o. male who presents for follow-up of AVR    HPI   The patient is a 89 year old male post TAVR 8/30/16. He continues to do well and exercises on stationary  bike.         Lab Results   Component Value Date     05/30/2018    K 5.2 (H) 05/30/2018     05/30/2018    CO2 28 05/30/2018    BUN 20 05/30/2018    CREATININE 1.3 05/30/2018    GLU 84 05/30/2018    HGBA1C 5.7 04/10/2014    MG 1.7 02/10/2017    AST 22 05/30/2018    ALT 15 05/30/2018    ALBUMIN 3.7 05/30/2018    PROT 8.0 05/30/2018    BILITOT 0.7 05/30/2018    WBC 6.97 05/30/2018    HGB 12.4 (L) 05/30/2018    HCT 37.9 (L) 05/30/2018     (H) 05/30/2018     05/30/2018    INR 1.0 08/29/2016    PSA 0.6 03/07/2005    TSH 1.423 12/19/2017         Lab Results   Component Value Date    CHOL 135 05/30/2018    HDL 37 (L) 05/30/2018    TRIG 83 05/30/2018       Lab Results   Component Value Date    LDLCALC 81.4 05/30/2018       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    Stenosis of aortic and mitral valves     aortic DAHLIA 1.1 CM       Current Outpatient Prescriptions:     amLODIPine (NORVASC) 5 MG tablet, TAKE 1 TABLET(5 MG) BY MOUTH EVERY DAY, Disp: 30 tablet, Rfl: 6    aspirin 81 MG Chew, Take 81 mg by mouth once daily.  , Disp: , Rfl:     CHOLESTYRAMINE LIGHT 4 gram packet, DISSOLVE 1 PACKET IN LIQUID AND DRINK TWICE DAILY, Disp: 60 packet, Rfl: 5    clopidogrel (PLAVIX) 75 mg tablet, TAKE 1 TABLET BY MOUTH ONCE DAILY, Disp: 90 tablet, Rfl: 6    finasteride (PROSCAR) 5 mg tablet, TAKE 1 TABLET(5 MG) BY MOUTH EVERY DAY, Disp: 90 tablet, Rfl: 3    fluorometholone 0.1% (FML) 0.1 % DrpS, , Disp: , Rfl:     gabapentin (NEURONTIN) 300 MG capsule, TAKE 1 CAPSULE(300 MG) BY MOUTH EVERY EVENING, Disp: 30  capsule, Rfl: 5    ipratropium (ATROVENT) 0.03 % nasal spray, SPRAY 1-2 SPRAYS IN EACH NOSTRIL TWICE DAILY AS NEEDED FOR NASAL DRIP, Disp: 60 mL, Rfl: 1    mupirocin (BACTROBAN) 2 % ointment, Apply topically 2 (two) times daily., Disp: 30 g, Rfl: 0    omeprazole (PRILOSEC) 40 MG capsule, TAKE 1 CAPSULE(40 MG) BY MOUTH EVERY MORNING, Disp: 30 capsule, Rfl: 0    rivastigmine tartrate (EXELON) 1.5 MG capsule, Take 1 capsule (1.5 mg total) by mouth 2 (two) times daily., Disp: 60 capsule, Rfl: 11    tamsulosin (FLOMAX) 0.4 mg Cp24, TAKE ONE CAPSULE BY MOUTH DAILY, Disp: 90 capsule, Rfl: 0    tolterodine (DETROL LA) 4 MG 24 hr capsule, Take 1 capsule (4 mg total) by mouth once daily., Disp: 30 capsule, Rfl: 11    torsemide (DEMADEX) 5 MG Tab, Take 1 tablet (5 mg total) by mouth once daily., Disp: 30 tablet, Rfl: 3    trospium (SANCTURA) 20 mg Tab tablet, Take 1 tablet (20 mg total) by mouth once daily., Disp: 30 tablet, Rfl: 11    FLUZONE HIGH-DOSE 2017-18, PF, 180 mcg/0.5 mL vaccine, , Disp: , Rfl:         Review of Systems   Constitution: Negative for decreased appetite, diaphoresis, fever, weakness, malaise/fatigue, weight gain and weight loss.   HENT: Negative for congestion, ear discharge, ear pain and nosebleeds.    Eyes: Negative for blurred vision, double vision and visual disturbance.   Cardiovascular: Negative for chest pain, claudication, cyanosis, dyspnea on exertion, irregular heartbeat, leg swelling, near-syncope, orthopnea, palpitations, paroxysmal nocturnal dyspnea and syncope.   Respiratory: Negative for cough, hemoptysis, shortness of breath, sleep disturbances due to breathing, snoring, sputum production and wheezing.    Endocrine: Negative for polydipsia, polyphagia and polyuria.   Hematologic/Lymphatic: Negative for adenopathy and bleeding problem. Does not bruise/bleed easily.   Skin: Negative for color change, nail changes, poor wound healing and rash.   Musculoskeletal: Negative for muscle  "cramps and muscle weakness.   Gastrointestinal: Negative for abdominal pain, anorexia, change in bowel habit, hematochezia, nausea and vomiting.   Genitourinary: Negative for dysuria, frequency and hematuria.   Neurological: Negative for brief paralysis, difficulty with concentration, excessive daytime sleepiness, dizziness, focal weakness, headaches, light-headedness, seizures and vertigo.   Psychiatric/Behavioral: Negative for altered mental status and depression.   Allergic/Immunologic: Negative for persistent infections.        Objective:/61   Pulse 82   Ht 5' 8" (1.727 m)   Wt 83.2 kg (183 lb 6.8 oz)   BMI 27.89 kg/m²           Physical Exam   Constitutional: He is oriented to person, place, and time. He appears well-developed and well-nourished.   HENT:   Head: Normocephalic.   Right Ear: External ear normal.   Left Ear: External ear normal.   Nose: Nose normal.   Inspection of lips, teeth and gums normal   Eyes: EOM are normal. Pupils are equal, round, and reactive to light. No scleral icterus.   Neck: Normal range of motion. Neck supple. No JVD present. No tracheal deviation present. No thyromegaly present.   Cardiovascular: Normal rate, regular rhythm and intact distal pulses.  Exam reveals no gallop and no friction rub.    No murmur heard.  Pulmonary/Chest: Effort normal and breath sounds normal.   Abdominal: Bowel sounds are normal. He exhibits no distension. There is no hepatosplenomegaly. There is no tenderness. There is no guarding.   Musculoskeletal: Normal range of motion. He exhibits no edema or tenderness.   Lymphadenopathy:   Palpation of neck and groin lymph nodes normal   Neurological: He is alert and oriented to person, place, and time. No cranial nerve deficit. He exhibits normal muscle tone. Coordination normal.   Skin: Skin is dry.   Palpation of skin normal   Psychiatric: His behavior is normal. Judgment and thought content normal.         Assessment:       1. Chronic diastolic " congestive heart failure    2. Hypertension, essential    3. Nonrheumatic aortic valve stenosis    4. Bilateral carotid artery disease    5. S/P aortic valve replacement    6. Chronic kidney disease, stage III (moderate)         Plan:       Randall was seen today for edema, hypertension, carotid artery disease and shoulder pain.    Diagnoses and all orders for this visit:    Chronic diastolic congestive heart failure  -     Comprehensive metabolic panel; Future; Expected date: 07/16/2019  -     Lipid panel; Future; Expected date: 07/16/2019    Hypertension, essential  -     CBC auto differential; Future; Expected date: 07/16/2019  -     Comprehensive metabolic panel; Future; Expected date: 07/16/2019    Nonrheumatic aortic valve stenosis  -     CBC auto differential; Future; Expected date: 07/16/2019    Bilateral carotid artery disease    S/P aortic valve replacement    Chronic kidney disease, stage III (moderate)

## 2018-07-16 NOTE — LETTER
July 16, 2018      Tiago Borja MD  [address]  [place] [address]           [facility]  [address]  [place] [address]  Phone: [phone]          Patient: Randall Strickland Jr.   MR Number: [id number]   YOB: 1927   Date of Visit: 7/16/2018       Dear Dr. Tiago Borja:    Thank you for referring Randall Strickland to me for evaluation. Attached you will find relevant portions of my assessment and plan of care.    If you have questions, please do not hesitate to call me. I look forward to following Randall Strickland along with you.    Sincerely,    Hieu Monk MD    Enclosure  CC:  No Recipients    If you would like to receive this communication electronically, please contact [email or web address] or [phone] to request more information on [employer] Link access.    For providers and/or their staff who would like to refer a patient to [facility], please contact us through our one-stop-shop provider referral line, [facility] , at [phone].    If you feel you have received this communication in error or would no longer like to receive these types of communications, please e-mail [email or web address]

## 2018-07-16 NOTE — PROGRESS NOTES
"Subjective:    Patient ID:  Randall Strickland Jr. is a 89 y.o. male who presents for {Misc; evaluation/follow-up:90074::"follow-up"} of Edema; Hypertension; Carotid Artery Disease; and Shoulder Pain (left)      HPI  Lab Results   Component Value Date     05/30/2018    K 5.2 (H) 05/30/2018     05/30/2018    CO2 28 05/30/2018    BUN 20 05/30/2018    CREATININE 1.3 05/30/2018    GLU 84 05/30/2018    HGBA1C 5.7 04/10/2014    MG 1.7 02/10/2017    AST 22 05/30/2018    ALT 15 05/30/2018    ALBUMIN 3.7 05/30/2018    PROT 8.0 05/30/2018    BILITOT 0.7 05/30/2018    WBC 6.97 05/30/2018    HGB 12.4 (L) 05/30/2018    HCT 37.9 (L) 05/30/2018     (H) 05/30/2018     05/30/2018    INR 1.0 08/29/2016    PSA 0.6 03/07/2005    TSH 1.423 12/19/2017         Lab Results   Component Value Date    CHOL 135 05/30/2018    HDL 37 (L) 05/30/2018    TRIG 83 05/30/2018       Lab Results   Component Value Date    LDLCALC 81.4 05/30/2018       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    Stenosis of aortic and mitral valves     aortic DAHLIA 1.1 CM       Current Outpatient Prescriptions:     amLODIPine (NORVASC) 5 MG tablet, TAKE 1 TABLET(5 MG) BY MOUTH EVERY DAY, Disp: 30 tablet, Rfl: 6    aspirin 81 MG Chew, Take 81 mg by mouth once daily.  , Disp: , Rfl:     CHOLESTYRAMINE LIGHT 4 gram packet, DISSOLVE 1 PACKET IN LIQUID AND DRINK TWICE DAILY, Disp: 60 packet, Rfl: 5    clopidogrel (PLAVIX) 75 mg tablet, TAKE 1 TABLET BY MOUTH ONCE DAILY, Disp: 90 tablet, Rfl: 6    finasteride (PROSCAR) 5 mg tablet, TAKE 1 TABLET(5 MG) BY MOUTH EVERY DAY, Disp: 90 tablet, Rfl: 3    fluorometholone 0.1% (FML) 0.1 % DrpS, , Disp: , Rfl:     gabapentin (NEURONTIN) 300 MG capsule, TAKE 1 CAPSULE(300 MG) BY MOUTH EVERY EVENING, Disp: 30 capsule, Rfl: " "5    ipratropium (ATROVENT) 0.03 % nasal spray, SPRAY 1-2 SPRAYS IN EACH NOSTRIL TWICE DAILY AS NEEDED FOR NASAL DRIP, Disp: 60 mL, Rfl: 1    mupirocin (BACTROBAN) 2 % ointment, Apply topically 2 (two) times daily., Disp: 30 g, Rfl: 0    omeprazole (PRILOSEC) 40 MG capsule, TAKE 1 CAPSULE(40 MG) BY MOUTH EVERY MORNING, Disp: 30 capsule, Rfl: 0    rivastigmine tartrate (EXELON) 1.5 MG capsule, Take 1 capsule (1.5 mg total) by mouth 2 (two) times daily., Disp: 60 capsule, Rfl: 11    tamsulosin (FLOMAX) 0.4 mg Cp24, TAKE ONE CAPSULE BY MOUTH DAILY, Disp: 90 capsule, Rfl: 0    tolterodine (DETROL LA) 4 MG 24 hr capsule, Take 1 capsule (4 mg total) by mouth once daily., Disp: 30 capsule, Rfl: 11    torsemide (DEMADEX) 5 MG Tab, Take 1 tablet (5 mg total) by mouth once daily., Disp: 30 tablet, Rfl: 3    trospium (SANCTURA) 20 mg Tab tablet, Take 1 tablet (20 mg total) by mouth once daily., Disp: 30 tablet, Rfl: 11    FLUZONE HIGH-DOSE 2017-18, PF, 180 mcg/0.5 mL vaccine, , Disp: , Rfl:         ROS     Objective:/61   Pulse 82   Ht 5' 8" (1.727 m)   Wt 83.2 kg (183 lb 6.8 oz)   BMI 27.89 kg/m²           Physical Exam   Constitutional: He is oriented to person, place, and time. He appears well-developed and well-nourished.   HENT:   Head: Normocephalic.   Right Ear: External ear normal.   Left Ear: External ear normal.   Nose: Nose normal.   Inspection of lips, teeth and gums normal   Eyes: EOM are normal. Pupils are equal, round, and reactive to light. No scleral icterus.   Neck: Normal range of motion. Neck supple. No JVD present. No tracheal deviation present. No thyromegaly present.   Cardiovascular: Normal rate, regular rhythm and intact distal pulses.  Exam reveals no gallop and no friction rub.    Murmur heard.  High-pitched harsh early systolic murmur is present with a grade of 2/6  at the upper right sternal border, upper left sternal border radiating to the neck  Pulses:       Dorsalis pedis " pulses are 2+ on the right side, and 2+ on the left side.   Pulmonary/Chest: Effort normal and breath sounds normal.   Abdominal: Bowel sounds are normal. He exhibits no distension. There is no hepatosplenomegaly. There is no tenderness. There is no guarding.   Musculoskeletal: Normal range of motion. He exhibits no edema or tenderness.   Lymphadenopathy:   Palpation of neck and groin lymph nodes normal   Neurological: He is alert and oriented to person, place, and time. No cranial nerve deficit. He exhibits normal muscle tone. Coordination normal.   Skin: Skin is dry.   Palpation of skin normal   Psychiatric: His behavior is normal. Judgment and thought content normal.         Assessment:       1. Chronic diastolic congestive heart failure    2. Hypertension, essential    3. Nonrheumatic aortic valve stenosis    4. Bilateral carotid artery disease    5. S/P aortic valve replacement    6. Chronic kidney disease, stage III (moderate)         Plan:       Randall was seen today for edema, hypertension, carotid artery disease and shoulder pain.    Diagnoses and all orders for this visit:    Chronic diastolic congestive heart failure  -     Comprehensive metabolic panel; Future; Expected date: 07/16/2019  -     Lipid panel; Future; Expected date: 07/16/2019    Hypertension, essential  -     CBC auto differential; Future; Expected date: 07/16/2019  -     Comprehensive metabolic panel; Future; Expected date: 07/16/2019    Nonrheumatic aortic valve stenosis  -     CBC auto differential; Future; Expected date: 07/16/2019    Bilateral carotid artery disease    S/P aortic valve replacement    Chronic kidney disease, stage III (moderate)

## 2018-07-31 ENCOUNTER — TELEPHONE (OUTPATIENT)
Dept: OPHTHALMOLOGY | Facility: CLINIC | Age: 83
End: 2018-07-31

## 2018-07-31 NOTE — TELEPHONE ENCOUNTER
----- Message from Lizzeth Bullard sent at 7/31/2018 10:14 AM CDT -----  Contact: Randall Henderson calling because he is having issues with his glasses and would like to have them rechecked for prescription.  He can be reached at [phone]

## 2018-08-07 RX ORDER — OMEPRAZOLE 40 MG/1
CAPSULE, DELAYED RELEASE ORAL
Qty: 30 CAPSULE | Refills: 0 | Status: CANCELLED | OUTPATIENT
Start: 2018-08-07

## 2018-08-10 RX ORDER — OMEPRAZOLE 40 MG/1
CAPSULE, DELAYED RELEASE ORAL
Qty: 90 CAPSULE | Refills: 0 | OUTPATIENT
Start: 2018-08-10

## 2018-08-13 ENCOUNTER — OFFICE VISIT (OUTPATIENT)
Dept: NEUROLOGY | Facility: CLINIC | Age: 83
End: 2018-08-13
Payer: MEDICARE

## 2018-08-13 VITALS
WEIGHT: 182.75 LBS | DIASTOLIC BLOOD PRESSURE: 72 MMHG | BODY MASS INDEX: 27.7 KG/M2 | SYSTOLIC BLOOD PRESSURE: 132 MMHG | HEART RATE: 88 BPM | HEIGHT: 68 IN

## 2018-08-13 DIAGNOSIS — R26.9 GAIT DISORDER: Primary | ICD-10-CM

## 2018-08-13 DIAGNOSIS — F03.90 DEMENTIA WITHOUT BEHAVIORAL DISTURBANCE, UNSPECIFIED DEMENTIA TYPE: ICD-10-CM

## 2018-08-13 PROCEDURE — 99999 PR PBB SHADOW E&M-EST. PATIENT-LVL III: CPT | Mod: PBBFAC,,, | Performed by: PSYCHIATRY & NEUROLOGY

## 2018-08-13 PROCEDURE — 99214 OFFICE O/P EST MOD 30 MIN: CPT | Mod: S$GLB,,, | Performed by: PSYCHIATRY & NEUROLOGY

## 2018-08-13 RX ORDER — RIVASTIGMINE TARTRATE 3 MG/1
3 CAPSULE ORAL 2 TIMES DAILY
Qty: 60 CAPSULE | Refills: 11 | Status: SHIPPED | OUTPATIENT
Start: 2018-08-13 | End: 2019-09-17 | Stop reason: SDUPTHER

## 2018-08-13 NOTE — PROGRESS NOTES
Randall Stevens I. Chief Complaints during this visit:  f/u Patient visit for  Gait disturbance    No referring provider defined for this encounter.    Primary Care Physician  Tiago Delaney MD  [address]  [place] [address]      History of present illness:   89 y.o.  M seen in f/u for dementia and gait disturbance.  Unaccompanied.  He reports that his gait is about the same, no falls.      Interval history 5/14/18:  Accompanied by wife and here to review the results of cisternogram.  No falls.    Denies heart failure or cardiomyopathy.  Still has low back pain that worsens as he walks.    From my note note 3/23/18:  Unaccompanied, but I spoke with wife by phone.  Here today for f/u results.  He has started the vitamin supplements.  No falls.    Interval history 12/19/17:  consultation at the request of  Dr. Monk for evaluation of gait disorder.  Had aortic valve replacement and since then, he has more trouble walking, a couple falls (last was a month ago).  Worse with eyes closed and in dark.  Has history of back problems with RFA in recent time, bilateral knee surgery.  Does not use cane or walker and resistent to using them.  Feet remain swollen.  No syncope or presyncope.    Has known aortic atherosclerosis and aortic valve replacement.  Bilateral carotid disease is on problem list, but I can't find study to confirm this.      II.  Review of systems:  As in HPI,  otherwise, balance 3 systems reviewed and are negative.    III.  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    Stenosis of aortic and mitral valves     aortic DAHLIA 1.1 CM     Family History   Problem Relation Age of Onset    No Known Problems Father     No Known Problems Mother     No Known Problems Sister     No Known Problems Brother     No  Known Problems Maternal Aunt     No Known Problems Maternal Uncle     No Known Problems Paternal Aunt     No Known Problems Paternal Uncle     No Known Problems Maternal Grandmother     No Known Problems Maternal Grandfather     No Known Problems Paternal Grandmother     No Known Problems Paternal Grandfather     No Known Problems Daughter     No Known Problems Son     No Known Problems Daughter     No Known Problems Son     Amblyopia Neg Hx     Blindness Neg Hx     Cancer Neg Hx     Cataracts Neg Hx     Diabetes Neg Hx     Glaucoma Neg Hx     Hypertension Neg Hx     Macular degeneration Neg Hx     Retinal detachment Neg Hx     Strabismus Neg Hx     Stroke Neg Hx     Thyroid disease Neg Hx     Anemia Neg Hx     Arrhythmia Neg Hx     Asthma Neg Hx     Clotting disorder Neg Hx     Fainting Neg Hx     Heart attack Neg Hx     Heart disease Neg Hx     Heart failure Neg Hx     Hyperlipidemia Neg Hx     Atrial Septal Defect Neg Hx      Social History     Socioeconomic History    Marital status:      Spouse name: Sade    Number of children: None    Years of education: None    Highest education level: None   Social Needs    Financial resource strain: None    Food insecurity - worry: None    Food insecurity - inability: None    Transportation needs - medical: None    Transportation needs - non-medical: None   Occupational History    Occupation: Retired     Employer: retired 1992   Tobacco Use    Smoking status: Never Smoker    Smokeless tobacco: Never Used   Substance and Sexual Activity    Alcohol use: Yes     Comment: less than one  drink weekly    Drug use: No    Sexual activity: None   Other Topics Concern    None   Social History Narrative    None         Current Outpatient Medications on File Prior to Visit   Medication Sig Dispense Refill    amLODIPine (NORVASC) 5 MG tablet TAKE 1 TABLET(5 MG) BY MOUTH EVERY DAY 30 tablet 6    aspirin 81 MG Chew Take 81 mg  "by mouth once daily.        CHOLESTYRAMINE LIGHT 4 gram packet DISSOLVE 1 PACKET IN LIQUID AND DRINK TWICE DAILY 60 packet 5    clopidogrel (PLAVIX) 75 mg tablet TAKE 1 TABLET BY MOUTH ONCE DAILY 90 tablet 6    finasteride (PROSCAR) 5 mg tablet TAKE 1 TABLET(5 MG) BY MOUTH EVERY DAY 90 tablet 3    fluorometholone 0.1% (FML) 0.1 % DrpS       FLUZONE HIGH-DOSE 2017-18, PF, 180 mcg/0.5 mL vaccine       gabapentin (NEURONTIN) 300 MG capsule TAKE 1 CAPSULE(300 MG) BY MOUTH EVERY EVENING 30 capsule 5    ipratropium (ATROVENT) 0.03 % nasal spray SPRAY 1-2 SPRAYS IN EACH NOSTRIL TWICE DAILY AS NEEDED FOR NASAL DRIP 60 mL 1    mupirocin (BACTROBAN) 2 % ointment Apply topically 2 (two) times daily. 30 g 0    omeprazole (PRILOSEC) 40 MG capsule TAKE 1 CAPSULE(40 MG) BY MOUTH EVERY MORNING 30 capsule 0    tamsulosin (FLOMAX) 0.4 mg Cp24 TAKE ONE CAPSULE BY MOUTH DAILY 90 capsule 0    tolterodine (DETROL LA) 4 MG 24 hr capsule Take 1 capsule (4 mg total) by mouth once daily. 30 capsule 11    torsemide (DEMADEX) 5 MG Tab Take 1 tablet (5 mg total) by mouth once daily. 30 tablet 3    trospium (SANCTURA) 20 mg Tab tablet Take 1 tablet (20 mg total) by mouth once daily. 30 tablet 11    [DISCONTINUED] rivastigmine tartrate (EXELON) 1.5 MG capsule Take 1 capsule (1.5 mg total) by mouth 2 (two) times daily. 60 capsule 11     No current facility-administered medications on file prior to visit.        PRIOR problem-specific medications tried:  na    Review of patient's allergies indicates:  No Known Allergies    IV. Physical Exam    Vitals:    08/13/18 1507   BP: 132/72   Pulse: 88   Weight: 82.9 kg (182 lb 12.2 oz)   Height: 5' 8" (1.727 m)     General appearance: Well nourished, well developed, no acute distress.         Cardiovascular:  pedal pulses 2, no edema or cyanosis, heart regular rate and rhythym, no carotid bruits.         -------------------------------------------------------------  Facial Expression: normal   "     Affect: full       Orientation to time & place:  Oriented to time, place, person and situation       Attention & concentration:  Normal attention span and concentration       Memory:  0/5  Language: Spontaneous, fluent; able to repeat and name objects        Fund of knowledge:  Aware of current events        Speech:  normal (not dysarthric)  -------------------------------------------------------  Cranial nerves: wears glasses, visual fields full, optic discs not visualized, pupils equal round and reactive, extraocular movements intact,       facial sensation intact, face symmetrical, hearing intact to whisper, palate raises midline, shoulder shrug strength normal, tongue protrudes midline.        -------------------------------------------------------  Musculoskeletal  Muscle tone: 1: Slight cogwheel Rigidity only detected with activation maneuver. RUE        Muscle Bulk: all 4 extremities normal        Muscle strength:  5/5 in all 4 extremities        No pronator drift  Sensation: loss in vibration up to knees       Deep tendon Reflexes: 1 bilateral biceps, triceps, none at patella and ankles        --------------------------------------------------------------  Cerebellar and Coordination  Gait:  low step height, mild imbalance, + enbloc turn, no festination.       Finger-nose: no dysmetria       Rapid Alternating Movements (pronation/supination):  R slowing; L normal  --------------------------------------------------------------  MOVEMENT DISORDERS FOCUSED EXAM  Abnormality of movement (bradykinesia, hyperkinesia) present? No    Tremor present?   No   Posture:  2: Mild: Definite flexion, scoliosis or leaning to one side, but patient can correct posture to  normal posture when asked to do so.    Postural stability: + Rhomberg    V.  Laboratory/ Radiological Data:          Cisternogram 4/26/18:  No evidence of normal pressure hydrocephalus.      CT head 12/26/17:  Age-appropriate generalized cerebral volume  loss  Mild prominence of the lateral and third ventricles which may be compensatory to volume loss however slightly disproportionate cannot exclude component of hydrocephalus.  There is a small hypodensity in the left cerebellum suggestive for remote infarction.      Lab Results   Component Value Date    TSH 1.423 12/19/2017     Lab Results   Component Value Date    [id number] 355 12/19/2017     Lab Results   Component Value Date    HGBA1C 5.7 04/10/2014        MRI brain 12/26/17:  Mild prominence of the lateral and third ventricles which may be compensatory to volume loss however slightly disproportionate cannot exclude component of hydrocephalus.    There is a small hypodensity in the left cerebellum suggestive for remote infarction.        VI. Assessment and Plan            Problem List Items Addressed This Visit        1 - High    Gait disorder - Primary    Overview     Mild imbalance, frontal gait ataxia vs lower body parkinsonism.  NPH ruled out 4/2018         Current Assessment & Plan     Mild imbalance, looking more parkinsonian each visit, but still not significant enough, in my opinion, to warrant risking dopaminergics.  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   -> increase exelon to 3mg bid   -> consider levodopa if symptoms become more clearly parkinsonian.            2     Dementia    Overview     Mini-Cog score 2 (2/22/17)  Benedict Cognitive Assessment:   20/30 (12/2017)         Relevant Medications    rivastigmine tartrate (EXELON) 3 MG capsule              Follow-up in about 3 months (around 11/13/2018).

## 2018-08-13 NOTE — ASSESSMENT & PLAN NOTE
Mild imbalance, looking more parkinsonian each visit, but still not significant enough, in my opinion, to warrant risking dopaminergics.  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   -> increase exelon to 3mg bid   -> consider levodopa if symptoms become more clearly parkinsonian.   none

## 2018-08-15 RX ORDER — OMEPRAZOLE 40 MG/1
CAPSULE, DELAYED RELEASE ORAL
Qty: 90 CAPSULE | Refills: 3 | OUTPATIENT
Start: 2018-08-15

## 2018-08-15 NOTE — TELEPHONE ENCOUNTER
Patient on omeprazole from prior PCP - he has a pending GI appointment. Please discuss continuing med with GI provider or schedule to see me. Thank you.

## 2018-08-17 ENCOUNTER — OFFICE VISIT (OUTPATIENT)
Dept: GASTROENTEROLOGY | Facility: CLINIC | Age: 83
End: 2018-08-17
Payer: MEDICARE

## 2018-08-17 VITALS
HEIGHT: 68 IN | WEIGHT: 183.88 LBS | DIASTOLIC BLOOD PRESSURE: 72 MMHG | SYSTOLIC BLOOD PRESSURE: 122 MMHG | HEART RATE: 90 BPM | BODY MASS INDEX: 27.87 KG/M2

## 2018-08-17 DIAGNOSIS — R15.2 INCONTINENCE OF FECES WITH FECAL URGENCY: Primary | ICD-10-CM

## 2018-08-17 DIAGNOSIS — R15.9 INCONTINENCE OF FECES WITH FECAL URGENCY: Primary | ICD-10-CM

## 2018-08-17 DIAGNOSIS — K21.9 GASTROESOPHAGEAL REFLUX DISEASE, ESOPHAGITIS PRESENCE NOT SPECIFIED: ICD-10-CM

## 2018-08-17 PROCEDURE — 99204 OFFICE O/P NEW MOD 45 MIN: CPT | Mod: S$GLB,,, | Performed by: INTERNAL MEDICINE

## 2018-08-17 PROCEDURE — 99999 PR PBB SHADOW E&M-EST. PATIENT-LVL III: CPT | Mod: PBBFAC,,, | Performed by: INTERNAL MEDICINE

## 2018-08-17 RX ORDER — OMEPRAZOLE 40 MG/1
CAPSULE, DELAYED RELEASE ORAL
Qty: 90 CAPSULE | Refills: 1 | OUTPATIENT
Start: 2018-08-17

## 2018-08-17 RX ORDER — TAMSULOSIN HYDROCHLORIDE 0.4 MG/1
CAPSULE ORAL
Qty: 90 CAPSULE | Refills: 3 | Status: SHIPPED | OUTPATIENT
Start: 2018-08-17 | End: 2019-09-06 | Stop reason: SDUPTHER

## 2018-08-17 NOTE — LETTER
August 23, 2018      Kev Srinivasan Jr., MD  [address]  [facility] [address]           [facility] - Gastroenterology  [address]  [facility] [address]  Phone: [phone]  Fax: [phone]          Patient: Randall Strickland Jr.   MR Number: [id number]   YOB: 1927   Date of Visit: 8/17/2018       Dear Dr. Kev Srinivasan Jr.:    Thank you for referring Randall Strickland to me for evaluation. Attached you will find relevant portions of my assessment and plan of care.    If you have questions, please do not hesitate to call me. I look forward to following Randall Strickland along with you.    Sincerely,    Masoud Morrow  CC:  No Recipients    If you would like to receive this communication electronically, please contact [email or web address] or [phone] to request more information on [employer] access.    For providers and/or their staff who would like to refer a patient to [facility], please contact us through our one-stop-shop provider referral line, [facility] , at [phone].    If you feel you have received this communication in error or would no longer like to receive these types of communications, please e-mail [email or web address]

## 2018-08-17 NOTE — PATIENT INSTRUCTIONS
Obtain the following medications over-the-counter:    Imodium - can take one pill as needed before traveling, to help control bowel movements.    Gaviscon or Pepto Bismol - can use to help sooth your stomach.    Zantac 150 mg tablet - can use one pill as needed, up to two time in a day, when stomach is burning.      Contact our office in 2 weeks if your stomach burning does not improve.

## 2018-08-17 NOTE — PROGRESS NOTES
[facility] Consultation Note    Reason for Consult:    Chief Complaint   Patient presents with    Diarrhea       PCP:   Tiago Delaney    Referring MD:  Kev Srinivasan Jr., Md  [address] [place] [address]    HPI:  Randall Strickland Jr. is a 89 y.o. male here for evaluation of chronic diarrhea.  This is his first visit in our clinic.  He was previously seen in 2014 by Dr. Bran with CRS for fecal incontinence.  He received Solesta injections with some improvement.  Anorectal manometry indicated decreased resting pressure and sensation.  He reports ongoing diarrhea for 7 years.  He describes his symptoms as intermittently having no control over his bowel movements - this is not every BM.  He occasionally has accidents.  He has fecal urgency.  Symptoms are overall unchanged from before.  He has 2-3 BM per week, occasionally with straining - again, not every BM.  He has formed stools.  He denies liquid or watery stools.  No anal pain.  He has a complete BM.  He denies blood in the stool.  He also denies bloating, cramping, or abdominal pain.  No pain around the time of BM.  He denies weight loss.      He occasionally has burning sensation in the xiphoid region of his torso.  It may last for days at a time and usually resolves on it's own.  Occasional regurgitation and nausea.  Tums usually works, but hasn't lately.  Omeprazole has been prescribed, but he is unsure if he is taking it and is unsure about some of his meds.  He denies dysphagia or odynophagia.      Colonoscopy with Dr. Garsia 1/18/11 for diarrhea, to the cecum with poor prep and random colon biopsies normal.            ROS:  Constitutional: No fevers, chills, No weight loss, normal appetite  ENT: No congestion, rhinorrhea, or chronic sinus problems  CV: No chest pain or palpitations  Pulm: No cough, No shortness of breath  Ophtho: No vision changes or pain  GI: see HPI  Derm: No rash or lesions  Heme: No  lymphadenopathy, No bruising  MSK: No arthritis or joint swelling  : No dysuria, No frequent urination  Endo: No hot or cold intolerance        Medical History:  has a past medical history of Blood transfusion, BPH (benign prostatic hypertrophy), Cataract, Congestive heart failure, Hypertension, Macular degeneration, 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    Surgical History:  has a past surgical history that includes Knee surgery; Total hip arthroplasty; cararact (car); Cataract extraction bilateral w/ anterior vitrectomy; Cholecystectomy; Appendectomy; Eye surgery; Cataract extraction w/  intraocular lens implant (Bilateral); Cardiac catheterization; Cardiac valuve replacement; Joint replacement; RADIOFREQUENCY THERMOCOAGULATION (RFTC)-NERVE-MEDIAN BRANCH-LUMBAR (Bilateral, 10/26/2017); BLOCK-NERVE-MEDIAL BRANCH-LUMBAR (Bilateral, 10/10/2017); REPLACEMENT-VALVE-AORTIC (N/A, 8/30/2016); HEART CATH-LEFT (N/A, 7/15/2016); and MANOMETRY-ANORECTAL (N/A, 6/16/2014).    Family History: family history includes No Known Problems in his brother, daughter, daughter, father, maternal aunt, maternal grandfather, maternal grandmother, maternal uncle, mother, paternal aunt, paternal grandfather, paternal grandmother, paternal uncle, sister, son, and son.    Social History:  reports that  has never smoked. he has never used smokeless tobacco. He reports that he drinks alcohol. He reports that he does not use drugs.    Review of patient's allergies indicates:  No Known Allergies    Prior to Admission medications    Medication Sig Start Date End Date Taking? Authorizing Provider   amLODIPine (NORVASC) 5 MG tablet TAKE 1 TABLET(5 MG) BY MOUTH EVERY DAY 2/20/18  Yes Hieu Monk MD   aspirin 81 MG Chew Take 81 mg by mouth once daily.     Yes Historical Provider, MD   clopidogrel (PLAVIX) 75 mg tablet TAKE 1 TABLET BY MOUTH ONCE DAILY 9/14/17  Yes Hieu Monk MD  "  finasteride (PROSCAR) 5 mg tablet TAKE 1 TABLET(5 MG) BY MOUTH EVERY DAY 6/25/18  Yes Kev Srinivasan Jr., MD   fluorometholone 0.1% (FML) 0.1 % DrpS  6/27/17  Yes Arik Ortez MD   gabapentin (NEURONTIN) 300 MG capsule TAKE 1 CAPSULE(300 MG) BY MOUTH EVERY EVENING 4/23/18 10/20/18 Yes Anali Quinteros MD   ipratropium (ATROVENT) 0.03 % nasal spray SPRAY 1-2 SPRAYS IN EACH NOSTRIL TWICE DAILY AS NEEDED FOR NASAL DRIP 7/6/17  Yes Praneeth Dowell III, MD   mupirocin (BACTROBAN) 2 % ointment Apply topically 2 (two) times daily. 9/14/17  Yes Dorota Correa PA-C   omeprazole (PRILOSEC) 40 MG capsule TAKE 1 CAPSULE(40 MG) BY MOUTH EVERY MORNING 7/7/18  Yes Tiago Borja MD   rivastigmine tartrate (EXELON) 3 MG capsule Take 1 capsule (3 mg total) by mouth 2 (two) times daily. 8/13/18 8/13/19 Yes Swapna Delarosa MD   tamsulosin (FLOMAX) 0.4 mg Cap TAKE ONE CAPSULE BY MOUTH DAILY 8/17/18  Yes Kev Srinivasan Jr., MD   tamsulosin (FLOMAX) 0.4 mg Cp24 TAKE ONE CAPSULE BY MOUTH DAILY 5/14/18  Yes Kev Srinivasan Jr., MD   tolterodine (DETROL LA) 4 MG 24 hr capsule Take 1 capsule (4 mg total) by mouth once daily. 3/12/18 3/12/19 Yes Kev Srinivasan Jr., MD   torsemide (DEMADEX) 5 MG Tab Take 1 tablet (5 mg total) by mouth once daily. 10/13/16  Yes Hieu Monk MD   trospium (SANCTURA) 20 mg Tab tablet Take 1 tablet (20 mg total) by mouth once daily. 6/25/18  Yes Kev Srinivasan Jr., MD   CHOLESTYRAMINE LIGHT 4 gram packet DISSOLVE 1 PACKET IN LIQUID AND DRINK TWICE DAILY 8/31/17   Tiago Borja MD   FLUZONE HIGH-DOSE 2017-18, PF, 180 mcg/0.5 mL vaccine  9/14/17   Historical Provider, MD       Objective Findings:  Vital Signs:  /72   Pulse 90   Ht 5' 8" (1.727 m)   Wt 83.4 kg (183 lb 13.8 oz)   BMI 27.96 kg/m²   Body mass index is 27.96 kg/m².    Physical Exam:  General Appearance:  Well appearing in no acute distress, appears stated age  Head:  Normocephalic, atraumatic  Eyes:  No " scleral icterus or pallor, EOMI  ENT:  Neck supple, moist mucosa; OP clear  Lungs:  CTA bilaterally in anterior and posterior fields, no wheezes, no crackles; no dullness  Heart:  Regular rate and rhythm, S1, S2 normal, no murmurs heard  Abdomen:  Soft, non tender, non distended with positive bowel sounds in all four quadrants. No hepatosplenomegaly, ascites, or mass  Extremities:  No clubbing, cyanosis, or edema        Labs:  Lab Results   Component Value Date    WBC 6.97 05/30/2018    HGB 12.4 (L) 05/30/2018    HCT 37.9 (L) 05/30/2018     (H) 05/30/2018    RDW 12.5 05/30/2018     05/30/2018    GRAN 2.6 05/30/2018    GRAN 37.4 (L) 05/30/2018    LYMPH 2.6 05/30/2018    LYMPH 37.6 05/30/2018    MONO 1.5 (H) 05/30/2018    MONO 21.4 (H) 05/30/2018    EOS 0.2 05/30/2018    BASO 0.05 05/30/2018     Lab Results   Component Value Date     05/30/2018    K 5.2 (H) 05/30/2018     05/30/2018    CO2 28 05/30/2018    GLU 84 05/30/2018    BUN 20 05/30/2018    CREATININE 1.3 05/30/2018    CALCIUM 9.8 05/30/2018    PROT 8.0 05/30/2018    ALBUMIN 3.7 05/30/2018    BILITOT 0.7 05/30/2018    ALKPHOS 103 05/30/2018    AST 22 05/30/2018    ALT 15 05/30/2018                 Assessment:  Randall Strickland Jr. is a 89 y.o. male with:  1. Incontinence of feces with fecal urgency    2. Gastroesophageal reflux disease, esophagitis presence not specified      He appears to have fecal incontinence with fecal urgency and does not meet criteria for IBS.  He also does not actually have diarrhea either.  He seems to have responded to Solesta injections in the past and may benefit again.          Recommendations/Plan:  1. Follow-up with Dr. Bran for incontinence  2. Continue fiber and consider imodium, but imodium may lead to constipation since he doesn't have watery stools  3. Try Gaviscon or Pepto Bismol GERD related symptoms.  Use Zantac as needed      Follow-up as needed        Thank you so much for allowing me to  participate in the care of Randall Strickland Jr.    Tiago Lowe MD

## 2018-08-17 NOTE — TELEPHONE ENCOUNTER
Medication refused due to failing protocol.    Requested Prescriptions   Pending Prescriptions Disp Refills    omeprazole (PRILOSEC) 40 MG capsule 90 capsule 1     Sig: TAKE 1 CAPSULE(40 MG) BY MOUTH EVERY MORNING    Proton Pump Inhibitors Protocol Failed - 8/17/2018 12:06 PM       Failed - Not on Clopidogrel (Plavix) or if on, refill is for Pantoprazole (Protonix)       Passed - Recent or future visit with authorizing provider       Passed - No osteoporosis diagnosis on problem list

## 2018-08-20 ENCOUNTER — OFFICE VISIT (OUTPATIENT)
Dept: OPTOMETRY | Facility: CLINIC | Age: 83
End: 2018-08-20
Payer: MEDICARE

## 2018-08-20 DIAGNOSIS — H52.4 MYOPIA WITH ASTIGMATISM AND PRESBYOPIA, BILATERAL: Primary | ICD-10-CM

## 2018-08-20 DIAGNOSIS — H52.13 MYOPIA WITH ASTIGMATISM AND PRESBYOPIA, BILATERAL: Primary | ICD-10-CM

## 2018-08-20 DIAGNOSIS — H52.203 MYOPIA WITH ASTIGMATISM AND PRESBYOPIA, BILATERAL: Primary | ICD-10-CM

## 2018-08-20 PROCEDURE — 99499 UNLISTED E&M SERVICE: CPT | Mod: S$GLB,,, | Performed by: OPTOMETRIST

## 2018-08-20 PROCEDURE — 99999 PR PBB SHADOW E&M-EST. PATIENT-LVL II: CPT | Mod: PBBFAC,,, | Performed by: OPTOMETRIST

## 2018-08-20 RX ORDER — OMEPRAZOLE 40 MG/1
CAPSULE, DELAYED RELEASE ORAL
Qty: 90 CAPSULE | Refills: 0 | OUTPATIENT
Start: 2018-08-20

## 2018-08-20 NOTE — PROGRESS NOTES
TONO RAI 06/2018  Patient has noticed distance blurred with new glasses. Using   AT's once a day. Eyes have felt dry, FBS and water some.  Blur od at dist, x mos, no assoc pain or red, no relief over time,   constant    Last edited by Bernardo Atwood, OD on 8/20/2018 10:03 AM. (History)            Assessment /Plan     For exam results, see Encounter Report.    Myopia with astigmatism and presbyopia, bilateral      1. No change with Rx, no flash or floaters, some foreign body sensation, increase tear drops, RTC for DFE if any worsening.

## 2018-08-21 RX ORDER — OMEPRAZOLE 40 MG/1
CAPSULE, DELAYED RELEASE ORAL
Qty: 90 CAPSULE | Refills: 0 | OUTPATIENT
Start: 2018-08-21

## 2018-08-22 ENCOUNTER — DOCUMENTATION ONLY (OUTPATIENT)
Dept: INTERNAL MEDICINE | Facility: CLINIC | Age: 83
End: 2018-08-22

## 2018-08-22 ENCOUNTER — OFFICE VISIT (OUTPATIENT)
Dept: INTERNAL MEDICINE | Facility: CLINIC | Age: 83
End: 2018-08-22
Attending: FAMILY MEDICINE
Payer: MEDICARE

## 2018-08-22 VITALS
BODY MASS INDEX: 28.07 KG/M2 | HEART RATE: 92 BPM | WEIGHT: 185.19 LBS | SYSTOLIC BLOOD PRESSURE: 102 MMHG | HEIGHT: 68 IN | DIASTOLIC BLOOD PRESSURE: 50 MMHG | OXYGEN SATURATION: 97 %

## 2018-08-22 DIAGNOSIS — I50.32 CHRONIC DIASTOLIC CONGESTIVE HEART FAILURE: ICD-10-CM

## 2018-08-22 DIAGNOSIS — M54.5 LOW BACK PAIN, UNSPECIFIED BACK PAIN LATERALITY, UNSPECIFIED CHRONICITY, WITH SCIATICA PRESENCE UNSPECIFIED: ICD-10-CM

## 2018-08-22 DIAGNOSIS — I73.9 PVD (PERIPHERAL VASCULAR DISEASE): ICD-10-CM

## 2018-08-22 DIAGNOSIS — K21.9 GASTROESOPHAGEAL REFLUX DISEASE, ESOPHAGITIS PRESENCE NOT SPECIFIED: ICD-10-CM

## 2018-08-22 DIAGNOSIS — I42.9 CARDIOMYOPATHY, IDIOPATHIC: ICD-10-CM

## 2018-08-22 DIAGNOSIS — R06.09 DOE (DYSPNEA ON EXERTION): Primary | ICD-10-CM

## 2018-08-22 DIAGNOSIS — I87.2 VENOUS STASIS DERMATITIS OF BOTH LOWER EXTREMITIES: ICD-10-CM

## 2018-08-22 DIAGNOSIS — Z95.2 S/P AORTIC VALVE REPLACEMENT: ICD-10-CM

## 2018-08-22 PROCEDURE — 99214 OFFICE O/P EST MOD 30 MIN: CPT | Mod: S$GLB,,, | Performed by: FAMILY MEDICINE

## 2018-08-22 PROCEDURE — 99999 PR PBB SHADOW E&M-EST. PATIENT-LVL III: CPT | Mod: PBBFAC,,, | Performed by: FAMILY MEDICINE

## 2018-08-22 RX ORDER — TRIAMCINOLONE ACETONIDE 1 MG/G
CREAM TOPICAL 2 TIMES DAILY
Qty: 80 G | Refills: 1 | Status: ON HOLD | OUTPATIENT
Start: 2018-08-22 | End: 2019-12-18 | Stop reason: HOSPADM

## 2018-08-22 NOTE — PROGRESS NOTES
Subjective:       Patient ID: Randall Strickland Jr. is a 89 y.o. male.    Chief Complaint: Medication Refill    HPI  Review of Systems   Constitutional: Positive for fatigue. Negative for chills, fever and unexpected weight change.   HENT: Negative for congestion and trouble swallowing.    Eyes: Negative for redness and visual disturbance.   Respiratory: Positive for shortness of breath. Negative for cough and chest tightness.    Cardiovascular: Positive for leg swelling. Negative for chest pain and palpitations.   Gastrointestinal: Negative for abdominal pain and blood in stool.   Genitourinary: Negative for difficulty urinating and hematuria.   Musculoskeletal: Positive for back pain. Negative for arthralgias, gait problem, joint swelling, myalgias and neck pain.   Skin: Negative for color change and rash.   Neurological: Negative for tremors, speech difficulty, weakness, numbness and headaches.   Hematological: Negative for adenopathy. Does not bruise/bleed easily.   Psychiatric/Behavioral: Negative for behavioral problems, confusion and sleep disturbance. The patient is not nervous/anxious.        Objective:      Physical Exam   Constitutional: He is oriented to person, place, and time. He appears well-developed and well-nourished.   HENT:   Head: Normocephalic and atraumatic.   Eyes: Conjunctivae are normal. No scleral icterus.   Neck: Normal range of motion. Neck supple. Carotid bruit is not present.   Cardiovascular: Normal rate, regular rhythm and intact distal pulses. Exam reveals no gallop and no friction rub.   Murmur heard.  Pulmonary/Chest: Effort normal and breath sounds normal. No respiratory distress. He has no wheezes. He has no rales.   Abdominal: He exhibits no distension. There is no tenderness.   Musculoskeletal: He exhibits edema. He exhibits no deformity.   Neurological: He is alert and oriented to person, place, and time. He displays no tremor. No cranial nerve deficit. Coordination and gait  normal.   Skin: Skin is warm and dry. No rash noted. He is not diaphoretic. There is erythema.   Psychiatric: He has a normal mood and affect. His behavior is normal. Judgment and thought content normal.   Nursing note and vitals reviewed.      Assessment:       1. RUDOLPH (dyspnea on exertion)    2. Gastroesophageal reflux disease, esophagitis presence not specified    3. Cardiomyopathy, idiopathic    4. Chronic diastolic congestive heart failure    5. Low back pain, unspecified back pain laterality, unspecified chronicity, with sciatica presence unspecified    6. PVD (peripheral vascular disease)    7. Venous stasis dermatitis of both lower extremities    8. S/P aortic valve replacement        Plan:   Randall was seen today for medication refill.    Diagnoses and all orders for this visit:    RUDOLPH (dyspnea on exertion)    Gastroesophageal reflux disease, esophagitis presence not specified    Cardiomyopathy, idiopathic    Chronic diastolic congestive heart failure    Low back pain, unspecified back pain laterality, unspecified chronicity, with sciatica presence unspecified    PVD (peripheral vascular disease)  -     COMPRESSION STOCKINGS    Venous stasis dermatitis of both lower extremities  -     COMPRESSION STOCKINGS    S/P aortic valve replacement    Other orders  -     triamcinolone acetonide 0.1% (KENALOG) 0.1 % cream; Apply topically 2 (two) times daily.    for LE for itching      See meds, orders, follow up, routing and instructions sections of encounter.  The patient is in for followup on medication.  We had not refilled his Prilosec,   which apparently had migrated from another provider into our refill list.    After reviewing his chart, he has had no recent Gastroenterology appointments or   EGD.  He denies any dyspepsia or significant reflux at this time, but does have   some mild symptoms.  He was seen by Gastroenterology again recently and I   believe they explained to him to take Zantac.  Exploring his chart  further, he   saw a nurse practitioner in GI on 01/28/2015 and was prescribed 40 mg omeprazole   through them.    The patient's primary complaint today is dyspnea on exertion, walking, but not   riding an exercise bike.  He stated that he had some difficulty in the office   today.  It is difficult to tell whether this is worsening or not, but according   to his history, it seems to have been present for a while with no associated   syncope, near syncope, palpitations or chest pain.    The patient has a history of low back pain.  He has had procedures.  He states   that that is slightly worsening.    Next complaint of swollen, red legs bilaterally.  He has had negative DVT study   in the past.  He has a diagnosis of PVD and his wife brought him some   compression stockings, which he is not wearing.  His examination reveals normal   cardiopulmonary for age.  He has a venous stasis erythema to the lower   extremities with no tenderness.  I would quantify this as mild.  He does have   some excoriations and scab to the right caudal upper anterior tibialis area that   does not appear to be infected.    RECOMMENDATIONS:  Continue Zantac.  Medications were updated.  I do not think he   needs a PPI at this time.    Follow up with me in one month concerning his dyspnea.  He has had   cardiovascular evaluations in the past.  I did review these.  He is under the   care of a cardiologist currently.  His symptoms do not seem to be progressive at   this time.    Should this change, I asked him to contact us.  I did order some compression   stockings.      LUCINA/NELLY  dd: 08/22/2018 14:03:12 (CDT)  td: 08/22/2018 20:29:28 (CDT)  Doc ID   #[id number]  Job ID #[id number]    CC:

## 2018-08-24 RX ORDER — OMEPRAZOLE 40 MG/1
40 CAPSULE, DELAYED RELEASE ORAL DAILY
Qty: 90 CAPSULE | Refills: 1 | OUTPATIENT
Start: 2018-08-24

## 2018-08-30 ENCOUNTER — TELEPHONE (OUTPATIENT)
Dept: GASTROENTEROLOGY | Facility: CLINIC | Age: 83
End: 2018-08-30

## 2018-08-30 ENCOUNTER — DOCUMENTATION ONLY (OUTPATIENT)
Dept: INTERNAL MEDICINE | Facility: CLINIC | Age: 83
End: 2018-08-30

## 2018-08-30 NOTE — TELEPHONE ENCOUNTER
Returned patient's call, but his wife stated he is not at home. Left message for Mr. Strickland to return call.

## 2018-08-30 NOTE — TELEPHONE ENCOUNTER
Returned patient's call.     Mr. Strickland is calling with an update. He has been taking Zantac, but it is not working. His stomach is still burning.     Patient is requesting a follow up appointment with Dr. Lowe. GI appointment scheduled next available (10/12).    He would like to know, if there is anything else he can try to help with the burning sensation?    Message routed to Dr. Lowe.

## 2018-08-30 NOTE — TELEPHONE ENCOUNTER
----- Message from Christine Jason sent at 8/30/2018  9:50 AM CDT -----  Contact: Self- [phone]  Mynor- pt called to schedule a f/u appt- states his abdominal discomfort hasn't improved while taking Zantac- please contact pt at [phone]

## 2018-08-30 NOTE — TELEPHONE ENCOUNTER
----- Message from Dior Garibay sent at 8/30/2018 10:41 AM CDT -----  Contact: Pt:[phone]  .Needs Advice    Reason for call: Pt states he missed a call from the nurse and would like to schedule an appointment with .     Communication Preference:Pt:[phone]  Additional Information:

## 2018-09-06 NOTE — TELEPHONE ENCOUNTER
Tell him to try omeprazole 20 mg daily for 14 days.  He may easily obtain a short course OTC.  He should take it in the morning, 30 minutes before eating.  He should call back in 2 weeks if not working.

## 2018-09-07 RX ORDER — CHOLESTYRAMINE LIGHT 4 G/5.7G
POWDER, FOR SUSPENSION ORAL
Qty: 60 PACKET | Refills: 0 | Status: SHIPPED | OUTPATIENT
Start: 2018-09-07 | End: 2018-11-20 | Stop reason: SDUPTHER

## 2018-09-11 NOTE — TELEPHONE ENCOUNTER
2nd attempt to contact patient, but he did not answer. Left voicemail for patient to return call.

## 2018-09-19 ENCOUNTER — TELEPHONE (OUTPATIENT)
Dept: GASTROENTEROLOGY | Facility: CLINIC | Age: 83
End: 2018-09-19

## 2018-09-19 NOTE — TELEPHONE ENCOUNTER
Returned patient's call. Patient given Dr. Lowe's recommendation.    He will try OTC Omeprazole 20 mg daily for 2 weeks and will give us a call in 2 weeks with an update.

## 2018-09-19 NOTE — TELEPHONE ENCOUNTER
----- Message from Jocelynn Martinez sent at 9/19/2018 10:58 AM CDT -----  Contact: self [phone]  Patient Returning Call from [facility]    Who Left Message for Patient: Dalila  Communication Preference: self [phone]  Additional Information: Pt states he is very sorry; he was out of town for a month and left his phone.

## 2018-09-24 ENCOUNTER — OFFICE VISIT (OUTPATIENT)
Dept: INTERNAL MEDICINE | Facility: CLINIC | Age: 83
End: 2018-09-24
Payer: MEDICARE

## 2018-09-24 VITALS
BODY MASS INDEX: 26.47 KG/M2 | OXYGEN SATURATION: 97 % | HEART RATE: 85 BPM | DIASTOLIC BLOOD PRESSURE: 64 MMHG | SYSTOLIC BLOOD PRESSURE: 126 MMHG | WEIGHT: 174.63 LBS | HEIGHT: 68 IN

## 2018-09-24 DIAGNOSIS — R11.10 VOMITING, INTRACTABILITY OF VOMITING NOT SPECIFIED, PRESENCE OF NAUSEA NOT SPECIFIED, UNSPECIFIED VOMITING TYPE: ICD-10-CM

## 2018-09-24 DIAGNOSIS — K21.9 GASTROESOPHAGEAL REFLUX DISEASE, ESOPHAGITIS PRESENCE NOT SPECIFIED: ICD-10-CM

## 2018-09-24 DIAGNOSIS — K30 ACID INDIGESTION: Primary | ICD-10-CM

## 2018-09-24 PROCEDURE — 1101F PT FALLS ASSESS-DOCD LE1/YR: CPT | Mod: CPTII,,, | Performed by: INTERNAL MEDICINE

## 2018-09-24 PROCEDURE — 99999 PR PBB SHADOW E&M-EST. PATIENT-LVL III: CPT | Mod: PBBFAC,,, | Performed by: INTERNAL MEDICINE

## 2018-09-24 PROCEDURE — 99213 OFFICE O/P EST LOW 20 MIN: CPT | Mod: S$PBB,,, | Performed by: INTERNAL MEDICINE

## 2018-09-24 PROCEDURE — 99213 OFFICE O/P EST LOW 20 MIN: CPT | Mod: PBBFAC | Performed by: INTERNAL MEDICINE

## 2018-09-24 RX ORDER — SUCRALFATE 1 G/10ML
500 SUSPENSION ORAL
Qty: 150 ML | Refills: 0 | Status: SHIPPED | OUTPATIENT
Start: 2018-09-24 | End: 2018-12-17

## 2018-09-24 NOTE — PROGRESS NOTES
Subjective:       Patient ID: Randall Strickland Jr. is a 89 y.o. male.    Chief Complaint: Gastroesophageal Reflux (Had it yesterday all day burning sensation) and Cough (constant cough)    HPI:  A 89-year-old male usually sees Dr Delaney comes in for urgent care visit indigestion abdominal burning and vomiting.  Patient states he has had this in the past and I see notes with his gastroenterologist over the last 2 weeks.  He is having some minor issues with memory as he cannot tell me what he ate for lunch yesterday or whether he is taking omeprazole that Dr. perez recommended about a week ago via phone.  He also does not think he is using any of liquid acid.  See where Pepto-Bismol was suggested a month ago.  He has a follow-up coming.  He did not vomit blood.  There may have been food.  He says he feels fine this morning but is afraid the burning going to come back.  No blood in the stool.  No urinary symptoms.  Patient states he could feel acid burning up into his chest and then going back down.  He took Tums several times yesterday with intermittent relief      Review of Systems   Constitutional: Negative for chills, fatigue, fever and unexpected weight change.   HENT: Negative for nosebleeds and trouble swallowing.    Eyes: Negative for pain and visual disturbance.   Respiratory: Negative for cough, shortness of breath and wheezing.    Cardiovascular: Negative for chest pain and palpitations.   Gastrointestinal: Positive for vomiting. Negative for abdominal pain, constipation, diarrhea and nausea.        Acid burning   Genitourinary: Negative for difficulty urinating and hematuria.   Musculoskeletal: Negative for neck pain.   Skin: Negative for rash.   Neurological: Negative for dizziness and headaches.   Hematological: Does not bruise/bleed easily.   Psychiatric/Behavioral: Negative for dysphoric mood, sleep disturbance and suicidal ideas.       Objective:      Physical Exam   Constitutional: He is oriented to  person, place, and time. He appears well-developed and well-nourished. No distress.   HENT:   Head: Normocephalic and atraumatic.   Mouth/Throat: Oropharynx is clear and moist. No oropharyngeal exudate.   TM's clear, pharynx clear   Eyes: Conjunctivae and EOM are normal. Pupils are equal, round, and reactive to light. No scleral icterus.   Neck: Normal range of motion. Neck supple. No thyromegaly present.   No supraclavicular nodes palpated   Cardiovascular: Normal rate, regular rhythm and normal heart sounds.   No murmur heard.  Pulmonary/Chest: Effort normal and breath sounds normal. He has no wheezes.   Abdominal: Soft. Bowel sounds are normal. He exhibits no distension and no mass. There is no tenderness. There is no rebound and no guarding.   Musculoskeletal: He exhibits no edema.   Lymphadenopathy:     He has no cervical adenopathy.   Neurological: He is alert and oriented to person, place, and time.   Skin: No rash noted. No erythema. No pallor.   Psychiatric: He has a normal mood and affect. His behavior is normal.       Assessment:       1. Acid indigestion    2. Gastroesophageal reflux disease, esophagitis presence not specified    3. Vomiting, intractability of vomiting not specified, presence of nausea not specified, unspecified vomiting type        Plan:       Randall was seen today for gastroesophageal reflux and cough.    Diagnoses and all orders for this visit:    Acid indigestion    Gastroesophageal reflux disease, esophagitis presence not specified    Vomiting, intractability of vomiting not specified, presence of nausea not specified, unspecified vomiting type    Other orders  -     sucralfate (CARAFATE) 100 mg/mL suspension; Take 5 mLs (500 mg total) by mouth 4 (four) times daily with meals and nightly.        trial of liquid Carafate for a few days.  Also make sure patient is taking omeprazole.  Follow up with gastro, PCP.  [place] diet.  Reflux precautions discussed.

## 2018-09-27 RX ORDER — AMLODIPINE BESYLATE 5 MG/1
TABLET ORAL
Qty: 30 TABLET | Refills: 0 | Status: SHIPPED | OUTPATIENT
Start: 2018-09-27 | End: 2018-10-27 | Stop reason: SDUPTHER

## 2018-10-01 RX ORDER — CLOPIDOGREL BISULFATE 75 MG/1
TABLET ORAL
Qty: 90 TABLET | Refills: 0 | Status: SHIPPED | OUTPATIENT
Start: 2018-10-01 | End: 2019-01-02 | Stop reason: SDUPTHER

## 2018-10-04 ENCOUNTER — TELEPHONE (OUTPATIENT)
Dept: GASTROENTEROLOGY | Facility: CLINIC | Age: 83
End: 2018-10-04

## 2018-10-04 NOTE — TELEPHONE ENCOUNTER
----- Message from Jocelynn Martinez sent at 10/4/2018  1:47 PM CDT -----  Contact: self [phone]  Patient Requesting Sooner Appointment.     Reason for sooner appt.: Pt states he is constantly losing weight and would like to be seen sooner  When is the first available appointment? Unable to access  Communication Preference: self [phone]  Additional Information:

## 2018-10-04 NOTE — TELEPHONE ENCOUNTER
----- Message from Colleen Alas sent at 10/4/2018  4:59 PM CDT -----  Contact: self  Pt called to speak with nurse pt stated he had a missed called.          Pt callback number [phone]

## 2018-10-05 ENCOUNTER — OFFICE VISIT (OUTPATIENT)
Dept: GASTROENTEROLOGY | Facility: CLINIC | Age: 83
End: 2018-10-05
Payer: MEDICARE

## 2018-10-05 VITALS
BODY MASS INDEX: 25.62 KG/M2 | HEIGHT: 68 IN | SYSTOLIC BLOOD PRESSURE: 122 MMHG | WEIGHT: 169.06 LBS | HEART RATE: 102 BPM | DIASTOLIC BLOOD PRESSURE: 68 MMHG

## 2018-10-05 DIAGNOSIS — K21.9 GASTROESOPHAGEAL REFLUX DISEASE, ESOPHAGITIS PRESENCE NOT SPECIFIED: Primary | ICD-10-CM

## 2018-10-05 DIAGNOSIS — K59.00 CONSTIPATION, UNSPECIFIED CONSTIPATION TYPE: ICD-10-CM

## 2018-10-05 PROCEDURE — 1101F PT FALLS ASSESS-DOCD LE1/YR: CPT | Mod: CPTII,,, | Performed by: NURSE PRACTITIONER

## 2018-10-05 PROCEDURE — 99214 OFFICE O/P EST MOD 30 MIN: CPT | Mod: S$PBB,,, | Performed by: NURSE PRACTITIONER

## 2018-10-05 PROCEDURE — 99215 OFFICE O/P EST HI 40 MIN: CPT | Mod: PBBFAC | Performed by: NURSE PRACTITIONER

## 2018-10-05 PROCEDURE — 99999 PR PBB SHADOW E&M-EST. PATIENT-LVL V: CPT | Mod: PBBFAC,,, | Performed by: NURSE PRACTITIONER

## 2018-10-05 RX ORDER — PNV NO.95/FERROUS FUM/FOLIC AC 28MG-0.8MG
100 TABLET ORAL DAILY
COMMUNITY
End: 2019-01-30

## 2018-10-05 RX ORDER — OMEPRAZOLE 40 MG/1
40 CAPSULE, DELAYED RELEASE ORAL EVERY MORNING
Qty: 30 CAPSULE | Refills: 3 | Status: SHIPPED | OUTPATIENT
Start: 2018-10-05 | End: 2019-03-18

## 2018-10-05 RX ORDER — LANOLIN ALCOHOL/MO/W.PET/CERES
100 CREAM (GRAM) TOPICAL DAILY
Status: ON HOLD | COMMUNITY
End: 2022-01-01 | Stop reason: HOSPADM

## 2018-10-05 NOTE — PATIENT INSTRUCTIONS
For constipation:  Stop imodium because this may be constipating you. Restart it at a lower dose if you start getting diarrhea.     For GERD/Reflux/burning:    Take your PPI (omeprazole/Prilosec 40 mg )30-45 minutes before your first protein containing meal (breakfast) every day.    Remain upright for at least 3 hours after eating.    Elevate the head of the bead about 6 inches.  Some patients place cinder blocks under the head of the bed for elevation.    Avoid foods that you have noticed make your symptoms worse (possible triggers include:peppermint, chocolate, caffeine, spicy foods, greasy/fried foods, acidic foods).    Set a weight loss goal of 10% of your body weight. (For example, if you weigh 150 lbs, your goal should be to loose 15 lbs).    You may take over the counter Zantac/ranitadine as directed, as needed for breakthrough symptoms.

## 2018-10-05 NOTE — PROGRESS NOTES
[facility] Established Patient Visit    Reason for Visit:  The primary encounter diagnosis was Gastroesophageal reflux disease, esophagitis presence not specified. A diagnosis of Constipation, unspecified constipation type was also pertinent to this visit.    PCP: Tiago Delaney    HPI:  This is a 89 y.o. male here for reflux  Previously seen by Dr. Lowe in 8/2018.    Abdominal pain - epigastric burning as below.  Reflux - epigastric pyrosis that goes up to upper esophagus since a few months ago. Daily. Some improvement with omeprazole 20 mg daily x 14 days. No longer taking.   Dysphagia/odynophagia - no   Bowel habits - 2 bristol type 4 BM/week.   GI bleeding - denies hematochezia, hematemesis, melena, BRBPR, black/tarry stools, and coffee ground emesis  NSAID usage - ASA 81 mg daily    ROS:  Constitutional: No fevers, no chills, 15 lb wt loss since 8/2017. Reports eating less d/t decreased appetite. no fatigue,   ENT: No allergies  CV: No chest pain, no palpitations, no perif. edema, no sob on exertion  Pulm: + cough, No shortness of breath, no wheezes, no sputum  Ophtho: No vision changes  GI: see HPI; also no nausea, no vomiting, no change in appetite  Derm: No rash  Heme: No lymphadenopathy, No bruising  MSK: No arthritis, no muscle pain, no muscle weakness  : No dysuria, No hematuria  Endo: No hot or cold intolerance  Neuro: No syncope, No seizure,     PMHX:  has a past medical history of Blood transfusion, BPH (benign prostatic hypertrophy), Cataract, Congestive heart failure, Hypertension, Macular degeneration, Osteoarthritis of lumbar spine (9/7/2016), PVD (peripheral vascular disease) (8/22/2018), and Stenosis of aortic and mitral valves.    PSHX:  has a past surgical history that includes Knee surgery; Total hip arthroplasty; cararact (car); Cataract extraction bilateral w/ anterior vitrectomy; Cholecystectomy; Appendectomy; Eye surgery; Cataract extraction w/  intraocular lens implant  (Bilateral); Cardiac catheterization; Cardiac valuve replacement; Joint replacement; RADIOFREQUENCY THERMOCOAGULATION (RFTC)-NERVE-MEDIAN BRANCH-LUMBAR (Bilateral, 10/26/2017); BLOCK-NERVE-MEDIAL BRANCH-LUMBAR (Bilateral, 10/10/2017); REPLACEMENT-VALVE-AORTIC (N/A, 8/30/2016); HEART CATH-LEFT (N/A, 7/15/2016); and MANOMETRY-ANORECTAL (N/A, 6/16/2014).    The patient's social and family histories were reviewed by me and updated in the appropriate section of the electronic medical record.    Review of patient's allergies indicates:  No Known Allergies    Current Outpatient Medications   Medication Sig    amLODIPine (NORVASC) 5 MG tablet TAKE 1 TABLET(5 MG) BY MOUTH EVERY DAY    aspirin 81 MG Chew Take 81 mg by mouth once daily.      clopidogrel (PLAVIX) 75 mg tablet TAKE 1 TABLET BY MOUTH ONCE DAILY    cyanocobalamin (VITAMIN B-12) 100 MCG tablet Take 100 mcg by mouth once daily.    finasteride (PROSCAR) 5 mg tablet TAKE 1 TABLET(5 MG) BY MOUTH EVERY DAY    gabapentin (NEURONTIN) 300 MG capsule TAKE 1 CAPSULE(300 MG) BY MOUTH EVERY EVENING    polycarbophil (FIBERCON) 625 mg tablet Take 625 mg by mouth once daily.    ranitidine (ZANTAC) 150 MG tablet Take 150 mg by mouth 2 (two) times daily.    rivastigmine tartrate (EXELON) 3 MG capsule Take 1 capsule (3 mg total) by mouth 2 (two) times daily.    sucralfate (CARAFATE) 100 mg/mL suspension Take 5 mLs (500 mg total) by mouth 4 (four) times daily with meals and nightly.    tamsulosin (FLOMAX) 0.4 mg Cap TAKE ONE CAPSULE BY MOUTH DAILY    thiamine 100 MG tablet Take 100 mg by mouth once daily.    tolterodine (DETROL LA) 4 MG 24 hr capsule Take 1 capsule (4 mg total) by mouth once daily.    triamcinolone acetonide 0.1% (KENALOG) 0.1 % cream Apply topically 2 (two) times daily.    trospium (SANCTURA) 20 mg Tab tablet Take 1 tablet (20 mg total) by mouth once daily.    UNABLE TO FIND medication name: 1 modium    CHOLESTYRAMINE LIGHT 4 gram packet DISSOLVE 1  "PACKET IN LIQUID AND DRINK TWICE DAILY    fluorometholone 0.1% (FML) 0.1 % DrpS     ipratropium (ATROVENT) 0.03 % nasal spray SPRAY 1-2 SPRAYS IN EACH NOSTRIL TWICE DAILY AS NEEDED FOR NASAL DRIP    omeprazole (PRILOSEC) 40 MG capsule Take 1 capsule (40 mg total) by mouth every morning.    torsemide (DEMADEX) 5 MG Tab Take 1 tablet (5 mg total) by mouth once daily.     No current facility-administered medications for this visit.          Objective Findings:    Vital Signs:  /68   Pulse 102   Ht 5' 8" (1.727 m)   Wt 76.7 kg (169 lb 1.5 oz)   BMI 25.71 kg/m²  Body mass index is 25.71 kg/m².    Physical Exam:  General Appearance: Well appearing in no acute distress  Head:   Normocephalic, without obvious abnormality  Eyes:    No scleral icterus, EOMI  Throat: Lips, mucosa, and tongue normal; teeth and gums normal  Lungs: CTA bilaterally in anterior and posterior fields, no wheezes, no crackles.  Heart:  Regular rate and rhythm, S1, S2 normal, no murmurs heard  Abdomen: Soft, non tender, non distended with positive bowel sounds in all four quadrants. No hepatosplenomegaly, ascites, or mass  Extremities:    2+ radial pulses, no clubbing, cyanosis or edema  Skin: No rash to exposed areas  Neurologic: A&Ox4      Labs:  Lab Results   Component Value Date    WBC 6.97 05/30/2018    HGB 12.4 (L) 05/30/2018    HCT 37.9 (L) 05/30/2018     05/30/2018    CHOL 135 05/30/2018    TRIG 83 05/30/2018    HDL 37 (L) 05/30/2018    ALT 15 05/30/2018    AST 22 05/30/2018     05/30/2018    K 5.2 (H) 05/30/2018     05/30/2018    CREATININE 1.3 05/30/2018    BUN 20 05/30/2018    CO2 28 05/30/2018    TSH 1.423 12/19/2017    PSA 0.6 03/07/2005    INR 1.0 08/29/2016    HGBA1C 5.7 04/10/2014       Endoscopy:   Colon 9/29/009: FPTC. Patchy inflammation in cecum (active colitis). Localized mild inflam in mid transverse colon 2/2 ischemic colitis (active colitis).    egd-none    Assessment:    1. Gastroesophageal " reflux disease, esophagitis presence not specified    2. Constipation, unspecified constipation type          Recommendations:  1.GERD-start omeprazole 40 mg daily. GERD healthy lifestyle as per handout provided. Consider EGD if no improvement at f/u.   2. Constipation- stop imodium. Restart at lower dose if diarrhea occurs.  Follow-up in about 2 months (around 12/5/2018) for gerd, constipation.    Order summary:  Orders Placed This Encounter    omeprazole (PRILOSEC) 40 MG capsule       Thank you for allowing me to participate in the care of Randall Gamble, APRN, FNP-C

## 2018-10-05 NOTE — LETTER
October 5, 2018      Tiago Borja MD  [address]  [place] [address]           [facility] Gastroenter[facility]  [address]  [facility] [address]  Phone: [phone]  Fax: [phone]          Patient: Randall Strickland Jr.   MR Number: [id number]   YOB: 1927   Date of Visit: 10/5/2018       Dear Dr. Tiago Borja:    Thank you for referring Randall Strickland to me for evaluation. Attached you will find relevant portions of my assessment and plan of care.    If you have questions, please do not hesitate to call me. I look forward to following Randall Strickland along with you.    Sincerely,    Victorina Gamble, RADHA    Enclosure  CC:  No Recipients    If you would like to receive this communication electronically, please contact [email or web address] or [phone] to request more information on [employer] Link access.    For providers and/or their staff who would like to refer a patient to [facility], please contact us through our one-stop-shop provider referral line, [facility], at [phone].    If you feel you have received this communication in error or would no longer like to receive these types of communications, please e-mail [email or web address]

## 2018-10-16 DIAGNOSIS — M51.36 DDD (DEGENERATIVE DISC DISEASE), LUMBAR: ICD-10-CM

## 2018-10-16 DIAGNOSIS — M70.62 GREATER TROCHANTERIC BURSITIS OF LEFT HIP: ICD-10-CM

## 2018-10-16 DIAGNOSIS — M54.16 LEFT LUMBAR RADICULITIS: ICD-10-CM

## 2018-10-16 RX ORDER — GABAPENTIN 300 MG/1
CAPSULE ORAL
Qty: 90 CAPSULE | Refills: 0 | Status: SHIPPED | OUTPATIENT
Start: 2018-10-16 | End: 2019-01-28 | Stop reason: SDUPTHER

## 2018-10-16 NOTE — TELEPHONE ENCOUNTER
Dr. Borja, I have not seen this gentleman in over a year.  Would you mind refilling this for him?    Thanks!  LW

## 2018-10-30 RX ORDER — AMLODIPINE BESYLATE 5 MG/1
TABLET ORAL
Qty: 30 TABLET | Refills: 6 | Status: SHIPPED | OUTPATIENT
Start: 2018-10-30 | End: 2019-06-13 | Stop reason: SDUPTHER

## 2018-11-20 RX ORDER — CHOLESTYRAMINE LIGHT 4 G/5.7G
POWDER, FOR SUSPENSION ORAL
Qty: 60 PACKET | Refills: 0 | Status: ON HOLD | OUTPATIENT
Start: 2018-11-20 | End: 2019-12-18 | Stop reason: HOSPADM

## 2018-11-20 RX ORDER — CHOLESTYRAMINE LIGHT 4 G/5.7G
POWDER, FOR SUSPENSION ORAL
Qty: 60 PACKET | Refills: 0 | Status: SHIPPED | OUTPATIENT
Start: 2018-11-20 | End: 2018-11-20 | Stop reason: SDUPTHER

## 2018-12-14 ENCOUNTER — OFFICE VISIT (OUTPATIENT)
Dept: INTERNAL MEDICINE | Facility: CLINIC | Age: 83
End: 2018-12-14
Attending: FAMILY MEDICINE
Payer: MEDICARE

## 2018-12-14 VITALS
HEART RATE: 62 BPM | HEIGHT: 69 IN | BODY MASS INDEX: 25.98 KG/M2 | TEMPERATURE: 98 F | SYSTOLIC BLOOD PRESSURE: 100 MMHG | DIASTOLIC BLOOD PRESSURE: 68 MMHG | OXYGEN SATURATION: 99 % | WEIGHT: 175.38 LBS

## 2018-12-14 DIAGNOSIS — R39.15 URINARY URGENCY: ICD-10-CM

## 2018-12-14 DIAGNOSIS — R06.09 DOE (DYSPNEA ON EXERTION): Primary | ICD-10-CM

## 2018-12-14 DIAGNOSIS — I10 HYPERTENSION, ESSENTIAL: ICD-10-CM

## 2018-12-14 DIAGNOSIS — R15.9 INCONTINENCE OF FECES, UNSPECIFIED FECAL INCONTINENCE TYPE: ICD-10-CM

## 2018-12-14 DIAGNOSIS — I50.32 CHRONIC DIASTOLIC CONGESTIVE HEART FAILURE: ICD-10-CM

## 2018-12-14 DIAGNOSIS — Z95.2 S/P AORTIC VALVE REPLACEMENT: ICD-10-CM

## 2018-12-14 DIAGNOSIS — N18.30 CHRONIC KIDNEY DISEASE, STAGE III (MODERATE): ICD-10-CM

## 2018-12-14 DIAGNOSIS — I35.0 NONRHEUMATIC AORTIC VALVE STENOSIS: ICD-10-CM

## 2018-12-14 PROCEDURE — 99999 PR PBB SHADOW E&M-EST. PATIENT-LVL III: CPT | Mod: PBBFAC,HCNC,, | Performed by: FAMILY MEDICINE

## 2018-12-14 PROCEDURE — 99214 OFFICE O/P EST MOD 30 MIN: CPT | Mod: HCNC,S$GLB,, | Performed by: FAMILY MEDICINE

## 2018-12-14 PROCEDURE — 1101F PT FALLS ASSESS-DOCD LE1/YR: CPT | Mod: CPTII,HCNC,S$GLB, | Performed by: FAMILY MEDICINE

## 2018-12-14 NOTE — PROGRESS NOTES
Subjective:       Patient ID: Randall Strickland Jr. is a 89 y.o. male.    Chief Complaint: Follow-up    HPI  Review of Systems   Constitutional: Negative for chills, fatigue, fever and unexpected weight change.   HENT: Negative for congestion and trouble swallowing.    Eyes: Negative for redness and visual disturbance.   Respiratory: Negative for cough, chest tightness and shortness of breath.    Cardiovascular: Negative for chest pain, palpitations and leg swelling.   Gastrointestinal: Positive for diarrhea. Negative for abdominal pain and blood in stool.   Genitourinary: Positive for frequency. Negative for difficulty urinating and hematuria.   Musculoskeletal: Positive for arthralgias, back pain and gait problem. Negative for joint swelling, myalgias and neck pain.   Skin: Negative for color change and rash.   Neurological: Negative for tremors, speech difficulty, weakness, numbness and headaches.   Hematological: Negative for adenopathy. Does not bruise/bleed easily.   Psychiatric/Behavioral: Negative for behavioral problems, confusion and sleep disturbance. The patient is not nervous/anxious.        Objective:      Physical Exam   Constitutional: He appears well-developed and well-nourished.   HENT:   Head: Normocephalic and atraumatic.   Eyes: Conjunctivae are normal. No scleral icterus.   Neck: Normal range of motion. Neck supple. Carotid bruit is not present.   Cardiovascular: Normal rate, regular rhythm, normal heart sounds and intact distal pulses. Exam reveals no gallop and no friction rub.   No murmur heard.  Pulmonary/Chest: Effort normal and breath sounds normal. No respiratory distress. He has no wheezes. He has no rales.   Abdominal: He exhibits no distension. There is no tenderness.   Musculoskeletal: He exhibits no edema or deformity.   Neurological: He is alert. He displays no tremor. No cranial nerve deficit. Coordination and gait normal.   Skin: Skin is warm and dry. No rash noted. He is not  diaphoretic. No erythema.   Psychiatric: He has a normal mood and affect. His behavior is normal.   Nursing note and vitals reviewed.      Assessment:       1. RUDOLPH (dyspnea on exertion)    2. Hypertension, essential    3. Nonrheumatic aortic valve stenosis    4. Chronic diastolic congestive heart failure    5. Chronic kidney disease, stage III (moderate)    6. S/P aortic valve replacement    7. Urinary urgency    8. Incontinence of feces, unspecified fecal incontinence type        Plan:   Randall was seen today for follow-up.    Diagnoses and all orders for this visit:    RUDOLPH (dyspnea on exertion)  Comments:  no c/o today    Hypertension, essential    Nonrheumatic aortic valve stenosis    Chronic diastolic congestive heart failure    Chronic kidney disease, stage III (moderate)    S/P aortic valve replacement    Urinary urgency    Incontinence of feces, unspecified fecal incontinence type      See meds, orders, follow up, routing and instructions sections of encounter.  A 89-year-old established male patient.  He is in for a four-month followup.  He   has no complaints today.  He denies overt dyspnea on exertion today.  He has no   chest pain, syncope or near syncope.  He does have leg swelling that continues.    I reviewed his medication list concerning his cardiovascular medications and   will make no changes at this time.    He is not complaining of any indigestion at this time.  He is on a PPI, Zantac   and Carafate and that regimen is unclear in reasoning.  He does have a followup   appointment with Dr. Lowe and my assessment in August was for him not to take a   PPI.  Further recommendation is to follow gastro evaluation.    He has dementia.  He is on Exelon.  He also has a gait disturbance.  He missed a   recent appointment with Dr. Delarosa, but has a followup in early January.    A recall for Dr. Monk in the summer.  He is reporting no decompensated   cardiovascular symptoms at this time.    Follow up with me in  six months.  He does have some difficulty remembering his   medications.  He appears stable with regard to his chronic medical illnesses at   this time.  He states he does not get all the house as much.  He does follow the   [employer] on TV.      LUCINA/NELLY  dd: 12/14/2018 10:06:33 (CST)  td: 12/14/2018 23:18:47 (CST)  Doc ID   #[id number]  Job ID #[id number]    CC:

## 2018-12-17 ENCOUNTER — OFFICE VISIT (OUTPATIENT)
Dept: GASTROENTEROLOGY | Facility: CLINIC | Age: 83
End: 2018-12-17
Payer: MEDICARE

## 2018-12-17 VITALS
DIASTOLIC BLOOD PRESSURE: 71 MMHG | BODY MASS INDEX: 26.56 KG/M2 | HEIGHT: 68 IN | WEIGHT: 175.25 LBS | SYSTOLIC BLOOD PRESSURE: 136 MMHG | HEART RATE: 63 BPM

## 2018-12-17 DIAGNOSIS — K21.9 GASTROESOPHAGEAL REFLUX DISEASE, ESOPHAGITIS PRESENCE NOT SPECIFIED: ICD-10-CM

## 2018-12-17 DIAGNOSIS — R15.1 FECAL SMEARING: Primary | ICD-10-CM

## 2018-12-17 PROCEDURE — 99999 PR PBB SHADOW E&M-EST. PATIENT-LVL III: CPT | Mod: PBBFAC,HCNC,, | Performed by: INTERNAL MEDICINE

## 2018-12-17 PROCEDURE — 1101F PT FALLS ASSESS-DOCD LE1/YR: CPT | Mod: CPTII,HCNC,S$GLB, | Performed by: INTERNAL MEDICINE

## 2018-12-17 PROCEDURE — 99213 OFFICE O/P EST LOW 20 MIN: CPT | Mod: HCNC,S$GLB,, | Performed by: INTERNAL MEDICINE

## 2018-12-17 RX ORDER — OMEPRAZOLE 20 MG/1
20 CAPSULE, DELAYED RELEASE ORAL DAILY
Qty: 30 CAPSULE | Refills: 2 | Status: SHIPPED | OUTPATIENT
Start: 2018-12-17 | End: 2019-03-18

## 2018-12-17 NOTE — PROGRESS NOTES
[facility] Established Patient Visit    Reason for Visit:    Chief Complaint   Patient presents with    Follow-up       PCP: Tiago Delaney    HPI:  Randall Strickland Jr. is a 89 y.o. male here for follow-up of incontinence.  He was last seen by my NP, Victorina Gamble, In October and by me in August for the same as well as GERD.  Imodium helps reduce number of stools, but he continues to have leakage at times that he cannot even predict or feel occurring.  He does not have loose stools every day, but at times will have several BM in a row.  He uses Fibercon, 2 pills every day.  He denies blood in his stools.  He denies indigestion or reflux.  He has been on Omeprazole every day for 2 months.  Zantac didn't really help.  No new complaints.          ROS:  GI: see HPI        PMHX:  has a past medical history of Blood transfusion, BPH (benign prostatic hypertrophy), Cataract, Congestive heart failure, Hypertension, Macular degeneration, Osteoarthritis of lumbar spine (9/7/2016), PVD (peripheral vascular disease) (8/22/2018), and Stenosis of aortic and mitral valves.    PSHX:  has a past surgical history that includes Knee surgery; Total hip arthroplasty; cararact (car); Cataract extraction bilateral w/ anterior vitrectomy; Cholecystectomy; Appendectomy; Eye surgery; Cataract extraction w/  intraocular lens implant (Bilateral); Cardiac catheterization; Cardiac valuve replacement; Joint replacement; RADIOFREQUENCY THERMOCOAGULATION (RFTC)-NERVE-MEDIAN BRANCH-LUMBAR (Bilateral, 10/26/2017); BLOCK-NERVE-MEDIAL BRANCH-LUMBAR (Bilateral, 10/10/2017); REPLACEMENT-VALVE-AORTIC (N/A, 8/30/2016); HEART CATH-LEFT (N/A, 7/15/2016); and MANOMETRY-ANORECTAL (N/A, 6/16/2014).    The patient's social and family histories were reviewed by me and updated in the appropriate section of the electronic medical record.    Review of patient's allergies indicates:  No Known Allergies    Prior to Admission medications     Medication Sig Start Date End Date Taking? Authorizing Provider   amLODIPine (NORVASC) 5 MG tablet TAKE 1 TABLET(5 MG) BY MOUTH EVERY DAY 10/30/18  Yes Daniel Duvall MD   aspirin 81 MG Chew Take 81 mg by mouth once daily.     Yes Historical Provider, MD   clopidogrel (PLAVIX) 75 mg tablet TAKE 1 TABLET BY MOUTH ONCE DAILY 10/1/18  Yes Hieu Monk MD   cyanocobalamin (VITAMIN B-12) 100 MCG tablet Take 100 mcg by mouth once daily.   Yes Historical Provider, MD   finasteride (PROSCAR) 5 mg tablet TAKE 1 TABLET(5 MG) BY MOUTH EVERY DAY 6/25/18  Yes Kev Srinivasan Jr., MD   gabapentin (NEURONTIN) 300 MG capsule TAKE 1 CAPSULE(300 MG) BY MOUTH EVERY EVENING 10/16/18  Yes Tiago Borja MD   omeprazole (PRILOSEC) 40 MG capsule Take 1 capsule (40 mg total) by mouth every morning. 10/5/18  Yes Victorina Gamble NP   polycarbophil (FIBERCON) 625 mg tablet Take 625 mg by mouth once daily.   Yes Historical Provider, MD   rivastigmine tartrate (EXELON) 3 MG capsule Take 1 capsule (3 mg total) by mouth 2 (two) times daily. 8/13/18 8/13/19 Yes Swapna Delarosa MD   tamsulosin (FLOMAX) 0.4 mg Cap TAKE ONE CAPSULE BY MOUTH DAILY 8/17/18  Yes Kev Srinivasan Jr., MD   thiamine 100 MG tablet Take 100 mg by mouth once daily.   Yes Historical Provider, MD   tolterodine (DETROL LA) 4 MG 24 hr capsule Take 1 capsule (4 mg total) by mouth once daily. 3/12/18 3/12/19 Yes Kev Srinivasan Jr., MD   CHOLESTYRAMINE LIGHT 4 gram packet DISSOLVE 1 PACKET IN LIQUID AND DRINK TWICE DAILY 11/20/18   Tiago Borja MD   fluorometholone 0.1% (FML) 0.1 % DrpS  6/27/17   Historical Provider, MD   omeprazole (PRILOSEC) 20 MG capsule Take 1 capsule (20 mg total) by mouth once daily. 12/17/18 3/17/19  Tiago Lowe MD   torsemide (DEMADEX) 5 MG Tab Take 1 tablet (5 mg total) by mouth once daily. 10/13/16   Hieu Monk MD   triamcinolone acetonide 0.1% (KENALOG) 0.1 % cream Apply topically 2 (two) times daily. 8/22/18   Tiago  "CARTER Borja MD   trospium (SANCTURA) 20 mg Tab tablet Take 1 tablet (20 mg total) by mouth once daily. 6/25/18   Kev Srinivasan Jr., MD   ipratropium (ATROVENT) 0.03 % nasal spray SPRAY 1-2 SPRAYS IN EACH NOSTRIL TWICE DAILY AS NEEDED FOR NASAL DRIP 7/6/17 12/17/18  Praneeth Dowell III, MD   ranitidine (ZANTAC) 150 MG tablet Take 150 mg by mouth 2 (two) times daily.  12/17/18  Historical Provider, MD   sucralfate (CARAFATE) 100 mg/mL suspension Take 5 mLs (500 mg total) by mouth 4 (four) times daily with meals and nightly. 9/24/18 12/17/18  Coleman Garcia MD         Objective Findings:  Vital Signs:  /71   Pulse 63   Ht 5' 8" (1.727 m)   Wt 79.5 kg (175 lb 4.3 oz)   BMI 26.65 kg/m²  Body mass index is 26.65 kg/m².    Physical Exam:  General Appearance:  Well appearing in no acute distress, appears stated age        Labs:  Lab Results   Component Value Date    WBC 6.97 05/30/2018    HGB 12.4 (L) 05/30/2018    HCT 37.9 (L) 05/30/2018     (H) 05/30/2018    RDW 12.5 05/30/2018     05/30/2018    GRAN 2.6 05/30/2018    GRAN 37.4 (L) 05/30/2018    LYMPH 2.6 05/30/2018    LYMPH 37.6 05/30/2018    MONO 1.5 (H) 05/30/2018    MONO 21.4 (H) 05/30/2018    EOS 0.2 05/30/2018    BASO 0.05 05/30/2018     Lab Results   Component Value Date     05/30/2018    K 5.2 (H) 05/30/2018     05/30/2018    CO2 28 05/30/2018    GLU 84 05/30/2018    BUN 20 05/30/2018    CREATININE 1.3 05/30/2018    CALCIUM 9.8 05/30/2018    PROT 8.0 05/30/2018    ALBUMIN 3.7 05/30/2018    BILITOT 0.7 05/30/2018    ALKPHOS 103 05/30/2018    AST 22 05/30/2018    ALT 15 05/30/2018                 Assessment:  Randall Strickland Jr. is a 89 y.o. male here with:  1. Fecal smearing    2. Gastroesophageal reflux disease, esophagitis presence not specified      His symptoms are more consistent with incontinence with fecal smearing.  Taking fiber daily.  Imodium helps some, but does not resolve symptoms.  Previously seen by " colorectal surgery for this problem, but has not yet followed up.    His reflux is better controlled with PPI.        Recommendations:  1. Referral to CRS for fecal smearing  2. Continue omeprazole and will decrease dose to 20 mg.    Follow-up 3 months      Order summary:  Orders Placed This Encounter   Procedures    Ambulatory Referral to Colorectal Surgery         Tiago Lowe MD

## 2018-12-19 ENCOUNTER — OFFICE VISIT (OUTPATIENT)
Dept: SURGERY | Facility: CLINIC | Age: 83
End: 2018-12-19
Payer: MEDICARE

## 2018-12-19 VITALS
SYSTOLIC BLOOD PRESSURE: 134 MMHG | WEIGHT: 174.38 LBS | BODY MASS INDEX: 26.43 KG/M2 | HEART RATE: 87 BPM | HEIGHT: 68 IN | DIASTOLIC BLOOD PRESSURE: 82 MMHG

## 2018-12-19 DIAGNOSIS — R15.1 FECAL SMEARING: Primary | ICD-10-CM

## 2018-12-19 PROCEDURE — 99203 OFFICE O/P NEW LOW 30 MIN: CPT | Mod: HCNC,S$GLB,, | Performed by: NURSE PRACTITIONER

## 2018-12-19 PROCEDURE — 1101F PT FALLS ASSESS-DOCD LE1/YR: CPT | Mod: CPTII,HCNC,S$GLB, | Performed by: NURSE PRACTITIONER

## 2018-12-19 PROCEDURE — 99999 PR PBB SHADOW E&M-EST. PATIENT-LVL III: CPT | Mod: PBBFAC,HCNC,, | Performed by: NURSE PRACTITIONER

## 2018-12-19 NOTE — PROGRESS NOTES
Subjective:       Patient ID: Randall Strickland Jr. is a 89 y.o. male.    Chief Complaint: No chief complaint on file.    HPI   89 M who presents to clinic for fecal smearing. Was followed by Dr Bran in 2014 for incontinence, received Solesta injections.  Followed by Dr Lowe with GI. Presents to clinic for occasional fecal smearing episodes. Happens weekly, small amount of stool in underwear. Denies any full incontinence, blood in stool, change in bowel habits, abdominal pain. On plavix. Not taking any fiber      Colon 9/29/009: FPTC. Patchy inflammation in cecum (active colitis). Localized mild inflam in mid transverse colon 2/2 ischemic colitis (active colitis).  No family history of colon or rectal cancer      Review of Systems   Constitutional: Negative for appetite change, chills, fatigue, fever and unexpected weight change.   Respiratory: Negative for shortness of breath.    Cardiovascular: Negative for chest pain.   Gastrointestinal: Negative for abdominal distention, abdominal pain, anal bleeding, blood in stool, constipation, diarrhea, nausea, rectal pain and vomiting.       Objective:      Physical Exam   Constitutional: He is oriented to person, place, and time. He appears well-developed and well-nourished.   Eyes: Conjunctivae and EOM are normal.   Pulmonary/Chest: Effort normal. No respiratory distress.   Abdominal: Soft. He exhibits no distension. There is no tenderness.   Genitourinary: Rectal exam shows no mass and no tenderness.   Musculoskeletal: Normal range of motion.   Neurological: He is alert and oriented to person, place, and time.   Skin: Skin is warm and dry.   Psychiatric: He has a normal mood and affect. His behavior is normal.       Assessment:       1. Fecal smearing        Plan:       Start fiber, metamucil   RTC 4 weeks

## 2018-12-19 NOTE — LETTER
December 19, 2018      Tiago Lowe MD  [address] [address]           [facility]  [address]  [place] [address]  Phone: [phone]          Patient: Randall Strickland Jr.   MR Number: [id number]   YOB: 1927   Date of Visit: 12/19/2018       Dear Dr. Tiago Lowe:    Thank you for referring Randall Strickland to me for evaluation. Attached you will find relevant portions of my assessment and plan of care.    If you have questions, please do not hesitate to call me. I look forward to following Randall Strickland along with you.    Sincerely,    Yvette Lanier, NP    Enclosure  CC:  No Recipients    If you would like to receive this communication electronically, please contact [email or web address] or [phone] to request more information on [employer] Link access.    For providers and/or their staff who would like to refer a patient to [facility], please contact us through our one-stop-shop provider referral line, [facility], at [phone].    If you feel you have received this communication in error or would no longer like to receive these types of communications, please e-mail [email or web address]

## 2019-01-02 RX ORDER — CLOPIDOGREL BISULFATE 75 MG/1
TABLET ORAL
Qty: 90 TABLET | Refills: 0 | Status: SHIPPED | OUTPATIENT
Start: 2019-01-02 | End: 2019-04-09 | Stop reason: SDUPTHER

## 2019-01-07 ENCOUNTER — OFFICE VISIT (OUTPATIENT)
Dept: NEUROLOGY | Facility: CLINIC | Age: 84
End: 2019-01-07
Payer: MEDICARE

## 2019-01-07 VITALS
SYSTOLIC BLOOD PRESSURE: 131 MMHG | HEART RATE: 81 BPM | HEIGHT: 68 IN | BODY MASS INDEX: 26.93 KG/M2 | DIASTOLIC BLOOD PRESSURE: 76 MMHG | WEIGHT: 177.69 LBS

## 2019-01-07 DIAGNOSIS — F03.90 DEMENTIA WITHOUT BEHAVIORAL DISTURBANCE, UNSPECIFIED DEMENTIA TYPE: ICD-10-CM

## 2019-01-07 DIAGNOSIS — M47.816 FACET ARTHRITIS, DEGENERATIVE, LUMBAR SPINE: ICD-10-CM

## 2019-01-07 DIAGNOSIS — R26.9 GAIT DISORDER: Primary | ICD-10-CM

## 2019-01-07 PROBLEM — E51.9 THIAMINE DEFICIENCY: Status: RESOLVED | Noted: 2018-03-23 | Resolved: 2019-01-07

## 2019-01-07 PROBLEM — E53.8 LOW SERUM VITAMIN B12: Status: RESOLVED | Noted: 2018-03-23 | Resolved: 2019-01-07

## 2019-01-07 PROCEDURE — 99999 PR PBB SHADOW E&M-EST. PATIENT-LVL IV: ICD-10-PCS | Mod: PBBFAC,HCNC,, | Performed by: PSYCHIATRY & NEUROLOGY

## 2019-01-07 PROCEDURE — 1101F PR PT FALLS ASSESS DOC 0-1 FALLS W/OUT INJ PAST YR: ICD-10-PCS | Mod: CPTII,HCNC,S$GLB, | Performed by: PSYCHIATRY & NEUROLOGY

## 2019-01-07 PROCEDURE — 99214 OFFICE O/P EST MOD 30 MIN: CPT | Mod: HCNC,S$GLB,, | Performed by: PSYCHIATRY & NEUROLOGY

## 2019-01-07 PROCEDURE — 99214 PR OFFICE/OUTPT VISIT, EST, LEVL IV, 30-39 MIN: ICD-10-PCS | Mod: HCNC,S$GLB,, | Performed by: PSYCHIATRY & NEUROLOGY

## 2019-01-07 PROCEDURE — 1101F PT FALLS ASSESS-DOCD LE1/YR: CPT | Mod: CPTII,HCNC,S$GLB, | Performed by: PSYCHIATRY & NEUROLOGY

## 2019-01-07 PROCEDURE — 99999 PR PBB SHADOW E&M-EST. PATIENT-LVL IV: CPT | Mod: PBBFAC,HCNC,, | Performed by: PSYCHIATRY & NEUROLOGY

## 2019-01-07 PROCEDURE — 99499 RISK ADDL DX/OHS AUDIT: ICD-10-PCS | Mod: HCNC,S$GLB,, | Performed by: PSYCHIATRY & NEUROLOGY

## 2019-01-07 PROCEDURE — 99499 UNLISTED E&M SERVICE: CPT | Mod: HCNC,S$GLB,, | Performed by: PSYCHIATRY & NEUROLOGY

## 2019-01-07 NOTE — PROGRESS NOTES
"Randall Stevens I. Chief Complaints during this visit:  f/u Patient visit for  Gait disturbance    No referring provider defined for this encounter.    Primary Care Physician  Tiago Delaney MD  [id number]  [facility] [address]      History of present illness:   89 y.o.  M seen in f/u for dementia and gait disturbance.  Unaccompanied.  He gets out of breath with long walk from garage, but did not have to take break.  No falls.    Appetite good.  Eating "too much ice cream."  Sleeping well.  Mood fine.  Mildly irritated with his gait.  Rides bike without problem.    +nocturia  Endorses bowel leakage, mild.    Continued low back pain.      Interval history 8/13/18:  He reports that his gait is about the same, no falls.    Interval history 5/14/18:  Accompanied by wife and here to review the results of cisternogram.  No falls.    Denies heart failure or cardiomyopathy.  Still has low back pain that worsens as he walks.    From my note note 3/23/18:  Unaccompanied, but I spoke with wife by phone.  Here today for f/u results.  He has started the vitamin supplements.  No falls.    Interval history 12/19/17:  consultation at the request of  Dr. Monk for evaluation of gait disorder.  Had aortic valve replacement and since then, he has more trouble walking, a couple falls (last was a month ago).  Worse with eyes closed and in dark.  Has history of back problems with RFA in recent time, bilateral knee surgery.  Does not use cane or walker and resistent to using them.  Feet remain swollen.  No syncope or presyncope.    Has known aortic atherosclerosis and aortic valve replacement.  Bilateral carotid disease is on problem list, but I can't find study to confirm this.      II.  Review of systems:  As in HPI,  otherwise, balance 3 systems reviewed and are negative.    III.  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    PVD (peripheral vascular disease) 8/22/2018    Stenosis of aortic and mitral valves     aortic DAHLIA 1.1 CM     Family History   Problem Relation Age of Onset    No Known Problems Father      Social History     Socioeconomic History    Marital status:      Spouse name: Sade Leal Needs   Occupational History    Occupation: Retired, insurance company     Employer: retired 1992   Tobacco Use    Smoking status: Never Smoker    Smokeless tobacco: Never Used   Substance and Sexual Activity    Alcohol use: Yes     Comment: less than one  drink weekly         Current Outpatient Medications on File Prior to Visit   Medication Sig Dispense Refill    amLODIPine (NORVASC) 5 MG tablet TAKE 1 TABLET(5 MG) BY MOUTH EVERY DAY 30 tablet 6    aspirin 81 MG Chew Take 81 mg by mouth once daily.        CHOLESTYRAMINE LIGHT 4 gram packet DISSOLVE 1 PACKET IN LIQUID AND DRINK TWICE DAILY 60 packet 0    clopidogrel (PLAVIX) 75 mg tablet TAKE 1 TABLET BY MOUTH ONCE DAILY 90 tablet 0    cyanocobalamin (VITAMIN B-12) 100 MCG tablet Take 100 mcg by mouth once daily.      finasteride (PROSCAR) 5 mg tablet TAKE 1 TABLET(5 MG) BY MOUTH EVERY DAY 90 tablet 3    fluorometholone 0.1% (FML) 0.1 % DrpS       gabapentin (NEURONTIN) 300 MG capsule TAKE 1 CAPSULE(300 MG) BY MOUTH EVERY EVENING 90 capsule 0    omeprazole (PRILOSEC) 20 MG capsule Take 1 capsule (20 mg total) by mouth once daily. 30 capsule 2    omeprazole (PRILOSEC) 40 MG capsule Take 1 capsule (40 mg total) by mouth every morning. 30 capsule 3    polycarbophil (FIBERCON) 625 mg tablet Take 625 mg by mouth once daily.      rivastigmine tartrate (EXELON) 3 MG capsule Take 1 capsule (3 mg total) by mouth 2 (two) times daily. 60 capsule 11    tamsulosin (FLOMAX) 0.4 mg Cap TAKE ONE CAPSULE BY MOUTH DAILY 90 capsule 3    thiamine 100 MG tablet Take 100 mg by  "mouth once daily.      tolterodine (DETROL LA) 4 MG 24 hr capsule Take 1 capsule (4 mg total) by mouth once daily. 30 capsule 11    torsemide (DEMADEX) 5 MG Tab Take 1 tablet (5 mg total) by mouth once daily. 30 tablet 3    triamcinolone acetonide 0.1% (KENALOG) 0.1 % cream Apply topically 2 (two) times daily. 80 g 1    trospium (SANCTURA) 20 mg Tab tablet Take 1 tablet (20 mg total) by mouth once daily. 30 tablet 11     No current facility-administered medications on file prior to visit.        PRIOR problem-specific medications tried:  na    Review of patient's allergies indicates:  No Known Allergies    IV. Physical Exam    Vitals:    01/07/19 1020   BP: 131/76   Pulse: 81   Weight: 80.6 kg (177 lb 11.1 oz)   Height: 5' 8" (1.727 m)     General appearance: Well nourished, well developed, no acute distress.         Cardiovascular:  pedal pulses 2, no edema or cyanosis, heart regular rate and rhythym, no carotid bruits.         -------------------------------------------------------------  Facial Expression: normal       Affect: full       Orientation to time & place:  Oriented to time, place, person and situation       Attention & concentration:  Normal attention span and concentration       Memory:  0/5  Language: Spontaneous, fluent; able to repeat and name objects        Fund of knowledge:  Aware of current events        Speech:  normal (not dysarthric)  -------------------------------------------------------  Cranial nerves: wears glasses, visual fields full, optic discs not visualized, pupils equal round and reactive, extraocular movements intact,       facial sensation intact, face symmetrical, hearing intact to whisper, palate raises midline, shoulder shrug strength normal, tongue protrudes midline.        -------------------------------------------------------  Musculoskeletal  Muscle tone: 1: Slight cogwheel Rigidity only detected with activation maneuver. RUE        Muscle Bulk: all 4 extremities " normal        Muscle strength:  5/5 in all 4 extremities        No pronator drift  Sensation: loss in vibration up to knees       Deep tendon Reflexes: 1 bilateral biceps, triceps, none at patella and ankles        --------------------------------------------------------------  Cerebellar and Coordination  Gait:  low step height, mild imbalance, + enbloc turn, no festination.       Finger-nose: no dysmetria       Rapid Alternating Movements (pronation/supination):  R slowing; L normal  --------------------------------------------------------------  MOVEMENT DISORDERS FOCUSED EXAM  Abnormality of movement (bradykinesia, hyperkinesia) present? No    Tremor present?   No   Posture:  stooped  Postural stability: + Rhomberg    V.  Laboratory/ Radiological Data:     Cisternogram 4/26/18:  No evidence of normal pressure hydrocephalus.    CT head 12/26/17:  Age-appropriate generalized cerebral volume loss  Mild prominence of the lateral and third ventricles which may be compensatory to volume loss however slightly disproportionate cannot exclude component of hydrocephalus.  There is a small hypodensity in the left cerebellum suggestive for remote infarction.      Lab Results   Component Value Date    TSH 1.423 12/19/2017     Lab Results   Component Value Date    VOVGIPHC35 355 12/19/2017     Lab Results   Component Value Date    HGBA1C 5.7 04/10/2014        MRI brain 12/26/17:  Mild prominence of the lateral and third ventricles which may be compensatory to volume loss however slightly disproportionate cannot exclude component of hydrocephalus.    There is a small hypodensity in the left cerebellum suggestive for remote infarction.        VI. Assessment and Plan            Problem List Items Addressed This Visit        1 - High    Gait disorder - Primary    Overview     Mild imbalance, frontal gait ataxia vs lower body parkinsonism.  NPH ruled out 4/2018         Current Assessment & Plan     Mild imbalance, looking more  parkinsonian each visit, but still not significant enough, in my opinion, to warrant risking dopaminergics.  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   -> PT   -> consider levodopa if symptoms become more clearly parkinsonian.         Relevant Orders    Ambulatory Referral to Physical/Occupational Therapy       2     Dementia    Overview     Mini-Cog score 2 (2/22/17)  Benedict Cognitive Assessment:   20/30 (12/2017)         Current Assessment & Plan     Despite low testing scores, he is functioning well.   -> continue exelon   -> advised to continue daily B12, B1 supplements            3     Facet arthritis, degenerative, lumbar spine    Current Assessment & Plan     Continued low back pain.  Remote PEPE.   -> PT         Relevant Orders    Ambulatory Referral to Physical/Occupational Therapy              Follow-up in about 4 months (around 5/7/2019).

## 2019-01-07 NOTE — ASSESSMENT & PLAN NOTE
Mild imbalance, looking more parkinsonian each visit, but still not significant enough, in my opinion, to warrant risking dopaminergics.  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   -> PT   -> consider levodopa if symptoms become more clearly parkinsonian.

## 2019-01-07 NOTE — ASSESSMENT & PLAN NOTE
Despite low testing scores, he is functioning well.   -> continue exelon   -> advised to continue daily B12, B1 supplements

## 2019-01-14 ENCOUNTER — CLINICAL SUPPORT (OUTPATIENT)
Dept: AUDIOLOGY | Facility: CLINIC | Age: 84
End: 2019-01-14
Payer: MEDICARE

## 2019-01-14 ENCOUNTER — OFFICE VISIT (OUTPATIENT)
Dept: OTOLARYNGOLOGY | Facility: CLINIC | Age: 84
End: 2019-01-14
Payer: MEDICARE

## 2019-01-14 VITALS
BODY MASS INDEX: 26.82 KG/M2 | SYSTOLIC BLOOD PRESSURE: 119 MMHG | HEART RATE: 45 BPM | DIASTOLIC BLOOD PRESSURE: 72 MMHG | WEIGHT: 176.38 LBS

## 2019-01-14 DIAGNOSIS — J34.89 NASAL CRUSTING: ICD-10-CM

## 2019-01-14 DIAGNOSIS — H90.3 SENSORINEURAL HEARING LOSS, BILATERAL: Primary | ICD-10-CM

## 2019-01-14 DIAGNOSIS — J34.2 NASAL SEPTAL DEVIATION: ICD-10-CM

## 2019-01-14 DIAGNOSIS — Z97.4 WEARS HEARING AID: ICD-10-CM

## 2019-01-14 PROCEDURE — 99999 PR PBB SHADOW E&M-EST. PATIENT-LVL III: CPT | Mod: PBBFAC,HCNC,, | Performed by: OTOLARYNGOLOGY

## 2019-01-14 PROCEDURE — 99212 PR OFFICE/OUTPT VISIT, EST, LEVL II, 10-19 MIN: ICD-10-PCS | Mod: HCNC,S$GLB,, | Performed by: OTOLARYNGOLOGY

## 2019-01-14 PROCEDURE — 92557 PR COMPREHENSIVE HEARING TEST: ICD-10-PCS | Mod: HCNC,S$GLB,, | Performed by: AUDIOLOGIST

## 2019-01-14 PROCEDURE — 1101F PR PT FALLS ASSESS DOC 0-1 FALLS W/OUT INJ PAST YR: ICD-10-PCS | Mod: CPTII,HCNC,S$GLB, | Performed by: OTOLARYNGOLOGY

## 2019-01-14 PROCEDURE — 1101F PT FALLS ASSESS-DOCD LE1/YR: CPT | Mod: CPTII,HCNC,S$GLB, | Performed by: OTOLARYNGOLOGY

## 2019-01-14 PROCEDURE — 92557 COMPREHENSIVE HEARING TEST: CPT | Mod: HCNC,S$GLB,, | Performed by: AUDIOLOGIST

## 2019-01-14 PROCEDURE — 99212 OFFICE O/P EST SF 10 MIN: CPT | Mod: HCNC,S$GLB,, | Performed by: OTOLARYNGOLOGY

## 2019-01-14 PROCEDURE — 92567 TYMPANOMETRY: CPT | Mod: HCNC,S$GLB,, | Performed by: AUDIOLOGIST

## 2019-01-14 PROCEDURE — 92567 PR TYMPA2METRY: ICD-10-PCS | Mod: HCNC,S$GLB,, | Performed by: AUDIOLOGIST

## 2019-01-14 PROCEDURE — 99999 PR PBB SHADOW E&M-EST. PATIENT-LVL III: ICD-10-PCS | Mod: PBBFAC,HCNC,, | Performed by: OTOLARYNGOLOGY

## 2019-01-14 NOTE — PATIENT INSTRUCTIONS
Audiometry reviewed, results compared to 2012 study  Copy of audiogram provided re: hearing aid re-programming  Use of AYR nasal mist/gel encouraged prn nasal dryness/crusting; humidifier use encouraged prn( distilled water)   RTC yearly for audiometric monitoring

## 2019-01-14 NOTE — PROGRESS NOTES
CC:  HPI:Mr. 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    His father was an ENT physician in [facility].  His mother was a sore E sister of my mother at [facility].  He is here for hearing assessment primarily.  He indicates perceived hearing loss especially with poor discrimination, I.e.,  trouble with understanding words now.    He also indicates dry mouth and throat irritation symptoms similar to those he complained of during his last visit with me 11/01/2017.    He remembers my referencing  his nasal septal deviation problems with nasal obstruction.  He was advised to utilize AYR nasal mist / gel products then.  His last audiometric study performed in 2012 indicated his bilateral 40 dB SRT scores and right ear 72% left ear 64% discrimination scores tested at 75 decibel hearing levels.    His medical problem list includes essential hypertension, chronic diastolic heart failure, status post aortic valve replacement surgery, ADEBAYO, RUDOLPH, BPH, idiopathic cardiomyopathy, chronic low back pain, chronic kidney disease stage 3, bilateral carotid artery disease and memory loss among others.    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    PVD (peripheral vascular disease) 8/22/2018    Stenosis of aortic and mitral valves     aortic DAHLIA 1.1 CM     Past Surgical History:   Procedure Laterality Date    APPENDECTOMY      BLOCK-NERVE-MEDIAL BRANCH-LUMBAR Bilateral 10/10/2017    [facility] MGT    cararact  car    CARDIAC CATHETERIZATION      CARDIAC VALVE SURGERY      CATARACT EXTRACTION BILATERAL W/ ANTERIOR VITRECTOMY      bilaterial    CATARACT EXTRACTION W/  INTRAOCULAR LENS  IMPLANT Bilateral     CHOLECYSTECTOMY      EYE SURGERY      HEART CATH-LEFT N/A 7/15/2016    Performed by [facility] at [facility] CATH LAB    JOINT REPLACEMENT      bilateral hip    KNEE SURGERY      Bilateral    MANOMETRY-ANORECTAL N/A 6/16/2014    Performed by Meir Bran MD at [facility] ENDO (4TH FLR)    RADIOFREQUENCY THERMOCOAGULATION (RFTC)-NERVE-MEDIAN BRANCH-LUMBAR Bilateral 10/26/2017    [facility] PAIN MGT    REPLACEMENT-VALVE-AORTIC N/A 8/30/2016    Performed by [facility]as MD at [facility] CATH LAB    TOTAL HIP ARTHROPLASTY      Bilaterial       Current Outpatient Medications on File Prior to Visit   Medication Sig Dispense Refill    amLODIPine (NORVASC) 5 MG tablet TAKE 1 TABLET(5 MG) BY MOUTH EVERY DAY 30 tablet 6    aspirin 81 MG Chew Take 81 mg by mouth once daily.        CHOLESTYRAMINE LIGHT 4 gram packet DISSOLVE 1 PACKET IN LIQUID AND DRINK TWICE DAILY 60 packet 0    clopidogrel (PLAVIX) 75 mg tablet TAKE 1 TABLET BY MOUTH ONCE DAILY 90 tablet 0    cyanocobalamin (VITAMIN B-12) 100 MCG tablet Take 100 mcg by mouth once daily.      finasteride (PROSCAR) 5 mg tablet TAKE 1 TABLET(5 MG) BY MOUTH EVERY DAY 90 tablet 3    fluorometholone 0.1% (FML) 0.1 % DrpS       gabapentin (NEURONTIN) 300 MG capsule TAKE 1 CAPSULE(300 MG) BY MOUTH EVERY EVENING 90 capsule 0    omeprazole (PRILOSEC) 20 MG capsule Take 1 capsule (20 mg total) by mouth once daily. 30 capsule 2    omeprazole (PRILOSEC) 40 MG capsule Take 1 capsule (40 mg total) by mouth every morning. 30 capsule 3    polycarbophil (FIBERCON) 625 mg tablet Take 625 mg by mouth once daily.      rivastigmine tartrate (EXELON) 3 MG capsule Take 1 capsule (3 mg total) by mouth 2 (two) times daily. 60 capsule 11    tamsulosin (FLOMAX) 0.4 mg Cap TAKE ONE CAPSULE BY MOUTH DAILY 90 capsule 3    thiamine 100 MG tablet Take 100 mg by mouth once daily.      tolterodine (DETROL LA) 4 MG 24 hr capsule Take  1 capsule (4 mg total) by mouth once daily. 30 capsule 11    torsemide (DEMADEX) 5 MG Tab Take 1 tablet (5 mg total) by mouth once daily. 30 tablet 3    triamcinolone acetonide 0.1% (KENALOG) 0.1 % cream Apply topically 2 (two) times daily. 80 g 1    trospium (SANCTURA) 20 mg Tab tablet Take 1 tablet (20 mg total) by mouth once daily. 30 tablet 11     No current facility-administered medications on file prior to visit.          PE:  General:  Alert and oriented respect cold gentleman in no acute distress wearing bilateral hearing aids  Both ears were examined under the microscope in the micro procedure room.  Some cerumen is removed from the right ear canal. A scant amount of dry skin and cerumen was removed from left ear canal.    Both eardrums are intact  as visualized.  Nasal exam reveals evidence of a right superior septal deflection.  Right middle turbinate appears hypertrophic.  There is evidence for left septal deviation as well.  There is no evidence of purulent discharge in either passage.  Some viscous mucus and crusting is noted in the anterior aspect of the left nasal passage.    The patient completed an audiometric study performed by [facility] aud[facility] today; the study it is duplicated below nerve results were reviewed with him in detail and compared to a previous study( 2012) .  The 2012 audiometric study if indicated the patient's bilateral 40 decibel SRT scores and right ear 72% in left ear 64% discrimination scores.  He was wearring Oticon hearing aids in both ears at that time.            DIAGNOSIS:     ICD-10-CM ICD-9-CM    1. Sensorineural hearing loss, bilateral, progressive; trouble understanding words H90.3 389.18    2. Wears hearing aid Z97.4 V45.89    3. Nasal crusting J34.89 478.19    4. Nasal septal deviation J34.2 470      PLAN: Audiometry reviewed, results compared to 2012 study  Copy of audiogram provided re: hearing aid re-programming  Use of AYR nasal mist/gel encouraged prn  nasal dryness/crusting; humidifier use encouraged prn( distilled water)   RTC yearly for audiometric monitoring

## 2019-01-14 NOTE — PROGRESS NOTES
Mr. Strickland was seen today for a hearing evaluation.     Pure tone audiometry revealed a mild to severe SNHL, AU  SRT and PTA are in good agreement bilaterally.    SRT was obtained at 40dB for the right ear with 64% speech discrimination and 45dB for the left ear with 48% speech discrimination.   Tympanometry revealed Type A, AU.     Recommendations:  1. Otologic Evaluation  2. Annual Audiogram  3. Hearing Protection  4. Continue use of amplification/See audiologist for a HA adjustment

## 2019-01-17 ENCOUNTER — CLINICAL SUPPORT (OUTPATIENT)
Dept: REHABILITATION | Facility: HOSPITAL | Age: 84
End: 2019-01-17
Attending: PSYCHIATRY & NEUROLOGY
Payer: MEDICARE

## 2019-01-17 DIAGNOSIS — R29.3 POSTURAL IMBALANCE: ICD-10-CM

## 2019-01-17 DIAGNOSIS — M53.86 DECREASED RANGE OF MOTION OF LUMBAR SPINE: ICD-10-CM

## 2019-01-17 DIAGNOSIS — M62.81 MUSCLE WEAKNESS OF LOWER EXTREMITY: ICD-10-CM

## 2019-01-17 PROCEDURE — 97110 THERAPEUTIC EXERCISES: CPT | Mod: HCNC,PO

## 2019-01-17 PROCEDURE — G8978 MOBILITY CURRENT STATUS: HCPCS | Mod: CI,HCNC,PO

## 2019-01-17 PROCEDURE — G8979 MOBILITY GOAL STATUS: HCPCS | Mod: CI,HCNC,PO

## 2019-01-17 PROCEDURE — 97161 PT EVAL LOW COMPLEX 20 MIN: CPT | Mod: HCNC,PO

## 2019-01-17 NOTE — PLAN OF CARE
[facility]  Physical Therapy Initial Evaluation    Name: Randall Strickland Jr.  Clinic Number: [id number]    Therapy Diagnosis:   Encounter Diagnoses   Name Primary?    Muscle weakness of lower extremity     Postural imbalance     Decreased range of motion of lumbar spine      Physician: Swapna Delarosa MD    Physician Orders: PT Eval and Treat   Please evaluate and treat as needed for low back pain, gait disorder, imbalance, falls.  Medical Diagnosis from Referral: Gait disorder, facet arthritis, degenerative, lumbar spine  Evaluation Date: 1/17/2019  Authorization Period Expiration: 12/31/19  Plan of Care Expiration: 3/17/19  Visit # / Visits authorized: 1/ 20    Time In: 1007  Time Out: 1100  Total Billable Time: 53 minutes    Precautions: Standard and wears hearing aids, B LE edema    Subjective   Date of onset: insidious about several years ago   History of current condition - Randall reports: pt's pain is worst in the morning and improves with less than 30 minutes of movement, he reports decreased ability to walk long distances due to SOB     No recent falls.     Medical History:   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    PVD (peripheral vascular disease) 8/22/2018    Stenosis of aortic and mitral valves     aortic DAHLIA 1.1 CM       Surgical History:   Randall Strickland Jr.  has a past surgical history that includes Knee surgery; Total hip arthroplasty; cararact (car); Cataract extraction bilateral w/ anterior vitrectomy; Cholecystectomy; Appendectomy; Eye surgery; Cataract extraction w/  intraocular lens implant (Bilateral); Cardiac catheterization; Cardiac valuve replacement; and Joint replacement.   B EMMANUEL more than 10 yeas ago    Medications:   Randall has a current medication list which includes the  "following prescription(s): amlodipine, aspirin, cholestyramine light, clopidogrel, cyanocobalamin, finasteride, fluorometholone 0.1%, gabapentin, omeprazole, omeprazole, polycarbophil, rivastigmine tartrate, tamsulosin, thiamine, tolterodine, torsemide, triamcinolone acetonide 0.1%, and trospium.    Allergies:   Review of patient's allergies indicates:  No Known Allergies     Imaging, 9/7/16 lumbar x-ray revealed: "Lumbar spine AP lateral, comparison 8/5/09.  The lumbar vertebra are intact.  No compression fracture is seen.  AP view shows moderate dextroscoliosis similar to before.  Lateral view demonstrates grade 1 retrolisthesis at L2-3.  Multilevel degenerative changes are again identified, most notable at L1-2 and L2-3 disc spaces with narrowing and proliferative change at the endplates.  Narrowing and sclerosis are also seen in the lower facet joints.  No obvious paraspinal lesion is seen.  A hip arthroplasty on the left is partially demonstrated."    Prior Therapy: PT about 2 years ago for low back and L shoulder with some improvements  Social History: lives with their spouse  Environment: 1 story home with 1 step to enter, uses no AD  Occupation: not currently employed  Prior Level of Function: independent with all ADLs, currently going to gym 1-2x/week to ride bike and lift weights, he currently drives  Current Level of Function: difficulty walking long distances, stair ambulation    Pain:  Current 5/10, worst 9/10, best 0/10   Location: mid back   Description: Aching, no radicular symptoms  Pain with coughing/sneezing, B&B, sleep disturbance: denies all  Aggravating Factors: Morning  Easing Factors: movement    Pts goals: to improve ability to walk long distances    Objective     Observation: pleasant and cooperative    Posture: forward head, rounded shoulders, increased B knee flexion, increased kyphosis    Gait: increased forward trunk lean, decreased B step length and gait speed    Lumbar Range of " Motion:    %   Flexion 30     Extension 10     Left Side Bending 100   Right Side Bending 100   Left rotation   10   Right Rotation   10    *= pain    Lower Extremity Strength    Right LE  Left LE    Hip flexion: 5/5 Hip flexion: 4+/5   Knee extension: 5/5 Knee extension: 5/5   Knee flexion: 5/5 Knee flexion: 5/5   Hip extension:  3-/5 Hip extension: 4/5   Hip abduction: 4/5 Hip abduction: 4/5   Hip adduction: 5/5 Hip adduction 4-/5   Ankle dorsiflexion: 5/5 Ankle dorsiflexion: 5/5   Ankle plantarflexion: 5/5 Ankle plantarflexion: 5/5     Sensation: B LEs grossly intact to light touch    Flexibility:    Ely's test: R = 80 degrees ; L = 80 degrees   Popliteal Angle: R = -45 degrees ; L = -45 degrees    CMS Impairment/Limitation/Restriction for FOTO lumbar spine Survey    Therapist reviewed FOTO scores for Randall KATRINA Strickland Jr. on 1/17/2019.   FOTO documents entered into [employer] - see Media section.    Limitation Score: 17%  Category: Mobility    Current : CI = at least 1% but < 20% impaired, limited or restricted  Goal: CI = at least 1% but < 20% impaired, limited or restricted  Discharge: CI = at least 1% but < 20% impaired, limited or restricted       TREATMENT   Treatment Time In: 1038  Treatment Time Out: 1100  Total Treatment time separate from Evaluation: 22 minutes    Randall received therapeutic exercises to develop strength, endurance, ROM, flexibility, posture and core stabilization for 22 minutes including:    LTR 5 sec hold x 10 ea  Supine hamstring stretch w strap 3 x 30 sec ea  Bridges x 20    Home Exercises and Patient Education Provided    Education provided:   - importance of proper upright posture and log roll technique    Written Home Exercises Provided: yes. Postural corrections, LTR, supine hamstring stretch w strap, bridges  Exercises were reviewed and Randall was able to demonstrate them prior to the end of the session.  Randall demonstrated good  understanding of the education provided.     See EMR under  Patient Instructions for exercises provided 1/17/2019.    Assessment   Randall is a 89 y.o. male referred to outpatient Physical Therapy with a medical diagnosis of gait disorder, facet arthritis, degenerative, lumbar spine. Pt presents with reports of low back pain and difficulty walking long distances, B LE weakness, decreased lumbar AROM, postural imbalance, and muscle tightness. Pt with decreased lumbar mobility and demonstrates impaired gait, endurance, and functional mobility.     Pt prognosis is Good.   Pt will benefit from skilled outpatient Physical Therapy to address the deficits stated above and in the chart below, provide pt/family education, and to maximize pt's level of independence.     Plan of care discussed with patient: Yes  Pt's spiritual, cultural and educational needs considered and patient is agreeable to the plan of care and goals as stated below:     Anticipated Barriers for therapy: none    Medical Necessity is demonstrated by the following  History  Co-morbidities and personal factors that may impact the plan of care Co-morbidities:   advanced age and prior hip surgery    Personal Factors:   no deficits     moderate   Examination  Body Structures and Functions, activity limitations and participation restrictions that may impact the plan of care Body Regions:   back  lower extremities  trunk    Body Systems:    ROM  strength  balance  gait  transfers  edema    Participation Restrictions:   ADLs, IADLs    Activity limitations:   Learning and applying knowledge  no deficits    General Tasks and Commands  no deficits    Communication  no deficits    Mobility  walking  stair ambulation    Self care  no deficits    Domestic Life  doing house work (cleaning house, washing dishes, laundry)    Interactions/Relationships  no deficits    Life Areas  no deficits    Community and Social Life  no deficits         high   Clinical Presentation stable and uncomplicated low   Decision Making/ Complexity Score:  low     Goals:   1) Decreased postural awareness   2) LE weakness   3) Difficulty walking long distances   4) B hamstring tightness   5) Lack of HEP    GOALS: Short Term Goals: 4 weeks  1. Pt will be able to demonstrate proper upright posture without verbal cueing to decrease abnormal spinal stresses.   2. Pt will be able to tolerate multi-directional LE strengthening in order to improve ability to perform household chores.  3. Pt will report 50% improvement in ability to walk long distances since start of care to indicate improved functional mobility.   4. Pt to increase B popliteal angle to -35 degrees in order to improve flexibility and posture.   5. Pt to tolerate HEP to improve ROM and independence with ADL's    Long Term Goals: 8 weeks  1. Pt will be able to perform 2 x 10 multi-directional LE strengthening without fatigue in order to improve ability to perform household chores.  2. Pt will report 80% improvement in ability to walk long distances since start of care to indicate improved functional mobility.   3. Pt to increase B popliteal angle to -30 degrees in order to improve flexibility and posture.   4. Pt to be Independent with HEP to improve ROM and independence with ADL's    Plan   Plan of care Certification: 1/17/2019 to 3/17/19.    Outpatient Physical Therapy 2 times weekly for 8 weeks to include the following interventions: Cervical/Lumbar Traction, Gait Training, Manual Therapy, Moist Heat/ Ice, Neuromuscular Re-ed, Patient Education, Self Care, Therapeutic Activites and Therapeutic Exercise.     Katy Hoang, PT

## 2019-01-17 NOTE — PROGRESS NOTES
[facility]  Physical Therapy Initial Evaluation    See POC for full evaluation.    TREATMENT   Treatment Time In: 1038  Treatment Time Out: 1100  Total Treatment time separate from Evaluation: 22 minutes    Randall received therapeutic exercises to develop strength, endurance, ROM, flexibility, posture and core stabilization for 22 minutes including:    LTR 5 sec hold x 10 ea  Supine hamstring stretch w strap 3 x 30 sec ea  Bridges x 20    Home Exercises and Patient Education Provided    Education provided:   - importance of proper upright posture and log roll technique    Written Home Exercises Provided: yes. Postural corrections, LTR, supine hamstring stretch w strap, bridges  Exercises were reviewed and Randall was able to demonstrate them prior to the end of the session.  Randall demonstrated good  understanding of the education provided.     See EMR under Patient Instructions for exercises provided 1/17/2019.    Assessment   Randall is a 89 y.o. male referred to outpatient Physical Therapy with a medical diagnosis of gait disorder, facet arthritis, degenerative, lumbar spine. Pt presents with reports of low back pain and difficulty walking long distances, B LE weakness, decreased lumbar AROM, postural imbalance, and muscle tightness. Pt with decreased lumbar mobility and demonstrates impaired gait, endurance, and functional mobility.     Pt prognosis is Good.   Pt will benefit from skilled outpatient Physical Therapy to address the deficits stated above and in the chart below, provide pt/family education, and to maximize pt's level of independence.     Plan of care discussed with patient: Yes  Pt's spiritual, cultural and educational needs considered and patient is agreeable to the plan of care and goals as stated below:     Anticipated Barriers for therapy: none    Medical Necessity is demonstrated by the following  History  Co-morbidities and personal factors that may impact the plan of care  Co-morbidities:   advanced age and prior hip surgery    Personal Factors:   no deficits     moderate   Examination  Body Structures and Functions, activity limitations and participation restrictions that may impact the plan of care Body Regions:   back  lower extremities  trunk    Body Systems:    ROM  strength  balance  gait  transfers  edema    Participation Restrictions:   ADLs, IADLs    Activity limitations:   Learning and applying knowledge  no deficits    General Tasks and Commands  no deficits    Communication  no deficits    Mobility  walking  stair ambulation    Self care  no deficits    Domestic Life  doing house work (cleaning house, washing dishes, laundry)    Interactions/Relationships  no deficits    Life Areas  no deficits    Community and Social Life  no deficits         high   Clinical Presentation stable and uncomplicated low   Decision Making/ Complexity Score: low     Goals:   1) Decreased postural awareness   2) LE weakness   3) Difficulty walking long distances   4) B hamstring tightness   5) Lack of HEP    GOALS: Short Term Goals: 4 weeks  1. Pt will be able to demonstrate proper upright posture without verbal cueing to decrease abnormal spinal stresses.   2. Pt will be able to tolerate multi-directional LE strengthening in order to improve ability to perform household chores.  3. Pt will report 50% improvement in ability to walk long distances since start of care to indicate improved functional mobility.   4. Pt to increase B popliteal angle to -35 degrees in order to improve flexibility and posture.   5. Pt to tolerate HEP to improve ROM and independence with ADL's    Long Term Goals: 8 weeks  1. Pt will be able to perform 2 x 10 multi-directional LE strengthening without fatigue in order to improve ability to perform household chores.  2. Pt will report 80% improvement in ability to walk long distances since start of care to indicate improved functional mobility.   3. Pt to increase B  popliteal angle to -30 degrees in order to improve flexibility and posture.   4. Pt to be Independent with HEP to improve ROM and independence with ADL's    Plan   Plan of care Certification: 1/17/2019 to 3/17/19.    Outpatient Physical Therapy 2 times weekly for 8 weeks to include the following interventions: Cervical/Lumbar Traction, Gait Training, Manual Therapy, Moist Heat/ Ice, Neuromuscular Re-ed, Patient Education, Self Care, Therapeutic Activites and Therapeutic Exercise.     Katy Hoang, PT

## 2019-01-21 NOTE — PROGRESS NOTES
Physical Therapy Daily Treatment Note     Name: Randall Strickland Jr.  Clinic Number: [id number]    Therapy Diagnosis:   Encounter Diagnoses   Name Primary?    Muscle weakness of lower extremity Yes    Postural imbalance     Decreased range of motion of lumbar spine      Physician: Swapna Delarosa MD    Visit Date: 1/22/2019    Evaluation Date: 1/17/2019  Authorization Period Expiration: 12/31/19  Plan of Care Expiration: 3/17/19  Visit # / Visits authorized: 2/ 20   Time In: 10:00  Time Out: 10:55  Treatment time: 50  Total Billable Time: 30 minutes       Precautions: Standard and wears hearing aids, B LE edema    Subjective     Pt reports: minimal lower back pain/stiffness in the mornings that subsides during the day. States that has a tendency to get out of breath with walking but can ride a bike [place] problems.   Pain: 0/10       Objective     Bp prior to Tx = 122/68 Hr = 61  BP post TX = 104/55 HR = 82 ( Patient denies c/o dizziness)     Randall received therapeutic exercises to develop strength, endurance, ROM, flexibility and posture for 50 minutes including:    Mat ex's:   LTR 5 sec hold x 10 ea  Supine hamstring stretch w strap 3 x 20 sec ea  Hooklying CLAM with OTB 2 x 10  Bridges + Iiso hip abd OTB  2 x 10  Hooklying hip add iso 2 x 10    Seated ex's  Brueggers ex ( scap retract with ER) no resist 2 x 10     Standing ex's    Scap retract.Row with YTB 2 x 10  Shoulder ext with YTB 2 x 10  Heel raises 2 x 10  Hip flex/ext and abd x 15 each  Forward step ups (4'') x 10 each  Lateral band walk with YTB around knees 2 laps of 10 feet with UE support.   Corner pec stretch 3 x 20 sec    Written Home Exercises Provided: Patient educated to continue with previously issued HEP to tolerance along with updated HEP to include: Standing heel raises, Hip flex/ext and abd, doorway pec stretch. He ws educated to utilize UE support for standing ex's   Exercises were reviewed and Randall was  able to demonstrate them prior to the end of the session.  Randall demonstrated good  understanding of the education provided.          Assessment      Patient alphonso TX fairly well. He ws able to perform the above ex's/activities within tolerable level of muscular fatigue and without c/o pain. He does exhibit some postural deficits with increased kyphosis, rounded shoulders, forward heat along with tendency for shuffling steps during.  Patient able to improve posture and gait quality slightly with cuing and ex's.  Hamstrings also appearing quite tight. Overall, he does well for his age and is pleasant and motivated with Tx efforts.   Pt will continue to benefit from skilled outpatient physical therapy to address the deficits listed in the problem list box on initial evaluation, provide pt/family education and to maximize pt's level of independence in the home and community environment.      Goals from eval   GOALS: Short Term Goals: 4 weeks  1. Pt will be able to demonstrate proper upright posture without verbal cueing to decrease abnormal spinal stresses.   2. Pt will be able to tolerate multi-directional LE strengthening in order to improve ability to perform household chores.  3. Pt will report 50% improvement in ability to walk long distances since start of care to indicate improved functional mobility.   4. Pt to increase B popliteal angle to -35 degrees in order to improve flexibility and posture.   5. Pt to tolerate HEP to improve ROM and independence with ADL's     Long Term Goals: 8 weeks  1. Pt will be able to perform 2 x 10 multi-directional LE strengthening without fatigue in order to improve ability to perform household chores.  2. Pt will report 80% improvement in ability to walk long distances since start of care to indicate improved functional mobility.   3. Pt to increase B popliteal angle to -30 degrees in order to improve flexibility and posture.   4. Pt to be Independent with HEP to improve ROM and  independence with ADL's    Plan     Continue PT towards established plan of care and goals with focus on strength, flexibility, posture.     Juan Long, PTA

## 2019-01-22 ENCOUNTER — CLINICAL SUPPORT (OUTPATIENT)
Dept: REHABILITATION | Facility: HOSPITAL | Age: 84
End: 2019-01-22
Attending: PSYCHIATRY & NEUROLOGY
Payer: MEDICARE

## 2019-01-22 DIAGNOSIS — M62.81 MUSCLE WEAKNESS OF LOWER EXTREMITY: Primary | ICD-10-CM

## 2019-01-22 DIAGNOSIS — R29.3 POSTURAL IMBALANCE: ICD-10-CM

## 2019-01-22 DIAGNOSIS — M53.86 DECREASED RANGE OF MOTION OF LUMBAR SPINE: ICD-10-CM

## 2019-01-22 PROCEDURE — 97110 THERAPEUTIC EXERCISES: CPT | Mod: HCNC,PO

## 2019-01-24 ENCOUNTER — CLINICAL SUPPORT (OUTPATIENT)
Dept: REHABILITATION | Facility: HOSPITAL | Age: 84
End: 2019-01-24
Attending: PSYCHIATRY & NEUROLOGY
Payer: MEDICARE

## 2019-01-24 DIAGNOSIS — M62.81 MUSCLE WEAKNESS OF LOWER EXTREMITY: ICD-10-CM

## 2019-01-24 DIAGNOSIS — R29.3 POSTURAL IMBALANCE: ICD-10-CM

## 2019-01-24 DIAGNOSIS — M53.86 DECREASED RANGE OF MOTION OF LUMBAR SPINE: ICD-10-CM

## 2019-01-24 PROCEDURE — 97110 THERAPEUTIC EXERCISES: CPT | Mod: HCNC,PO

## 2019-01-24 NOTE — PROGRESS NOTES
"  Physical Therapy Daily Treatment Note     Name: Randall Strickland Jr.  Clinic Number: [id number]    Therapy Diagnosis:   No diagnosis found.  Physician: Swapna Delarosa MD    Visit Date: 1/24/2019    Evaluation Date: 1/17/2019  Authorization Period Expiration: 12/31/19  Plan of Care Expiration: 3/17/19  Visit # / Visits authorized: 3/ 20   Time In: 8:55  Time Out: 9:50  Treatment time: 50  Total Billable Time: 30 minutes       Precautions: Standard and wears hearing aids, B LE edema    Subjective     Pt reports:no problems after last session. States that it felt good to exercise. He denies c/o LBP.    Pain: 0/10     Objective     Bp prior to Tx = 110/59  Hr = 64  BP post TX = 114/61 HR - 80     Randall received therapeutic exercises to develop strength, endurance, ROM, flexibility and posture for 50 minutes including:    Mat ex's:   LTR 5 sec hold x 10 ea  Supine hamstring stretch w strap 2 x 30 sec  ea  Hooklying CLAM with GTB 2 x 10  Bridges + Iiso hip abd GTB 2 x 10  Hooklying hip add iso 2 x 10    Seated ex's  Brueggers ex with YTB 2 x 10   Repeated sit<->stand from mat + " Big arms" x 10    Standing ex's    Scap retract.Row with YTB 2 x12  Shoulder ext with YTB 2 x 12  Heel raises on foam 2 x 10  Hip flex/ext and abd x 20 each  Forward step ups (4'') x 12 each  Lateral band walk with OTB around knees 2 laps of 10 feet with UE support.   Corner pec stretch 3 x 20 sec  Forward and lateral step over hurdles with light UE support on counter top   Lateral step and reachx 10 each ( CGA)     Written Home Exercises Provided: Patient educated to continue with previously issued HEP to tolerance .  Exercises were reviewed and Randall was able to demonstrate them prior to the end of the session.  Randall demonstrated good  understanding of the education provided.          Assessment      Patient alphonso TX fairly well. He was progressed slightly with ex;s/activities within tolerable muscular fatigue but without c/o pain. Added forward and " "lateral step over hurdles to encourage hip /knee flex during gait. Also added UE " Big " movements with functional  sit <->stand .   Decreased coordination fror new activity of  lateral step and reach and required GA for balance.     Patient able to improve posture and gait quality slightly with cuing and ex's.  Hamstrings also appearing quite tight. Overall, he does well for his age and is pleasant and motivated with Tx efforts.   Pt will continue to benefit from skilled outpatient physical therapy to address the deficits listed in the problem list box on initial evaluation, provide pt/family education and to maximize pt's level of independence in the home and community environment.      Goals from eval   GOALS: Short Term Goals: 4 weeks  1. Pt will be able to demonstrate proper upright posture without verbal cueing to decrease abnormal spinal stresses.   2. Pt will be able to tolerate multi-directional LE strengthening in order to improve ability to perform household chores.  3. Pt will report 50% improvement in ability to walk long distances since start of care to indicate improved functional mobility.   4. Pt to increase B popliteal angle to -35 degrees in order to improve flexibility and posture.   5. Pt to tolerate HEP to improve ROM and independence with ADL's     Long Term Goals: 8 weeks  1. Pt will be able to perform 2 x 10 multi-directional LE strengthening without fatigue in order to improve ability to perform household chores.  2. Pt will report 80% improvement in ability to walk long distances since start of care to indicate improved functional mobility.   3. Pt to increase B popliteal angle to -30 degrees in order to improve flexibility and posture.   4. Pt to be Independent with HEP to improve ROM and independence with ADL's    Plan     Continue PT towards established plan of care and goals with focus on strength, flexibility, posture.     Juan Long, PTA   "

## 2019-01-28 DIAGNOSIS — M51.36 DDD (DEGENERATIVE DISC DISEASE), LUMBAR: ICD-10-CM

## 2019-01-28 DIAGNOSIS — M54.16 LEFT LUMBAR RADICULITIS: ICD-10-CM

## 2019-01-28 DIAGNOSIS — M70.62 GREATER TROCHANTERIC BURSITIS OF LEFT HIP: ICD-10-CM

## 2019-01-28 RX ORDER — GABAPENTIN 300 MG/1
CAPSULE ORAL
Qty: 90 CAPSULE | Refills: 3 | Status: SHIPPED | OUTPATIENT
Start: 2019-01-28 | End: 2020-02-04

## 2019-01-29 ENCOUNTER — CLINICAL SUPPORT (OUTPATIENT)
Dept: REHABILITATION | Facility: HOSPITAL | Age: 84
End: 2019-01-29
Attending: PSYCHIATRY & NEUROLOGY
Payer: MEDICARE

## 2019-01-29 DIAGNOSIS — M53.86 DECREASED RANGE OF MOTION OF LUMBAR SPINE: ICD-10-CM

## 2019-01-29 DIAGNOSIS — R29.3 POSTURAL IMBALANCE: ICD-10-CM

## 2019-01-29 DIAGNOSIS — M62.81 MUSCLE WEAKNESS OF LOWER EXTREMITY: ICD-10-CM

## 2019-01-29 PROCEDURE — 97110 THERAPEUTIC EXERCISES: CPT | Mod: HCNC,PO

## 2019-01-29 NOTE — PROGRESS NOTES
Physical Therapy Daily Treatment Note     Name: Randall Strickland Jr.  Clinic Number: [id number]    Therapy Diagnosis:   Encounter Diagnoses   Name Primary?    Muscle weakness of lower extremity     Postural imbalance     Decreased range of motion of lumbar spine      Physician: Swapna Delarosa MD    Visit Date: 1/29/2019    Evaluation Date: 1/17/2019  Authorization Period Expiration: 12/31/19  Plan of Care Expiration: 3/17/19  Visit # / Visits authorized: 3/ 20   Time In: 1000  Time Out: 1056  Treatment time: 56  Total Billable Time: 56 minutes       Precautions: Standard and wears hearing aids, B LE edema    Subjective     Pt reports: no new complaints. He has not been compliant with HEP. Walking is tough for him due to SOB. No adverse effects to last treatment.   Pain: 0/10     Objective     Randall received therapeutic exercises to develop strength, endurance, ROM, flexibility and posture for 56 minutes including:    +Treadmill @ 1.5 mph x 5 min in beginning of session  +Treadmill @ 1.5 mph x 5 min at end of session    Mat ex's:   LTR 5 sec hold x 10 ea  Supine hamstring stretch w strap 3 x 20 sec ea  SL clams w green TB x 20 ea  Bridges + iso hip abd green TB 2 x 10  SLR x 20 ea  Hooklying hip add iso 2 x 10- NP    Seated ex's  Brueggers ex ( scap retract with ER) orange TB 2 x 10     Standing ex's    Squats w UE support x 20  Scap retract.Row with green TB 2 x 10  Shoulder ext with orange TB 2 x 10  Wall push ups x 30  Heel raises 2 x 10  Hip flex/ext and abd x 20 each  Forward step ups (6'') x 20 each  Lateral band walk with orange TB 40' x 3 laps  Corner pec stretch 3 x 30 sec    Written Home Exercises Provided: Patient educated to continue with previously issued HEP to tolerance along with updated HEP to include: continue with current HEP (Standing heel raises, Hip flex/ext and abd, doorway pec stretch. He ws educated to utilize UE support for standing ex's)  Pt educated on log roll technique.   Exercises were  reviewed and Randall was able to demonstrate them prior to the end of the session.  Randall demonstrated good  understanding of the education provided.     Assessment     Pt had good tolerance to treatment today with no adverse effects. Post-treatment back and L shoulder pain rated as 0/10. Pt challenged with treadmill walking. No abnormal response with breathing but demonstrates decreased endurance. Good response to progression of exercise program. Occasional need for verbal cueing to increased B step length and heel strike with ambulation.     Pt will continue to benefit from skilled outpatient physical therapy to address the deficits listed in the problem list box on initial evaluation, provide pt/family education and to maximize pt's level of independence in the home and community environment.      Goals from eval   GOALS: Short Term Goals: 4 weeks  1. Pt will be able to demonstrate proper upright posture without verbal cueing to decrease abnormal spinal stresses.   2. Pt will be able to tolerate multi-directional LE strengthening in order to improve ability to perform household chores.  3. Pt will report 50% improvement in ability to walk long distances since start of care to indicate improved functional mobility.   4. Pt to increase B popliteal angle to -35 degrees in order to improve flexibility and posture.   5. Pt to tolerate HEP to improve ROM and independence with ADL's     Long Term Goals: 8 weeks  1. Pt will be able to perform 2 x 10 multi-directional LE strengthening without fatigue in order to improve ability to perform household chores.  2. Pt will report 80% improvement in ability to walk long distances since start of care to indicate improved functional mobility.   3. Pt to increase B popliteal angle to -30 degrees in order to improve flexibility and posture.   4. Pt to be Independent with HEP to improve ROM and independence with ADL's    Plan     Continue PT towards established plan of care and goals  with focus on walking endurance and gait.     Katy Hoang, PT

## 2019-01-30 ENCOUNTER — OFFICE VISIT (OUTPATIENT)
Dept: SURGERY | Facility: CLINIC | Age: 84
End: 2019-01-30
Payer: MEDICARE

## 2019-01-30 VITALS
BODY MASS INDEX: 27.1 KG/M2 | HEART RATE: 92 BPM | WEIGHT: 178.81 LBS | SYSTOLIC BLOOD PRESSURE: 139 MMHG | DIASTOLIC BLOOD PRESSURE: 79 MMHG | HEIGHT: 68 IN

## 2019-01-30 DIAGNOSIS — R15.2 INCONTINENCE OF FECES WITH FECAL URGENCY: Primary | ICD-10-CM

## 2019-01-30 DIAGNOSIS — R15.9 INCONTINENCE OF FECES WITH FECAL URGENCY: Primary | ICD-10-CM

## 2019-01-30 PROCEDURE — 99999 PR PBB SHADOW E&M-EST. PATIENT-LVL III: CPT | Mod: PBBFAC,HCNC,, | Performed by: NURSE PRACTITIONER

## 2019-01-30 PROCEDURE — 99213 OFFICE O/P EST LOW 20 MIN: CPT | Mod: HCNC,S$GLB,, | Performed by: NURSE PRACTITIONER

## 2019-01-30 PROCEDURE — 1101F PR PT FALLS ASSESS DOC 0-1 FALLS W/OUT INJ PAST YR: ICD-10-PCS | Mod: HCNC,CPTII,S$GLB, | Performed by: NURSE PRACTITIONER

## 2019-01-30 PROCEDURE — 99213 PR OFFICE/OUTPT VISIT, EST, LEVL III, 20-29 MIN: ICD-10-PCS | Mod: HCNC,S$GLB,, | Performed by: NURSE PRACTITIONER

## 2019-01-30 PROCEDURE — 1101F PT FALLS ASSESS-DOCD LE1/YR: CPT | Mod: HCNC,CPTII,S$GLB, | Performed by: NURSE PRACTITIONER

## 2019-01-30 PROCEDURE — 99999 PR PBB SHADOW E&M-EST. PATIENT-LVL III: ICD-10-PCS | Mod: PBBFAC,HCNC,, | Performed by: NURSE PRACTITIONER

## 2019-01-30 NOTE — PROGRESS NOTES
Subjective:       Patient ID: Randall Strickland Jr. is a 89 y.o. male.    Chief Complaint: No chief complaint on file.    HPI   89 M who presents to clinic for fecal smearing. Was followed by Dr Bran in 2014 for incontinence, received Solesta injections.  Followed by Dr Lowe with GI. Presents to clinic for occasional fecal smearing episodes. Happens weekly, small amount of stool in underwear. Denies any full incontinence, blood in stool, change in bowel habits, abdominal pain. On plavix. Not taking any fiber      Colon 9/29/009: FPTC. Patchy inflammation in cecum (active colitis). Localized mild inflam in mid transverse colon 2/2 ischemic colitis (active colitis).  No family history of colon or rectal cancer    Interval history:  Taking 2 FiberCon every morning. Fecal incontinence improved. Still continues to have urgency and incomplete evacuation. Has a soft formed BM every 2-3 days, has to return to the restroom several times. Denies any blood in stool, abdominal pain, weight loss.         Review of Systems   Constitutional: Negative for appetite change, chills, fatigue, fever and unexpected weight change.   Respiratory: Negative for shortness of breath.    Cardiovascular: Negative for chest pain.   Gastrointestinal: Negative for abdominal distention, abdominal pain, anal bleeding, blood in stool, constipation, diarrhea, nausea, rectal pain and vomiting.       Objective:      Physical Exam   Constitutional: He is oriented to person, place, and time. He appears well-developed and well-nourished.   Eyes: Conjunctivae and EOM are normal.   Pulmonary/Chest: Effort normal. No respiratory distress.   Abdominal: Soft. He exhibits no distension. There is no tenderness.   Genitourinary: Rectal exam shows no mass and no tenderness.   Musculoskeletal: Normal range of motion.   Neurological: He is alert and oriented to person, place, and time.   Skin: Skin is warm and dry.   Psychiatric: He has a normal mood and affect. His  behavior is normal.       Assessment:       1. Incontinence of feces with fecal urgency        Plan:       Discussed high fiber diet, given info sheet  Continue fiber con  Dietary elimination of foods causing urgency  Briefly discussed SNS, not a candidate since incontinence episodes are improving

## 2019-01-31 ENCOUNTER — CLINICAL SUPPORT (OUTPATIENT)
Dept: REHABILITATION | Facility: HOSPITAL | Age: 84
End: 2019-01-31
Attending: PSYCHIATRY & NEUROLOGY
Payer: MEDICARE

## 2019-01-31 DIAGNOSIS — M62.81 MUSCLE WEAKNESS OF LOWER EXTREMITY: ICD-10-CM

## 2019-01-31 DIAGNOSIS — R29.3 POSTURAL IMBALANCE: ICD-10-CM

## 2019-01-31 DIAGNOSIS — M53.86 DECREASED RANGE OF MOTION OF LUMBAR SPINE: ICD-10-CM

## 2019-01-31 PROCEDURE — 97110 THERAPEUTIC EXERCISES: CPT | Mod: HCNC,PO

## 2019-01-31 NOTE — PROGRESS NOTES
Physical Therapy Daily Treatment Note     Name: Randall Strickland Jr.  Clinic Number: [id number]    Therapy Diagnosis:   Encounter Diagnoses   Name Primary?    Muscle weakness of lower extremity     Postural imbalance     Decreased range of motion of lumbar spine      Physician: Swapna Delarosa MD    Visit Date: 1/31/2019    Evaluation Date: 1/17/2019  Authorization Period Expiration: 12/31/19  Plan of Care Expiration: 3/17/19  Visit # / Visits authorized: 5/ 20   Time In: 1000  Time Out: 11:00  Treatment time: 60  Total Billable Time: 30 minutes       Precautions: Standard and wears hearing aids, B LE edema    Subjective     Pt reports: no new complaints.  He reports some lower back pain primarily in the mornings but none presently.   Pain: 0/10     Objective     Bp prior to Tx = 115/63  Hr = 81  BP post Tx = 117/72 Hr = 81    Randall received therapeutic exercises to develop strength, endurance, ROM, flexibility and posture for 56 minutes including:     Treadmill @ 1.5 mph x 5 min in beginning of session--NP   Treadmill @ 1.5 mph x 5 min at end of session--NP    Mat ex's:   LTR 5 sec hold x 10 ea  Supine hamstring stretch w strap 3 x 20 sec ea  Hooklying clams w green TB x 20 ea  Bridges + iso hip abd green TB 2 x 10  SLR with 1# x 20 ea  Hooklying hip add iso 2 x 10- NP    Seated ex's  Brueggers ex ( scap retract with ER) orange TB 2 x 10     Standing ex's    Squats w UE support on foam x 20  Scap retract.Row with green TB 2 x 10  Shoulder ext with GTB 2 x 10  Wall push ups x 30--NP  Heel raises on foam 2 x 10  Hip flex/ext and abd with 1# x 20 each  Forward step ups (6'') x 20 each  Lateral band walk with GTB 10' x 4 laps  Corner pec stretch 3 x 30 sec--NP  Forward and lateral step over hurdles with light UE support on counter top   Lateral step and reachx 10 each ( CGA)-NP today    Written Home Exercises Provided: Patient educated to continue with previously issued HEP to tolerance .   Exercises were reviewed and  Randall was able to demonstrate them prior to the end of the session.  Randall demonstrated good  understanding of the education provided.     Assessment   Patient alphonso TX fairly well. He was progressed slightly with ex;s/activities within tolerable muscular fatigue but without c/o pain. Improved ability to perform forward and lateral step over hurdles to encourage hip /knee flex during gait. Patient able to improve posture and gait quality slightly with cuing and ex's.  Hamstrings remain tightt. Overall, he does well for his age and is pleasant and motivated with Tx efforts.   Pt will continue to benefit from skilled outpatient physical therapy to address the deficits listed in the problem list box on initial evaluation, provide pt/family education and to maximize pt's level of independence in the home and community environment.    Pt will continue to benefit from skilled outpatient physical therapy to address the deficits listed in the problem list box on initial evaluation, provide pt/family education and to maximize pt's level of independence in the home and community environment.      Goals from eval   GOALS: Short Term Goals: 4 weeks  1. Pt will be able to demonstrate proper upright posture without verbal cueing to decrease abnormal spinal stresses.   2. Pt will be able to tolerate multi-directional LE strengthening in order to improve ability to perform household chores.  3. Pt will report 50% improvement in ability to walk long distances since start of care to indicate improved functional mobility.   4. Pt to increase B popliteal angle to -35 degrees in order to improve flexibility and posture.   5. Pt to tolerate HEP to improve ROM and independence with ADL's     Long Term Goals: 8 weeks  1. Pt will be able to perform 2 x 10 multi-directional LE strengthening without fatigue in order to improve ability to perform household chores.  2. Pt will report 80% improvement in ability to walk long distances since start of  care to indicate improved functional mobility.   3. Pt to increase B popliteal angle to -30 degrees in order to improve flexibility and posture.   4. Pt to be Independent with HEP to improve ROM and independence with ADL's    Plan     Continue PT towards established plan of care and goals with focus on walking endurance and gait.     Juan Lnog, PTA

## 2019-02-05 ENCOUNTER — CLINICAL SUPPORT (OUTPATIENT)
Dept: REHABILITATION | Facility: HOSPITAL | Age: 84
End: 2019-02-05
Attending: PSYCHIATRY & NEUROLOGY
Payer: MEDICARE

## 2019-02-05 DIAGNOSIS — M62.81 MUSCLE WEAKNESS OF LOWER EXTREMITY: ICD-10-CM

## 2019-02-05 DIAGNOSIS — R29.3 POSTURAL IMBALANCE: ICD-10-CM

## 2019-02-05 DIAGNOSIS — M53.86 DECREASED RANGE OF MOTION OF LUMBAR SPINE: ICD-10-CM

## 2019-02-05 PROCEDURE — 97110 THERAPEUTIC EXERCISES: CPT | Mod: HCNC,PO

## 2019-02-07 ENCOUNTER — CLINICAL SUPPORT (OUTPATIENT)
Dept: REHABILITATION | Facility: HOSPITAL | Age: 84
End: 2019-02-07
Attending: PSYCHIATRY & NEUROLOGY
Payer: MEDICARE

## 2019-02-07 DIAGNOSIS — M53.86 DECREASED RANGE OF MOTION OF LUMBAR SPINE: ICD-10-CM

## 2019-02-07 DIAGNOSIS — R29.3 POSTURAL IMBALANCE: ICD-10-CM

## 2019-02-07 DIAGNOSIS — M62.81 MUSCLE WEAKNESS OF LOWER EXTREMITY: ICD-10-CM

## 2019-02-07 PROCEDURE — 97110 THERAPEUTIC EXERCISES: CPT | Mod: HCNC,PO

## 2019-02-07 NOTE — PROGRESS NOTES
Physical Therapy Daily Treatment Note     Name: Randall Strickland Jr.  Clinic Number: [id number]    Therapy Diagnosis:   Encounter Diagnoses   Name Primary?    Muscle weakness of lower extremity     Postural imbalance     Decreased range of motion of lumbar spine      Physician: Swapna Delarosa MD    Visit Date: 2/7/2019    Evaluation Date: 1/17/2019  Authorization Period Expiration: 12/31/19  Plan of Care Expiration: 3/17/19  Visit # / Visits authorized: 6/ 20   Time In: 1000  Time Out: 1053  Treatment time: 53 minutes  Total Billable Time: 53 minutes       Precautions: Standard and wears hearing aids, B LE edema    Subjective     Pt reports: no new complaints. No adverse effects to last treatment.   Pain: 0/10     Objective     BOLD= performed today    Randall received therapeutic exercises to develop strength, endurance, ROM, flexibility and posture for 53 minutes including:    Treadmill @ 2.0 mph x 5 min in beginning of session  Treadmill @ 2.0 mph x 5 min at end of session    Mat ex's:  - NP  LTR 5 sec hold x 10 ea  Supine hamstring stretch w strap 3 x 20 sec ea  Hooklying clams w BTB  x 20 ea  Bridges + iso hip abd BTB 2 x 10  SLR with 1# x 20 ea  Hooklying hip add iso 2 x 10- NP  PROM hamstring stretch with therapist assist    Seated ex's  +Hamstring str w stool 3 x 30 sec ea  +Lumbar flex str on ball 10 sec x 10  Sit<->stand from jump box 2 + overhead press with 5# KBell 2 x 10    Standing ex's    Squats w UE support on foam x 20--NP  +Freemotion rows 7# x 20  +Freemotion B sh ext 3# x 20  Wall push ups x 20  Serratus wall slides 2 x 10  Brueggers ex (scap retract with ER) orange TB 2 x 10   Heel raises on foam 2 x 10--NP  Hip flex/ext and abd with 2# x 20 each  Forward step ups (6'') w opp hip flex 2 x 10 each  Lateral band walk with GTB 10' x 4 laps with light UE support  Corner pec stretch 3 x 30 sec  Forward and lateral step over hurdles x 2 laps ea way  Lateral step and reach x 10 each (CGA)  Forward rock and  reach ( reciprocal arm swing) x 10 each (CGA)  +Rockerboard x 20 ea way    Written Home Exercises Provided: Patient educated to continue with previously issued HEP to tolerance .     Assessment     Pt had good tolerance to treatment today with no adverse effects. He demonstrates occasional LOB with ability to self-recover when balance was challenged. Pt was challenged with gualberto walking. Good response to addition of new therapeutic exercises. Continues to require manual and verbal cueing for posture correction.      Pt will continue to benefit from skilled outpatient physical therapy to address the deficits listed in the problem list box on initial evaluation, provide pt/family education and to maximize pt's level of independence in the home and community environment.      Goals from eval   GOALS: Short Term Goals: 4 weeks  1. Pt will be able to demonstrate proper upright posture without verbal cueing to decrease abnormal spinal stresses.   2. Pt will be able to tolerate multi-directional LE strengthening in order to improve ability to perform household chores.  3. Pt will report 50% improvement in ability to walk long distances since start of care to indicate improved functional mobility.   4. Pt to increase B popliteal angle to -35 degrees in order to improve flexibility and posture.   5. Pt to tolerate HEP to improve ROM and independence with ADL's     Long Term Goals: 8 weeks  1. Pt will be able to perform 2 x 10 multi-directional LE strengthening without fatigue in order to improve ability to perform household chores.  2. Pt will report 80% improvement in ability to walk long distances since start of care to indicate improved functional mobility.   3. Pt to increase B popliteal angle to -30 degrees in order to improve flexibility and posture.   4. Pt to be Independent with HEP to improve ROM and independence with ADL's    Plan     Progress with walking endurance and gait.     Katy Hoang, PT

## 2019-02-12 ENCOUNTER — CLINICAL SUPPORT (OUTPATIENT)
Dept: REHABILITATION | Facility: HOSPITAL | Age: 84
End: 2019-02-12
Attending: PSYCHIATRY & NEUROLOGY
Payer: MEDICARE

## 2019-02-12 DIAGNOSIS — R29.3 POSTURAL IMBALANCE: ICD-10-CM

## 2019-02-12 DIAGNOSIS — M53.86 DECREASED RANGE OF MOTION OF LUMBAR SPINE: ICD-10-CM

## 2019-02-12 DIAGNOSIS — M62.81 MUSCLE WEAKNESS OF LOWER EXTREMITY: ICD-10-CM

## 2019-02-12 PROCEDURE — 97110 THERAPEUTIC EXERCISES: CPT | Mod: HCNC,PO

## 2019-02-12 NOTE — PROGRESS NOTES
"  Physical Therapy Daily Treatment Note     Name: Randall Strickland Jr.  Clinic Number: [id number]    Therapy Diagnosis:   Encounter Diagnoses   Name Primary?    Muscle weakness of lower extremity     Postural imbalance     Decreased range of motion of lumbar spine      Physician: Swapna Delarosa MD    Visit Date: 2/12/2019    Evaluation Date: 1/17/2019  Authorization Period Expiration: 12/31/19  Plan of Care Expiration: 3/17/19  Visit # / Visits authorized: 8/ 20   Time In: 9:15 ( Patient with late arrival today due to weather)   Time Out:10:00  Treatment time: 45 minutes  Total Billable Time: 25 minutes       Precautions: Standard and wears hearing aids, B LE edema    Subjective     Pt reports no new c/o. Reports slight Improvement in strength since the start of care.   Pain: 0/10     Objective     BOLD= performed today    Randall received therapeutic exercises to develop strength, endurance, ROM, flexibility and posture for 53 minutes including:    Treadmill @ 2.0 mph x 5 min in beginning of session  Treadmill @ 2.0 mph x 5 min at end of session    Mat ex's:  - NP  LTR 5 sec hold x 10 ea  Supine hamstring stretch w strap 3 x 20 sec ea  Hooklying clams w BTB  x 20 ea  Bridges + iso hip abd BTB 2 x 10  SLR with 1# x 20 ea  Hooklying hip add iso 2 x 10- NP  PROM hamstring stretch with therapist assist    Seated ex's  +Hamstring str w stool 3 x 30 sec ea   Lumbar flex str on ball 10 sec x 10  Sit<->stand from 18" box   + overhead press with 5# KBell 2 x 10    Standing ex's    Squats w UE support on foam x 20--NP  +Freemotion rows 7# x 20  +Freemotion B sh ext 3# x 20  Wall push ups x 20  Serratus wall slides with bolster 2 x 10  Brueggers ex (scap retract with ER) orange TB 2 x 10   Heel raises on foam 2 x 10--NP  Matrix Hip flex/ext and abd with 25# x 15 each  Forward step ups (6'') w opp hip flex 2 x 10 each  Lateral band walk with GTB 10' x 4 laps with light UE support  Corner pec stretch 3 x 30 sec  Forward and lateral " step over hurdles x 2 laps ea way  Lateral step and reach x 20 each (CGA)  Forward rock and reach ( reciprocal arm swing) x 20 each (CGA)  +Rockerboard A/P and laterally x 20 each with UE support    Written Home Exercises Provided: Patient educated to continue with previously issued HEP to tolerance .     Assessment   Patient alphonso TX well. He was progressed to perform the   Matrix multi-hip ex's without c/o. Demonstrates improved coordination and performance with forward/lateral step and reach activity today.. Still exhibits tendency for shuffling gait which he corrects briefly with cueing.  Patient remains pleasant and cooperative with all Tx efforts.    Pt will continue to benefit from skilled outpatient physical therapy to address the deficits listed in the problem list box on initial evaluation, provide pt/family education and to maximize pt's level of independence in the home and community environment.      Goals from eval   GOALS: Short Term Goals: 4 weeks  1. Pt will be able to demonstrate proper upright posture without verbal cueing to decrease abnormal spinal stresses.   2. Pt will be able to tolerate multi-directional LE strengthening in order to improve ability to perform household chores.  3. Pt will report 50% improvement in ability to walk long distances since start of care to indicate improved functional mobility.   4. Pt to increase B popliteal angle to -35 degrees in order to improve flexibility and posture.   5. Pt to tolerate HEP to improve ROM and independence with ADL's     Long Term Goals: 8 weeks  1. Pt will be able to perform 2 x 10 multi-directional LE strengthening without fatigue in order to improve ability to perform household chores.  2. Pt will report 80% improvement in ability to walk long distances since start of care to indicate improved functional mobility.   3. Pt to increase B popliteal angle to -30 degrees in order to improve flexibility and posture.   4. Pt to be Independent with  HEP to improve ROM and independence with ADL's    Plan     Progress with walking endurance and gait.     Juan Long, PTA

## 2019-02-13 NOTE — PROGRESS NOTES
"  Physical Therapy Daily Treatment Note     Name: Randall Strickland Jr.  Clinic Number: [id number]    Therapy Diagnosis:   Encounter Diagnoses   Name Primary?    Muscle weakness of lower extremity     Postural imbalance     Decreased range of motion of lumbar spine      Physician: Swapna Delarosa MD    Visit Date: 2/14/2019    Evaluation Date: 1/17/2019  Authorization Period Expiration: 12/31/19  Plan of Care Expiration: 3/17/19  Visit # / Visits authorized: 9/ 20   Time In: 1000  Time Out: 1057  Treatment time: 57 minutes  Total Billable Time: 57 minutes       Precautions: Standard and wears hearing aids, B LE edema    Subjective     Pt reports he is feeling good this morning. Pt is ready for discharge. He plans to go to the gym to self-manage condition. Pt is not compliant with HEP. 25% improvement in ability to walk long distances since start of care.   Pain: 0/10     Objective     Taken 2/14/2019:  Popliteal angle: R -35 deg, L -35 deg    BOLD= performed today    Randall received therapeutic exercises to develop strength, endurance, ROM, flexibility and posture for 57 minutes including:    Treadmill @ 2.0 mph x 7 min in beginning of session  Treadmill @ 2.0 mph x 7 min at end of session    Mat ex's:  - NP  LTR 5 sec hold x 10 ea  Supine hamstring stretch w strap 3 x 20 sec ea  Hooklying clams w BTB  x 20 ea  Bridges + iso hip abd BTB 2 x 10  SLR with 1# x 20 ea  Hooklying hip add iso 2 x 10- NP  PROM hamstring stretch with therapist assist    Seated ex's  Hamstring str w stool 3 x 30 sec ea  Lumbar flex str on ball 10 sec x 10  Sit<->stand from 18" box + overhead press with 5# KBell 2 x 10    Standing ex's    Squats w UE support on foam x 20--NP  Freemotion rows 7# x 30  Freemotion B sh ext 7# x 30  Wall push ups x 20  Serratus wall slides with bolster 2 x 10  Brueggers ex (scap retract with ER) orange TB 2 x 10   Heel raises on foam 2 x 10--NP  Matrix Hip flex/ext and abd with 25# x 15 each  Forward step ups (6'') w " opp hip flex 2 x 10 each  Lateral band walk with GTB 40' x 2 laps  Corner pec stretch 3 x 30 sec  Forward and lateral step over hurdles x 2 laps ea way  Lateral step and reach x 20 each (CGA)  Forward rock and reach (reciprocal arm swing) x 20 each (CGA)  Rockerboard A/P and laterally x 20 each with UE support    Written Home Exercises Provided: Patient educated to continue with previously issued HEP to tolerance . Addition of corner stretch, seated hamstring stretch, sit to stands, and step ups.     Functional Limitation Report- G-CODE:  CMS Impairment/Limitation/Restriction for FOTO Lumbar Spine Survey  Status Limitation G-Code CMS Severity Modifier  Intake 83% 17%  Predicted 76% 24% Goal Status+ CJ - At least 20 percent but less than 40 percent  2/14/2019 47% 53% Current Status CK - At least 40 percent but less than 60 percent  D/C Status CK **only report if this is discharge survey  +Based on FOTO predicted change score    Assessment   Patient was re-assessed today with 4/5 STGs and 1/4 LTGs being met indicating improvements in postural awareness, B LE strengthening, B hamstring flexibility, and tolerance for HEP since start of care. He continues with decreased ability to walk long distances, decreased compliance with HEP, B hamstring tightness, and impaired gait, balance, endurance, and functional mobility. Pt wishes not to continue physical therapy services. He plans to continue going to gym for self-management of condition. Pt encouraged to continue walking following DC. He was challenged with rock and reach exercises. Good sit to stand technique and stability with gualberto walking. Pt to be discharged at this time with updated HEP.      Goals from eval   GOALS: Short Term Goals: 4 weeks  1. Pt will be able to demonstrate proper upright posture without verbal cueing to decrease abnormal spinal stresses.- met 2/14/19   2. Pt will be able to tolerate multi-directional LE strengthening in order to improve ability  to perform household chores.- met 2/14/19  3. Pt will report 50% improvement in ability to walk long distances since start of care to indicate improved functional mobility.- not met  4. Pt to increase B popliteal angle to -35 degrees in order to improve flexibility and posture.- met 2/14/19   5. Pt to tolerate HEP to improve ROM and independence with ADL's.- met 2/14/19     Long Term Goals: 8 weeks  1. Pt will be able to perform 2 x 10 multi-directional LE strengthening without fatigue in order to improve ability to perform household chores.- met 2/14/19  2. Pt will report 80% improvement in ability to walk long distances since start of care to indicate improved functional mobility.- not met   3. Pt to increase B popliteal angle to -30 degrees in order to improve flexibility and posture.- not met   4. Pt to be Independent with HEP to improve ROM and independence with ADL's.- not met    Plan     Pt is discharged from physical therapy services.    Katy Hoang, PT

## 2019-02-14 ENCOUNTER — CLINICAL SUPPORT (OUTPATIENT)
Dept: REHABILITATION | Facility: HOSPITAL | Age: 84
End: 2019-02-14
Attending: PSYCHIATRY & NEUROLOGY
Payer: MEDICARE

## 2019-02-14 DIAGNOSIS — M53.86 DECREASED RANGE OF MOTION OF LUMBAR SPINE: ICD-10-CM

## 2019-02-14 DIAGNOSIS — M62.81 MUSCLE WEAKNESS OF LOWER EXTREMITY: ICD-10-CM

## 2019-02-14 DIAGNOSIS — R29.3 POSTURAL IMBALANCE: ICD-10-CM

## 2019-02-14 PROCEDURE — G8980 MOBILITY D/C STATUS: HCPCS | Mod: CK,HCNC,PO

## 2019-02-14 PROCEDURE — 97110 THERAPEUTIC EXERCISES: CPT | Mod: HCNC,PO

## 2019-02-14 PROCEDURE — G8979 MOBILITY GOAL STATUS: HCPCS | Mod: CJ,HCNC,PO

## 2019-03-18 ENCOUNTER — OFFICE VISIT (OUTPATIENT)
Dept: GASTROENTEROLOGY | Facility: CLINIC | Age: 84
End: 2019-03-18
Payer: MEDICARE

## 2019-03-18 VITALS
WEIGHT: 177.25 LBS | DIASTOLIC BLOOD PRESSURE: 59 MMHG | HEART RATE: 96 BPM | HEIGHT: 68 IN | SYSTOLIC BLOOD PRESSURE: 105 MMHG | BODY MASS INDEX: 26.86 KG/M2

## 2019-03-18 DIAGNOSIS — R15.1 FECAL SMEARING: ICD-10-CM

## 2019-03-18 DIAGNOSIS — K21.9 GASTROESOPHAGEAL REFLUX DISEASE WITHOUT ESOPHAGITIS: Primary | ICD-10-CM

## 2019-03-18 PROCEDURE — 1101F PT FALLS ASSESS-DOCD LE1/YR: CPT | Mod: HCNC,CPTII,S$GLB, | Performed by: NURSE PRACTITIONER

## 2019-03-18 PROCEDURE — 99213 PR OFFICE/OUTPT VISIT, EST, LEVL III, 20-29 MIN: ICD-10-PCS | Mod: HCNC,S$GLB,, | Performed by: NURSE PRACTITIONER

## 2019-03-18 PROCEDURE — 99213 OFFICE O/P EST LOW 20 MIN: CPT | Mod: HCNC,S$GLB,, | Performed by: NURSE PRACTITIONER

## 2019-03-18 PROCEDURE — 99999 PR PBB SHADOW E&M-EST. PATIENT-LVL IV: CPT | Mod: PBBFAC,HCNC,, | Performed by: NURSE PRACTITIONER

## 2019-03-18 PROCEDURE — 99999 PR PBB SHADOW E&M-EST. PATIENT-LVL IV: ICD-10-PCS | Mod: PBBFAC,HCNC,, | Performed by: NURSE PRACTITIONER

## 2019-03-18 PROCEDURE — 1101F PR PT FALLS ASSESS DOC 0-1 FALLS W/OUT INJ PAST YR: ICD-10-PCS | Mod: HCNC,CPTII,S$GLB, | Performed by: NURSE PRACTITIONER

## 2019-03-18 RX ORDER — OMEPRAZOLE 20 MG/1
20 CAPSULE, DELAYED RELEASE ORAL DAILY
Qty: 30 CAPSULE | Refills: 2 | Status: SHIPPED | OUTPATIENT
Start: 2019-03-18 | End: 2019-07-23 | Stop reason: SDUPTHER

## 2019-03-18 NOTE — PROGRESS NOTES
[facility] Consultation Note    Reason for Consult:  The primary encounter diagnosis was Gastroesophageal reflux disease without esophagitis. A diagnosis of Fecal smearing was also pertinent to this visit.    PCP:   Tiago Delaney       Referring MD:  No referring provider defined for this encounter.    HPI:  This is a 89 y.o. male here for f/u for GERD and fecal smearing. He is an established patient last seen by Dr. Lowe 12/2018. Taking prilosec 40 mg by mouth. He reports the last time he had epigastric burning and pyrosis is something he cant even remember because it has been so long. Has dry mouth. No dysphagia, pyrosis, reflux, epigastric pain, n/v, or melena.    He was referred to CRS for the fecal smearing and has since been seen twice in clinic. He states his fecal smearing has not improved. He states when he gets the urge he needs to be by a bathroom or he may have an accident. He has a BM every 2-3 days. Only having the fecal smearing on the days he is having the accidents. He is not aware that he is having the accidents. Sometimes its a smear but sometimes he reports its a formed stool and he is not sure it is even there. No blood in the stool. Still taking the fiber con. Not eating a fiber diet. He states he eats a lot of dairy such as ice cream but states he does not get abdominal pain or diarrhea.        ROS:  Constitutional: No fevers, chills,  CV: No chest pain  Pulm: + cough, No shortness of breath  GI: see HPI  Derm: No rash  MSK: + arthritis  Psych: No anxiety, No depression    Medical History:  has a past medical history of Blood transfusion, BPH (benign prostatic hypertrophy), Cataract, Congestive heart failure, Hypertension, Macular degeneration, Osteoarthritis of lumbar spine (9/7/2016), PVD (peripheral vascular disease) (8/22/2018), and Stenosis of aortic and mitral valves.    Surgical History:  has a past surgical history that includes Knee surgery; Total hip  arthroplasty; cararact (car); Cataract extraction bilateral w/ anterior vitrectomy; Cholecystectomy; Appendectomy; Eye surgery; Cataract extraction w/  intraocular lens implant (Bilateral); Cardiac catheterization; Cardiac valuve replacement; and Joint replacement.    Family History: family history includes No Known Problems in his brother, daughter, daughter, father, maternal aunt, maternal grandfather, maternal grandmother, maternal uncle, mother, paternal aunt, paternal grandfather, paternal grandmother, paternal uncle, sister, son, and son..     Social History:  reports that  has never smoked. he has never used smokeless tobacco. He reports that he drinks alcohol. He reports that he does not use drugs.    Review of patient's allergies indicates:  No Known Allergies    Current Outpatient Medications on File Prior to Visit   Medication Sig Dispense Refill    amLODIPine (NORVASC) 5 MG tablet TAKE 1 TABLET(5 MG) BY MOUTH EVERY DAY 30 tablet 6    aspirin 81 MG Chew Take 81 mg by mouth once daily.        CHOLESTYRAMINE LIGHT 4 gram packet DISSOLVE 1 PACKET IN LIQUID AND DRINK TWICE DAILY 60 packet 0    clopidogrel (PLAVIX) 75 mg tablet TAKE 1 TABLET BY MOUTH ONCE DAILY 90 tablet 0    finasteride (PROSCAR) 5 mg tablet TAKE 1 TABLET(5 MG) BY MOUTH EVERY DAY 90 tablet 3    fluorometholone 0.1% (FML) 0.1 % DrpS       gabapentin (NEURONTIN) 300 MG capsule TAKE 1 CAPSULE(300 MG) BY MOUTH EVERY EVENING 90 capsule 3    polycarbophil (FIBERCON) 625 mg tablet Take 625 mg by mouth once daily.      rivastigmine tartrate (EXELON) 3 MG capsule Take 1 capsule (3 mg total) by mouth 2 (two) times daily. 60 capsule 11    tamsulosin (FLOMAX) 0.4 mg Cap TAKE ONE CAPSULE BY MOUTH DAILY 90 capsule 3    thiamine 100 MG tablet Take 100 mg by mouth once daily.      tolterodine (DETROL LA) 4 MG 24 hr capsule Take 1 capsule (4 mg total) by mouth once daily. 30 capsule 11    triamcinolone acetonide 0.1% (KENALOG) 0.1 % cream Apply  "topically 2 (two) times daily. 80 g 1    [DISCONTINUED] omeprazole (PRILOSEC) 40 MG capsule Take 1 capsule (40 mg total) by mouth every morning. 30 capsule 3    [DISCONTINUED] omeprazole (PRILOSEC) 20 MG capsule Take 1 capsule (20 mg total) by mouth once daily. 30 capsule 2     No current facility-administered medications on file prior to visit.          Objective Findings:    Vital Signs:  BP (!) 105/59   Pulse 96   Ht 5' 8" (1.727 m)   Wt 80.4 kg (177 lb 4 oz)   BMI 26.95 kg/m²   Body mass index is 26.95 kg/m².    Physical Exam:  General Appearance: Well appearing in no acute distress  Head:   Normocephalic, without obvious abnormality  Eyes:    No scleral icterus  ENT: Neck supple  Lungs: CTA bilaterally in anterior and posterior fields, no wheezes, no crackles.  Heart:  Regular rate and rhythm, S1, S2 normal, no murmurs heard  Abdomen: Soft, non tender, non distended with positive bowel sounds in all four quadrants. No hepatosplenomegaly, ascites, or mass  Extremities: No edema  Skin: No rash  Neurologic: AAO x 3      Labs:  Lab Results   Component Value Date    WBC 6.97 05/30/2018    HGB 12.4 (L) 05/30/2018    HCT 37.9 (L) 05/30/2018     05/30/2018    CRP 28.8 (H) 06/30/2009    CHOL 135 05/30/2018    TRIG 83 05/30/2018    HDL 37 (L) 05/30/2018    ALT 15 05/30/2018    AST 22 05/30/2018     05/30/2018    K 5.2 (H) 05/30/2018     05/30/2018    CREATININE 1.3 05/30/2018    BUN 20 05/30/2018    CO2 28 05/30/2018    TSH 1.423 12/19/2017    PSA 0.6 03/07/2005    INR 1.0 08/29/2016    HGBA1C 5.7 04/10/2014       Imaging:  None reviewed    Endoscopy:    None reviewed     Assessment:  1. Gastroesophageal reflux disease without esophagitis   Patient denies any types of GERD symptoms. He has been taking omeprazole 40 mg QD.  We will decrease the dosage to 20 mg QD for the next couple of months. At f/u , if he is still symptoms free we will consider weaning him completely off the PPI   2. Fecal " smearing   He states there has been no improvement at all. Has seen CRS. I encouraged patient to start taking his fibercon 3 tablets a day with plenty of water for the next two weeks. If that improves his symptoms then he may continue taking that as tolerated. I also instructed him if that causes constipation then he can go back to taking it BID. Will also refer back to CRS as I do not see an upcoming appt.           Recommendations:  1. Decrease omeprazole to 20 mg every day. If still symptom free at F/U, will wean off of PPI.  2. Trial of fibercon 3 tabs a day for fecal soiling. Refer back to CRS.    Follow-up in about 3 months (around 6/18/2019).      Order summary:  Orders Placed This Encounter    omeprazole (PRILOSEC) 20 MG capsule         Thank you so much for allowing me to participate in the care of Randall Najera, HEIDI-C

## 2019-03-18 NOTE — PATIENT INSTRUCTIONS
Stop taking the omeprazole 40 mg dose.   Start taking omeprazole 20 mg dose every morning on an empty stomach 30 mins before breakfast.    Try taking the fibercon pills 3 tablets a day. Increase water intake to prevent constipation. Try this for about 2 weeks. If constipation does occurs, go back to two pills a day.

## 2019-03-27 RX ORDER — TOLTERODINE 4 MG/1
CAPSULE, EXTENDED RELEASE ORAL
Qty: 30 CAPSULE | Refills: 0 | Status: ON HOLD | OUTPATIENT
Start: 2019-03-27 | End: 2019-12-18 | Stop reason: HOSPADM

## 2019-04-02 ENCOUNTER — OFFICE VISIT (OUTPATIENT)
Dept: SURGERY | Facility: CLINIC | Age: 84
End: 2019-04-02
Payer: MEDICARE

## 2019-04-02 VITALS
WEIGHT: 180.31 LBS | HEART RATE: 83 BPM | DIASTOLIC BLOOD PRESSURE: 75 MMHG | SYSTOLIC BLOOD PRESSURE: 141 MMHG | BODY MASS INDEX: 27.33 KG/M2 | HEIGHT: 68 IN

## 2019-04-02 DIAGNOSIS — R15.1 FECAL SMEARING: Primary | ICD-10-CM

## 2019-04-02 PROCEDURE — 99999 PR PBB SHADOW E&M-EST. PATIENT-LVL III: CPT | Mod: PBBFAC,HCNC,, | Performed by: COLON & RECTAL SURGERY

## 2019-04-02 PROCEDURE — 99999 PR PBB SHADOW E&M-EST. PATIENT-LVL III: ICD-10-PCS | Mod: PBBFAC,HCNC,, | Performed by: COLON & RECTAL SURGERY

## 2019-04-02 PROCEDURE — 99213 OFFICE O/P EST LOW 20 MIN: CPT | Mod: HCNC,S$GLB,, | Performed by: COLON & RECTAL SURGERY

## 2019-04-02 PROCEDURE — 1101F PR PT FALLS ASSESS DOC 0-1 FALLS W/OUT INJ PAST YR: ICD-10-PCS | Mod: HCNC,CPTII,S$GLB, | Performed by: COLON & RECTAL SURGERY

## 2019-04-02 PROCEDURE — 99213 PR OFFICE/OUTPT VISIT, EST, LEVL III, 20-29 MIN: ICD-10-PCS | Mod: HCNC,S$GLB,, | Performed by: COLON & RECTAL SURGERY

## 2019-04-02 PROCEDURE — 1101F PT FALLS ASSESS-DOCD LE1/YR: CPT | Mod: HCNC,CPTII,S$GLB, | Performed by: COLON & RECTAL SURGERY

## 2019-04-02 RX ORDER — AMITRIPTYLINE HYDROCHLORIDE 25 MG/1
25 TABLET, FILM COATED ORAL NIGHTLY
Qty: 30 TABLET | Refills: 11 | Status: SHIPPED | OUTPATIENT
Start: 2019-04-02 | End: 2019-12-06 | Stop reason: SDUPTHER

## 2019-04-02 NOTE — PROGRESS NOTES
CRS Office Visit Follow-up  Referring Md:   Aaareferral Self  No address on file    SUBJECTIVE:     Chief Complaint:  Fecal incontinence    History of Present Illness:  Patient is a 89 y.o. male presents with fecal incontinence. The patient is a established patient to this practice.     Course is as follows:  2014: Seen by Dr. Bran for fecal incontinence.  Received Solesta injections with some improvement  Previous manometry study showed a low resting sphincter pressure, decreased sensation, and normal squeezing pressure.  12/2018:  Seen by NP here for fecal smearing.  Happens weekly, small amount of stool in underwear. Denies any full incontinence, blood in stool, change in bowel habits, abdominal pain. On plavix. Not taking any fiber   - FiberCon was recommended.  On Followup exam, he had mild improvement of his fecal incontinence.    Current status:  Has fecal leakage once per week.  He has fecal urgency and is unable to make it to the toilet time.  He often does not sense that he is passing stool.  He has 3-4 bowel movements per week.  He denies any bleeding. He does not wear a diaper or pad.  He frequently has fecal smearing at the end of the day apparent in his underwear  Functionally, he is active and performs all of his activities daily living.  He lives at home with his wife.  He does all of his shopping.     Last Colonoscopy: 1/2011:  One 5 mm polyp in the descending colon.  This was resected and retrieved.  Pathology demonstrated extensive cautery artifact  No family history of colon or rectal cancer    Review of Systems:  Review of Systems   Constitutional: Negative for chills, diaphoresis, fever, malaise/fatigue and weight loss.   HENT: Negative for congestion.    Respiratory: Negative for shortness of breath.    Cardiovascular: Negative for chest pain and leg swelling.        Intermittent shoulder pain   Gastrointestinal: Negative for abdominal pain, blood in stool, constipation, nausea and  "vomiting.   Genitourinary: Negative for dysuria.   Musculoskeletal: Negative for back pain and myalgias.   Skin: Negative for rash.   Neurological: Negative for dizziness and weakness.   Endo/Heme/Allergies: Does not bruise/bleed easily.   Psychiatric/Behavioral: Negative for depression.       OBJECTIVE:     Vital Signs (Most Recent)  BP (!) 141/75 (BP Location: Left arm, Patient Position: Sitting, BP Method: Large (Automatic))   Pulse 83   Ht 5' 8" (1.727 m)   Wt 81.8 kg (180 lb 5.4 oz)   BMI 27.42 kg/m²     Physical Exam:  General: White male in no distress   Neuro: alert and oriented x 4.  Moves all extremities.     HEENT: no icterus.  Trachea midline  Respiratory: respirations are even and unlabored  Cardiac: regular rate  Abdomen:  Soft, nontender, no masses  Extremities: Warm dry and intact  Skin: no rashes  Anorectal:  External exam is normal. Digital exam is performed. Decreased resting tone.  Good squeeze pressure.  Prostate exam demonstrated nodularity on either side.    Labs:  H&H of 12 and 38.  Albumin of 3.7.  Normal renal function    Imaging: none      ASSESSMENT/PLAN:     Randall was seen today for fecal incontinence.    Diagnoses and all orders for this visit:    Fecal smearing  -     amitriptyline (ELAVIL) 25 MG tablet; Take 1 tablet (25 mg total) by mouth every evening.        89-year-old gentleman with intermittent fecal incontinence. We had a long discussion regarding the treatment strategies for fecal incontinence. He is currently taking FiberCon once per day.  I recommended increasing his FiberCon to twice per day, additionally, we will trial Elavil to see if that assist with his intermittent incontinence.  Also, I recommended to start wearing an adult diaper at night to decrease his accidents.  We did discuss the role of potential sacral nerve stimulator.  Given his advanced age, I am hesitant to place a sacral nerve stimulator prior to exhausting all medical options.  In regards to his " intermittent left shoulder pain, I did ask that he reach out to his cardiologist or primary care physician for further workup and to rule out any underlying cardiac cause.    WADE Graves MD  Staff Surgeon  [facility]

## 2019-04-02 NOTE — PATIENT INSTRUCTIONS
For the treatment of fecal incontinence:    Take FiberCon 1 pill twice per day  Start using an adult diaper at night to assist with intermittent fecal leakage  Take Elavil as directed.  Return clinic in 6 weeks.    WADE Graves MD  Staff Surgeon  Colon [facility] Rectal Surgery

## 2019-04-10 RX ORDER — CLOPIDOGREL BISULFATE 75 MG/1
TABLET ORAL
Qty: 90 TABLET | Refills: 0 | Status: SHIPPED | OUTPATIENT
Start: 2019-04-10 | End: 2019-07-19 | Stop reason: SDUPTHER

## 2019-04-16 RX ORDER — CHOLESTYRAMINE LIGHT 4 G/5.7G
POWDER, FOR SUSPENSION ORAL
Qty: 180 PACKET | Refills: 0 | Status: SHIPPED | OUTPATIENT
Start: 2019-04-16 | End: 2019-12-03 | Stop reason: SDUPTHER

## 2019-04-26 RX ORDER — TOLTERODINE 4 MG/1
CAPSULE, EXTENDED RELEASE ORAL
Qty: 30 CAPSULE | Refills: 11 | Status: SHIPPED | OUTPATIENT
Start: 2019-04-26 | End: 2019-12-03 | Stop reason: SDUPTHER

## 2019-05-03 ENCOUNTER — HOSPITAL ENCOUNTER (OUTPATIENT)
Dept: CARDIOLOGY | Facility: CLINIC | Age: 84
Discharge: HOME | End: 2019-05-03
Payer: MEDICARE

## 2019-05-03 ENCOUNTER — OFFICE VISIT (OUTPATIENT)
Dept: NEUROLOGY | Facility: CLINIC | Age: 84
End: 2019-05-03
Payer: MEDICARE

## 2019-05-03 VITALS
WEIGHT: 180.31 LBS | BODY MASS INDEX: 27.33 KG/M2 | HEIGHT: 68 IN | SYSTOLIC BLOOD PRESSURE: 123 MMHG | DIASTOLIC BLOOD PRESSURE: 72 MMHG | HEART RATE: 86 BPM

## 2019-05-03 DIAGNOSIS — R26.9 GAIT DISORDER: Primary | ICD-10-CM

## 2019-05-03 DIAGNOSIS — M25.512 LEFT SHOULDER PAIN, UNSPECIFIED CHRONICITY: ICD-10-CM

## 2019-05-03 DIAGNOSIS — F03.90 DEMENTIA WITHOUT BEHAVIORAL DISTURBANCE, UNSPECIFIED DEMENTIA TYPE: ICD-10-CM

## 2019-05-03 PROCEDURE — 99999 PR PBB SHADOW E&M-EST. PATIENT-LVL III: CPT | Mod: PBBFAC,HCNC,, | Performed by: PSYCHIATRY & NEUROLOGY

## 2019-05-03 PROCEDURE — 1101F PR PT FALLS ASSESS DOC 0-1 FALLS W/OUT INJ PAST YR: ICD-10-PCS | Mod: HCNC,CPTII,S$GLB, | Performed by: PSYCHIATRY & NEUROLOGY

## 2019-05-03 PROCEDURE — 99499 UNLISTED E&M SERVICE: CPT | Mod: HCNC,S$GLB,, | Performed by: PSYCHIATRY & NEUROLOGY

## 2019-05-03 PROCEDURE — 93005 EKG 12-LEAD: ICD-10-PCS | Mod: HCNC,S$GLB,, | Performed by: PSYCHIATRY & NEUROLOGY

## 2019-05-03 PROCEDURE — 93005 ELECTROCARDIOGRAM TRACING: CPT | Mod: HCNC,S$GLB,, | Performed by: PSYCHIATRY & NEUROLOGY

## 2019-05-03 PROCEDURE — 99214 OFFICE O/P EST MOD 30 MIN: CPT | Mod: HCNC,S$GLB,, | Performed by: PSYCHIATRY & NEUROLOGY

## 2019-05-03 PROCEDURE — 99499 RISK ADDL DX/OHS AUDIT: ICD-10-PCS | Mod: HCNC,S$GLB,, | Performed by: PSYCHIATRY & NEUROLOGY

## 2019-05-03 PROCEDURE — 1101F PT FALLS ASSESS-DOCD LE1/YR: CPT | Mod: HCNC,CPTII,S$GLB, | Performed by: PSYCHIATRY & NEUROLOGY

## 2019-05-03 PROCEDURE — 93010 ELECTROCARDIOGRAM REPORT: CPT | Mod: HCNC,S$GLB,, | Performed by: INTERNAL MEDICINE

## 2019-05-03 PROCEDURE — 99999 PR PBB SHADOW E&M-EST. PATIENT-LVL III: ICD-10-PCS | Mod: PBBFAC,HCNC,, | Performed by: PSYCHIATRY & NEUROLOGY

## 2019-05-03 PROCEDURE — 99214 PR OFFICE/OUTPT VISIT, EST, LEVL IV, 30-39 MIN: ICD-10-PCS | Mod: HCNC,S$GLB,, | Performed by: PSYCHIATRY & NEUROLOGY

## 2019-05-03 PROCEDURE — 93010 EKG 12-LEAD: ICD-10-PCS | Mod: HCNC,S$GLB,, | Performed by: INTERNAL MEDICINE

## 2019-05-03 NOTE — PROGRESS NOTES
"Randall Stevens I. Chief Complaints during this visit:  f/u Patient visit for  Gait disturbance    No referring provider defined for this encounter.    Primary Care Physician  Tiago Delaney MD  [id number]  [facility] [id number]      History of present illness:   89 y.o.  M seen in f/u for dementia and gait disturbance.  Unaccompanied.      Completed PT, found it helpful.  No falls.    Forgot his hearing aids today.    Pain in left shoulder, short episodes.  Several times/day.  No obvious trigger.  Does not matter if he has moved arm.  Concerned that in past, this resulted in a cardiac stent.      Interval history 1/7/19:  He gets out of breath with long walk from garage, but did not have to take break.  No falls.  Appetite good.  Eating "too much ice cream."  Sleeping well.  Mood fine.  Mildly irritated with his gait.  Rides bike without problem.  +nocturia  Endorses bowel leakage, mild.  Continued low back pain.    Interval history 8/13/18:  He reports that his gait is about the same, no falls.    Interval history 5/14/18:  Accompanied by wife and here to review the results of cisternogram.  No falls.    Denies heart failure or cardiomyopathy.  Still has low back pain that worsens as he walks.    From my note note 3/23/18:  Unaccompanied, but I spoke with wife by phone.  Here today for f/u results.  He has started the vitamin supplements.  No falls.    Interval history 12/19/17:  consultation at the request of  Dr. Monk for evaluation of gait disorder.  Had aortic valve replacement and since then, he has more trouble walking, a couple falls (last was a month ago).  Worse with eyes closed and in dark.  Has history of back problems with RFA in recent time, bilateral knee surgery.  Does not use cane or walker and resistent to using them.  Feet remain swollen.  No syncope or presyncope.    Has known aortic atherosclerosis and aortic valve replacement.  Bilateral carotid disease is on problem list, but I " can't find study to confirm this.      II.  Review of systems:  As in HPI,  otherwise, balance 3 systems reviewed and are negative.    III.  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    PVD (peripheral vascular disease) 8/22/2018    Stenosis of aortic and mitral valves     aortic DAHLIA 1.1 CM     Family History   Problem Relation Age of Onset    No Known Problems Father      Social History     Socioeconomic History    Marital status:      Spouse name: Sade   Social Needs   Occupational History    Occupation: Retired, insurance company     Employer: retired 1992   Tobacco Use    Smoking status: Never Smoker    Smokeless tobacco: Never Used   Substance and Sexual Activity    Alcohol use: Yes     Comment: less than one  drink weekly         Current Outpatient Medications on File Prior to Visit   Medication Sig Dispense Refill    amitriptyline (ELAVIL) 25 MG tablet Take 1 tablet (25 mg total) by mouth every evening. 30 tablet 11    amLODIPine (NORVASC) 5 MG tablet TAKE 1 TABLET(5 MG) BY MOUTH EVERY DAY 30 tablet 6    aspirin 81 MG Chew Take 81 mg by mouth once daily.        CHOLESTYRAMINE LIGHT 4 gram packet DISSOLVE 1 PACKET IN LIQUID AND DRINK TWICE DAILY 180 packet 0    clopidogrel (PLAVIX) 75 mg tablet TAKE 1 TABLET BY MOUTH ONCE DAILY 90 tablet 0    finasteride (PROSCAR) 5 mg tablet TAKE 1 TABLET(5 MG) BY MOUTH EVERY DAY 90 tablet 3    fluorometholone 0.1% (FML) 0.1 % DrpS       gabapentin (NEURONTIN) 300 MG capsule TAKE 1 CAPSULE(300 MG) BY MOUTH EVERY EVENING 90 capsule 3    omeprazole (PRILOSEC) 20 MG capsule Take 1 capsule (20 mg total) by mouth once daily. (Patient taking differently: Take 40 mg by mouth once daily. ) 30 capsule 2    polycarbophil (FIBERCON) 625 mg tablet Take 625 mg by mouth  "once daily.      rivastigmine tartrate (EXELON) 3 MG capsule Take 1 capsule (3 mg total) by mouth 2 (two) times daily. 60 capsule 11    tamsulosin (FLOMAX) 0.4 mg Cap TAKE ONE CAPSULE BY MOUTH DAILY 90 capsule 3    thiamine 100 MG tablet Take 100 mg by mouth once daily.      tolterodine (DETROL LA) 4 MG 24 hr capsule TAKE 1 CAPSULE(4 MG) BY MOUTH EVERY DAY 30 capsule 11    triamcinolone acetonide 0.1% (KENALOG) 0.1 % cream Apply topically 2 (two) times daily. 80 g 1    CHOLESTYRAMINE LIGHT 4 gram packet DISSOLVE 1 PACKET IN LIQUID AND DRINK TWICE DAILY 60 packet 0    tolterodine (DETROL LA) 4 MG 24 hr capsule TAKE 1 CAPSULE(4 MG) BY MOUTH EVERY DAY 30 capsule 0     No current facility-administered medications on file prior to visit.        PRIOR problem-specific medications tried:  na    Review of patient's allergies indicates:  No Known Allergies    IV. Physical Exam    Vitals:    05/03/19 1038   BP: 123/72   Pulse: 86   Weight: 81.8 kg (180 lb 5.4 oz)   Height: 5' 8" (1.727 m)     General appearance: Well nourished, well developed, no acute distress.         Cardiovascular:  pedal pulses 2, no edema or cyanosis, heart regular rate and rhythym, no carotid bruits.         -------------------------------------------------------------  Facial Expression: normal       Affect: full       Orientation to time & place:  Oriented to time, place, person and situation       Attention & concentration:  Normal attention span and concentration       Memory:  0/5  Language: Spontaneous, fluent; able to repeat and name objects        Fund of knowledge:  Aware of current events        Speech:  normal (not dysarthric)  -------------------------------------------------------  Cranial nerves: wears glasses, visual fields full, optic discs not visualized, pupils equal round and reactive, extraocular movements intact,       facial sensation intact, face symmetrical, hearing poor, palate raises midline, shoulder shrug strength " normal, tongue protrudes midline.        -------------------------------------------------------  Musculoskeletal  Muscle tone: 1: Slight cogwheel Rigidity only detected with activation maneuver. RUE        Muscle Bulk: all 4 extremities normal        Muscle strength:  5/5 in all 4 extremities        No pronator drift  Sensation: loss in vibration up to knees       Deep tendon Reflexes: 1 bilateral biceps, triceps, none at patella and ankles        --------------------------------------------------------------  Cerebellar and Coordination  Gait:  low step height, mild imbalance, + enbloc turn, no festination.       Finger-nose: no dysmetria       Rapid Alternating Movements (pronation/supination):  R slowing; L normal  --------------------------------------------------------------  MOVEMENT DISORDERS FOCUSED EXAM  Abnormality of movement (bradykinesia, hyperkinesia) present? No    Tremor present?   No   Posture:  stooped  Postural stability: + Rhomberg    V.  Laboratory/ Radiological Data:     Cisternogram 4/26/18:  No evidence of normal pressure hydrocephalus.    CT head 12/26/17:  Age-appropriate generalized cerebral volume loss  Mild prominence of the lateral and third ventricles which may be compensatory to volume loss however slightly disproportionate cannot exclude component of hydrocephalus.  There is a small hypodensity in the left cerebellum suggestive for remote infarction.      Lab Results   Component Value Date    TSH 1.423 12/19/2017     Lab Results   Component Value Date    RMUEGZDR52 355 12/19/2017     Lab Results   Component Value Date    HGBA1C 5.7 04/10/2014        MRI brain 12/26/17:  Mild prominence of the lateral and third ventricles which may be compensatory to volume loss however slightly disproportionate cannot exclude component of hydrocephalus.    There is a small hypodensity in the left cerebellum suggestive for remote infarction.        VI. Assessment and Plan            Problem List  Items Addressed This Visit        1 - High    Gait disorder - Primary    Overview     Mild imbalance, frontal gait ataxia vs lower body parkinsonism.  NPH ruled out 4/2018         Current Assessment & Plan     Mild imbalance, looking more parkinsonian each visit, but still not significant enough, in my opinion, to warrant risking dopaminergics.  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   -> consider levodopa if symptoms become more clearly parkinsonian.            2     Dementia    Overview     Mini-Cog score 2 (2/22/17)  [place] Cognitive Assessment:   20/30 (12/2017)         Current Assessment & Plan     Despite low testing scores, he is functioning well.   -> continue exelon   -> advised to continue daily B12, B1 supplements            3     Left shoulder pain    Current Assessment & Plan     Neurogenic, arthritic vs cardiogenic.   -> EKG   -> advised to see his cardiologist, soon   -> f/u with pcp         Relevant Orders    EKG 12-lead              Follow up in about 4 months (around 9/3/2019).

## 2019-05-03 NOTE — ASSESSMENT & PLAN NOTE
Neurogenic, arthritic vs cardiogenic.   -> EKG   -> advised to see his cardiologist, soon   -> f/u with pcp

## 2019-05-03 NOTE — PATIENT INSTRUCTIONS
Please follow-up soon with Cardiology and your Primary care doctor regarding the left shoulder pain.    I will check ECG today for your heart and let you know if there is any urgency to getting your heart further checked out.

## 2019-05-03 NOTE — ASSESSMENT & PLAN NOTE
Mild imbalance, looking more parkinsonian each visit, but still not significant enough, in my opinion, to warrant risking dopaminergics.  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   -> consider levodopa if symptoms become more clearly parkinsonian.

## 2019-05-03 NOTE — Clinical Note
pls set up f/u for this man- having new left shoulder pains and he is concerned he needs another stent.

## 2019-05-06 ENCOUNTER — TELEPHONE (OUTPATIENT)
Dept: NEUROLOGY | Facility: CLINIC | Age: 84
End: 2019-05-06

## 2019-05-06 NOTE — TELEPHONE ENCOUNTER
Reviewed ekg report.  Patient has appt with Dr. Monk tomorrow.        EKG 5/4/19:  Test Reason : M25.512,    Vent. Rate : 069 BPM     Atrial Rate : 069 BPM     P-R Int : 000 ms          QRS Dur : 112 ms      QT Int : 398 ms       P-R-T Axes : 000 -57 089 degrees     QTc Int : 426 ms    Sinus bradycardia with Premature atrial complexes  Left axis deviation  Abnormal ECG  When compared with ECG of 31-AUG-2016 03:29,  QRS duration has increased  Nonspecific T wave abnormality has replaced inverted T waves in Lateral  leads  Confirmed by RENARD DICKERSON, GRIFFIN ([id number]) on 5/3/2019 3:30:43 PM

## 2019-05-07 ENCOUNTER — LAB VISIT (OUTPATIENT)
Dept: LAB | Facility: HOSPITAL | Age: 84
End: 2019-05-07
Attending: INTERNAL MEDICINE
Payer: MEDICARE

## 2019-05-07 ENCOUNTER — OFFICE VISIT (OUTPATIENT)
Dept: CARDIOLOGY | Facility: CLINIC | Age: 84
End: 2019-05-07
Payer: MEDICARE

## 2019-05-07 VITALS
DIASTOLIC BLOOD PRESSURE: 65 MMHG | HEART RATE: 74 BPM | SYSTOLIC BLOOD PRESSURE: 141 MMHG | WEIGHT: 177.94 LBS | BODY MASS INDEX: 26.97 KG/M2 | HEIGHT: 68 IN

## 2019-05-07 DIAGNOSIS — R60.9 DEPENDENT EDEMA: ICD-10-CM

## 2019-05-07 DIAGNOSIS — Z95.2 S/P AORTIC VALVE REPLACEMENT: ICD-10-CM

## 2019-05-07 DIAGNOSIS — I50.32 CHRONIC DIASTOLIC CONGESTIVE HEART FAILURE: ICD-10-CM

## 2019-05-07 DIAGNOSIS — I10 HYPERTENSION, ESSENTIAL: ICD-10-CM

## 2019-05-07 DIAGNOSIS — I50.32 CHRONIC DIASTOLIC CONGESTIVE HEART FAILURE: Primary | ICD-10-CM

## 2019-05-07 DIAGNOSIS — I35.0 NONRHEUMATIC AORTIC VALVE STENOSIS: ICD-10-CM

## 2019-05-07 LAB
ALBUMIN SERPL BCP-MCNC: 3.2 G/DL (ref 3.5–5.2)
ALP SERPL-CCNC: 165 U/L (ref 55–135)
ALT SERPL W/O P-5'-P-CCNC: 15 U/L (ref 10–44)
ANION GAP SERPL CALC-SCNC: 7 MMOL/L (ref 8–16)
AST SERPL-CCNC: 22 U/L (ref 10–40)
BASOPHILS # BLD AUTO: 0.05 K/UL (ref 0–0.2)
BASOPHILS NFR BLD: 0.7 % (ref 0–1.9)
BILIRUB SERPL-MCNC: 0.5 MG/DL (ref 0.1–1)
BNP SERPL-MCNC: 112 PG/ML (ref 0–99)
BUN SERPL-MCNC: 13 MG/DL (ref 10–30)
CALCIUM SERPL-MCNC: 9.3 MG/DL (ref 8.7–10.5)
CHLORIDE SERPL-SCNC: 103 MMOL/L (ref 95–110)
CO2 SERPL-SCNC: 26 MMOL/L (ref 23–29)
CREAT SERPL-MCNC: 1.2 MG/DL (ref 0.5–1.4)
DIFFERENTIAL METHOD: ABNORMAL
EOSINOPHIL # BLD AUTO: 0.2 K/UL (ref 0–0.5)
EOSINOPHIL NFR BLD: 2.5 % (ref 0–8)
ERYTHROCYTE [DISTWIDTH] IN BLOOD BY AUTOMATED COUNT: 13.7 % (ref 11.5–14.5)
EST. GFR  (AFRICAN AMERICAN): >60 ML/MIN/1.73 M^2
EST. GFR  (NON AFRICAN AMERICAN): 52.2 ML/MIN/1.73 M^2
GLUCOSE SERPL-MCNC: 88 MG/DL (ref 70–110)
HCT VFR BLD AUTO: 35.4 % (ref 40–54)
HGB BLD-MCNC: 11.6 G/DL (ref 14–18)
IMM GRANULOCYTES # BLD AUTO: 0.03 K/UL (ref 0–0.04)
IMM GRANULOCYTES NFR BLD AUTO: 0.4 % (ref 0–0.5)
LYMPHOCYTES # BLD AUTO: 3 K/UL (ref 1–4.8)
LYMPHOCYTES NFR BLD: 44.8 % (ref 18–48)
MCH RBC QN AUTO: 32.9 PG (ref 27–31)
MCHC RBC AUTO-ENTMCNC: 32.8 G/DL (ref 32–36)
MCV RBC AUTO: 100 FL (ref 82–98)
MONOCYTES # BLD AUTO: 1.8 K/UL (ref 0.3–1)
MONOCYTES NFR BLD: 26.3 % (ref 4–15)
NEUTROPHILS # BLD AUTO: 1.7 K/UL (ref 1.8–7.7)
NEUTROPHILS NFR BLD: 25.3 % (ref 38–73)
NRBC BLD-RTO: 0 /100 WBC
PLATELET # BLD AUTO: 197 K/UL (ref 150–350)
PMV BLD AUTO: 9.4 FL (ref 9.2–12.9)
POTASSIUM SERPL-SCNC: 4.5 MMOL/L (ref 3.5–5.1)
PROT SERPL-MCNC: 8 G/DL (ref 6–8.4)
RBC # BLD AUTO: 3.53 M/UL (ref 4.6–6.2)
SODIUM SERPL-SCNC: 136 MMOL/L (ref 136–145)
WBC # BLD AUTO: 6.77 K/UL (ref 3.9–12.7)

## 2019-05-07 PROCEDURE — 99214 OFFICE O/P EST MOD 30 MIN: CPT | Mod: HCNC,S$GLB,, | Performed by: INTERNAL MEDICINE

## 2019-05-07 PROCEDURE — 1101F PR PT FALLS ASSESS DOC 0-1 FALLS W/OUT INJ PAST YR: ICD-10-PCS | Mod: HCNC,CPTII,S$GLB, | Performed by: INTERNAL MEDICINE

## 2019-05-07 PROCEDURE — 80053 COMPREHEN METABOLIC PANEL: CPT | Mod: HCNC

## 2019-05-07 PROCEDURE — 83880 ASSAY OF NATRIURETIC PEPTIDE: CPT | Mod: HCNC

## 2019-05-07 PROCEDURE — 99999 PR PBB SHADOW E&M-EST. PATIENT-LVL III: ICD-10-PCS | Mod: PBBFAC,HCNC,, | Performed by: INTERNAL MEDICINE

## 2019-05-07 PROCEDURE — 99999 PR PBB SHADOW E&M-EST. PATIENT-LVL III: CPT | Mod: PBBFAC,HCNC,, | Performed by: INTERNAL MEDICINE

## 2019-05-07 PROCEDURE — 36415 COLL VENOUS BLD VENIPUNCTURE: CPT | Mod: HCNC

## 2019-05-07 PROCEDURE — 85025 COMPLETE CBC W/AUTO DIFF WBC: CPT | Mod: HCNC

## 2019-05-07 PROCEDURE — 1101F PT FALLS ASSESS-DOCD LE1/YR: CPT | Mod: HCNC,CPTII,S$GLB, | Performed by: INTERNAL MEDICINE

## 2019-05-07 PROCEDURE — 99214 PR OFFICE/OUTPT VISIT, EST, LEVL IV, 30-39 MIN: ICD-10-PCS | Mod: HCNC,S$GLB,, | Performed by: INTERNAL MEDICINE

## 2019-05-07 RX ORDER — TORSEMIDE 20 MG/1
20 TABLET ORAL DAILY
Qty: 30 TABLET | Refills: 11 | Status: SHIPPED | OUTPATIENT
Start: 2019-05-07 | End: 2020-03-16

## 2019-05-07 NOTE — PROGRESS NOTES
Subjective:    Patient ID:  Randall Strickland Jr. is a 89 y.o. male who presents for follow-up of TAVAR    HPI     The patient is a 89 year old male post TAVR 8/30/16. He is followed by Dr Delarosa with a gait disorder and dementia. He reports SOB wiyht walking but not with exercise bike. He has no rest dyspnea or orthopnea.  Lab Results   Component Value Date     05/30/2018    K 5.2 (H) 05/30/2018     05/30/2018    CO2 28 05/30/2018    BUN 20 05/30/2018    CREATININE 1.3 05/30/2018    GLU 84 05/30/2018    HGBA1C 5.7 04/10/2014    MG 1.7 02/10/2017    AST 22 05/30/2018    ALT 15 05/30/2018    ALBUMIN 3.7 05/30/2018    PROT 8.0 05/30/2018    BILITOT 0.7 05/30/2018    WBC 6.97 05/30/2018    HGB 12.4 (L) 05/30/2018    HCT 37.9 (L) 05/30/2018     (H) 05/30/2018     05/30/2018    INR 1.0 08/29/2016    PSA 0.6 03/07/2005    TSH 1.423 12/19/2017         Lab Results   Component Value Date    CHOL 135 05/30/2018    HDL 37 (L) 05/30/2018    TRIG 83 05/30/2018       Lab Results   Component Value Date    LDLCALC 81.4 05/30/2018       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    PVD (peripheral vascular disease) 8/22/2018    Stenosis of aortic and mitral valves     aortic DAHLIA 1.1 CM       Current Outpatient Medications:     amitriptyline (ELAVIL) 25 MG tablet, Take 1 tablet (25 mg total) by mouth every evening., Disp: 30 tablet, Rfl: 11    amLODIPine (NORVASC) 5 MG tablet, TAKE 1 TABLET(5 MG) BY MOUTH EVERY DAY, Disp: 30 tablet, Rfl: 6    aspirin 81 MG Chew, Take 81 mg by mouth once daily.  , Disp: , Rfl:     CHOLESTYRAMINE LIGHT 4 gram packet, DISSOLVE 1 PACKET IN LIQUID AND DRINK TWICE DAILY, Disp: 60 packet, Rfl: 0    CHOLESTYRAMINE LIGHT 4 gram packet, DISSOLVE 1 PACKET IN LIQUID AND DRINK TWICE DAILY, Disp: 180  packet, Rfl: 0    clopidogrel (PLAVIX) 75 mg tablet, TAKE 1 TABLET BY MOUTH ONCE DAILY, Disp: 90 tablet, Rfl: 0    finasteride (PROSCAR) 5 mg tablet, TAKE 1 TABLET(5 MG) BY MOUTH EVERY DAY, Disp: 90 tablet, Rfl: 3    fluorometholone 0.1% (FML) 0.1 % DrpS, , Disp: , Rfl:     gabapentin (NEURONTIN) 300 MG capsule, TAKE 1 CAPSULE(300 MG) BY MOUTH EVERY EVENING, Disp: 90 capsule, Rfl: 3    omeprazole (PRILOSEC) 20 MG capsule, Take 1 capsule (20 mg total) by mouth once daily. (Patient taking differently: Take 40 mg by mouth once daily. ), Disp: 30 capsule, Rfl: 2    polycarbophil (FIBERCON) 625 mg tablet, Take 625 mg by mouth once daily., Disp: , Rfl:     rivastigmine tartrate (EXELON) 3 MG capsule, Take 1 capsule (3 mg total) by mouth 2 (two) times daily., Disp: 60 capsule, Rfl: 11    tamsulosin (FLOMAX) 0.4 mg Cap, TAKE ONE CAPSULE BY MOUTH DAILY, Disp: 90 capsule, Rfl: 3    thiamine 100 MG tablet, Take 100 mg by mouth once daily., Disp: , Rfl:     tolterodine (DETROL LA) 4 MG 24 hr capsule, TAKE 1 CAPSULE(4 MG) BY MOUTH EVERY DAY, Disp: 30 capsule, Rfl: 0    tolterodine (DETROL LA) 4 MG 24 hr capsule, TAKE 1 CAPSULE(4 MG) BY MOUTH EVERY DAY, Disp: 30 capsule, Rfl: 11    triamcinolone acetonide 0.1% (KENALOG) 0.1 % cream, Apply topically 2 (two) times daily., Disp: 80 g, Rfl: 1    torsemide (DEMADEX) 20 MG Tab, Take 1 tablet (20 mg total) by mouth once daily., Disp: 30 tablet, Rfl: 11          Review of Systems   Constitution: Negative for decreased appetite, diaphoresis, fever, malaise/fatigue, weight gain and weight loss.   HENT: Negative for congestion, ear discharge, ear pain and nosebleeds.    Eyes: Negative for blurred vision, double vision and visual disturbance.   Cardiovascular: Positive for dyspnea on exertion and leg swelling. Negative for chest pain, claudication, cyanosis, irregular heartbeat, near-syncope, orthopnea, palpitations, paroxysmal nocturnal dyspnea and syncope.   Respiratory:  "Negative for cough, hemoptysis, shortness of breath, sleep disturbances due to breathing, snoring, sputum production and wheezing.    Endocrine: Negative for polydipsia, polyphagia and polyuria.   Hematologic/Lymphatic: Negative for adenopathy and bleeding problem. Does not bruise/bleed easily.   Skin: Negative for color change, nail changes, poor wound healing and rash.   Musculoskeletal: Negative for muscle cramps and muscle weakness.   Gastrointestinal: Negative for abdominal pain, anorexia, change in bowel habit, hematochezia, nausea and vomiting.   Genitourinary: Negative for dysuria, frequency and hematuria.   Neurological: Positive for disturbances in coordination and tremors. Negative for brief paralysis, difficulty with concentration, excessive daytime sleepiness, dizziness, focal weakness, headaches, light-headedness, seizures, vertigo and weakness.   Psychiatric/Behavioral: Negative for altered mental status and depression.   Allergic/Immunologic: Negative for persistent infections.        Objective:BP (!) 141/65 (BP Location: Left arm, Patient Position: Sitting, BP Method: Large (Automatic))   Pulse 74   Ht 5' 8" (1.727 m)   Wt 80.7 kg (177 lb 14.6 oz)   BMI 27.05 kg/m²             Physical Exam   Constitutional: He is oriented to person, place, and time. He appears well-developed and well-nourished.   HENT:   Head: Normocephalic.   Right Ear: External ear normal.   Left Ear: External ear normal.   Nose: Nose normal.   Inspection of lips, teeth and gums normal   Eyes: Pupils are equal, round, and reactive to light. EOM are normal. No scleral icterus.   Neck: Normal range of motion. Neck supple. No JVD present. No tracheal deviation present. No thyromegaly present.   Cardiovascular: Normal rate, regular rhythm and intact distal pulses. Exam reveals no gallop and no friction rub.   Murmur heard.   Harsh midsystolic murmur is present with a grade of 2/6 at the upper right sternal border and upper left " sternal border.  Pulmonary/Chest: Effort normal and breath sounds normal.   Abdominal: Bowel sounds are normal. He exhibits no distension. There is no hepatosplenomegaly. There is no tenderness. There is no guarding.   Musculoskeletal: Normal range of motion. He exhibits edema (trace edema right leg  +1 left). He exhibits no tenderness.   Lymphadenopathy:   Palpation of neck and groin lymph nodes normal   Neurological: He is alert and oriented to person, place, and time. No cranial nerve deficit. He exhibits normal muscle tone. Coordination normal.   Skin: Skin is dry.   Palpation of skin normal   Psychiatric: His behavior is normal. Judgment and thought content normal.         Assessment:       1. Chronic diastolic congestive heart failure    2. Hypertension, essential    3. Nonrheumatic aortic valve stenosis    4. S/P aortic valve replacement    5. Dependent edema         Plan:       Randall was seen today for congestive heart failure.    Diagnoses and all orders for this visit:    Chronic diastolic congestive heart failure  -     Comprehensive metabolic panel; Future; Expected date: 05/07/2019  -     B-TYPE NATRIURETIC PEPTIDE; Future; Expected date: 05/07/2019  -     Transthoracic echo (TTE) 2D with Color Flow; Future  -     torsemide (DEMADEX) 20 MG Tab; Take 1 tablet (20 mg total) by mouth once daily.    Hypertension, essential  -     Comprehensive metabolic panel; Future; Expected date: 05/07/2019    Nonrheumatic aortic valve stenosis    S/P aortic valve replacement  -     CBC auto differential; Future; Expected date: 05/07/2019    Dependent edema  -     B-TYPE NATRIURETIC PEPTIDE; Future; Expected date: 05/07/2019  -     Transthoracic echo (TTE) 2D with Color Flow; Future  -     torsemide (DEMADEX) 20 MG Tab; Take 1 tablet (20 mg total) by mouth once daily.

## 2019-05-08 ENCOUNTER — OFFICE VISIT (OUTPATIENT)
Dept: OPTOMETRY | Facility: CLINIC | Age: 84
End: 2019-05-08
Payer: MEDICARE

## 2019-05-08 DIAGNOSIS — Z96.1 PSEUDOPHAKIA OF BOTH EYES: ICD-10-CM

## 2019-05-08 DIAGNOSIS — H35.033 HYPERTENSIVE RETINOPATHY, BILATERAL: Primary | ICD-10-CM

## 2019-05-08 PROCEDURE — 92014 COMPRE OPH EXAM EST PT 1/>: CPT | Mod: HCNC,S$GLB,, | Performed by: OPTOMETRIST

## 2019-05-08 PROCEDURE — 99999 PR PBB SHADOW E&M-EST. PATIENT-LVL II: CPT | Mod: PBBFAC,HCNC,, | Performed by: OPTOMETRIST

## 2019-05-08 PROCEDURE — 92014 PR EYE EXAM, EST PATIENT,COMPREHESV: ICD-10-PCS | Mod: HCNC,S$GLB,, | Performed by: OPTOMETRIST

## 2019-05-08 PROCEDURE — 99999 PR PBB SHADOW E&M-EST. PATIENT-LVL II: ICD-10-PCS | Mod: PBBFAC,HCNC,, | Performed by: OPTOMETRIST

## 2019-05-08 NOTE — PROGRESS NOTES
TONO RAI 08/2018  Patient hasn't noticed any vision changes.  Glasses about 1   yr. Old, still seem fine.  Patient defers refraction today. Not using any   drops.     Last edited by Bettye Mills on 5/8/2019 10:20 AM. (History)            Assessment /Plan     For exam results, see Encounter Report.    Hypertensive retinopathy, bilateral    Pseudophakia of both eyes      1. Mild, Monitor condition. Patient to report any changes. RTC 1 year recheck.  2. Monitor condition. Patient to report any changes. RTC 1 year recheck.

## 2019-05-10 ENCOUNTER — CLINICAL SUPPORT (OUTPATIENT)
Dept: CARDIOLOGY | Facility: CLINIC | Age: 84
End: 2019-05-10
Attending: INTERNAL MEDICINE
Payer: MEDICARE

## 2019-05-10 VITALS
DIASTOLIC BLOOD PRESSURE: 66 MMHG | BODY MASS INDEX: 26.98 KG/M2 | HEART RATE: 63 BPM | HEIGHT: 68 IN | SYSTOLIC BLOOD PRESSURE: 120 MMHG | WEIGHT: 178 LBS

## 2019-05-10 DIAGNOSIS — R60.9 DEPENDENT EDEMA: ICD-10-CM

## 2019-05-10 DIAGNOSIS — I50.32 CHRONIC DIASTOLIC CONGESTIVE HEART FAILURE: ICD-10-CM

## 2019-05-10 LAB
ASCENDING AORTA: 3.86 CM
AV INDEX (PROSTH): 0.49
AV MEAN GRADIENT: 14.74 MMHG
AV PEAK GRADIENT: 23.43 MMHG
AV VALVE AREA: 2.7 CM2
AV VELOCITY RATIO: 0.45
BSA FOR ECHO PROCEDURE: 1.97 M2
CV ECHO LV RWT: 0.34 CM
DOP CALC AO PEAK VEL: 2.42 M/S
DOP CALC AO VTI: 52.59 CM
DOP CALC LVOT AREA: 5.47 CM2
DOP CALC LVOT DIAMETER: 2.64 CM
DOP CALC LVOT PEAK VEL: 1.1 M/S
DOP CALC LVOT STROKE VOLUME: 142.25 CM3
DOP CALCLVOT PEAK VEL VTI: 26 CM
E WAVE DECELERATION TIME: 313.64 MSEC
E/A RATIO: 0.81
E/E' RATIO: 14.15
ECHO LV POSTERIOR WALL: 0.82 CM (ref 0.6–1.1)
FRACTIONAL SHORTENING: 39 % (ref 28–44)
INTERVENTRICULAR SEPTUM: 0.83 CM (ref 0.6–1.1)
IVRT: 0.1 MSEC
LA MAJOR: 6.51 CM
LA MINOR: 6.34 CM
LA WIDTH: 4.19 CM
LEFT ATRIUM SIZE: 3.9 CM
LEFT ATRIUM VOLUME INDEX: 45.9 ML/M2
LEFT ATRIUM VOLUME: 89.23 CM3
LEFT INTERNAL DIMENSION IN SYSTOLE: 2.94 CM (ref 2.1–4)
LEFT VENTRICLE DIASTOLIC VOLUME INDEX: 55.17 ML/M2
LEFT VENTRICLE DIASTOLIC VOLUME: 107.31 ML
LEFT VENTRICLE MASS INDEX: 67.8 G/M2
LEFT VENTRICLE SYSTOLIC VOLUME INDEX: 17.1 ML/M2
LEFT VENTRICLE SYSTOLIC VOLUME: 33.27 ML
LEFT VENTRICULAR INTERNAL DIMENSION IN DIASTOLE: 4.8 CM (ref 3.5–6)
LEFT VENTRICULAR MASS: 131.84 G
LV LATERAL E/E' RATIO: 11.5
LV SEPTAL E/E' RATIO: 18.4
MV PEAK A VEL: 1.14 M/S
MV PEAK E VEL: 0.92 M/S
PISA TR MAX VEL: 2.48 M/S
PULM VEIN S/D RATIO: 1.14
PV PEAK D VEL: 0.51 M/S
PV PEAK S VEL: 0.58 M/S
RA MAJOR: 6.67 CM
RA PRESSURE: 3 MMHG
RA WIDTH: 2.28 CM
RIGHT VENTRICULAR END-DIASTOLIC DIMENSION: 2.41 CM
SINUS: 3.54 CM
STJ: 3.13 CM
TDI LATERAL: 0.08
TDI SEPTAL: 0.05
TDI: 0.07
TR MAX PG: 24.6 MMHG
TRICUSPID ANNULAR PLANE SYSTOLIC EXCURSION: 2.36 CM
TV REST PULMONARY ARTERY PRESSURE: 28 MMHG

## 2019-05-10 PROCEDURE — 99999 PR PBB SHADOW E&M-EST. PATIENT-LVL II: ICD-10-PCS | Mod: PBBFAC,HCNC,,

## 2019-05-10 PROCEDURE — 93306 TTE W/DOPPLER COMPLETE: CPT | Mod: HCNC,S$GLB,, | Performed by: INTERNAL MEDICINE

## 2019-05-10 PROCEDURE — 93306 TRANSTHORACIC ECHO (TTE) COMPLETE (CUPID ONLY): ICD-10-PCS | Mod: HCNC,S$GLB,, | Performed by: INTERNAL MEDICINE

## 2019-05-10 PROCEDURE — 99999 PR PBB SHADOW E&M-EST. PATIENT-LVL II: CPT | Mod: PBBFAC,HCNC,,

## 2019-05-13 NOTE — PROGRESS NOTES
[facility] Office Visit Follow-up  Referring Md:   No referring provider defined for this encounter.    SUBJECTIVE:     Chief Complaint:  Fecal incontinence    History of Present Illness:  Patient is a 89 y.o. male presents with fecal incontinence. The patient is a established patient to this practice.     Course is as follows:  2014: Seen by Dr. Bran for fecal incontinence.  Received Solesta injections with some improvement  Previous manometry study showed a low resting sphincter pressure, decreased sensation, and normal squeezing pressure.  12/2018:  Seen by NP here for fecal smearing.  Happens weekly, small amount of stool in underwear. Denies any full incontinence, blood in stool, change in bowel habits, abdominal pain. On plavix. Not taking any fiber   - FiberCon was recommended.  On Followup exam, he had mild improvement of his fecal incontinence.    4/2/19:  Has fecal leakage once per week.  He has fecal urgency and is unable to make it to the toilet time.  He often does not sense that he is passing stool.  He has 3-4 bowel movements per week.  He denies any bleeding. He does not wear a diaper or pad.  He frequently has fecal smearing at the end of the day apparent in his underwear  Functionally, he is active and performs all of his activities daily living.  He lives at home with his wife.  He does all of his shopping.   - started on Elavil in increased FiberCon twice a day.  Recommended to wear a diaper to assist with intermittent fecal leakage    Current status:  Complains of intermittent dry mouth.  Overall feels slightly improved.  Does not wish to pursue any further intervention.     Last Colonoscopy: 1/2011:  One 5 mm polyp in the descending colon.  This was resected and retrieved.  Pathology demonstrated extensive cautery artifact  No family history of colon or rectal cancer    Review of Systems:  Review of Systems   Constitutional: Negative for chills, diaphoresis, fever, malaise/fatigue and weight loss.  "  HENT: Negative for congestion.    Respiratory: Negative for shortness of breath.    Cardiovascular: Negative for chest pain and leg swelling.        Intermittent shoulder pain   Gastrointestinal: Negative for abdominal pain, blood in stool, constipation, nausea and vomiting.   Genitourinary: Negative for dysuria.   Musculoskeletal: Negative for back pain and myalgias.   Skin: Negative for rash.   Neurological: Negative for dizziness and weakness.   Endo/Heme/Allergies: Does not bruise/bleed easily.   Psychiatric/Behavioral: Negative for depression.       OBJECTIVE:     Vital Signs (Most Recent)  /67 (BP Location: Right arm, Patient Position: Sitting, BP Method: Large (Automatic))   Pulse 68   Ht 5' 8" (1.727 m)   Wt 78.7 kg (173 lb 8 oz)   BMI 26.38 kg/m²     Physical Exam:  General: White male in no distress   Neuro: alert and oriented x 4.  Moves all extremities.     HEENT: no icterus.  Trachea midline  Respiratory: respirations are even and unlabored  Cardiac: regular rate  Abdomen:  Soft, nontender, no masses  Extremities: Warm dry and intact  Skin: no rashes  Anorectal:  Deferred today.  From prior:  "External exam is normal. Digital exam is performed. Decreased resting tone.  Good squeeze pressure.  Prostate exam demonstrated nodularity on either side."    Labs:  H&H of 12 and 38.  Albumin of 3.7.  Normal renal function    Imaging: none      ASSESSMENT/PLAN:     Randall was seen today for fecal incontinence.    Diagnoses and all orders for this visit:    Fecal soiling due to fecal incontinence        89-year-old gentleman with intermittent fecal incontinence. We discussed today further options including pelvic physical therapy versus sacral nerve stimulation.  He is content with his current treatment.   His major concern is now dry mouth.  The Elavil may be related to his dry mouth and I have therefore asked him to stop Elavil for 1 week.  If the dry mouth improves, he can stop Elavil indefinitely.  " Otherwise, he should resume the Elavil to assist with intermittent fecal incontinence      WADE Graves MD  Staff Surgeon  Colon & Rectal Surgery

## 2019-05-14 ENCOUNTER — OFFICE VISIT (OUTPATIENT)
Dept: SURGERY | Facility: CLINIC | Age: 84
End: 2019-05-14
Payer: MEDICARE

## 2019-05-14 VITALS
SYSTOLIC BLOOD PRESSURE: 122 MMHG | DIASTOLIC BLOOD PRESSURE: 67 MMHG | HEART RATE: 68 BPM | HEIGHT: 68 IN | BODY MASS INDEX: 26.3 KG/M2 | WEIGHT: 173.5 LBS

## 2019-05-14 DIAGNOSIS — R15.9 FECAL SOILING DUE TO FECAL INCONTINENCE: Primary | ICD-10-CM

## 2019-05-14 PROCEDURE — 99999 PR PBB SHADOW E&M-EST. PATIENT-LVL III: CPT | Mod: PBBFAC,HCNC,, | Performed by: COLON & RECTAL SURGERY

## 2019-05-14 PROCEDURE — 99213 PR OFFICE/OUTPT VISIT, EST, LEVL III, 20-29 MIN: ICD-10-PCS | Mod: HCNC,S$GLB,, | Performed by: COLON & RECTAL SURGERY

## 2019-05-14 PROCEDURE — 1101F PR PT FALLS ASSESS DOC 0-1 FALLS W/OUT INJ PAST YR: ICD-10-PCS | Mod: HCNC,CPTII,S$GLB, | Performed by: COLON & RECTAL SURGERY

## 2019-05-14 PROCEDURE — 1101F PT FALLS ASSESS-DOCD LE1/YR: CPT | Mod: HCNC,CPTII,S$GLB, | Performed by: COLON & RECTAL SURGERY

## 2019-05-14 PROCEDURE — 99213 OFFICE O/P EST LOW 20 MIN: CPT | Mod: HCNC,S$GLB,, | Performed by: COLON & RECTAL SURGERY

## 2019-05-14 PROCEDURE — 99999 PR PBB SHADOW E&M-EST. PATIENT-LVL III: ICD-10-PCS | Mod: PBBFAC,HCNC,, | Performed by: COLON & RECTAL SURGERY

## 2019-05-16 ENCOUNTER — OFFICE VISIT (OUTPATIENT)
Dept: CARDIOLOGY | Facility: CLINIC | Age: 84
End: 2019-05-16
Payer: MEDICARE

## 2019-05-16 VITALS
BODY MASS INDEX: 26.36 KG/M2 | SYSTOLIC BLOOD PRESSURE: 134 MMHG | DIASTOLIC BLOOD PRESSURE: 68 MMHG | HEART RATE: 106 BPM | HEIGHT: 68 IN | WEIGHT: 173.94 LBS

## 2019-05-16 DIAGNOSIS — I50.32 CHRONIC DIASTOLIC CONGESTIVE HEART FAILURE: Primary | ICD-10-CM

## 2019-05-16 DIAGNOSIS — Z95.2 S/P AORTIC VALVE REPLACEMENT: ICD-10-CM

## 2019-05-16 DIAGNOSIS — I35.0 NONRHEUMATIC AORTIC VALVE STENOSIS: ICD-10-CM

## 2019-05-16 DIAGNOSIS — I87.2 VENOUS STASIS DERMATITIS OF BOTH LOWER EXTREMITIES: ICD-10-CM

## 2019-05-16 PROCEDURE — 1101F PR PT FALLS ASSESS DOC 0-1 FALLS W/OUT INJ PAST YR: ICD-10-PCS | Mod: HCNC,CPTII,S$GLB, | Performed by: INTERNAL MEDICINE

## 2019-05-16 PROCEDURE — 1101F PT FALLS ASSESS-DOCD LE1/YR: CPT | Mod: HCNC,CPTII,S$GLB, | Performed by: INTERNAL MEDICINE

## 2019-05-16 PROCEDURE — 99999 PR PBB SHADOW E&M-EST. PATIENT-LVL III: ICD-10-PCS | Mod: PBBFAC,HCNC,, | Performed by: INTERNAL MEDICINE

## 2019-05-16 PROCEDURE — 99213 OFFICE O/P EST LOW 20 MIN: CPT | Mod: HCNC,S$GLB,, | Performed by: INTERNAL MEDICINE

## 2019-05-16 PROCEDURE — 99213 PR OFFICE/OUTPT VISIT, EST, LEVL III, 20-29 MIN: ICD-10-PCS | Mod: HCNC,S$GLB,, | Performed by: INTERNAL MEDICINE

## 2019-05-16 PROCEDURE — 99999 PR PBB SHADOW E&M-EST. PATIENT-LVL III: CPT | Mod: PBBFAC,HCNC,, | Performed by: INTERNAL MEDICINE

## 2019-05-16 NOTE — PROGRESS NOTES
Subjective:    Patient ID:  Randall Strickland Jr. is a 89 y.o. male who presents for follow-up of Congestive Heart Failure (f/u )      HPI     The patient is a 89 year old male post TAVR 8/30/16. He is followed by Dr Delarosa with a gait disorder and dementia. He reports SOB wiht walking but not with exercise bike. He has no rest dyspnea or orthopnea but has dependent edema. His BNP was 112. He has lost 4# since last week and his edema has largely resolved. He has not been walking but rides stationary bike [free wheeling]      Conclusion 5/10/19    · Moderate left atrial enlargement.  · Normal left ventricular systolic function. The estimated ejection fraction is 55%  · Grade II (moderate) left ventricular diastolic dysfunction consistent with pseudonormalization.  · Mild right atrial enlargement.  · Normal right ventricular systolic function.  · There is a 26 mm Torres Rain S3 transcutaneously-placed aortic bioprosthesis.  · Aortic valve area is 2.7 cm2; peak velocity is 2.42 m/s; mean gradient is 14.74 mmHg. Trivial perivalvular regurgitation.  · Mild mitral regurgitation.  · Normal central venous pressure (3 mm Hg).  · The estimated PA systolic pressure is 28 mm Hg        .  Lab Results   Component Value Date     05/07/2019    K 4.5 05/07/2019     05/07/2019    CO2 26 05/07/2019    BUN 13 05/07/2019    CREATININE 1.2 05/07/2019    GLU 88 05/07/2019    HGBA1C 5.7 04/10/2014    MG 1.7 02/10/2017    AST 22 05/07/2019    ALT 15 05/07/2019    ALBUMIN 3.2 (L) 05/07/2019    PROT 8.0 05/07/2019    BILITOT 0.5 05/07/2019    WBC 6.77 05/07/2019    HGB 11.6 (L) 05/07/2019    HCT 35.4 (L) 05/07/2019     (H) 05/07/2019     05/07/2019    INR 1.0 08/29/2016    PSA 0.6 03/07/2005    TSH 1.423 12/19/2017         Lab Results   Component Value Date    CHOL 135 05/30/2018    HDL 37 (L) 05/30/2018    TRIG 83 05/30/2018       Lab Results   Component Value Date    LDLCALC 81.4 05/30/2018       Past Medical History:    Diagnosis Date    Blood transfusion     BPH (benign prostatic hypertrophy)     Cataract     Congestive heart failure     Hypertension     Macular degeneration     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    PVD (peripheral vascular disease) 8/22/2018    Stenosis of aortic and mitral valves     aortic DAHLIA 1.1 CM       Current Outpatient Medications:     amLODIPine (NORVASC) 5 MG tablet, TAKE 1 TABLET(5 MG) BY MOUTH EVERY DAY, Disp: 30 tablet, Rfl: 6    aspirin 81 MG Chew, Take 81 mg by mouth once daily.  , Disp: , Rfl:     CHOLESTYRAMINE LIGHT 4 gram packet, DISSOLVE 1 PACKET IN LIQUID AND DRINK TWICE DAILY, Disp: 60 packet, Rfl: 0    CHOLESTYRAMINE LIGHT 4 gram packet, DISSOLVE 1 PACKET IN LIQUID AND DRINK TWICE DAILY, Disp: 180 packet, Rfl: 0    clopidogrel (PLAVIX) 75 mg tablet, TAKE 1 TABLET BY MOUTH ONCE DAILY, Disp: 90 tablet, Rfl: 0    finasteride (PROSCAR) 5 mg tablet, TAKE 1 TABLET(5 MG) BY MOUTH EVERY DAY, Disp: 90 tablet, Rfl: 3    fluorometholone 0.1% (FML) 0.1 % DrpS, , Disp: , Rfl:     gabapentin (NEURONTIN) 300 MG capsule, TAKE 1 CAPSULE(300 MG) BY MOUTH EVERY EVENING, Disp: 90 capsule, Rfl: 3    omeprazole (PRILOSEC) 20 MG capsule, Take 1 capsule (20 mg total) by mouth once daily. (Patient taking differently: Take 40 mg by mouth once daily. ), Disp: 30 capsule, Rfl: 2    polycarbophil (FIBERCON) 625 mg tablet, Take 625 mg by mouth once daily., Disp: , Rfl:     rivastigmine tartrate (EXELON) 3 MG capsule, Take 1 capsule (3 mg total) by mouth 2 (two) times daily., Disp: 60 capsule, Rfl: 11    tamsulosin (FLOMAX) 0.4 mg Cap, TAKE ONE CAPSULE BY MOUTH DAILY, Disp: 90 capsule, Rfl: 3    thiamine 100 MG tablet, Take 100 mg by mouth once daily., Disp: , Rfl:     tolterodine (DETROL LA) 4 MG 24 hr capsule, TAKE 1 CAPSULE(4 MG) BY MOUTH EVERY DAY, Disp: 30 capsule, Rfl: 0    tolterodine (DETROL LA) 4 MG 24 hr capsule, TAKE 1 CAPSULE(4 MG) BY MOUTH EVERY DAY, Disp: 30 capsule, Rfl:  11    torsemide (DEMADEX) 20 MG Tab, Take 1 tablet (20 mg total) by mouth once daily., Disp: 30 tablet, Rfl: 11    triamcinolone acetonide 0.1% (KENALOG) 0.1 % cream, Apply topically 2 (two) times daily., Disp: 80 g, Rfl: 1    amitriptyline (ELAVIL) 25 MG tablet, Take 1 tablet (25 mg total) by mouth every evening., Disp: 30 tablet, Rfl: 11          Review of Systems   Constitution: Negative for decreased appetite, diaphoresis, fever, malaise/fatigue, weight gain and weight loss.   HENT: Negative for congestion, ear discharge, ear pain and nosebleeds.    Eyes: Negative for blurred vision, double vision and visual disturbance.   Cardiovascular: Positive for dyspnea on exertion. Negative for chest pain, claudication, cyanosis, irregular heartbeat, leg swelling, near-syncope, orthopnea, palpitations, paroxysmal nocturnal dyspnea and syncope.   Respiratory: Negative for cough, hemoptysis, shortness of breath, sleep disturbances due to breathing, snoring, sputum production and wheezing.    Endocrine: Negative for polydipsia, polyphagia and polyuria.   Hematologic/Lymphatic: Negative for adenopathy and bleeding problem. Does not bruise/bleed easily.   Skin: Negative for color change, nail changes, poor wound healing and rash.   Musculoskeletal: Negative for muscle cramps and muscle weakness.   Gastrointestinal: Negative for abdominal pain, anorexia, change in bowel habit, hematochezia, nausea and vomiting.   Genitourinary: Negative for dysuria, frequency and hematuria.   Neurological: Negative for brief paralysis, difficulty with concentration, excessive daytime sleepiness, dizziness, focal weakness, headaches, light-headedness, seizures, vertigo and weakness.   Psychiatric/Behavioral: Negative for altered mental status and depression.   Allergic/Immunologic: Negative for persistent infections.        Objective:/68 (BP Location: Left arm, Patient Position: Sitting, BP Method: Medium (Automatic))   Pulse 106   Ht  "5' 8" (1.727 m)   Wt 78.9 kg (173 lb 15.1 oz)   BMI 26.45 kg/m²             Physical Exam   Constitutional: He is oriented to person, place, and time. He appears well-developed and well-nourished.   HENT:   Head: Normocephalic.   Right Ear: External ear normal.   Left Ear: External ear normal.   Nose: Nose normal.   Inspection of lips, teeth and gums normal   Eyes: Pupils are equal, round, and reactive to light. EOM are normal. No scleral icterus.   Neck: Normal range of motion. Neck supple. No JVD present. No tracheal deviation present. No thyromegaly present.   Cardiovascular: Normal rate, regular rhythm and intact distal pulses. Exam reveals no gallop and no friction rub.   Murmur heard.   Harsh midsystolic murmur is present with a grade of 2/6 at the upper right sternal border and upper left sternal border.  Pulmonary/Chest: Effort normal and breath sounds normal.   Abdominal: Bowel sounds are normal. He exhibits no distension. There is no hepatosplenomegaly. There is no tenderness. There is no guarding.   Musculoskeletal: Normal range of motion. He exhibits no edema or tenderness.   Lymphadenopathy:   Palpation of neck and groin lymph nodes normal   Neurological: He is alert and oriented to person, place, and time. No cranial nerve deficit. He exhibits normal muscle tone. Coordination normal.   Skin: Skin is dry.   Palpation of skin normal   Psychiatric: His behavior is normal. Judgment and thought content normal.         Assessment:       1. Chronic diastolic congestive heart failure    2. Nonrheumatic aortic valve stenosis    3. S/P aortic valve replacement    4. Venous stasis dermatitis of both lower extremities         Plan:       Randall was seen today for congestive heart failure.    Diagnoses and all orders for this visit:    Chronic diastolic congestive heart failure  -     Basic metabolic panel; Future; Expected date: 06/16/2019    Nonrheumatic aortic valve stenosis    S/P aortic valve " replacement    Venous stasis dermatitis of both lower extremities

## 2019-06-13 RX ORDER — AMLODIPINE BESYLATE 5 MG/1
TABLET ORAL
Qty: 30 TABLET | Refills: 6 | Status: SHIPPED | OUTPATIENT
Start: 2019-06-13 | End: 2019-12-06 | Stop reason: SDUPTHER

## 2019-06-17 ENCOUNTER — OFFICE VISIT (OUTPATIENT)
Dept: OTOLARYNGOLOGY | Facility: CLINIC | Age: 84
End: 2019-06-17
Payer: MEDICARE

## 2019-06-17 VITALS — DIASTOLIC BLOOD PRESSURE: 67 MMHG | SYSTOLIC BLOOD PRESSURE: 109 MMHG | HEART RATE: 91 BPM

## 2019-06-17 DIAGNOSIS — R13.11 ORAL PHASE DYSPHAGIA: ICD-10-CM

## 2019-06-17 DIAGNOSIS — J39.2 THROAT IRRITATION: ICD-10-CM

## 2019-06-17 DIAGNOSIS — R47.9 DIFFICULTY SPEAKING: ICD-10-CM

## 2019-06-17 DIAGNOSIS — R68.2 MOUTH DRYNESS: Primary | ICD-10-CM

## 2019-06-17 PROCEDURE — 99213 PR OFFICE/OUTPT VISIT, EST, LEVL III, 20-29 MIN: ICD-10-PCS | Mod: HCNC,S$GLB,, | Performed by: OTOLARYNGOLOGY

## 2019-06-17 PROCEDURE — 1101F PR PT FALLS ASSESS DOC 0-1 FALLS W/OUT INJ PAST YR: ICD-10-PCS | Mod: HCNC,CPTII,S$GLB, | Performed by: OTOLARYNGOLOGY

## 2019-06-17 PROCEDURE — 87070 CULTURE OTHR SPECIMN AEROBIC: CPT | Mod: HCNC

## 2019-06-17 PROCEDURE — 99999 PR PBB SHADOW E&M-EST. PATIENT-LVL IV: CPT | Mod: PBBFAC,HCNC,, | Performed by: OTOLARYNGOLOGY

## 2019-06-17 PROCEDURE — 1101F PT FALLS ASSESS-DOCD LE1/YR: CPT | Mod: HCNC,CPTII,S$GLB, | Performed by: OTOLARYNGOLOGY

## 2019-06-17 PROCEDURE — 99999 PR PBB SHADOW E&M-EST. PATIENT-LVL IV: ICD-10-PCS | Mod: PBBFAC,HCNC,, | Performed by: OTOLARYNGOLOGY

## 2019-06-17 PROCEDURE — 99213 OFFICE O/P EST LOW 20 MIN: CPT | Mod: HCNC,S$GLB,, | Performed by: OTOLARYNGOLOGY

## 2019-06-17 NOTE — PATIENT INSTRUCTIONS
Hydration encouraged; consider humidifier use prn  Biotene oral rinse  may help  Throat culture taken, call for results  Liguid benadryl/ nystatin susp, /prelone elixir oral rinse prescribed; 1 tsp swish, spit or swallow 6 times a day for inflammation; written Rx provided  Call for any significant change in status

## 2019-06-17 NOTE — PROGRESS NOTES
Subjective:       Patient ID: Randall Strickland Jr. is a 89 y.o. male.    Chief Complaint: Other (CANT SWALLOW )    HPI: Mr. Strickland is a 89 year old CM whose chief complaint today is inability to swallow well. He also indicates difficulty speaking.  His wife has a hard time understanding what he is saying to her at this point.  He indicates dry lips and an unusual tongue sensation as if it is swollen at times.  He indicates dry mouth symptoms.  He often awakens with dry mouth symptoms.  He believes his dry mouth symptoms impair his ability to swallow.  He does not feel ill and he does not have fever.    He denies regurgitation of food stuffs.  He has no physical trouble swallowing liquids or solids.    He does drink a lot of water.  He denies any change in his medications.  He denies any recent dental visits.  He reminds me that his father was an ENT physician in [facility].  One of his father's ENT colleagues ( Lizandro) was a friend of Dr[facility]. [facility].      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    PVD (peripheral vascular disease) 8/22/2018    Stenosis of aortic and mitral valves     aortic DAHLIA 1.1 CM     Current Outpatient Medications on File Prior to Visit   Medication Sig Dispense Refill    amitriptyline (ELAVIL) 25 MG tablet Take 1 tablet (25 mg total) by mouth every evening. 30 tablet 11    amLODIPine (NORVASC) 5 MG tablet TAKE 1 TABLET(5 MG) BY MOUTH EVERY DAY 30 tablet 6    aspirin 81 MG Chew Take 81 mg by mouth once daily.        CHOLESTYRAMINE LIGHT 4 gram packet DISSOLVE 1 PACKET IN LIQUID AND DRINK TWICE DAILY 60 packet 0    CHOLESTYRAMINE LIGHT 4 gram packet DISSOLVE 1 PACKET IN LIQUID AND DRINK TWICE DAILY 180 packet 0    clopidogrel (PLAVIX) 75 mg tablet TAKE 1 TABLET BY MOUTH ONCE DAILY 90 tablet 0     finasteride (PROSCAR) 5 mg tablet TAKE 1 TABLET(5 MG) BY MOUTH EVERY DAY 90 tablet 3    fluorometholone 0.1% (FML) 0.1 % DrpS       gabapentin (NEURONTIN) 300 MG capsule TAKE 1 CAPSULE(300 MG) BY MOUTH EVERY EVENING 90 capsule 3    polycarbophil (FIBERCON) 625 mg tablet Take 625 mg by mouth once daily.      rivastigmine tartrate (EXELON) 3 MG capsule Take 1 capsule (3 mg total) by mouth 2 (two) times daily. 60 capsule 11    tamsulosin (FLOMAX) 0.4 mg Cap TAKE ONE CAPSULE BY MOUTH DAILY 90 capsule 3    thiamine 100 MG tablet Take 100 mg by mouth once daily.      tolterodine (DETROL LA) 4 MG 24 hr capsule TAKE 1 CAPSULE(4 MG) BY MOUTH EVERY DAY 30 capsule 0    tolterodine (DETROL LA) 4 MG 24 hr capsule TAKE 1 CAPSULE(4 MG) BY MOUTH EVERY DAY 30 capsule 11    torsemide (DEMADEX) 20 MG Tab Take 1 tablet (20 mg total) by mouth once daily. 30 tablet 11    triamcinolone acetonide 0.1% (KENALOG) 0.1 % cream Apply topically 2 (two) times daily. 80 g 1    omeprazole (PRILOSEC) 20 MG capsule Take 1 capsule (20 mg total) by mouth once daily. (Patient taking differently: Take 40 mg by mouth once daily. ) 30 capsule 2     No current facility-administered medications on file prior to visit.          Review of Systems        Objective:     Blood pressure 1/67 pulse 91 height 5 ft 8 in weight 173 lb  General:  Alert and oriented gentleman in no acute distress; he does not sound hoarse and does not appear ill; he is not dyspneic  Physical Exam   HENT:   Mouth/Throat:           Assessment:       1. Mouth dryness    2. Oral phase dysphagia    3. Difficulty speaking    4. Throat irritation     5.      Viscous white/yellow secretions noted initially in posterior pharynx /hypopharynx.   Plan:     Hydration encouraged; consider humidifier use prn  Biotene oral rinse  may help  Throat culture taken, call for results  Liguid benadryl/ nystatin susp, /prelone elixir oral rinse prescribed; 1 tsp swish, spit or swallow 6 times a day  for inflammation; written Rx provided  Call for any significant change in status

## 2019-06-19 LAB — BACTERIA SPEC AEROBE CULT: NORMAL

## 2019-06-25 ENCOUNTER — OFFICE VISIT (OUTPATIENT)
Dept: UROLOGY | Facility: CLINIC | Age: 84
End: 2019-06-25
Payer: MEDICARE

## 2019-06-25 VITALS
DIASTOLIC BLOOD PRESSURE: 55 MMHG | HEIGHT: 68 IN | WEIGHT: 170.63 LBS | SYSTOLIC BLOOD PRESSURE: 89 MMHG | BODY MASS INDEX: 25.86 KG/M2 | HEART RATE: 89 BPM

## 2019-06-25 DIAGNOSIS — N40.0 BENIGN NON-NODULAR PROSTATIC HYPERPLASIA WITHOUT LOWER URINARY TRACT SYMPTOMS: Primary | ICD-10-CM

## 2019-06-25 PROCEDURE — 99999 PR PBB SHADOW E&M-EST. PATIENT-LVL III: ICD-10-PCS | Mod: PBBFAC,HCNC,, | Performed by: UROLOGY

## 2019-06-25 PROCEDURE — 1101F PT FALLS ASSESS-DOCD LE1/YR: CPT | Mod: HCNC,CPTII,S$GLB, | Performed by: UROLOGY

## 2019-06-25 PROCEDURE — 99999 PR PBB SHADOW E&M-EST. PATIENT-LVL III: CPT | Mod: PBBFAC,HCNC,, | Performed by: UROLOGY

## 2019-06-25 PROCEDURE — 1101F PR PT FALLS ASSESS DOC 0-1 FALLS W/OUT INJ PAST YR: ICD-10-PCS | Mod: HCNC,CPTII,S$GLB, | Performed by: UROLOGY

## 2019-06-25 PROCEDURE — 99213 OFFICE O/P EST LOW 20 MIN: CPT | Mod: HCNC,S$GLB,, | Performed by: UROLOGY

## 2019-06-25 PROCEDURE — 99213 PR OFFICE/OUTPT VISIT, EST, LEVL III, 20-29 MIN: ICD-10-PCS | Mod: HCNC,S$GLB,, | Performed by: UROLOGY

## 2019-06-25 RX ORDER — NYSTATIN 100000 [USP'U]/ML
SUSPENSION ORAL
Status: ON HOLD | COMMUNITY
Start: 2019-06-17 | End: 2019-12-18 | Stop reason: HOSPADM

## 2019-06-25 NOTE — PROGRESS NOTES
Subjective:       Patient ID: Randall Strickland Jr. is a 89 y.o. male.    Chief Complaint: BPH with urinary obstruction    HPI    Randall Strickland Jr. is a 89 y.o. male with PMHx of BPH who presents today for his annual prostate evaluation. He is voiding well and has a good stream. States he voids frequently during the day and experiences nocturia 4-5x. No other urological complaints at this time. He takes Flomax and finasteride.     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    PVD (peripheral vascular disease) 8/22/2018    Stenosis of aortic and mitral valves     aortic DAHLIA 1.1 CM       Past Surgical History:   Procedure Laterality Date    APPENDECTOMY      BLOCK-NERVE-MEDIAL BRANCH-LUMBAR Bilateral 10/10/2017    [facility] at [facility]    cararact  car    CARDIAC CATHETERIZATION      CARDIAC VALVE SURGERY      CATARACT EXTRACTION BILATERAL W/ ANTERIOR VITRECTOMY      bilaterial    CATARACT EXTRACTION W/  INTRAOCULAR LENS IMPLANT Bilateral     CHOLECYSTECTOMY      EYE SURGERY      HEART CATH-LEFT N/A 7/15/2016    Performed by Yaya Armas MD at [facility] CATH LAB    JOINT REPLACEMENT      bilateral hip    KNEE SURGERY      Bilateral    MANOMETRY-ANORECTAL N/A 6/16/2014    Performed by Meir Bran MD at [facility] ENDO (4TH FLR)    RADIOFREQUENCY THERMOCOAGULATION (RFTC)-NERVE-MEDIAN BRANCH-LUMBAR Bilateral 10/26/2017    Performed by [facility] at [facility]    REPLACEMENT-VALVE-AORTIC N/A 8/30/2016    Performed by Yaya Armas MD at [facility] CATH LAB    TOTAL HIP ARTHROPLASTY      Bilaterial       Family History   Problem Relation Age of Onset    No Known Problems Father     No Known Problems Mother     No Known Problems Sister     No Known Problems Brother     No Known  Problems Maternal Aunt     No Known Problems Maternal Uncle     No Known Problems Paternal Aunt     No Known Problems Paternal Uncle     No Known Problems Maternal Grandmother     No Known Problems Maternal Grandfather     No Known Problems Paternal Grandmother     No Known Problems Paternal Grandfather     No Known Problems Daughter     No Known Problems Son     No Known Problems Daughter     No Known Problems Son     Amblyopia Neg Hx     Blindness Neg Hx     Cancer Neg Hx     Cataracts Neg Hx     Diabetes Neg Hx     Glaucoma Neg Hx     Hypertension Neg Hx     Macular degeneration Neg Hx     Retinal detachment Neg Hx     Strabismus Neg Hx     Stroke Neg Hx     Thyroid disease Neg Hx     Anemia Neg Hx     Arrhythmia Neg Hx     Asthma Neg Hx     Clotting disorder Neg Hx     Fainting Neg Hx     Heart attack Neg Hx     Heart disease Neg Hx     Heart failure Neg Hx     Hyperlipidemia Neg Hx     Atrial Septal Defect Neg Hx        Social History     Socioeconomic History    Marital status:      Spouse name: Sade    Number of children: Not on file    Years of education: Not on file    Highest education level: Not on file   Occupational History    Occupation: Retired     Employer: retired 1992   Social Needs    Financial resource strain: Not on file    Food insecurity:     Worry: Not on file     Inability: Not on file    Transportation needs:     Medical: Not on file     Non-medical: Not on file   Tobacco Use    Smoking status: Never Smoker    Smokeless tobacco: Never Used   Substance and Sexual Activity    Alcohol use: Yes     Comment: less than one  drink weekly    Drug use: No    Sexual activity: Not on file   Lifestyle    Physical activity:     Days per week: Not on file     Minutes per session: Not on file    Stress: Not on file   Relationships    Social connections:     Talks on phone: Not on file     Gets together: Not on file     Attends [employer] service:  Not on file     Active member of club or organization: Not on file     Attends meetings of clubs or organizations: Not on file     Relationship status: Not on file   Other Topics Concern    Not on file   Social History Narrative    Not on file       Allergies:  Patient has no known allergies.    Medications:    Current Outpatient Medications:     amitriptyline (ELAVIL) 25 MG tablet, Take 1 tablet (25 mg total) by mouth every evening., Disp: 30 tablet, Rfl: 11    amLODIPine (NORVASC) 5 MG tablet, TAKE 1 TABLET(5 MG) BY MOUTH EVERY DAY, Disp: 30 tablet, Rfl: 6    aspirin 81 MG Chew, Take 81 mg by mouth once daily.  , Disp: , Rfl:     CHOLESTYRAMINE LIGHT 4 gram packet, DISSOLVE 1 PACKET IN LIQUID AND DRINK TWICE DAILY, Disp: 60 packet, Rfl: 0    CHOLESTYRAMINE LIGHT 4 gram packet, DISSOLVE 1 PACKET IN LIQUID AND DRINK TWICE DAILY, Disp: 180 packet, Rfl: 0    clopidogrel (PLAVIX) 75 mg tablet, TAKE 1 TABLET BY MOUTH ONCE DAILY, Disp: 90 tablet, Rfl: 0    finasteride (PROSCAR) 5 mg tablet, TAKE 1 TABLET(5 MG) BY MOUTH EVERY DAY, Disp: 90 tablet, Rfl: 3    fluorometholone 0.1% (FML) 0.1 % DrpS, , Disp: , Rfl:     gabapentin (NEURONTIN) 300 MG capsule, TAKE 1 CAPSULE(300 MG) BY MOUTH EVERY EVENING, Disp: 90 capsule, Rfl: 3    nystatin (MYCOSTATIN) 100,000 unit/mL suspension, , Disp: , Rfl:     polycarbophil (FIBERCON) 625 mg tablet, Take 625 mg by mouth once daily., Disp: , Rfl:     rivastigmine tartrate (EXELON) 3 MG capsule, Take 1 capsule (3 mg total) by mouth 2 (two) times daily., Disp: 60 capsule, Rfl: 11    tamsulosin (FLOMAX) 0.4 mg Cap, TAKE ONE CAPSULE BY MOUTH DAILY, Disp: 90 capsule, Rfl: 3    thiamine 100 MG tablet, Take 100 mg by mouth once daily., Disp: , Rfl:     tolterodine (DETROL LA) 4 MG 24 hr capsule, TAKE 1 CAPSULE(4 MG) BY MOUTH EVERY DAY, Disp: 30 capsule, Rfl: 0    tolterodine (DETROL LA) 4 MG 24 hr capsule, TAKE 1 CAPSULE(4 MG) BY MOUTH EVERY DAY, Disp: 30 capsule, Rfl: 11     torsemide (DEMADEX) 20 MG Tab, Take 1 tablet (20 mg total) by mouth once daily., Disp: 30 tablet, Rfl: 11    triamcinolone acetonide 0.1% (KENALOG) 0.1 % cream, Apply topically 2 (two) times daily., Disp: 80 g, Rfl: 1    omeprazole (PRILOSEC) 20 MG capsule, Take 1 capsule (20 mg total) by mouth once daily. (Patient taking differently: Take 40 mg by mouth once daily. ), Disp: 30 capsule, Rfl: 2    Review of Systems   Constitutional: Negative for activity change, appetite change, chills, diaphoresis, fatigue, fever and unexpected weight change.   HENT: Negative for congestion, dental problem, hearing loss, mouth sores, postnasal drip, rhinorrhea, sinus pressure and trouble swallowing.    Eyes: Negative for pain, discharge and itching.   Respiratory: Negative for apnea, cough, choking, chest tightness, shortness of breath and wheezing.    Cardiovascular: Negative for chest pain, palpitations and leg swelling.   Gastrointestinal: Negative for abdominal distention, abdominal pain, anal bleeding, blood in stool, constipation, diarrhea, nausea, rectal pain and vomiting.   Endocrine: Negative for polydipsia and polyuria.   Genitourinary: Positive for frequency. Negative for decreased urine volume, difficulty urinating, discharge, dysuria, enuresis, flank pain, genital sores, hematuria, penile pain, penile swelling and scrotal swelling.        Positive for nocturia.    Musculoskeletal: Negative for arthralgias, back pain and myalgias.   Skin: Negative for color change, rash and wound.   Neurological: Negative for dizziness, syncope, speech difficulty, light-headedness and headaches.   Hematological: Negative for adenopathy. Does not bruise/bleed easily.   Psychiatric/Behavioral: Negative for behavioral problems, confusion and sleep disturbance.       Objective:      Physical Exam   Constitutional: He appears well-developed.   HENT:   Head: Normocephalic.   Neck: Neck supple.   Cardiovascular: Normal rate.     Pulmonary/Chest: Effort normal.   Abdominal: Soft.   Genitourinary:   Genitourinary Comments: Prostate was smooth without nodularity. No rectal masses. 20 grams. Normal perineum.     Neurological: He is alert.   Skin: Skin is warm.     Psychiatric: He has a normal mood and affect.       Assessment:       1. Benign non-nodular prostatic hyperplasia without lower urinary tract symptoms        Plan:       Randall was seen today for bph with urinary obstruction.    Diagnoses and all orders for this visit:    Benign non-nodular prostatic hyperplasia without lower urinary tract symptoms          Continue Proscar.  RTC 1 year for SALAZAR.    IAnderson, am acting as a scribe on this patient encounter in the presence and under the supervision of Dr. Srinivasan.    06/25/2019 10:15 AM    I, Dr. Srinivasan, personally performed the services described in this documentation.   All medical record entries made by the scribe were at my direction and in my presence.   I have reviewed the chart and agree that the record is accurate and complete.   Kev Srinivasan MD.  10:35 AM 06/25/2019

## 2019-06-27 ENCOUNTER — LAB VISIT (OUTPATIENT)
Dept: LAB | Facility: HOSPITAL | Age: 84
End: 2019-06-27
Attending: INTERNAL MEDICINE
Payer: MEDICARE

## 2019-06-27 ENCOUNTER — OFFICE VISIT (OUTPATIENT)
Dept: CARDIOLOGY | Facility: CLINIC | Age: 84
End: 2019-06-27
Payer: MEDICARE

## 2019-06-27 ENCOUNTER — TELEPHONE (OUTPATIENT)
Dept: OTOLARYNGOLOGY | Facility: CLINIC | Age: 84
End: 2019-06-27

## 2019-06-27 VITALS
HEART RATE: 89 BPM | HEIGHT: 68 IN | BODY MASS INDEX: 25.76 KG/M2 | WEIGHT: 170 LBS | DIASTOLIC BLOOD PRESSURE: 63 MMHG | SYSTOLIC BLOOD PRESSURE: 132 MMHG

## 2019-06-27 DIAGNOSIS — I50.32 CHRONIC DIASTOLIC CONGESTIVE HEART FAILURE: Primary | ICD-10-CM

## 2019-06-27 DIAGNOSIS — R06.09 DOE (DYSPNEA ON EXERTION): ICD-10-CM

## 2019-06-27 DIAGNOSIS — I50.32 CHRONIC DIASTOLIC CONGESTIVE HEART FAILURE: ICD-10-CM

## 2019-06-27 DIAGNOSIS — Z95.2 S/P AORTIC VALVE REPLACEMENT: ICD-10-CM

## 2019-06-27 DIAGNOSIS — I73.9 PVD (PERIPHERAL VASCULAR DISEASE): ICD-10-CM

## 2019-06-27 DIAGNOSIS — G47.33 OSA (OBSTRUCTIVE SLEEP APNEA): ICD-10-CM

## 2019-06-27 PROBLEM — R60.9 DEPENDENT EDEMA: Status: RESOLVED | Noted: 2019-05-07 | Resolved: 2019-06-27

## 2019-06-27 LAB
ANION GAP SERPL CALC-SCNC: 8 MMOL/L (ref 8–16)
BUN SERPL-MCNC: 21 MG/DL (ref 10–30)
CALCIUM SERPL-MCNC: 9.2 MG/DL (ref 8.7–10.5)
CHLORIDE SERPL-SCNC: 100 MMOL/L (ref 95–110)
CO2 SERPL-SCNC: 30 MMOL/L (ref 23–29)
CREAT SERPL-MCNC: 1.4 MG/DL (ref 0.5–1.4)
EST. GFR  (AFRICAN AMERICAN): 50.1 ML/MIN/1.73 M^2
EST. GFR  (NON AFRICAN AMERICAN): 43.3 ML/MIN/1.73 M^2
GLUCOSE SERPL-MCNC: 115 MG/DL (ref 70–110)
POTASSIUM SERPL-SCNC: 4.3 MMOL/L (ref 3.5–5.1)
SODIUM SERPL-SCNC: 138 MMOL/L (ref 136–145)

## 2019-06-27 PROCEDURE — 99999 PR PBB SHADOW E&M-EST. PATIENT-LVL III: ICD-10-PCS | Mod: PBBFAC,HCNC,, | Performed by: INTERNAL MEDICINE

## 2019-06-27 PROCEDURE — 1101F PT FALLS ASSESS-DOCD LE1/YR: CPT | Mod: HCNC,CPTII,S$GLB, | Performed by: INTERNAL MEDICINE

## 2019-06-27 PROCEDURE — 1101F PR PT FALLS ASSESS DOC 0-1 FALLS W/OUT INJ PAST YR: ICD-10-PCS | Mod: HCNC,CPTII,S$GLB, | Performed by: INTERNAL MEDICINE

## 2019-06-27 PROCEDURE — 99499 UNLISTED E&M SERVICE: CPT | Mod: HCNC,S$GLB,, | Performed by: INTERNAL MEDICINE

## 2019-06-27 PROCEDURE — 99214 OFFICE O/P EST MOD 30 MIN: CPT | Mod: HCNC,S$GLB,, | Performed by: INTERNAL MEDICINE

## 2019-06-27 PROCEDURE — 80048 BASIC METABOLIC PNL TOTAL CA: CPT | Mod: HCNC

## 2019-06-27 PROCEDURE — 99999 PR PBB SHADOW E&M-EST. PATIENT-LVL III: CPT | Mod: PBBFAC,HCNC,, | Performed by: INTERNAL MEDICINE

## 2019-06-27 PROCEDURE — 99214 PR OFFICE/OUTPT VISIT, EST, LEVL IV, 30-39 MIN: ICD-10-PCS | Mod: HCNC,S$GLB,, | Performed by: INTERNAL MEDICINE

## 2019-06-27 PROCEDURE — 36415 COLL VENOUS BLD VENIPUNCTURE: CPT | Mod: HCNC

## 2019-06-27 PROCEDURE — 99499 RISK ADDL DX/OHS AUDIT: ICD-10-PCS | Mod: HCNC,S$GLB,, | Performed by: INTERNAL MEDICINE

## 2019-06-27 RX ORDER — MUPIROCIN 20 MG/G
OINTMENT TOPICAL 3 TIMES DAILY PRN
Status: ON HOLD | COMMUNITY
End: 2019-12-18 | Stop reason: HOSPADM

## 2019-06-27 NOTE — PROGRESS NOTES
Subjective:    Patient ID:  Randall Strickland Jr. is a 89 y.o. male who presents for follow-up of Chronic diastolic congestive heart failure (1 month fu)      HPI       The patient is a 89 year old male post TAVR 8/30/16. He is followed by Dr Delarosa with a gait disorder and dementia. He reported SOB with walking but not with exercise bike and had plus 1 dependent edema on his last visit. He has lost 4# since hat visit, no edema but continues with dyspnea on walking but not on exercise.  Lab Results   Component Value Date     06/27/2019    K 4.3 06/27/2019     06/27/2019    CO2 30 (H) 06/27/2019    BUN 21 06/27/2019    CREATININE 1.4 06/27/2019     (H) 06/27/2019    HGBA1C 5.7 04/10/2014    MG 1.7 02/10/2017    AST 22 05/07/2019    ALT 15 05/07/2019    ALBUMIN 3.2 (L) 05/07/2019    PROT 8.0 05/07/2019    BILITOT 0.5 05/07/2019    WBC 6.77 05/07/2019    HGB 11.6 (L) 05/07/2019    HCT 35.4 (L) 05/07/2019     (H) 05/07/2019     05/07/2019    INR 1.0 08/29/2016    PSA 0.6 03/07/2005    TSH 1.423 12/19/2017         Lab Results   Component Value Date    CHOL 135 05/30/2018    HDL 37 (L) 05/30/2018    TRIG 83 05/30/2018       Lab Results   Component Value Date    LDLCALC 81.4 05/30/2018       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    PVD (peripheral vascular disease) 8/22/2018    Stenosis of aortic and mitral valves     aortic DAHLIA 1.1 CM       Current Outpatient Medications:     amitriptyline (ELAVIL) 25 MG tablet, Take 1 tablet (25 mg total) by mouth every evening., Disp: 30 tablet, Rfl: 11    amLODIPine (NORVASC) 5 MG tablet, TAKE 1 TABLET(5 MG) BY MOUTH EVERY DAY, Disp: 30 tablet, Rfl: 6    aspirin 81 MG Chew, Take 81 mg by mouth once daily.  , Disp: , Rfl:     CHOLESTYRAMINE LIGHT 4 gram packet,  DISSOLVE 1 PACKET IN LIQUID AND DRINK TWICE DAILY, Disp: 60 packet, Rfl: 0    clopidogrel (PLAVIX) 75 mg tablet, TAKE 1 TABLET BY MOUTH ONCE DAILY, Disp: 90 tablet, Rfl: 0    gabapentin (NEURONTIN) 300 MG capsule, TAKE 1 CAPSULE(300 MG) BY MOUTH EVERY EVENING, Disp: 90 capsule, Rfl: 3    mupirocin (BACTROBAN) 2 % ointment, Apply topically 3 (three) times daily as needed., Disp: , Rfl:     omeprazole (PRILOSEC) 20 MG capsule, Take 1 capsule (20 mg total) by mouth once daily. (Patient taking differently: Take 40 mg by mouth once daily. ), Disp: 30 capsule, Rfl: 2    polycarbophil (FIBERCON) 625 mg tablet, Take 625 mg by mouth once daily., Disp: , Rfl:     rivastigmine tartrate (EXELON) 3 MG capsule, Take 1 capsule (3 mg total) by mouth 2 (two) times daily., Disp: 60 capsule, Rfl: 11    tamsulosin (FLOMAX) 0.4 mg Cap, TAKE ONE CAPSULE BY MOUTH DAILY, Disp: 90 capsule, Rfl: 3    tolterodine (DETROL LA) 4 MG 24 hr capsule, TAKE 1 CAPSULE(4 MG) BY MOUTH EVERY DAY, Disp: 30 capsule, Rfl: 11    torsemide (DEMADEX) 20 MG Tab, Take 1 tablet (20 mg total) by mouth once daily., Disp: 30 tablet, Rfl: 11    triamcinolone acetonide 0.1% (KENALOG) 0.1 % cream, Apply topically 2 (two) times daily., Disp: 80 g, Rfl: 1    CHOLESTYRAMINE LIGHT 4 gram packet, DISSOLVE 1 PACKET IN LIQUID AND DRINK TWICE DAILY, Disp: 180 packet, Rfl: 0    finasteride (PROSCAR) 5 mg tablet, TAKE 1 TABLET(5 MG) BY MOUTH EVERY DAY, Disp: 90 tablet, Rfl: 3    fluorometholone 0.1% (FML) 0.1 % DrpS, , Disp: , Rfl:     nystatin (MYCOSTATIN) 100,000 unit/mL suspension, , Disp: , Rfl:     thiamine 100 MG tablet, Take 100 mg by mouth once daily., Disp: , Rfl:     tolterodine (DETROL LA) 4 MG 24 hr capsule, TAKE 1 CAPSULE(4 MG) BY MOUTH EVERY DAY, Disp: 30 capsule, Rfl: 0          Review of Systems   Constitution: Negative for decreased appetite, diaphoresis, fever, malaise/fatigue, weight gain and weight loss.   HENT: Negative for congestion, ear  "discharge, ear pain and nosebleeds.    Eyes: Negative for blurred vision, double vision and visual disturbance.   Cardiovascular: Positive for dyspnea on exertion. Negative for chest pain, claudication, cyanosis, irregular heartbeat, leg swelling, near-syncope, orthopnea, palpitations, paroxysmal nocturnal dyspnea and syncope.   Respiratory: Negative for cough, hemoptysis, shortness of breath, sleep disturbances due to breathing, snoring, sputum production and wheezing.    Endocrine: Negative for polydipsia, polyphagia and polyuria.   Hematologic/Lymphatic: Negative for adenopathy and bleeding problem. Does not bruise/bleed easily.   Skin: Negative for color change, nail changes, poor wound healing and rash.   Musculoskeletal: Negative for muscle cramps and muscle weakness.   Gastrointestinal: Negative for abdominal pain, anorexia, change in bowel habit, hematochezia, nausea and vomiting.   Genitourinary: Negative for dysuria, frequency and hematuria.   Neurological: Negative for brief paralysis, difficulty with concentration, excessive daytime sleepiness, dizziness, focal weakness, headaches, light-headedness, seizures, vertigo and weakness.   Psychiatric/Behavioral: Negative for altered mental status and depression.   Allergic/Immunologic: Negative for persistent infections.        Objective:/63 (BP Location: Left arm, Patient Position: Sitting, BP Method: Large (Automatic))   Pulse 89   Ht 5' 8" (1.727 m)   Wt 77.1 kg (169 lb 15.6 oz)   BMI 25.84 kg/m²             Physical Exam   Constitutional: He is oriented to person, place, and time. He appears well-developed and well-nourished.   HENT:   Head: Normocephalic.   Right Ear: External ear normal.   Left Ear: External ear normal.   Nose: Nose normal.   Inspection of lips, teeth and gums normal   Eyes: Pupils are equal, round, and reactive to light. EOM are normal. No scleral icterus.   Neck: Normal range of motion. Neck supple. No JVD present. No " tracheal deviation present. No thyromegaly present.   Cardiovascular: Normal rate, regular rhythm and intact distal pulses. Exam reveals no gallop and no friction rub.   Murmur heard.   Low-pitched blowing midsystolic murmur is present with a grade of 1/6 at the upper right sternal border radiating to the neck.  Pulmonary/Chest: Effort normal and breath sounds normal.   Abdominal: Bowel sounds are normal. He exhibits no distension. There is no hepatosplenomegaly. There is no tenderness. There is no guarding.   Musculoskeletal: Normal range of motion. He exhibits no edema (no edema) or tenderness.   Lymphadenopathy:   Palpation of neck and groin lymph nodes normal   Neurological: He is alert and oriented to person, place, and time. No cranial nerve deficit. He exhibits normal muscle tone. Coordination normal.   Skin: Skin is dry.   Palpation of skin normal   Psychiatric: His behavior is normal. Judgment and thought content normal.         Assessment:       1. Chronic diastolic congestive heart failure    2. S/P aortic valve replacement    3. PVD (peripheral vascular disease)    4. ADEBAYO (obstructive sleep apnea)    5. RUDOLPH (dyspnea on exertion)         Plan:       Randall was seen today for chronic diastolic congestive heart failure.    Diagnoses and all orders for this visit:    Chronic diastolic congestive heart failure  -     Basic metabolic panel; Future; Expected date: 09/25/2019    S/P aortic valve replacement    PVD (peripheral vascular disease)    ADEBAYO (obstructive sleep apnea)    RUDOLPH (dyspnea on exertion)    Other orders  -     mupirocin (BACTROBAN) 2 % ointment; Apply topically 3 (three) times daily as needed.

## 2019-06-28 ENCOUNTER — TELEPHONE (OUTPATIENT)
Dept: OTOLARYNGOLOGY | Facility: CLINIC | Age: 84
End: 2019-06-28

## 2019-07-19 RX ORDER — CLOPIDOGREL BISULFATE 75 MG/1
TABLET ORAL
Qty: 90 TABLET | Refills: 0 | Status: SHIPPED | OUTPATIENT
Start: 2019-07-19 | End: 2019-10-27 | Stop reason: SDUPTHER

## 2019-07-23 DIAGNOSIS — K21.9 GASTROESOPHAGEAL REFLUX DISEASE WITHOUT ESOPHAGITIS: ICD-10-CM

## 2019-07-23 RX ORDER — OMEPRAZOLE 20 MG/1
CAPSULE, DELAYED RELEASE ORAL
Qty: 30 CAPSULE | Refills: 0 | Status: SHIPPED | OUTPATIENT
Start: 2019-07-23 | End: 2019-11-06 | Stop reason: SDUPTHER

## 2019-07-23 NOTE — TELEPHONE ENCOUNTER
Spoke with patient and he stated he did request the refill.  He did not realize he needed to follow up with you.

## 2019-07-23 NOTE — TELEPHONE ENCOUNTER
Patient was was to follow up with me in clinic to determine if he still needed to be on this medication.  It does not look like he has followed up with me in clinic.  Please call patient to ask if he is requesting for this to be refilled or if this was an automatic message from his pharmacy.  If he is requesting a refill the patient needs to come see me in clinic to determine if he still needs to be on this medication.

## 2019-08-07 DIAGNOSIS — N13.8 BPH WITH URINARY OBSTRUCTION: ICD-10-CM

## 2019-08-07 DIAGNOSIS — N40.1 BPH WITH URINARY OBSTRUCTION: ICD-10-CM

## 2019-08-07 RX ORDER — FINASTERIDE 5 MG/1
TABLET, FILM COATED ORAL
Qty: 90 TABLET | Refills: 0 | Status: SHIPPED | OUTPATIENT
Start: 2019-08-07 | End: 2019-11-06 | Stop reason: SDUPTHER

## 2019-08-25 DIAGNOSIS — K21.9 GASTROESOPHAGEAL REFLUX DISEASE WITHOUT ESOPHAGITIS: ICD-10-CM

## 2019-08-26 ENCOUNTER — TELEPHONE (OUTPATIENT)
Dept: NEUROLOGY | Facility: CLINIC | Age: 84
End: 2019-08-26

## 2019-08-26 RX ORDER — OMEPRAZOLE 20 MG/1
CAPSULE, DELAYED RELEASE ORAL
Qty: 30 CAPSULE | Refills: 0 | OUTPATIENT
Start: 2019-08-26

## 2019-08-26 NOTE — TELEPHONE ENCOUNTER
Patient needs to see me in clinic. We have been trying to wean him off the omeprazole all together. Omeprazole 20 mg can be found OTC if he needs it, however at our last visit, in March, he was supposed to f/u in a month for further weaning off the omeprazole.

## 2019-08-26 NOTE — TELEPHONE ENCOUNTER
Called and spoke to  regarding his appt with .  stated that [facility] September 4, 2019 Wednesday at 10:00AM

## 2019-08-26 NOTE — TELEPHONE ENCOUNTER
----- Message from Nessa Rosales sent at 8/23/2019  2:39 PM CDT -----  Contact: pt at [phone]  Type:  Needs Medical Advice    Who Called: pt  Symptoms (please be specific): none   How long has patient had these symptoms: none  Pharmacy name and phone #:  none  Would the patient rather a call back or a response via [facility]? call  Best Call Back Number: [phone]  Additional Information: Pt cancelled his appt that was on September 4 with [facility] yesterday.  Would like that appt back if possible or another appt for his routine fu per letter.  Left message yesterday also but hasnt heard back from anyone yet.

## 2019-09-04 ENCOUNTER — OFFICE VISIT (OUTPATIENT)
Dept: NEUROLOGY | Facility: CLINIC | Age: 84
End: 2019-09-04
Payer: MEDICARE

## 2019-09-04 VITALS
HEART RATE: 99 BPM | HEIGHT: 68 IN | BODY MASS INDEX: 25.2 KG/M2 | WEIGHT: 166.25 LBS | DIASTOLIC BLOOD PRESSURE: 69 MMHG | SYSTOLIC BLOOD PRESSURE: 115 MMHG

## 2019-09-04 DIAGNOSIS — F03.90 DEMENTIA WITHOUT BEHAVIORAL DISTURBANCE, UNSPECIFIED DEMENTIA TYPE: ICD-10-CM

## 2019-09-04 DIAGNOSIS — R26.9 GAIT DISORDER: Primary | ICD-10-CM

## 2019-09-04 PROCEDURE — 99213 PR OFFICE/OUTPT VISIT, EST, LEVL III, 20-29 MIN: ICD-10-PCS | Mod: HCNC,S$GLB,, | Performed by: PSYCHIATRY & NEUROLOGY

## 2019-09-04 PROCEDURE — 99213 OFFICE O/P EST LOW 20 MIN: CPT | Mod: HCNC,S$GLB,, | Performed by: PSYCHIATRY & NEUROLOGY

## 2019-09-04 PROCEDURE — 99499 RISK ADDL DX/OHS AUDIT: ICD-10-PCS | Mod: S$GLB,,, | Performed by: PSYCHIATRY & NEUROLOGY

## 2019-09-04 PROCEDURE — 99499 UNLISTED E&M SERVICE: CPT | Mod: S$GLB,,, | Performed by: PSYCHIATRY & NEUROLOGY

## 2019-09-04 PROCEDURE — 99999 PR PBB SHADOW E&M-EST. PATIENT-LVL III: CPT | Mod: PBBFAC,HCNC,, | Performed by: PSYCHIATRY & NEUROLOGY

## 2019-09-04 PROCEDURE — 1101F PT FALLS ASSESS-DOCD LE1/YR: CPT | Mod: HCNC,CPTII,S$GLB, | Performed by: PSYCHIATRY & NEUROLOGY

## 2019-09-04 PROCEDURE — 1101F PR PT FALLS ASSESS DOC 0-1 FALLS W/OUT INJ PAST YR: ICD-10-PCS | Mod: HCNC,CPTII,S$GLB, | Performed by: PSYCHIATRY & NEUROLOGY

## 2019-09-04 PROCEDURE — 99999 PR PBB SHADOW E&M-EST. PATIENT-LVL III: ICD-10-PCS | Mod: PBBFAC,HCNC,, | Performed by: PSYCHIATRY & NEUROLOGY

## 2019-09-04 NOTE — PROGRESS NOTES
"Randall Stevens I. Chief Complaints during this visit:  f/u Patient visit for  Gait disturbance    Aaareferral Self  No address on file    Primary Care Physician  Tiago Delaney MD  [id number]  [facility] [id number]      History of present illness:   89 y.o.  M seen in f/u for dementia and gait disturbance.      Has fallen once in the house, backwards, minor abraision on his head.      5/3/19:  Unaccompanied.    Completed PT, found it helpful.  No falls.  Forgot his hearing aids today.  Pain in left shoulder, short episodes.  Several times/day.  No obvious trigger.  Does not matter if he has moved arm.  Concerned that in past, this resulted in a cardiac stent.    Interval history 1/7/19:  He gets out of breath with long walk from garage, but did not have to take break.  No falls.  Appetite good.  Eating "too much ice cream."  Sleeping well.  Mood fine.  Mildly irritated with his gait.  Rides bike without problem.  +nocturia  Endorses bowel leakage, mild.  Continued low back pain.    Interval history 8/13/18:  He reports that his gait is about the same, no falls.    Interval history 5/14/18:  Accompanied by wife and here to review the results of cisternogram.  No falls.    Denies heart failure or cardiomyopathy.  Still has low back pain that worsens as he walks.    From my note note 3/23/18:  Unaccompanied, but I spoke with wife by phone.  Here today for f/u results.  He has started the vitamin supplements.  No falls.    Interval history 12/19/17:  consultation at the request of  Dr. Monk for evaluation of gait disorder.  Had aortic valve replacement and since then, he has more trouble walking, a couple falls (last was a month ago).  Worse with eyes closed and in dark.  Has history of back problems with RFA in recent time, bilateral knee surgery.  Does not use cane or walker and resistent to using them.  Feet remain swollen.  No syncope or presyncope.    Has known aortic atherosclerosis and aortic valve " replacement.  Bilateral carotid disease is on problem list, but I can't find study to confirm this.      II.  Review of systems:  As in HPI,  otherwise, balance 3 systems reviewed and are negative.    III.  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    PVD (peripheral vascular disease) 8/22/2018    Stenosis of aortic and mitral valves     aortic DAHLIA 1.1 CM     Family History   Problem Relation Age of Onset    No Known Problems Father      Social History     Socioeconomic History    Marital status:      Spouse name: Sade   Social Needs   Occupational History    Occupation: Retired, insurance company     Employer: retired 1992   Tobacco Use    Smoking status: Never Smoker    Smokeless tobacco: Never Used   Substance and Sexual Activity    Alcohol use: Yes     Comment: less than one  drink weekly         Current Outpatient Medications on File Prior to Visit   Medication Sig Dispense Refill    amitriptyline (ELAVIL) 25 MG tablet Take 1 tablet (25 mg total) by mouth every evening. 30 tablet 11    amLODIPine (NORVASC) 5 MG tablet TAKE 1 TABLET(5 MG) BY MOUTH EVERY DAY 30 tablet 6    aspirin 81 MG Chew Take 81 mg by mouth once daily.        CHOLESTYRAMINE LIGHT 4 gram packet DISSOLVE 1 PACKET IN LIQUID AND DRINK TWICE DAILY 180 packet 0    clopidogrel (PLAVIX) 75 mg tablet TAKE 1 TABLET BY MOUTH ONCE DAILY 90 tablet 0    finasteride (PROSCAR) 5 mg tablet TAKE 1 TABLET(5 MG) BY MOUTH EVERY DAY 90 tablet 0    fluorometholone 0.1% (FML) 0.1 % DrpS       gabapentin (NEURONTIN) 300 MG capsule TAKE 1 CAPSULE(300 MG) BY MOUTH EVERY EVENING 90 capsule 3    mupirocin (BACTROBAN) 2 % ointment Apply topically 3 (three) times daily as needed.      nystatin (MYCOSTATIN) 100,000 unit/mL suspension       omeprazole  "(PRILOSEC) 20 MG capsule TAKE 1 CAPSULE(20 MG) BY MOUTH EVERY DAY 30 capsule 0    polycarbophil (FIBERCON) 625 mg tablet Take 625 mg by mouth once daily.      tamsulosin (FLOMAX) 0.4 mg Cap TAKE ONE CAPSULE BY MOUTH DAILY 90 capsule 3    thiamine 100 MG tablet Take 100 mg by mouth once daily.      tolterodine (DETROL LA) 4 MG 24 hr capsule TAKE 1 CAPSULE(4 MG) BY MOUTH EVERY DAY 30 capsule 0    tolterodine (DETROL LA) 4 MG 24 hr capsule TAKE 1 CAPSULE(4 MG) BY MOUTH EVERY DAY 30 capsule 11    torsemide (DEMADEX) 20 MG Tab Take 1 tablet (20 mg total) by mouth once daily. 30 tablet 11    triamcinolone acetonide 0.1% (KENALOG) 0.1 % cream Apply topically 2 (two) times daily. 80 g 1    CHOLESTYRAMINE LIGHT 4 gram packet DISSOLVE 1 PACKET IN LIQUID AND DRINK TWICE DAILY 60 packet 0    rivastigmine tartrate (EXELON) 3 MG capsule Take 1 capsule (3 mg total) by mouth 2 (two) times daily. 60 capsule 11     No current facility-administered medications on file prior to visit.        PRIOR problem-specific medications tried:  na    Review of patient's allergies indicates:  No Known Allergies    IV. Physical Exam    Vitals:    09/04/19 1016   BP: 115/69   Pulse: 99   Weight: 75.4 kg (166 lb 3.6 oz)   Height: 5' 8" (1.727 m)     General appearance: Well nourished, well developed, no acute distress.         Cardiovascular:  pedal pulses 2, no edema or cyanosis, heart regular rate and rhythym, no carotid bruits.         -------------------------------------------------------------  Facial Expression: normal       Affect: full       Orientation to time & place:  Oriented to time, place, person and situation       Attention & concentration:  Normal attention span and concentration       Memory:  0/5  Language: Spontaneous, fluent; able to repeat and name objects        Fund of knowledge:  Aware of current events        Speech:  normal (not dysarthric)  -------------------------------------------------------  Cranial nerves: " wears glasses, visual fields full, optic discs not visualized, pupils equal round and reactive, extraocular movements intact,       facial sensation intact, face symmetrical, hearing poor, palate raises midline, shoulder shrug strength normal, tongue protrudes midline.        -------------------------------------------------------  Musculoskeletal  Muscle tone: 1: Slight cogwheel Rigidity only detected with activation maneuver. RUE        Muscle Bulk: all 4 extremities normal        Muscle strength:  5/5 in all 4 extremities        No pronator drift  Sensation: loss in vibration up to knees       Deep tendon Reflexes: 1 bilateral biceps, triceps, none at patella and ankles        --------------------------------------------------------------  Cerebellar and Coordination  Gait:  low step height, mild imbalance, + enbloc turn, no festination.       Finger-nose: no dysmetria       Rapid Alternating Movements (pronation/supination):  R slowing; L normal  --------------------------------------------------------------  MOVEMENT DISORDERS FOCUSED EXAM  Abnormality of movement (bradykinesia, hyperkinesia) present? No    Tremor present?   No   Posture:  stooped  Postural stability: + Rhomberg    V.  Laboratory/ Radiological Data:     EKG 5/3/19  Sinus bradycardia with Premature atrial complexes  Left axis deviation  Abnormal ECG  When compared with ECG of 31-AUG-2016 03:29,  QRS duration has increased  Nonspecific T wave abnormality has replaced inverted T waves in Lateral  Leads    Cisternogram 4/26/18:  No evidence of normal pressure hydrocephalus.    CT head 12/26/17:  Age-appropriate generalized cerebral volume loss  Mild prominence of the lateral and third ventricles which may be compensatory to volume loss however slightly disproportionate cannot exclude component of hydrocephalus.  There is a small hypodensity in the left cerebellum suggestive for remote infarction.      Lab Results   Component Value Date    TSH 1.423  12/19/2017     Lab Results   Component Value Date    ALYAGKIM36 355 12/19/2017     Lab Results   Component Value Date    HGBA1C 5.7 04/10/2014        MRI brain 12/26/17:  Mild prominence of the lateral and third ventricles which may be compensatory to volume loss however slightly disproportionate cannot exclude component of hydrocephalus.    There is a small hypodensity in the left cerebellum suggestive for remote infarction.        VI. Assessment and Plan            Problem List Items Addressed This Visit        1 - High    Gait disorder - Primary    Overview     Mild imbalance, frontal gait ataxia vs lower body parkinsonism.  NPH ruled out 4/2018         Current Assessment & Plan     Mild imbalance, looking slightly more parkinsonian each visit, but still not significant enough, in my opinion, to warrant risking dopaminergics.  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   -> consider levodopa if symptoms become more clearly parkinsonian.            2     Dementia    Overview     Mini-Cog score 2 (2/22/17)  Benedict Cognitive Assessment:   20/30 (12/2017)         Current Assessment & Plan     Despite low testing scores, he is functioning well.   -> continue exelon   -> advised to continue daily B12, B1 supplements                   Follow up in about 4 months (around 1/4/2020).

## 2019-09-04 NOTE — ASSESSMENT & PLAN NOTE
Mild imbalance, looking slightly more parkinsonian each visit, but still not significant enough, in my opinion, to warrant risking dopaminergics.  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   -> consider levodopa if symptoms become more clearly parkinsonian.

## 2019-09-06 RX ORDER — TAMSULOSIN HYDROCHLORIDE 0.4 MG/1
CAPSULE ORAL
Qty: 90 CAPSULE | Refills: 3 | Status: SHIPPED | OUTPATIENT
Start: 2019-09-06 | End: 2020-05-07 | Stop reason: SDUPTHER

## 2019-09-17 DIAGNOSIS — F03.90 DEMENTIA WITHOUT BEHAVIORAL DISTURBANCE, UNSPECIFIED DEMENTIA TYPE: ICD-10-CM

## 2019-09-17 RX ORDER — RIVASTIGMINE TARTRATE 3 MG/1
CAPSULE ORAL
Qty: 60 CAPSULE | Refills: 11 | Status: SHIPPED | OUTPATIENT
Start: 2019-09-17 | End: 2020-09-22

## 2019-09-23 ENCOUNTER — IMMUNIZATION (OUTPATIENT)
Dept: INTERNAL MEDICINE | Facility: CLINIC | Age: 84
End: 2019-09-23
Payer: MEDICARE

## 2019-09-23 ENCOUNTER — OFFICE VISIT (OUTPATIENT)
Dept: INTERNAL MEDICINE | Facility: CLINIC | Age: 84
End: 2019-09-23
Payer: MEDICARE

## 2019-09-23 VITALS
WEIGHT: 162.06 LBS | HEART RATE: 94 BPM | OXYGEN SATURATION: 99 % | SYSTOLIC BLOOD PRESSURE: 118 MMHG | DIASTOLIC BLOOD PRESSURE: 60 MMHG | HEIGHT: 68 IN | BODY MASS INDEX: 24.56 KG/M2

## 2019-09-23 DIAGNOSIS — L30.9 DERMATITIS: Primary | ICD-10-CM

## 2019-09-23 DIAGNOSIS — N18.30 CHRONIC KIDNEY DISEASE, STAGE III (MODERATE): ICD-10-CM

## 2019-09-23 PROBLEM — E88.09 HYPOALBUMINEMIA DUE TO PROTEIN-CALORIE MALNUTRITION: Status: RESOLVED | Noted: 2017-02-22 | Resolved: 2019-09-23

## 2019-09-23 PROBLEM — E46 HYPOALBUMINEMIA DUE TO PROTEIN-CALORIE MALNUTRITION: Status: RESOLVED | Noted: 2017-02-22 | Resolved: 2019-09-23

## 2019-09-23 PROCEDURE — 99213 OFFICE O/P EST LOW 20 MIN: CPT | Mod: 25,HCNC,S$GLB, | Performed by: INTERNAL MEDICINE

## 2019-09-23 PROCEDURE — 99213 PR OFFICE/OUTPT VISIT, EST, LEVL III, 20-29 MIN: ICD-10-PCS | Mod: 25,HCNC,S$GLB, | Performed by: INTERNAL MEDICINE

## 2019-09-23 PROCEDURE — 99999 PR PBB SHADOW E&M-EST. PATIENT-LVL IV: ICD-10-PCS | Mod: PBBFAC,HCNC,, | Performed by: INTERNAL MEDICINE

## 2019-09-23 PROCEDURE — 99499 UNLISTED E&M SERVICE: CPT | Mod: HCNC,S$GLB,, | Performed by: INTERNAL MEDICINE

## 2019-09-23 PROCEDURE — 1101F PR PT FALLS ASSESS DOC 0-1 FALLS W/OUT INJ PAST YR: ICD-10-PCS | Mod: HCNC,CPTII,S$GLB, | Performed by: INTERNAL MEDICINE

## 2019-09-23 PROCEDURE — 1101F PT FALLS ASSESS-DOCD LE1/YR: CPT | Mod: HCNC,CPTII,S$GLB, | Performed by: INTERNAL MEDICINE

## 2019-09-23 PROCEDURE — 99999 PR PBB SHADOW E&M-EST. PATIENT-LVL IV: CPT | Mod: PBBFAC,HCNC,, | Performed by: INTERNAL MEDICINE

## 2019-09-23 PROCEDURE — 99499 RISK ADDL DX/OHS AUDIT: ICD-10-PCS | Mod: S$GLB,,, | Performed by: INTERNAL MEDICINE

## 2019-09-23 PROCEDURE — G0008 FLU VACCINE - HIGH DOSE (65+) PRESERVATIVE FREE IM: ICD-10-PCS | Mod: HCNC,S$GLB,, | Performed by: INTERNAL MEDICINE

## 2019-09-23 PROCEDURE — G0008 ADMIN INFLUENZA VIRUS VAC: HCPCS | Mod: HCNC,S$GLB,, | Performed by: INTERNAL MEDICINE

## 2019-09-23 PROCEDURE — 90662 FLU VACCINE - HIGH DOSE (65+) PRESERVATIVE FREE IM: ICD-10-PCS | Mod: HCNC,S$GLB,, | Performed by: INTERNAL MEDICINE

## 2019-09-23 PROCEDURE — 90662 IIV NO PRSV INCREASED AG IM: CPT | Mod: HCNC,S$GLB,, | Performed by: INTERNAL MEDICINE

## 2019-09-23 RX ORDER — NYSTATIN AND TRIAMCINOLONE ACETONIDE 100000; 1 [USP'U]/G; MG/G
OINTMENT TOPICAL 2 TIMES DAILY
Qty: 30 G | Refills: 1 | Status: SHIPPED | OUTPATIENT
Start: 2019-09-23 | End: 2019-12-03 | Stop reason: ALTCHOICE

## 2019-09-23 NOTE — PROGRESS NOTES
Subjective:       Patient ID: Randall Strickland Jr. is a 89 y.o. male.    Chief Complaint: Rash    Mr. Strickland is a 89yM coming in with pain with sitting and rash on his butt for past few weeks. Not purulent. No pain with BMs. No blood in BMs. No pain with wiping. Feels pain after sitting for long periods and needs to shift his weight or stand. Denies any incontinence. Feeling well otherwise.     Review of Systems   Constitutional: Negative for chills, fatigue and fever.   Respiratory: Negative for cough and shortness of breath.    Gastrointestinal: Positive for rectal pain. Negative for anal bleeding and blood in stool.   Genitourinary: Negative for difficulty urinating, dysuria and hematuria.   Skin: Positive for rash.   Neurological: Negative for dizziness.       Objective:      Physical Exam   Constitutional: He is oriented to person, place, and time. He appears well-developed and well-nourished. No distress.   HENT:   Head: Normocephalic and atraumatic.   Eyes: Conjunctivae are normal. No scleral icterus.   Neck: Normal range of motion.   Abdominal: Soft. He exhibits no distension. There is no tenderness.   Musculoskeletal: Normal range of motion. He exhibits no edema.        Back:    Neurological: He is alert and oriented to person, place, and time.   Skin: Skin is warm and dry. He is not diaphoretic.       Assessment:     Randall was seen today for rash.    Diagnoses and all orders for this visit:    Dermatitis  -     nystatin-triamcinolone (MYCOLOG) ointment; Apply topically 2 (two) times daily.      Plan:       Mr. Strickland is a 89y gentleman with rash between gluteal flaps, likely irritation/dermatitis.    -Mycolog ointment twice a day to area as needed  -Advised to pat area dry after showers  -Can use powder on the area as well to help keep dry and minimize irritation    Return to clinic mabel New MD  Internal Medicine PGY1  Attending Physician: Dr. Townsend

## 2019-09-24 ENCOUNTER — LAB VISIT (OUTPATIENT)
Dept: LAB | Facility: HOSPITAL | Age: 84
End: 2019-09-24
Attending: INTERNAL MEDICINE
Payer: MEDICARE

## 2019-09-24 DIAGNOSIS — I50.32 CHRONIC DIASTOLIC CONGESTIVE HEART FAILURE: ICD-10-CM

## 2019-09-24 LAB
ANION GAP SERPL CALC-SCNC: 9 MMOL/L (ref 8–16)
BUN SERPL-MCNC: 24 MG/DL (ref 10–30)
CALCIUM SERPL-MCNC: 9.2 MG/DL (ref 8.7–10.5)
CHLORIDE SERPL-SCNC: 101 MMOL/L (ref 95–110)
CO2 SERPL-SCNC: 29 MMOL/L (ref 23–29)
CREAT SERPL-MCNC: 1.5 MG/DL (ref 0.5–1.4)
EST. GFR  (AFRICAN AMERICAN): 46 ML/MIN/1.73 M^2
EST. GFR  (NON AFRICAN AMERICAN): 40 ML/MIN/1.73 M^2
GLUCOSE SERPL-MCNC: 104 MG/DL (ref 70–110)
POTASSIUM SERPL-SCNC: 4.4 MMOL/L (ref 3.5–5.1)
SODIUM SERPL-SCNC: 139 MMOL/L (ref 136–145)

## 2019-09-24 PROCEDURE — 36415 COLL VENOUS BLD VENIPUNCTURE: CPT | Mod: HCNC

## 2019-09-24 PROCEDURE — 80048 BASIC METABOLIC PNL TOTAL CA: CPT | Mod: HCNC

## 2019-09-24 NOTE — PROGRESS NOTES
"I have personally taken the history and examined this patient and agree with the resident's note as stated above with the following thoughts:  /60 (BP Location: Right arm, Patient Position: Sitting, BP Method: Large (Manual))   Pulse 94   Ht 5' 8" (1.727 m)   Wt 73.5 kg (162 lb 0.6 oz)   SpO2 99%   BMI 24.64 kg/m²     WIll use myclog ointment- Keep area dry.  RTC if no improvement   "

## 2019-09-24 NOTE — PROGRESS NOTES
Patient, Randall Strickland Jr. (MRN #[id number]), presented with a recent Estimated Glumerular Filtration Rate (EGFR) between 30 and 45 consistent with the definition of chronic kidney disease stage 3 - moderate (ICD10 - N18.3).    eGFR if non    Date Value Ref Range Status   06/27/2019 43.3 (A) >60 mL/min/1.73 m^2 Final     Comment:     Calculation used to obtain the estimated glomerular filtration  rate (eGFR) is the CKD-EPI equation.          The patient's chronic kidney disease stage 3 was monitored, evaluated, addressed and/or treated. This addendum to the medical record is made on 09/23/2019.

## 2019-10-01 ENCOUNTER — OFFICE VISIT (OUTPATIENT)
Dept: CARDIOLOGY | Facility: CLINIC | Age: 84
End: 2019-10-01
Payer: MEDICARE

## 2019-10-01 ENCOUNTER — LAB VISIT (OUTPATIENT)
Dept: LAB | Facility: HOSPITAL | Age: 84
End: 2019-10-01
Attending: INTERNAL MEDICINE
Payer: MEDICARE

## 2019-10-01 VITALS
HEIGHT: 68 IN | DIASTOLIC BLOOD PRESSURE: 66 MMHG | BODY MASS INDEX: 24.52 KG/M2 | WEIGHT: 161.81 LBS | HEART RATE: 87 BPM | SYSTOLIC BLOOD PRESSURE: 118 MMHG

## 2019-10-01 DIAGNOSIS — I50.32 CHRONIC DIASTOLIC CONGESTIVE HEART FAILURE: Primary | ICD-10-CM

## 2019-10-01 DIAGNOSIS — I35.0 NONRHEUMATIC AORTIC VALVE STENOSIS: ICD-10-CM

## 2019-10-01 DIAGNOSIS — I50.32 CHRONIC DIASTOLIC CONGESTIVE HEART FAILURE: ICD-10-CM

## 2019-10-01 DIAGNOSIS — Z95.2 S/P AORTIC VALVE REPLACEMENT: ICD-10-CM

## 2019-10-01 DIAGNOSIS — G47.33 OSA (OBSTRUCTIVE SLEEP APNEA): ICD-10-CM

## 2019-10-01 LAB
ANION GAP SERPL CALC-SCNC: 7 MMOL/L (ref 8–16)
BUN SERPL-MCNC: 22 MG/DL (ref 10–30)
CALCIUM SERPL-MCNC: 9 MG/DL (ref 8.7–10.5)
CHLORIDE SERPL-SCNC: 103 MMOL/L (ref 95–110)
CO2 SERPL-SCNC: 29 MMOL/L (ref 23–29)
CREAT SERPL-MCNC: 1.4 MG/DL (ref 0.5–1.4)
ERYTHROCYTE [DISTWIDTH] IN BLOOD BY AUTOMATED COUNT: 13.2 % (ref 11.5–14.5)
EST. GFR  (AFRICAN AMERICAN): 50.1 ML/MIN/1.73 M^2
EST. GFR  (NON AFRICAN AMERICAN): 43.3 ML/MIN/1.73 M^2
GLUCOSE SERPL-MCNC: 110 MG/DL (ref 70–110)
HCT VFR BLD AUTO: 35.9 % (ref 40–54)
HGB BLD-MCNC: 11.2 G/DL (ref 14–18)
MCH RBC QN AUTO: 32.2 PG (ref 27–31)
MCHC RBC AUTO-ENTMCNC: 31.2 G/DL (ref 32–36)
MCV RBC AUTO: 103 FL (ref 82–98)
PLATELET # BLD AUTO: 164 K/UL (ref 150–350)
PMV BLD AUTO: 9.3 FL (ref 9.2–12.9)
POTASSIUM SERPL-SCNC: 4.2 MMOL/L (ref 3.5–5.1)
RBC # BLD AUTO: 3.48 M/UL (ref 4.6–6.2)
SODIUM SERPL-SCNC: 139 MMOL/L (ref 136–145)
WBC # BLD AUTO: 7.6 K/UL (ref 3.9–12.7)

## 2019-10-01 PROCEDURE — 1101F PT FALLS ASSESS-DOCD LE1/YR: CPT | Mod: HCNC,CPTII,S$GLB, | Performed by: INTERNAL MEDICINE

## 2019-10-01 PROCEDURE — 36415 COLL VENOUS BLD VENIPUNCTURE: CPT | Mod: HCNC

## 2019-10-01 PROCEDURE — 85027 COMPLETE CBC AUTOMATED: CPT | Mod: HCNC

## 2019-10-01 PROCEDURE — 1101F PR PT FALLS ASSESS DOC 0-1 FALLS W/OUT INJ PAST YR: ICD-10-PCS | Mod: HCNC,CPTII,S$GLB, | Performed by: INTERNAL MEDICINE

## 2019-10-01 PROCEDURE — 99214 OFFICE O/P EST MOD 30 MIN: CPT | Mod: HCNC,S$GLB,, | Performed by: INTERNAL MEDICINE

## 2019-10-01 PROCEDURE — 99999 PR PBB SHADOW E&M-EST. PATIENT-LVL III: CPT | Mod: PBBFAC,HCNC,, | Performed by: INTERNAL MEDICINE

## 2019-10-01 PROCEDURE — 99999 PR PBB SHADOW E&M-EST. PATIENT-LVL III: ICD-10-PCS | Mod: PBBFAC,HCNC,, | Performed by: INTERNAL MEDICINE

## 2019-10-01 PROCEDURE — 80048 BASIC METABOLIC PNL TOTAL CA: CPT | Mod: HCNC

## 2019-10-01 PROCEDURE — 99214 PR OFFICE/OUTPT VISIT, EST, LEVL IV, 30-39 MIN: ICD-10-PCS | Mod: HCNC,S$GLB,, | Performed by: INTERNAL MEDICINE

## 2019-10-01 NOTE — PROGRESS NOTES
Subjective:    Patient ID:  Randall Strickland Jr. is a 89 y.o. male who presents for follow-up of Chronic diastolic congestive heart failure (3 month f/u )      HPI     The patient is a 89 year old male post TAVR 8/30/16. He is followed by Dr Delarosa with a gait disorder and dementia. . He has lost 8# since his visit, no edema but continues with dyspnea on walking. He is not exercising as much has he once did. He hs had 3-4 falls in his house since his last visit. He has a cane but has not been using. He was encouraged to use to avoid injury    Conclusion 5/10/19    · Moderate left atrial enlargement.  · Normal left ventricular systolic function. The estimated ejection fraction is 55%  · Grade II (moderate) left ventricular diastolic dysfunction consistent with pseudonormalization.  · Mild right atrial enlargement.  · Normal right ventricular systolic function.  · There is a 26 mm Torres Rain S3 transcutaneously-placed aortic bioprosthesis.  · Aortic valve area is 2.7 cm2; peak velocity is 2.42 m/s; mean gradient is 14.74 mmHg. Trivial perivalvular regurgitation.  · Mild mitral regurgitation.  · Normal central venous pressure (3 mm Hg).  · The estimated PA systolic pressure is 28 mm Hg          Lab Results   Component Value Date     09/24/2019    K 4.4 09/24/2019     09/24/2019    CO2 29 09/24/2019    BUN 24 09/24/2019    CREATININE 1.5 (H) 09/24/2019     09/24/2019    HGBA1C 5.7 04/10/2014    MG 1.7 02/10/2017    AST 22 05/07/2019    ALT 15 05/07/2019    ALBUMIN 3.2 (L) 05/07/2019    PROT 8.0 05/07/2019    BILITOT 0.5 05/07/2019    WBC 6.77 05/07/2019    HGB 11.6 (L) 05/07/2019    HCT 35.4 (L) 05/07/2019     (H) 05/07/2019     05/07/2019    INR 1.0 08/29/2016    PSA 0.6 03/07/2005    TSH 1.423 12/19/2017         Lab Results   Component Value Date    CHOL 135 05/30/2018    HDL 37 (L) 05/30/2018    TRIG 83 05/30/2018       Lab Results   Component Value Date    LDLCALC 81.4 05/30/2018        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    PVD (peripheral vascular disease) 8/22/2018    Stenosis of aortic and mitral valves     aortic DAHLIA 1.1 CM       Current Outpatient Medications:     amitriptyline (ELAVIL) 25 MG tablet, Take 1 tablet (25 mg total) by mouth every evening., Disp: 30 tablet, Rfl: 11    amLODIPine (NORVASC) 5 MG tablet, TAKE 1 TABLET(5 MG) BY MOUTH EVERY DAY, Disp: 30 tablet, Rfl: 6    aspirin 81 MG Chew, Take 81 mg by mouth once daily.  , Disp: , Rfl:     CHOLESTYRAMINE LIGHT 4 gram packet, DISSOLVE 1 PACKET IN LIQUID AND DRINK TWICE DAILY, Disp: 60 packet, Rfl: 0    CHOLESTYRAMINE LIGHT 4 gram packet, DISSOLVE 1 PACKET IN LIQUID AND DRINK TWICE DAILY, Disp: 180 packet, Rfl: 0    clopidogrel (PLAVIX) 75 mg tablet, TAKE 1 TABLET BY MOUTH ONCE DAILY, Disp: 90 tablet, Rfl: 0    finasteride (PROSCAR) 5 mg tablet, TAKE 1 TABLET(5 MG) BY MOUTH EVERY DAY, Disp: 90 tablet, Rfl: 0    fluorometholone 0.1% (FML) 0.1 % DrpS, , Disp: , Rfl:     gabapentin (NEURONTIN) 300 MG capsule, TAKE 1 CAPSULE(300 MG) BY MOUTH EVERY EVENING, Disp: 90 capsule, Rfl: 3    mupirocin (BACTROBAN) 2 % ointment, Apply topically 3 (three) times daily as needed., Disp: , Rfl:     nystatin (MYCOSTATIN) 100,000 unit/mL suspension, , Disp: , Rfl:     nystatin-triamcinolone (MYCOLOG) ointment, Apply topically 2 (two) times daily., Disp: 30 g, Rfl: 1    omeprazole (PRILOSEC) 20 MG capsule, TAKE 1 CAPSULE(20 MG) BY MOUTH EVERY DAY, Disp: 30 capsule, Rfl: 0    polycarbophil (FIBERCON) 625 mg tablet, Take 625 mg by mouth once daily., Disp: , Rfl:     rivastigmine tartrate (EXELON) 3 MG capsule, TAKE 1 CAPSULE(3 MG) BY MOUTH TWICE DAILY, Disp: 60 capsule, Rfl: 11    tamsulosin (FLOMAX) 0.4 mg Cap, TAKE ONE CAPSULE BY MOUTH  DAILY, Disp: 90 capsule, Rfl: 3    thiamine 100 MG tablet, Take 100 mg by mouth once daily., Disp: , Rfl:     tolterodine (DETROL LA) 4 MG 24 hr capsule, TAKE 1 CAPSULE(4 MG) BY MOUTH EVERY DAY, Disp: 30 capsule, Rfl: 0    tolterodine (DETROL LA) 4 MG 24 hr capsule, TAKE 1 CAPSULE(4 MG) BY MOUTH EVERY DAY, Disp: 30 capsule, Rfl: 11    torsemide (DEMADEX) 20 MG Tab, Take 1 tablet (20 mg total) by mouth once daily., Disp: 30 tablet, Rfl: 11    triamcinolone acetonide 0.1% (KENALOG) 0.1 % cream, Apply topically 2 (two) times daily., Disp: 80 g, Rfl: 1          Review of Systems   Constitution: Negative for decreased appetite, diaphoresis, fever, malaise/fatigue, weight gain and weight loss.   HENT: Negative for congestion, ear discharge, ear pain and nosebleeds.    Eyes: Negative for blurred vision, double vision and visual disturbance.   Cardiovascular: Positive for dyspnea on exertion and leg swelling (none). Negative for chest pain, claudication, cyanosis, irregular heartbeat, near-syncope, orthopnea, palpitations, paroxysmal nocturnal dyspnea and syncope.   Respiratory: Negative for cough, hemoptysis, shortness of breath, sleep disturbances due to breathing, snoring, sputum production and wheezing.    Endocrine: Negative for polydipsia, polyphagia and polyuria.   Hematologic/Lymphatic: Negative for adenopathy and bleeding problem. Does not bruise/bleed easily.   Skin: Negative for color change, nail changes, poor wound healing and rash.   Musculoskeletal: Negative for muscle cramps and muscle weakness.   Gastrointestinal: Negative for abdominal pain, anorexia, change in bowel habit, hematochezia, nausea and vomiting.   Genitourinary: Negative for dysuria, frequency and hematuria.   Neurological: Positive for disturbances in coordination. Negative for brief paralysis, difficulty with concentration, excessive daytime sleepiness, dizziness, focal weakness, headaches, light-headedness, seizures, vertigo and  "weakness.   Psychiatric/Behavioral: Negative for altered mental status and depression.   Allergic/Immunologic: Negative for persistent infections.        Objective:/66 (BP Location: Left arm, Patient Position: Sitting, BP Method: Medium (Automatic))   Pulse 87   Ht 5' 8" (1.727 m)   Wt 73.4 kg (161 lb 13.1 oz)   BMI 24.60 kg/m²             Physical Exam   Constitutional: He is oriented to person, place, and time. He appears well-developed and well-nourished.   HENT:   Head: Normocephalic.   Right Ear: External ear normal.   Left Ear: External ear normal.   Nose: Nose normal.   Inspection of lips, teeth and gums normal   Eyes: Pupils are equal, round, and reactive to light. EOM are normal. No scleral icterus.   Neck: Normal range of motion. Neck supple. No JVD present. No tracheal deviation present. No thyromegaly present.   Cardiovascular: Normal rate, regular rhythm and intact distal pulses. Exam reveals no gallop and no friction rub.   Murmur heard.   Medium-pitched blowing midsystolic murmur is present with a grade of 2/6 at the upper right sternal border and upper left sternal border.  Pulmonary/Chest: Effort normal and breath sounds normal.   Abdominal: Bowel sounds are normal. He exhibits no distension. There is no hepatosplenomegaly. There is no tenderness. There is no guarding.   Musculoskeletal: Normal range of motion. He exhibits no edema or tenderness.   Lymphadenopathy:   Palpation of neck and groin lymph nodes normal   Neurological: He is alert and oriented to person, place, and time. No cranial nerve deficit. He exhibits normal muscle tone. Coordination normal.   Skin: Skin is dry.   Palpation of skin normal   Psychiatric: His behavior is normal. Judgment and thought content normal.         Assessment:       1. Chronic diastolic congestive heart failure    2. Nonrheumatic aortic valve stenosis    3. S/P aortic valve replacement    4. ADEBAYO (obstructive sleep apnea)         Plan:       Randall was " seen today for chronic diastolic congestive heart failure.    Diagnoses and all orders for this visit:    Chronic diastolic congestive heart failure  -     Basic metabolic panel; Future; Expected date: 4  months    Nonrheumatic aortic valve stenosis    S/P aortic valve replacement  -     CBC Without Differential; Future; Expected date: 4 months    ADEBAYO (obstructive sleep apnea)

## 2019-10-28 RX ORDER — CLOPIDOGREL BISULFATE 75 MG/1
TABLET ORAL
Qty: 90 TABLET | Refills: 3 | Status: ON HOLD | OUTPATIENT
Start: 2019-10-28 | End: 2019-12-18 | Stop reason: HOSPADM

## 2019-11-06 DIAGNOSIS — N13.8 BPH WITH URINARY OBSTRUCTION: ICD-10-CM

## 2019-11-06 DIAGNOSIS — N40.1 BPH WITH URINARY OBSTRUCTION: ICD-10-CM

## 2019-11-06 RX ORDER — FINASTERIDE 5 MG/1
TABLET, FILM COATED ORAL
Qty: 90 TABLET | Refills: 0 | Status: SHIPPED | OUTPATIENT
Start: 2019-11-06 | End: 2020-05-07 | Stop reason: SDUPTHER

## 2019-11-06 RX ORDER — OMEPRAZOLE 20 MG/1
CAPSULE, DELAYED RELEASE ORAL
Qty: 30 CAPSULE | Refills: 0 | Status: SHIPPED | OUTPATIENT
Start: 2019-11-06 | End: 2020-04-27

## 2019-11-08 ENCOUNTER — TELEPHONE (OUTPATIENT)
Dept: GASTROENTEROLOGY | Facility: CLINIC | Age: 84
End: 2019-11-08

## 2019-11-08 NOTE — TELEPHONE ENCOUNTER
----- Message from Tiago Lowe MD sent at 11/6/2019  8:10 AM CST -----  Patient requested refill of Prilosec.  Did not come for recommended follow-up with Ciara.  Will need appointment for further refills or can contact his PCP

## 2019-12-03 ENCOUNTER — LAB VISIT (OUTPATIENT)
Dept: LAB | Facility: HOSPITAL | Age: 84
End: 2019-12-03
Attending: FAMILY MEDICINE
Payer: MEDICARE

## 2019-12-03 ENCOUNTER — OFFICE VISIT (OUTPATIENT)
Dept: INTERNAL MEDICINE | Facility: CLINIC | Age: 84
End: 2019-12-03
Attending: FAMILY MEDICINE
Payer: MEDICARE

## 2019-12-03 VITALS
TEMPERATURE: 97 F | SYSTOLIC BLOOD PRESSURE: 110 MMHG | OXYGEN SATURATION: 99 % | HEIGHT: 68 IN | DIASTOLIC BLOOD PRESSURE: 56 MMHG | BODY MASS INDEX: 23.93 KG/M2 | WEIGHT: 157.88 LBS | HEART RATE: 88 BPM

## 2019-12-03 DIAGNOSIS — R63.4 WEIGHT LOSS: ICD-10-CM

## 2019-12-03 DIAGNOSIS — F03.90 DEMENTIA WITHOUT BEHAVIORAL DISTURBANCE, UNSPECIFIED DEMENTIA TYPE: ICD-10-CM

## 2019-12-03 DIAGNOSIS — I50.32 CHRONIC DIASTOLIC CONGESTIVE HEART FAILURE: ICD-10-CM

## 2019-12-03 DIAGNOSIS — N18.30 CHRONIC KIDNEY DISEASE, STAGE III (MODERATE): ICD-10-CM

## 2019-12-03 DIAGNOSIS — Z95.2 S/P AORTIC VALVE REPLACEMENT: ICD-10-CM

## 2019-12-03 DIAGNOSIS — L30.9 DERMATITIS: ICD-10-CM

## 2019-12-03 DIAGNOSIS — M17.0 OSTEOARTHRITIS OF BOTH KNEES, UNSPECIFIED OSTEOARTHRITIS TYPE: ICD-10-CM

## 2019-12-03 DIAGNOSIS — Z00.00 ANNUAL PHYSICAL EXAM: Primary | ICD-10-CM

## 2019-12-03 DIAGNOSIS — R26.9 GAIT DISORDER: ICD-10-CM
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
## 2019-12-03 DIAGNOSIS — I42.9 CARDIOMYOPATHY, IDIOPATHIC: ICD-10-CM

## 2019-12-03 PROCEDURE — 99397 PR PREVENTIVE VISIT,EST,65 & OVER: ICD-10-PCS | Mod: HCNC,S$GLB,, | Performed by: FAMILY MEDICINE

## 2019-12-03 PROCEDURE — 84443 ASSAY THYROID STIM HORMONE: CPT | Mod: HCNC

## 2019-12-03 PROCEDURE — 36415 COLL VENOUS BLD VENIPUNCTURE: CPT | Mod: HCNC

## 2019-12-03 PROCEDURE — 99999 PR PBB SHADOW E&M-EST. PATIENT-LVL IV: ICD-10-PCS | Mod: PBBFAC,HCNC,, | Performed by: FAMILY MEDICINE

## 2019-12-03 PROCEDURE — 99499 RISK ADDL DX/OHS AUDIT: ICD-10-PCS | Mod: HCNC,S$GLB,, | Performed by: FAMILY MEDICINE

## 2019-12-03 PROCEDURE — 99499 UNLISTED E&M SERVICE: CPT | Mod: HCNC,S$GLB,, | Performed by: FAMILY MEDICINE

## 2019-12-03 PROCEDURE — 99397 PER PM REEVAL EST PAT 65+ YR: CPT | Mod: HCNC,S$GLB,, | Performed by: FAMILY MEDICINE

## 2019-12-03 PROCEDURE — 99999 PR PBB SHADOW E&M-EST. PATIENT-LVL IV: CPT | Mod: PBBFAC,HCNC,, | Performed by: FAMILY MEDICINE

## 2019-12-03 RX ORDER — TRIAMCINOLONE ACETONIDE 1 MG/G
CREAM TOPICAL 2 TIMES DAILY
Qty: 80 G | Refills: 1 | Status: SHIPPED | OUTPATIENT
Start: 2019-12-03 | End: 2020-01-22 | Stop reason: ALTCHOICE

## 2019-12-03 RX ORDER — MUPIROCIN 20 MG/G
OINTMENT TOPICAL 3 TIMES DAILY
Qty: 22 G | Refills: 1 | Status: SHIPPED | OUTPATIENT
Start: 2019-12-03 | End: 2019-12-10

## 2019-12-03 NOTE — PATIENT INSTRUCTIONS
Schedule lab orders for today.     Physical Therapy/Occupational Therapy number to schedule appointments - [phone].    Triamcinolone is for chest and buttocks.    Mupirocin is for chest only.

## 2019-12-03 NOTE — PROGRESS NOTES
Subjective:       Patient ID: Randall Strickland Jr. is a 89 y.o. male.    Chief Complaint: Follow-up    Established patient for an annual wellness check/physical exam and also chronic disease management. Specific complaints - see dictation and please see ROS.  P, S, Fm, Soc Hx's; Meds, allergies reviewed and reconciled.  Health maintenance file reviewed and addressed items due.    Presents with wife.  Had a recent fall which sounds mechanical in the back room.  Nevertheless they had to call EMS for help getting him up.  No cardiopulmonary complaints as is syncope near syncope or chest pain. Some progressive difficulty with ambulation at home.  He did see a neurologist in the past, no reviewed.  Also so his cardiologist recently.    Wife express concern for weight loss with normal appetite he was 175 last year now 152.  No focal or constitutional complaints.  No difficulty swallowing.    Review of Systems   Constitutional: Positive for unexpected weight change. Negative for chills, fatigue and fever.   HENT: Negative for congestion and trouble swallowing.    Eyes: Negative for redness and visual disturbance.   Respiratory: Negative for cough, chest tightness and shortness of breath.    Cardiovascular: Negative for chest pain, palpitations and leg swelling.   Gastrointestinal: Negative for abdominal pain and blood in stool.   Genitourinary: Negative for difficulty urinating and hematuria.   Musculoskeletal: Positive for gait problem. Negative for arthralgias, back pain, joint swelling, myalgias and neck pain.   Skin: Positive for rash. Negative for color change.   Neurological: Positive for weakness. Negative for tremors, speech difficulty, numbness and headaches.   Hematological: Negative for adenopathy. Does not bruise/bleed easily.   Psychiatric/Behavioral: Positive for decreased concentration. Negative for behavioral problems, confusion and sleep disturbance. The patient is not nervous/anxious.        Objective:       Physical Exam   Constitutional: He appears well-developed and well-nourished. No distress.   HENT:   Head: Normocephalic.   Right Ear: Tympanic membrane, external ear and ear canal normal.   Left Ear: Tympanic membrane, external ear and ear canal normal.   Nose: Nose normal.   Mouth/Throat: Uvula is midline, oropharynx is clear and moist and mucous membranes are normal. No oropharyngeal exudate.   Eyes: Conjunctivae are normal. Right eye exhibits no discharge. Left eye exhibits no discharge. No scleral icterus.   Neck: Normal range of motion. Neck supple. No thyromegaly present.   Cardiovascular: Normal rate, regular rhythm, normal heart sounds and intact distal pulses. Exam reveals no gallop and no friction rub.   No murmur heard.  Pulmonary/Chest: Effort normal. No respiratory distress. He has no wheezes. He has no rales. He exhibits no tenderness.   Abdominal: Soft. There is no tenderness.   Musculoskeletal: He exhibits no edema.        Right lower leg: He exhibits no edema.        Left lower leg: He exhibits no edema.   Lymphadenopathy:     He has no cervical adenopathy.   Neurological: He is alert. He exhibits abnormal muscle tone. Coordination and gait abnormal.   Skin: Skin is warm and dry. No rash noted. He is not diaphoretic.        Psychiatric: He has a normal mood and affect. His behavior is normal. Judgment and thought content normal.   Nursing note and vitals reviewed.      Assessment:       1. Annual physical exam    2. Fall, subsequent encounter    3. Gait disorder    4. S/P aortic valve replacement    5. Cardiomyopathy, idiopathic    6. Chronic diastolic congestive heart failure    7. Dementia without behavioral disturbance, unspecified dementia type    8. Osteoarthritis of both knees, unspecified osteoarthritis type    9. Chronic kidney disease, stage III (moderate)    10. Weight loss    11. Dermatitis        Plan:     Medication List with Changes/Refills   New Medications    MUPIROCIN (BACTROBAN)  2 % OINTMENT    Apply topically 3 (three) times daily. for 7 days    TRIAMCINOLONE ACETONIDE 0.1% (KENALOG) 0.1 % CREAM    Apply topically 2 (two) times daily.   Current Medications    AMITRIPTYLINE (ELAVIL) 25 MG TABLET    Take 1 tablet (25 mg total) by mouth every evening.    AMLODIPINE (NORVASC) 5 MG TABLET    TAKE 1 TABLET(5 MG) BY MOUTH EVERY DAY    ASPIRIN 81 MG CHEW    Take 81 mg by mouth once daily.      CHOLESTYRAMINE LIGHT 4 GRAM PACKET    DISSOLVE 1 PACKET IN LIQUID AND DRINK TWICE DAILY    CLOPIDOGREL (PLAVIX) 75 MG TABLET    TAKE 1 TABLET BY MOUTH ONCE DAILY    FINASTERIDE (PROSCAR) 5 MG TABLET    TAKE 1 TABLET(5 MG) BY MOUTH EVERY DAY    FLUOROMETHOLONE 0.1% (FML) 0.1 % DRPS        GABAPENTIN (NEURONTIN) 300 MG CAPSULE    TAKE 1 CAPSULE(300 MG) BY MOUTH EVERY EVENING    MUPIROCIN (BACTROBAN) 2 % OINTMENT    Apply topically 3 (three) times daily as needed.    NYSTATIN (MYCOSTATIN) 100,000 UNIT/ML SUSPENSION        OMEPRAZOLE (PRILOSEC) 20 MG CAPSULE    TAKE 1 CAPSULE(20 MG) BY MOUTH EVERY DAY    POLYCARBOPHIL (FIBERCON) 625 MG TABLET    Take 625 mg by mouth once daily.    RIVASTIGMINE TARTRATE (EXELON) 3 MG CAPSULE    TAKE 1 CAPSULE(3 MG) BY MOUTH TWICE DAILY    TAMSULOSIN (FLOMAX) 0.4 MG CAP    TAKE ONE CAPSULE BY MOUTH DAILY    THIAMINE 100 MG TABLET    Take 100 mg by mouth once daily.    TOLTERODINE (DETROL LA) 4 MG 24 HR CAPSULE    TAKE 1 CAPSULE(4 MG) BY MOUTH EVERY DAY    TORSEMIDE (DEMADEX) 20 MG TAB    Take 1 tablet (20 mg total) by mouth once daily.    TRIAMCINOLONE ACETONIDE 0.1% (KENALOG) 0.1 % CREAM    Apply topically 2 (two) times daily.   Discontinued Medications    CHOLESTYRAMINE LIGHT 4 GRAM PACKET    DISSOLVE 1 PACKET IN LIQUID AND DRINK TWICE DAILY    FLUZONE HIGH-DOSE 2019-20, PF, 180 MCG/0.5 ML SYRG    ADM 0.5ML IM UTD    NYSTATIN-TRIAMCINOLONE (MYCOLOG) OINTMENT    Apply topically 2 (two) times daily.    TOLTERODINE (DETROL LA) 4 MG 24 HR CAPSULE    TAKE 1 CAPSULE(4 MG) BY MOUTH  EVERY DAY     Randall was seen today for follow-up.    Diagnoses and all orders for this visit:    Annual physical exam    Fall, subsequent encounter  -     Ambulatory Referral to Physical/Occupational Therapy    Gait disorder  -     Ambulatory Referral to Physical/Occupational Therapy    S/P aortic valve replacement    Cardiomyopathy, idiopathic    Chronic diastolic congestive heart failure    Dementia without behavioral disturbance, unspecified dementia type    Osteoarthritis of both knees, unspecified osteoarthritis type    Chronic kidney disease, stage III (moderate)    Weight loss  -     TSH; Future    Dermatitis    Other orders  -     triamcinolone acetonide 0.1% (KENALOG) 0.1 % cream; Apply topically 2 (two) times daily.  -     mupirocin (BACTROBAN) 2 % ointment; Apply topically 3 (three) times daily. for 7 days      See meds, orders, follow up, routing and instructions sections of encounter and AVS. Discussed with patient and provided on AVS.    Advised not to drive.    Follow up in 3 months to assess weight. If continues, consider scanning or other additional workup.  At this time unclear with the cause may be.      Excoriated dermatitis anterior chest.  Also intertriginous, buttock area.  This was examined, no ulcerations or other decubitus noted, no external hemorrhoid noted.

## 2019-12-04 ENCOUNTER — OFFICE VISIT (OUTPATIENT)
Dept: GASTROENTEROLOGY | Facility: CLINIC | Age: 84
End: 2019-12-04
Payer: MEDICARE

## 2019-12-04 VITALS
HEIGHT: 68 IN | DIASTOLIC BLOOD PRESSURE: 54 MMHG | WEIGHT: 158.06 LBS | BODY MASS INDEX: 23.95 KG/M2 | SYSTOLIC BLOOD PRESSURE: 91 MMHG | HEART RATE: 128 BPM

## 2019-12-04 DIAGNOSIS — R15.9 INCONTINENCE OF FECES WITH FECAL URGENCY: ICD-10-CM

## 2019-12-04 DIAGNOSIS — R15.1 FECAL SMEARING: ICD-10-CM

## 2019-12-04 DIAGNOSIS — R15.2 INCONTINENCE OF FECES WITH FECAL URGENCY: ICD-10-CM

## 2019-12-04 DIAGNOSIS — K21.9 GASTROESOPHAGEAL REFLUX DISEASE WITHOUT ESOPHAGITIS: Primary | ICD-10-CM

## 2019-12-04 LAB — TSH SERPL DL<=0.005 MIU/L-ACNC: 0.99 UIU/ML (ref 0.4–4)

## 2019-12-04 PROCEDURE — 3288F FALL RISK ASSESSMENT DOCD: CPT | Mod: HCNC,CPTII,S$GLB, | Performed by: NURSE PRACTITIONER

## 2019-12-04 PROCEDURE — 99213 PR OFFICE/OUTPT VISIT, EST, LEVL III, 20-29 MIN: ICD-10-PCS | Mod: HCNC,S$GLB,, | Performed by: NURSE PRACTITIONER

## 2019-12-04 PROCEDURE — 3288F PR FALLS RISK ASSESSMENT DOCUMENTED: ICD-10-PCS | Mod: HCNC,CPTII,S$GLB, | Performed by: NURSE PRACTITIONER

## 2019-12-04 PROCEDURE — 99213 OFFICE O/P EST LOW 20 MIN: CPT | Mod: HCNC,S$GLB,, | Performed by: NURSE PRACTITIONER

## 2019-12-04 PROCEDURE — 99999 PR PBB SHADOW E&M-EST. PATIENT-LVL III: ICD-10-PCS | Mod: PBBFAC,HCNC,, | Performed by: NURSE PRACTITIONER

## 2019-12-04 PROCEDURE — 1159F PR MEDICATION LIST DOCUMENTED IN MEDICAL RECORD: ICD-10-PCS | Mod: HCNC,S$GLB,, | Performed by: NURSE PRACTITIONER

## 2019-12-04 PROCEDURE — 1100F PTFALLS ASSESS-DOCD GE2>/YR: CPT | Mod: HCNC,CPTII,S$GLB, | Performed by: NURSE PRACTITIONER

## 2019-12-04 PROCEDURE — 1159F MED LIST DOCD IN RCRD: CPT | Mod: HCNC,S$GLB,, | Performed by: NURSE PRACTITIONER

## 2019-12-04 PROCEDURE — 1126F PR PAIN SEVERITY QUANTIFIED, NO PAIN PRESENT: ICD-10-PCS | Mod: HCNC,S$GLB,, | Performed by: NURSE PRACTITIONER

## 2019-12-04 PROCEDURE — 1100F PR PT FALLS ASSESS DOC 2+ FALLS/FALL W/INJURY/YR: ICD-10-PCS | Mod: HCNC,CPTII,S$GLB, | Performed by: NURSE PRACTITIONER

## 2019-12-04 PROCEDURE — 99999 PR PBB SHADOW E&M-EST. PATIENT-LVL III: CPT | Mod: PBBFAC,HCNC,, | Performed by: NURSE PRACTITIONER

## 2019-12-04 PROCEDURE — 1126F AMNT PAIN NOTED NONE PRSNT: CPT | Mod: HCNC,S$GLB,, | Performed by: NURSE PRACTITIONER

## 2019-12-04 NOTE — PATIENT INSTRUCTIONS
When you get home today, check to see if you are taking the omeprazole. If you are not taking the omeprazole, then that is fine. Goal is for you to stop taking the omeprazole due to the potential side effects it can cause.  If you are taking the omeprazole, take it every other day for 2 weeks, then stop all together. If once you stop the medication, your heart burn returns, then get back on the omeprazole 20 mg and let me know if you need refills.

## 2019-12-04 NOTE — PROGRESS NOTES
[facility] Consultation Note    Reason for Consult:  There were no encounter diagnoses.    PCP:   Tiago Delaney       Referring MD:  No referring provider defined for this encounter.    HPI 3/2019:  This is a 89 y.o. male here for f/u for GERD and fecal smearing. He is an established patient last seen by Dr. Lowe 12/2018. Taking prilosec 40 mg by mouth. He reports the last time he had epigastric burning and pyrosis is something he cant even remember because it has been so long. Has dry mouth. No dysphagia, pyrosis, reflux, epigastric pain, n/v, or melena.    He was referred to CRS for the fecal smearing and has since been seen twice in clinic. He states his fecal smearing has not improved. He states when he gets the urge he needs to be by a bathroom or he may have an accident. He has a BM every 2-3 days. Only having the fecal smearing on the days he is having the accidents. He is not aware that he is having the accidents. Sometimes its a smear but sometimes he reports its a formed stool and he is not sure it is even there. No blood in the stool. Still taking the fiber con. Not eating a fiber diet. He states he eats a lot of dairy such as ice cream but states he does not get abdominal pain or diarrhea.    12/4/19  He reports his GERD happens less than once a month. No N/v, dysphagia, overt GI bleeding.  He is unsure if he has been taking his omeprazole daily.  Diarrhea is stable right now. Not having accidents, he is unsure exactly what changed.     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    Surgical History:  has a past surgical history that includes Knee surgery; Total hip arthroplasty; cararact (car); Cataract extraction bilateral w/ anterior vitrectomy; Cholecystectomy; Appendectomy; Eye surgery; Cataract extraction w/  intraocular lens implant (Bilateral); Cardiac catheterization; Cardiac valuve replacement; and Joint replacement.    Family History: family history includes No Known Problems in his brother, daughter, daughter, father, maternal aunt, maternal grandfather, maternal grandmother, maternal uncle, mother, paternal aunt, paternal grandfather, paternal grandmother, paternal uncle, sister, son, and son..     Social History:  reports that he has never smoked. He has never used smokeless tobacco. He reports that he drinks alcohol. He reports that he does not use drugs.    Review of patient's allergies indicates:  No Known Allergies    Current Outpatient Medications on File Prior to Visit   Medication Sig Dispense Refill    amitriptyline (ELAVIL) 25 MG tablet Take 1 tablet (25 mg total) by mouth every evening. 30 tablet 11    amLODIPine (NORVASC) 5 MG tablet TAKE 1 TABLET(5 MG) BY MOUTH EVERY DAY 30 tablet 6    aspirin 81 MG Chew Take 81 mg by mouth once daily.        CHOLESTYRAMINE LIGHT 4 gram packet DISSOLVE 1 PACKET IN LIQUID AND DRINK TWICE DAILY 60 packet 0    clopidogrel (PLAVIX) 75 mg tablet TAKE 1 TABLET BY MOUTH ONCE DAILY 90 tablet 3    finasteride (PROSCAR) 5 mg tablet TAKE 1 TABLET(5 MG) BY MOUTH EVERY DAY 90 tablet 0    gabapentin (NEURONTIN) 300 MG capsule TAKE 1 CAPSULE(300 MG) BY MOUTH EVERY EVENING 90 capsule 3    mupirocin (BACTROBAN) 2 % ointment Apply topically 3 (three) times daily. for 7 days 22 g 1    omeprazole (PRILOSEC) 20 MG capsule TAKE 1 CAPSULE(20 MG) BY MOUTH EVERY DAY 30 capsule 0    polycarbophil (FIBERCON) 625 mg tablet Take 625 mg by mouth once daily.       "rivastigmine tartrate (EXELON) 3 MG capsule TAKE 1 CAPSULE(3 MG) BY MOUTH TWICE DAILY 60 capsule 11    tamsulosin (FLOMAX) 0.4 mg Cap TAKE ONE CAPSULE BY MOUTH DAILY 90 capsule 3    tolterodine (DETROL LA) 4 MG 24 hr capsule TAKE 1 CAPSULE(4 MG) BY MOUTH EVERY DAY 30 capsule 0    torsemide (DEMADEX) 20 MG Tab Take 1 tablet (20 mg total) by mouth once daily. 30 tablet 11    triamcinolone acetonide 0.1% (KENALOG) 0.1 % cream Apply topically 2 (two) times daily. 80 g 1    triamcinolone acetonide 0.1% (KENALOG) 0.1 % cream Apply topically 2 (two) times daily. 80 g 1    fluorometholone 0.1% (FML) 0.1 % DrpS       mupirocin (BACTROBAN) 2 % ointment Apply topically 3 (three) times daily as needed.      nystatin (MYCOSTATIN) 100,000 unit/mL suspension       thiamine 100 MG tablet Take 100 mg by mouth once daily.       No current facility-administered medications on file prior to visit.          Objective Findings:    Vital Signs:  BP (!) 91/54 (BP Location: Left arm)   Pulse (!) 128   Ht 5' 8" (1.727 m)   Wt 71.7 kg (158 lb 1.1 oz)   BMI 24.03 kg/m²   Body mass index is 24.03 kg/m².    Physical Exam:  General Appearance: Well appearing in no acute distress  Head:   Normocephalic, without obvious abnormality  Eyes:    No scleral icterus  ENT: Neck supple  Neurologic: AAO x 3      Labs:  Lab Results   Component Value Date    WBC 7.60 10/01/2019    HGB 11.2 (L) 10/01/2019    HCT 35.9 (L) 10/01/2019     10/01/2019    CRP 28.8 (H) 06/30/2009    CHOL 135 05/30/2018    TRIG 83 05/30/2018    HDL 37 (L) 05/30/2018    ALT 15 05/07/2019    AST 22 05/07/2019     10/01/2019    K 4.2 10/01/2019     10/01/2019    CREATININE 1.4 10/01/2019    BUN 22 10/01/2019    CO2 29 10/01/2019    TSH 0.993 12/03/2019    PSA 0.6 03/07/2005    INR 1.0 08/29/2016    HGBA1C 5.7 04/10/2014       Imaging:  None reviewed    Endoscopy:    None reviewed     Assessment:  1. Gastroesophageal reflux disease without esophagitis   Use " was to follow up with me several months ago but did not make that clinic visit.  Goal is to wean him off PPI therapy.  He is unsure if he has been taking his omeprazole, I am unsure if he has as well since his pharmacy has been sending refill request.  Explained to patient if he has not been taking his omeprazole then there is no need to refill that medication.  But we did discuss a weaning protocol that can be found in his after visit summary regarding weaning off the omeprazole.   2. Fecal smearing and diarrhea  This has resolved.  He is unsure what has changed.  He did not go back and see CRS.  He is unsure if there is a medication change that helped.  If he has not been taking his omeprazole it could be that discontinuing the omeprazole helped with his diarrhea since that is a potential side effect.          Recommendations:  1.  Stop taking omeprazole.  Weaning protocol discussed.  Patient will let me know if his reflux returns.    Follow-up as needed    Order summary:         Thank you so much for allowing me to participate in the care of GINA Alex Jr.

## 2019-12-06 DIAGNOSIS — R15.1 FECAL SMEARING: ICD-10-CM

## 2019-12-06 RX ORDER — AMITRIPTYLINE HYDROCHLORIDE 25 MG/1
TABLET, FILM COATED ORAL
Qty: 90 TABLET | Refills: 0 | Status: SHIPPED | OUTPATIENT
Start: 2019-12-06 | End: 2020-03-05

## 2019-12-06 RX ORDER — AMLODIPINE BESYLATE 5 MG/1
TABLET ORAL
Qty: 30 TABLET | Refills: 6 | Status: SHIPPED | OUTPATIENT
Start: 2019-12-06 | End: 2021-06-14

## 2019-12-16 ENCOUNTER — HOSPITAL ENCOUNTER (INPATIENT)
Facility: OTHER | Age: 84
LOS: 2 days | Discharge: HOME HEALTH | DRG: 194 | End: 2019-12-18
Attending: EMERGENCY MEDICINE | Admitting: INTERNAL MEDICINE
Payer: MEDICARE

## 2019-12-16 ENCOUNTER — TELEPHONE (OUTPATIENT)
Dept: INTERNAL MEDICINE | Facility: CLINIC | Age: 84
End: 2019-12-16

## 2019-12-16 ENCOUNTER — NURSE TRIAGE (OUTPATIENT)
Dept: ADMINISTRATIVE | Facility: CLINIC | Age: 84
End: 2019-12-16

## 2019-12-16 DIAGNOSIS — J18.9 MULTIFOCAL PNEUMONIA: Primary | ICD-10-CM

## 2019-12-16 DIAGNOSIS — R63.4 WEIGHT LOSS: ICD-10-CM

## 2019-12-16 DIAGNOSIS — R53.83 FATIGUE: ICD-10-CM

## 2019-12-16 DIAGNOSIS — I48.91 ATRIAL FIBRILLATION, NEW ONSET: ICD-10-CM

## 2019-12-16 DIAGNOSIS — R68.89 MULTIPLE COMPLAINTS: ICD-10-CM

## 2019-12-16 LAB
ALBUMIN SERPL BCP-MCNC: 2.4 G/DL (ref 3.5–5.2)
ALP SERPL-CCNC: 182 U/L (ref 55–135)
ALT SERPL W/O P-5'-P-CCNC: 22 U/L (ref 10–44)
ANION GAP SERPL CALC-SCNC: 11 MMOL/L (ref 8–16)
AST SERPL-CCNC: 43 U/L (ref 10–40)
BASOPHILS # BLD AUTO: 0.09 K/UL (ref 0–0.2)
BASOPHILS NFR BLD: 0.6 % (ref 0–1.9)
BILIRUB SERPL-MCNC: 1 MG/DL (ref 0.1–1)
BNP SERPL-MCNC: 155 PG/ML (ref 0–99)
BUN SERPL-MCNC: 31 MG/DL (ref 6–30)
BUN SERPL-MCNC: 32 MG/DL (ref 10–30)
CALCIUM SERPL-MCNC: 9.4 MG/DL (ref 8.7–10.5)
CHLORIDE SERPL-SCNC: 100 MMOL/L (ref 95–110)
CHLORIDE SERPL-SCNC: 98 MMOL/L (ref 95–110)
CO2 SERPL-SCNC: 32 MMOL/L (ref 23–29)
CREAT SERPL-MCNC: 0.9 MG/DL (ref 0.5–1.4)
CREAT SERPL-MCNC: 0.9 MG/DL (ref 0.5–1.4)
DIFFERENTIAL METHOD: ABNORMAL
EOSINOPHIL # BLD AUTO: 0.1 K/UL (ref 0–0.5)
EOSINOPHIL NFR BLD: 0.6 % (ref 0–8)
ERYTHROCYTE [DISTWIDTH] IN BLOOD BY AUTOMATED COUNT: 13.5 % (ref 11.5–14.5)
EST. GFR  (AFRICAN AMERICAN): >60 ML/MIN/1.73 M^2
EST. GFR  (NON AFRICAN AMERICAN): >60 ML/MIN/1.73 M^2
GLUCOSE SERPL-MCNC: 101 MG/DL (ref 70–110)
GLUCOSE SERPL-MCNC: 103 MG/DL (ref 70–110)
HCT VFR BLD AUTO: 36.4 % (ref 40–54)
HCT VFR BLD CALC: 32 %PCV (ref 36–54)
HGB BLD-MCNC: 11.7 G/DL (ref 14–18)
IMM GRANULOCYTES # BLD AUTO: 0.39 K/UL (ref 0–0.04)
IMM GRANULOCYTES NFR BLD AUTO: 2.6 % (ref 0–0.5)
LACTATE SERPL-SCNC: 1.3 MMOL/L (ref 0.5–2.2)
LYMPHOCYTES # BLD AUTO: 2.6 K/UL (ref 1–4.8)
LYMPHOCYTES NFR BLD: 17 % (ref 18–48)
MCH RBC QN AUTO: 32.2 PG (ref 27–31)
MCHC RBC AUTO-ENTMCNC: 32.1 G/DL (ref 32–36)
MCV RBC AUTO: 100 FL (ref 82–98)
MONOCYTES # BLD AUTO: 1.6 K/UL (ref 0.3–1)
MONOCYTES NFR BLD: 10.7 % (ref 4–15)
NEUTROPHILS # BLD AUTO: 10.4 K/UL (ref 1.8–7.7)
NEUTROPHILS NFR BLD: 68.5 % (ref 38–73)
NRBC BLD-RTO: 0 /100 WBC
PLATELET # BLD AUTO: 309 K/UL (ref 150–350)
PMV BLD AUTO: 9.5 FL (ref 9.2–12.9)
POC IONIZED CALCIUM: 1.07 MMOL/L (ref 1.06–1.42)
POC TCO2 (MEASURED): 30 MMOL/L (ref 23–29)
POTASSIUM BLD-SCNC: 3.2 MMOL/L (ref 3.5–5.1)
POTASSIUM SERPL-SCNC: 3.6 MMOL/L (ref 3.5–5.1)
PROT SERPL-MCNC: 7.7 G/DL (ref 6–8.4)
RBC # BLD AUTO: 3.63 M/UL (ref 4.6–6.2)
SAMPLE: ABNORMAL
SODIUM BLD-SCNC: 140 MMOL/L (ref 136–145)
SODIUM SERPL-SCNC: 141 MMOL/L (ref 136–145)
TROPONIN I SERPL DL<=0.01 NG/ML-MCNC: 0.06 NG/ML (ref 0–0.03)
WBC # BLD AUTO: 15.21 K/UL (ref 3.9–12.7)

## 2019-12-16 PROCEDURE — 63600175 PHARM REV CODE 636 W HCPCS: Mod: HCNC | Performed by: EMERGENCY MEDICINE

## 2019-12-16 PROCEDURE — 80053 COMPREHEN METABOLIC PANEL: CPT | Mod: HCNC

## 2019-12-16 PROCEDURE — 93005 ELECTROCARDIOGRAM TRACING: CPT | Mod: HCNC

## 2019-12-16 PROCEDURE — 85025 COMPLETE CBC W/AUTO DIFF WBC: CPT | Mod: HCNC

## 2019-12-16 PROCEDURE — 25000003 PHARM REV CODE 250: Mod: HCNC | Performed by: STUDENT IN AN ORGANIZED HEALTH CARE EDUCATION/TRAINING PROGRAM

## 2019-12-16 PROCEDURE — 83880 ASSAY OF NATRIURETIC PEPTIDE: CPT | Mod: HCNC

## 2019-12-16 PROCEDURE — 84484 ASSAY OF TROPONIN QUANT: CPT | Mod: HCNC

## 2019-12-16 PROCEDURE — 99285 EMERGENCY DEPT VISIT HI MDM: CPT | Mod: HCNC,,, | Performed by: EMERGENCY MEDICINE

## 2019-12-16 PROCEDURE — 63600175 PHARM REV CODE 636 W HCPCS: Mod: HCNC | Performed by: STUDENT IN AN ORGANIZED HEALTH CARE EDUCATION/TRAINING PROGRAM

## 2019-12-16 PROCEDURE — 80047 BASIC METABLC PNL IONIZED CA: CPT | Mod: HCNC

## 2019-12-16 PROCEDURE — 93010 EKG 12-LEAD: ICD-10-PCS | Mod: HCNC,,, | Performed by: INTERNAL MEDICINE

## 2019-12-16 PROCEDURE — 83605 ASSAY OF LACTIC ACID: CPT | Mod: HCNC

## 2019-12-16 PROCEDURE — 96360 HYDRATION IV INFUSION INIT: CPT | Mod: HCNC

## 2019-12-16 PROCEDURE — 93010 ELECTROCARDIOGRAM REPORT: CPT | Mod: HCNC,,, | Performed by: INTERNAL MEDICINE

## 2019-12-16 PROCEDURE — 84145 PROCALCITONIN (PCT): CPT | Mod: HCNC

## 2019-12-16 PROCEDURE — 99285 PR EMERGENCY DEPT VISIT,LEVEL V: ICD-10-PCS | Mod: HCNC,,, | Performed by: EMERGENCY MEDICINE

## 2019-12-16 PROCEDURE — 87040 BLOOD CULTURE FOR BACTERIA: CPT | Mod: 59,HCNC

## 2019-12-16 PROCEDURE — 12000002 HC ACUTE/MED SURGE SEMI-PRIVATE ROOM: Mod: HCNC

## 2019-12-16 PROCEDURE — 99285 EMERGENCY DEPT VISIT HI MDM: CPT | Mod: 25,HCNC

## 2019-12-16 RX ORDER — TORSEMIDE 20 MG/1
20 TABLET ORAL DAILY
Status: DISCONTINUED | OUTPATIENT
Start: 2019-12-17 | End: 2019-12-18 | Stop reason: HOSPADM

## 2019-12-16 RX ORDER — NAPROXEN SODIUM 220 MG/1
81 TABLET, FILM COATED ORAL DAILY
Status: DISCONTINUED | OUTPATIENT
Start: 2019-12-17 | End: 2019-12-18 | Stop reason: HOSPADM

## 2019-12-16 RX ORDER — PANTOPRAZOLE SODIUM 40 MG/1
40 TABLET, DELAYED RELEASE ORAL DAILY
Status: DISCONTINUED | OUTPATIENT
Start: 2019-12-17 | End: 2019-12-18 | Stop reason: HOSPADM

## 2019-12-16 RX ORDER — POLYETHYLENE GLYCOL 3350 17 G/17G
17 POWDER, FOR SOLUTION ORAL DAILY
Status: DISCONTINUED | OUTPATIENT
Start: 2019-12-17 | End: 2019-12-18 | Stop reason: HOSPADM

## 2019-12-16 RX ORDER — AMLODIPINE BESYLATE 5 MG/1
5 TABLET ORAL DAILY
Status: DISCONTINUED | OUTPATIENT
Start: 2019-12-17 | End: 2019-12-18 | Stop reason: HOSPADM

## 2019-12-16 RX ORDER — GLUCAGON 1 MG
1 KIT INJECTION
Status: DISCONTINUED | OUTPATIENT
Start: 2019-12-16 | End: 2019-12-18 | Stop reason: HOSPADM

## 2019-12-16 RX ORDER — SODIUM CHLORIDE 0.9 % (FLUSH) 0.9 %
10 SYRINGE (ML) INJECTION
Status: CANCELLED | OUTPATIENT
Start: 2019-12-16

## 2019-12-16 RX ORDER — IBUPROFEN 200 MG
16 TABLET ORAL
Status: DISCONTINUED | OUTPATIENT
Start: 2019-12-16 | End: 2019-12-18 | Stop reason: HOSPADM

## 2019-12-16 RX ORDER — IBUPROFEN 200 MG
24 TABLET ORAL
Status: DISCONTINUED | OUTPATIENT
Start: 2019-12-16 | End: 2019-12-18 | Stop reason: HOSPADM

## 2019-12-16 RX ORDER — RIVASTIGMINE TARTRATE 1.5 MG/1
1.5 CAPSULE ORAL 2 TIMES DAILY
Status: DISCONTINUED | OUTPATIENT
Start: 2019-12-16 | End: 2019-12-18 | Stop reason: HOSPADM

## 2019-12-16 RX ORDER — AMOXICILLIN 250 MG
1 CAPSULE ORAL 2 TIMES DAILY
Status: DISCONTINUED | OUTPATIENT
Start: 2019-12-16 | End: 2019-12-18 | Stop reason: HOSPADM

## 2019-12-16 RX ORDER — ACETAMINOPHEN 325 MG/1
650 TABLET ORAL EVERY 4 HOURS PRN
Status: DISCONTINUED | OUTPATIENT
Start: 2019-12-16 | End: 2019-12-18 | Stop reason: HOSPADM

## 2019-12-16 RX ORDER — CEFTRIAXONE 1 G/1
1 INJECTION, POWDER, FOR SOLUTION INTRAMUSCULAR; INTRAVENOUS
Status: COMPLETED | OUTPATIENT
Start: 2019-12-16 | End: 2019-12-16

## 2019-12-16 RX ORDER — ACETAMINOPHEN 325 MG/1
650 TABLET ORAL EVERY 8 HOURS PRN
Status: DISCONTINUED | OUTPATIENT
Start: 2019-12-16 | End: 2019-12-18 | Stop reason: HOSPADM

## 2019-12-16 RX ORDER — THIAMINE HCL 100 MG
100 TABLET ORAL DAILY
Status: DISCONTINUED | OUTPATIENT
Start: 2019-12-17 | End: 2019-12-18 | Stop reason: HOSPADM

## 2019-12-16 RX ORDER — GABAPENTIN 300 MG/1
300 CAPSULE ORAL ONCE
Status: COMPLETED | OUTPATIENT
Start: 2019-12-16 | End: 2019-12-16

## 2019-12-16 RX ORDER — SODIUM CHLORIDE 9 MG/ML
500 INJECTION, SOLUTION INTRAVENOUS
Status: COMPLETED | OUTPATIENT
Start: 2019-12-16 | End: 2019-12-16

## 2019-12-16 RX ORDER — FINASTERIDE 5 MG/1
5 TABLET, FILM COATED ORAL DAILY
Status: DISCONTINUED | OUTPATIENT
Start: 2019-12-17 | End: 2019-12-18 | Stop reason: HOSPADM

## 2019-12-16 RX ORDER — CLOPIDOGREL BISULFATE 75 MG/1
75 TABLET ORAL DAILY
Status: DISCONTINUED | OUTPATIENT
Start: 2019-12-17 | End: 2019-12-18

## 2019-12-16 RX ORDER — TAMSULOSIN HYDROCHLORIDE 0.4 MG/1
1 CAPSULE ORAL DAILY
Status: DISCONTINUED | OUTPATIENT
Start: 2019-12-17 | End: 2019-12-18 | Stop reason: HOSPADM

## 2019-12-16 RX ORDER — SODIUM CHLORIDE 0.9 % (FLUSH) 0.9 %
10 SYRINGE (ML) INJECTION
Status: DISCONTINUED | OUTPATIENT
Start: 2019-12-16 | End: 2019-12-18 | Stop reason: HOSPADM

## 2019-12-16 RX ADMIN — RIVASTIGMINE TARTRATE 1.5 MG: 1.5 CAPSULE ORAL at 11:12

## 2019-12-16 RX ADMIN — SODIUM CHLORIDE 500 ML: 0.9 INJECTION, SOLUTION INTRAVENOUS at 07:12

## 2019-12-16 RX ADMIN — AZITHROMYCIN MONOHYDRATE 500 MG: 500 INJECTION, POWDER, LYOPHILIZED, FOR SOLUTION INTRAVENOUS at 08:12

## 2019-12-16 RX ADMIN — SODIUM CHLORIDE 500 ML: 0.9 INJECTION, SOLUTION INTRAVENOUS at 08:12

## 2019-12-16 RX ADMIN — CEFTRIAXONE SODIUM 1 G: 1 INJECTION, POWDER, FOR SOLUTION INTRAMUSCULAR; INTRAVENOUS at 08:12

## 2019-12-16 RX ADMIN — SENNOSIDES AND DOCUSATE SODIUM 1 TABLET: 8.6; 5 TABLET ORAL at 10:12

## 2019-12-16 RX ADMIN — GABAPENTIN 300 MG: 300 CAPSULE ORAL at 10:12

## 2019-12-16 NOTE — TELEPHONE ENCOUNTER
----- Message from Cinthia Shahid sent at 12/16/2019  2:35 PM CST -----  Contact: Sade wife of pt [phone]  Patient has had cold sx x 1 week and has no appetite . A former pediatrician who is a friend, stopped by their home and listened to his lungs and said that he should talk to you about putting him in the hospital because he sounds like he has fluid in his lungs and felt he should be on iv therapy. Patient would prefer to go in the morning if you would have him admitted.     Please call to advise pt what to do asap.

## 2019-12-17 PROBLEM — I48.91 NEW ONSET ATRIAL FIBRILLATION: Status: ACTIVE | Noted: 2019-12-17

## 2019-12-17 LAB
ALBUMIN SERPL BCP-MCNC: 2.2 G/DL (ref 3.5–5.2)
ALP SERPL-CCNC: 164 U/L (ref 55–135)
ALT SERPL W/O P-5'-P-CCNC: 21 U/L (ref 10–44)
ANION GAP SERPL CALC-SCNC: 10 MMOL/L (ref 8–16)
AST SERPL-CCNC: 40 U/L (ref 10–40)
BASOPHILS # BLD AUTO: 0.02 K/UL (ref 0–0.2)
BASOPHILS NFR BLD: 0.1 % (ref 0–1.9)
BILIRUB SERPL-MCNC: 0.9 MG/DL (ref 0.1–1)
BILIRUB UR QL STRIP: NEGATIVE
BUN SERPL-MCNC: 26 MG/DL (ref 10–30)
CALCIUM SERPL-MCNC: 8.7 MG/DL (ref 8.7–10.5)
CHLORIDE SERPL-SCNC: 101 MMOL/L (ref 95–110)
CLARITY UR REFRACT.AUTO: CLEAR
CO2 SERPL-SCNC: 28 MMOL/L (ref 23–29)
COLOR UR AUTO: YELLOW
CREAT SERPL-MCNC: 0.8 MG/DL (ref 0.5–1.4)
DIFFERENTIAL METHOD: ABNORMAL
EOSINOPHIL # BLD AUTO: 0.1 K/UL (ref 0–0.5)
EOSINOPHIL NFR BLD: 0.6 % (ref 0–8)
ERYTHROCYTE [DISTWIDTH] IN BLOOD BY AUTOMATED COUNT: 13.5 % (ref 11.5–14.5)
EST. GFR  (AFRICAN AMERICAN): >60 ML/MIN/1.73 M^2
EST. GFR  (NON AFRICAN AMERICAN): >60 ML/MIN/1.73 M^2
GLUCOSE SERPL-MCNC: 92 MG/DL (ref 70–110)
GLUCOSE UR QL STRIP: NEGATIVE
HCT VFR BLD AUTO: 33 % (ref 40–54)
HGB BLD-MCNC: 10.5 G/DL (ref 14–18)
HGB UR QL STRIP: NEGATIVE
IMM GRANULOCYTES # BLD AUTO: 0.39 K/UL (ref 0–0.04)
IMM GRANULOCYTES NFR BLD AUTO: 2.8 % (ref 0–0.5)
KETONES UR QL STRIP: NEGATIVE
LEUKOCYTE ESTERASE UR QL STRIP: NEGATIVE
LYMPHOCYTES # BLD AUTO: 2.6 K/UL (ref 1–4.8)
LYMPHOCYTES NFR BLD: 18.2 % (ref 18–48)
MAGNESIUM SERPL-MCNC: 1.9 MG/DL (ref 1.6–2.6)
MCH RBC QN AUTO: 31.5 PG (ref 27–31)
MCHC RBC AUTO-ENTMCNC: 31.8 G/DL (ref 32–36)
MCV RBC AUTO: 99 FL (ref 82–98)
MONOCYTES # BLD AUTO: 1.7 K/UL (ref 0.3–1)
MONOCYTES NFR BLD: 12.1 % (ref 4–15)
NEUTROPHILS # BLD AUTO: 9.3 K/UL (ref 1.8–7.7)
NEUTROPHILS NFR BLD: 66.2 % (ref 38–73)
NITRITE UR QL STRIP: NEGATIVE
NRBC BLD-RTO: 0 /100 WBC
PH UR STRIP: 6 [PH] (ref 5–8)
PHOSPHATE SERPL-MCNC: 2.9 MG/DL (ref 2.7–4.5)
PLATELET # BLD AUTO: 287 K/UL (ref 150–350)
PMV BLD AUTO: 9.7 FL (ref 9.2–12.9)
POTASSIUM SERPL-SCNC: 3.5 MMOL/L (ref 3.5–5.1)
PROCALCITONIN SERPL IA-MCNC: 0.17 NG/ML
PROT SERPL-MCNC: 6.7 G/DL (ref 6–8.4)
PROT UR QL STRIP: NEGATIVE
RBC # BLD AUTO: 3.33 M/UL (ref 4.6–6.2)
SODIUM SERPL-SCNC: 139 MMOL/L (ref 136–145)
SP GR UR STRIP: 1.02 (ref 1–1.03)
URN SPEC COLLECT METH UR: NORMAL
WBC # BLD AUTO: 13.98 K/UL (ref 3.9–12.7)

## 2019-12-17 PROCEDURE — 84100 ASSAY OF PHOSPHORUS: CPT | Mod: HCNC

## 2019-12-17 PROCEDURE — 11000001 HC ACUTE MED/SURG PRIVATE ROOM: Mod: HCNC

## 2019-12-17 PROCEDURE — 99223 PR INITIAL HOSPITAL CARE,LEVL III: ICD-10-PCS | Mod: HCNC,AI,GC, | Performed by: INTERNAL MEDICINE

## 2019-12-17 PROCEDURE — 99223 1ST HOSP IP/OBS HIGH 75: CPT | Mod: HCNC,AI,GC, | Performed by: INTERNAL MEDICINE

## 2019-12-17 PROCEDURE — 83735 ASSAY OF MAGNESIUM: CPT | Mod: HCNC

## 2019-12-17 PROCEDURE — 36415 COLL VENOUS BLD VENIPUNCTURE: CPT | Mod: HCNC

## 2019-12-17 PROCEDURE — 87449 NOS EACH ORGANISM AG IA: CPT | Mod: HCNC

## 2019-12-17 PROCEDURE — 85025 COMPLETE CBC W/AUTO DIFF WBC: CPT | Mod: HCNC

## 2019-12-17 PROCEDURE — 63600175 PHARM REV CODE 636 W HCPCS: Mod: HCNC | Performed by: PHYSICIAN ASSISTANT

## 2019-12-17 PROCEDURE — 80053 COMPREHEN METABOLIC PANEL: CPT | Mod: HCNC

## 2019-12-17 PROCEDURE — 25000003 PHARM REV CODE 250: Mod: HCNC | Performed by: STUDENT IN AN ORGANIZED HEALTH CARE EDUCATION/TRAINING PROGRAM

## 2019-12-17 PROCEDURE — 81003 URINALYSIS AUTO W/O SCOPE: CPT | Mod: HCNC

## 2019-12-17 RX ORDER — CEFTRIAXONE 1 G/1
1 INJECTION, POWDER, FOR SOLUTION INTRAMUSCULAR; INTRAVENOUS
Status: DISCONTINUED | OUTPATIENT
Start: 2019-12-18 | End: 2019-12-17

## 2019-12-17 RX ADMIN — RIVASTIGMINE TARTRATE 1.5 MG: 1.5 CAPSULE ORAL at 09:12

## 2019-12-17 RX ADMIN — PANTOPRAZOLE SODIUM 40 MG: 40 TABLET, DELAYED RELEASE ORAL at 10:12

## 2019-12-17 RX ADMIN — SENNOSIDES AND DOCUSATE SODIUM 1 TABLET: 8.6; 5 TABLET ORAL at 10:12

## 2019-12-17 RX ADMIN — ASPIRIN 81 MG CHEWABLE TABLET 81 MG: 81 TABLET CHEWABLE at 10:12

## 2019-12-17 RX ADMIN — CLOPIDOGREL BISULFATE 75 MG: 75 TABLET ORAL at 10:12

## 2019-12-17 RX ADMIN — SENNOSIDES AND DOCUSATE SODIUM 1 TABLET: 8.6; 5 TABLET ORAL at 09:12

## 2019-12-17 RX ADMIN — TORSEMIDE 20 MG: 20 TABLET ORAL at 10:12

## 2019-12-17 RX ADMIN — CEFTRIAXONE 1 G: 1 INJECTION, SOLUTION INTRAVENOUS at 09:12

## 2019-12-17 RX ADMIN — Medication 100 MG: at 10:12

## 2019-12-17 RX ADMIN — AMLODIPINE BESYLATE 5 MG: 5 TABLET ORAL at 10:12

## 2019-12-17 RX ADMIN — FINASTERIDE 5 MG: 5 TABLET, FILM COATED ORAL at 10:12

## 2019-12-17 RX ADMIN — TAMSULOSIN HYDROCHLORIDE 0.4 MG: 0.4 CAPSULE ORAL at 10:12

## 2019-12-17 RX ADMIN — RIVASTIGMINE TARTRATE 1.5 MG: 1.5 CAPSULE ORAL at 10:12

## 2019-12-17 NOTE — NURSING
Pt received from [facility] ED. No acute distress noted. VSS on RA. Admission assesment complete. Pt oriented to room and hospital equipment. Bed lowered and locked, side rails up x2,bed alarm set, call light within reach. Will continue to monitor for changes.

## 2019-12-17 NOTE — SUBJECTIVE & OBJECTIVE
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    PVD (peripheral vascular disease) 8/22/2018    Stenosis of aortic and mitral valves     aortic DAHLIA 1.1 CM       Past Surgical History:   Procedure Laterality Date    APPENDECTOMY      cararact  car    CARDIAC CATHETERIZATION      CARDIAC VALVE SURGERY      CATARACT EXTRACTION BILATERAL W/ ANTERIOR VITRECTOMY      bilaterial    CATARACT EXTRACTION W/  INTRAOCULAR LENS IMPLANT Bilateral     CHOLECYSTECTOMY      EYE SURGERY      JOINT REPLACEMENT      bilateral hip    KNEE SURGERY      Bilateral    TOTAL HIP ARTHROPLASTY      Bilaterial       Review of patient's allergies indicates:  No Known Allergies    No current facility-administered medications on file prior to encounter.      Current Outpatient Medications on File Prior to Encounter   Medication Sig    amitriptyline (ELAVIL) 25 MG tablet TAKE 1 TABLET(25 MG) BY MOUTH EVERY EVENING    amLODIPine (NORVASC) 5 MG tablet TAKE 1 TABLET(5 MG) BY MOUTH EVERY DAY    aspirin 81 MG Chew Take 81 mg by mouth once daily.      CHOLESTYRAMINE LIGHT 4 gram packet DISSOLVE 1 PACKET IN LIQUID AND DRINK TWICE DAILY    clopidogrel (PLAVIX) 75 mg tablet TAKE 1 TABLET BY MOUTH ONCE DAILY    finasteride (PROSCAR) 5 mg tablet TAKE 1 TABLET(5 MG) BY MOUTH EVERY DAY    fluorometholone 0.1% (FML) 0.1 % DrpS     gabapentin (NEURONTIN) 300 MG capsule TAKE 1 CAPSULE(300 MG) BY MOUTH EVERY EVENING    mupirocin (BACTROBAN) 2 % ointment Apply topically 3 (three) times daily as needed.    nystatin (MYCOSTATIN) 100,000 unit/mL suspension     omeprazole (PRILOSEC) 20 MG capsule TAKE 1 CAPSULE(20 MG) BY MOUTH EVERY DAY    polycarbophil (FIBERCON) 625 mg tablet Take 625 mg by mouth once daily.    rivastigmine tartrate (EXELON) 3 MG capsule  TAKE 1 CAPSULE(3 MG) BY MOUTH TWICE DAILY    tamsulosin (FLOMAX) 0.4 mg Cap TAKE ONE CAPSULE BY MOUTH DAILY    thiamine 100 MG tablet Take 100 mg by mouth once daily.    tolterodine (DETROL LA) 4 MG 24 hr capsule TAKE 1 CAPSULE(4 MG) BY MOUTH EVERY DAY    torsemide (DEMADEX) 20 MG Tab Take 1 tablet (20 mg total) by mouth once daily.    triamcinolone acetonide 0.1% (KENALOG) 0.1 % cream Apply topically 2 (two) times daily.    triamcinolone acetonide 0.1% (KENALOG) 0.1 % cream Apply topically 2 (two) times daily.     Family History     Problem Relation (Age of Onset)    No Known Problems Father, Mother, Sister, Brother, Maternal Aunt, Maternal Uncle, Paternal Aunt, Paternal Uncle, Maternal Grandmother, Maternal Grandfather, Paternal Grandmother, Paternal Grandfather, Daughter, Son, Daughter, Son        Tobacco Use    Smoking status: Never Smoker    Smokeless tobacco: Never Used   Substance and Sexual Activity    Alcohol use: Yes     Comment: less than one  drink weekly    Drug use: No    Sexual activity: Not on file     Review of Systems   Constitutional: Positive for activity change, appetite change and fatigue. Negative for fever.   HENT: Negative for congestion and mouth sores.    Eyes: Negative for photophobia and visual disturbance.   Respiratory: Positive for cough and shortness of breath. Negative for apnea and chest tightness.    Cardiovascular: Negative for chest pain, palpitations and leg swelling.   Gastrointestinal: Negative for abdominal distention, abdominal pain, constipation, diarrhea, nausea and vomiting.   Endocrine: Negative for polyphagia and polyuria.   Genitourinary: Positive for frequency and urgency. Negative for dysuria.   Musculoskeletal: Positive for back pain. Negative for arthralgias.   Neurological: Negative for dizziness, light-headedness and headaches.   Hematological: Negative for adenopathy. Bruises/bleeds easily.   Psychiatric/Behavioral: Negative for agitation and  behavioral problems.     Objective:     Vital Signs (Most Recent):  Temp: 97.5 °F (36.4 °C) (12/16/19 2049)  Pulse: 70 (12/16/19 2049)  Resp: 16 (12/16/19 2049)  BP: (!) 143/63 (12/16/19 2049)  SpO2: 98 % (12/16/19 2049) Vital Signs (24h Range):  Temp:  [97.5 °F (36.4 °C)-98.2 °F (36.8 °C)] 97.5 °F (36.4 °C)  Pulse:  [70-79] 70  Resp:  [16-18] 16  SpO2:  [96 %-98 %] 98 %  BP: (123-150)/(63-74) 143/63     Weight: 64.4 kg (142 lb)  Body mass index is 21.59 kg/m².    Physical Exam   Constitutional: He is oriented to person, place, and time. He appears well-developed and well-nourished.   HENT:   Head: Normocephalic and atraumatic.   Mouth/Throat: No oropharyngeal exudate.   White plaque ulcers noted throughout mouth    Eyes: Pupils are equal, round, and reactive to light. EOM are normal. Right eye exhibits no discharge. Left eye exhibits no discharge.   Neck: Normal range of motion. Neck supple.   Cardiovascular: Normal heart sounds and intact distal pulses.   No murmur heard.  Pulmonary/Chest: Effort normal and breath sounds normal. No stridor. No respiratory distress.   Abdominal: Soft. Bowel sounds are normal. A hernia (umbilical) is present.   Musculoskeletal: Normal range of motion. He exhibits no edema or deformity.   Neurological: He is alert and oriented to person, place, and time. No cranial nerve deficit.   Skin: Skin is warm and dry.   2X3in scab on the left arm    Psychiatric: He has a normal mood and affect.   Nursing note and vitals reviewed.        CRANIAL NERVES     CN III, IV, VI   Pupils are equal, round, and reactive to light.  Extraocular motions are normal.        Significant Labs:   CBC:   Recent Labs   Lab 12/16/19 1923 12/16/19 2028   WBC 15.21*  --    HGB 11.7*  --    HCT 36.4* 32*     --      CMP:   Recent Labs   Lab 12/16/19 1925      K 3.6   CL 98   CO2 32*      BUN 32*   CREATININE 0.9   CALCIUM 9.4   PROT 7.7   ALBUMIN 2.4*   BILITOT 1.0   ALKPHOS 182*   AST 43*    ALT 22   ANIONGAP 11   EGFRNONAA >60.0       Significant Imaging: I have reviewed and interpreted all pertinent imaging results/findings within the past 24 hours.

## 2019-12-17 NOTE — ASSESSMENT & PLAN NOTE
Noted on ECG in the ED. Rate controlled.   No a. Fib on review of prior ECGs  Echo pending  Cardiology consulted for evaluation  CHADS-VASc 5 but given h/o TAVR, will hold off on anticoagulation

## 2019-12-17 NOTE — HOSPITAL COURSE
Patient admitted with multifocal pneumonia associated with decreased PO intake and newly diagnosed atrial fibrillation. He was started on empiric rocephin/azithromycin. Cardiology was consulted and recommended starting apixaban. He had rapid improvement in symptoms, resolved leukocytosis and oxygen sats were stable on room air. He was adamant about going home and did not want any further testing done during this admission. He was instructed to follow-up with his PCP within 2 weeks. I discussed with patient and his wife that he should have follow-up imaging in a few weeks to ensure resolution of pneumonia and that no other underlying issue is present.

## 2019-12-17 NOTE — SUBJECTIVE & OBJECTIVE
Interval History:  Pt seen and examined at bedside with his wife. He reports feeling much improved from yesterday. He denies SOB. He had dry cough. Appetite improved today and he is eating well. Denies fever, chills.    Review of Systems   Constitutional: Negative for chills and fever.   Respiratory: Positive for cough. Negative for shortness of breath.    Cardiovascular: Negative for chest pain.   Gastrointestinal: Negative for nausea and vomiting.     Objective:     Vital Signs (Most Recent):  Temp: 98.3 °F (36.8 °C) (12/17/19 1227)  Pulse: 82 (12/17/19 1227)  Resp: 20 (12/17/19 1227)  BP: (!) 144/67 (12/17/19 1227)  SpO2: 96 % (12/17/19 1227) Vital Signs (24h Range):  Temp:  [97.5 °F (36.4 °C)-98.3 °F (36.8 °C)] 98.3 °F (36.8 °C)  Pulse:  [64-82] 82  Resp:  [] 20  SpO2:  [94 %-98 %] 96 %  BP: (123-159)/(61-78) 144/67     Weight: 67.2 kg (148 lb 2.4 oz)  Body mass index is 22.53 kg/m².    Intake/Output Summary (Last 24 hours) at 12/17/2019 1353  Last data filed at 12/17/2019 1300  Gross per 24 hour   Intake 1813.33 ml   Output 450 ml   Net 1363.33 ml      Physical Exam   Constitutional: He is oriented to person, place, and time. He appears well-developed and well-nourished. No distress.   Cardiovascular: Normal rate, normal heart sounds and intact distal pulses.   Pulmonary/Chest: Effort normal. He has rales (scattered).   Abdominal: Soft. Bowel sounds are normal. There is no tenderness.   Musculoskeletal: Normal range of motion. He exhibits no edema.   Neurological: He is alert and oriented to person, place, and time.   Skin: Skin is warm and dry.   Psychiatric: He has a normal mood and affect. His behavior is normal.   Nursing note and vitals reviewed.      Significant Labs:   BMP:   Recent Labs   Lab 12/17/19  0433   GLU 92      K 3.5      CO2 28   BUN 26   CREATININE 0.8   CALCIUM 8.7   MG 1.9     CMP:   Recent Labs   Lab 12/16/19  1925 12/17/19  0433    139   K 3.6 3.5   CL 98 101    CO2 32* 28    92   BUN 32* 26   CREATININE 0.9 0.8   CALCIUM 9.4 8.7   PROT 7.7 6.7   ALBUMIN 2.4* 2.2*   BILITOT 1.0 0.9   ALKPHOS 182* 164*   AST 43* 40   ALT 22 21   ANIONGAP 11 10   EGFRNONAA >60.0 >60     All pertinent labs within the past 24 hours have been reviewed.    Significant Imaging: I have reviewed all pertinent imaging results/findings within the past 24 hours.

## 2019-12-17 NOTE — PLAN OF CARE
(Physician in Lead of Transfers)   Outside Transfer Acceptance Note / [facility]erral [facility]      Transferring Physician: Dr. Robert    Accepting Physician: Kaylin Boudreaux MD / Dr. Rg    Date of Acceptance: 12/17/2019    Transferring Facility: [facility]    Reason for Transfer: Capacity issues at [facility]    Report from Transferring Physician/Hospital course: 89 y/o male with PMH of aortic valve replacement, HFpEF, HTN, PVD, and BPH who presented with feeling unwell. Diagnosed with PNA. Given dose of ceftriaxone and azithromycin. Patient appears dry per ER MD. Given gentle hydration due to CHF concerns. WBC at 15K. Found to also be in new a-fib which is most likely precipitated by his PNA. Would hold off on anticoagulation for now and monitor a-fib response with treatment of his sepsis. HR well controlled; pt on carvedilol chronically. UA needs to be drawn to complete infectious work up; pt has not voided yet.       Labs & Radiographs: see EPIC      To Do List:   1) Vitals q15 mins during the 1st hr of arrival then q30 mins during the 2nd hr   2) Telemetry  3) Continue antibiotics  4) Obtain UA      Kaylin Boudreaux MD  Hospital Medicine Staff

## 2019-12-17 NOTE — ED NOTES
Randall HERNDON Marco A Stevens, a 89 y.o. male presents to the ED w/ complaint of no appetite and fluid on his lungs    Triage note:  Chief Complaint   Patient presents with    Multiple complaints     fluid in lungs,not eating , trouble walking for past few days, sleeping alot     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    PVD (peripheral vascular disease) 8/22/2018    Stenosis of aortic and mitral valves     aortic DAHLIA 1.1 CM     LOC: Patient name and date of birth verified. The patient is awake, alert and aware of environment with an appropriate affect, the patient is oriented x 3 and speaking appropriately.   APPEARANCE: Patient resting comfortably, patient is clean and well groomed, patient's clothing is properly fastened.  SKIN: The skin is warm and dry, color consistent with ethnicity, patient has normal skin turgor and moist mucus membranes, skin intact, no breakdown or bruising noted.  MUSCULOSKELETAL: Patient moving all extremities well, no obvious swelling or deformities noted.   RESPIRATORY: Respirations are spontaneous, patient has a normal effort and rate, no accessory muscle use noted.  CARDIAC: Patient has a normal rate and rhythm, no periphreal edema noted, capillary refill < 3 seconds.  ABDOMEN: Soft and non tender to palpation, no distention noted. Bowel sounds present in all four quadrants.  NEUROLOGIC: Eyes open spontaneously, behavior appropriate to situation, follows commands, facial expression symmetrical, bilateral hand grasp equal and even, purposeful motor response noted, normal sensation in all extremities when touched with a finger.

## 2019-12-17 NOTE — ED PROVIDER NOTES
"Encounter Date: 12/16/2019       History     Chief Complaint   Patient presents with    Multiple complaints     fluid in lungs,not eating , trouble walking for past few days, sleeping alot     Pt is 89yoM w/ PMH aortic valve replacement several years ago and dementia who presents to the ED for "fluid in his lungs." Pt says for the last week he has had decreased appetite and increased fatigue. Pt denies cough, sore throat, rhinorrhea, chest pain, SOB, or N/V. Pt's wife said their friend who was a pediatrician visited and listened to pts lungs & told pt he should go to the ED because he has fluid on his lungs. Of note pt has also had 20lb weight loss over the last 2 months.         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    PVD (peripheral vascular disease) 8/22/2018    Stenosis of aortic and mitral valves     aortic DAHLIA 1.1 CM     Past Surgical History:   Procedure Laterality Date    APPENDECTOMY      cararact  car    CARDIAC CATHETERIZATION      CARDIAC VALVE SURGERY      CATARACT EXTRACTION BILATERAL W/ ANTERIOR VITRECTOMY      bilaterial    CATARACT EXTRACTION W/  INTRAOCULAR LENS IMPLANT Bilateral     CHOLECYSTECTOMY      EYE SURGERY      JOINT REPLACEMENT      bilateral hip    KNEE SURGERY      Bilateral    TOTAL HIP ARTHROPLASTY      Bilaterial     Family History   Problem Relation Age of Onset    No Known Problems Father     No Known Problems Mother     No Known Problems Sister     No Known Problems Brother     No Known Problems Maternal Aunt     No Known Problems Maternal Uncle     No Known Problems Paternal Aunt     No Known Problems Paternal Uncle     No Known Problems Maternal Grandmother     No Known Problems Maternal Grandfather  "    No Known Problems Paternal Grandmother     No Known Problems Paternal Grandfather     No Known Problems Daughter     No Known Problems Son     No Known Problems Daughter     No Known Problems Son     Amblyopia Neg Hx     Blindness Neg Hx     Cancer Neg Hx     Cataracts Neg Hx     Diabetes Neg Hx     Glaucoma Neg Hx     Hypertension Neg Hx     Macular degeneration Neg Hx     Retinal detachment Neg Hx     Strabismus Neg Hx     Stroke Neg Hx     Thyroid disease Neg Hx     Anemia Neg Hx     Arrhythmia Neg Hx     Asthma Neg Hx     Clotting disorder Neg Hx     Fainting Neg Hx     Heart attack Neg Hx     Heart disease Neg Hx     Heart failure Neg Hx     Hyperlipidemia Neg Hx     Atrial Septal Defect Neg Hx      Social History     Tobacco Use    Smoking status: Never Smoker    Smokeless tobacco: Never Used   Substance Use Topics    Alcohol use: Yes     Comment: less than one  drink weekly    Drug use: No     Review of Systems   Constitutional: Positive for appetite change (decreased appetite over last several months) and fatigue (worsened over last week ). Negative for chills and fever.   HENT: Negative for congestion, rhinorrhea, sneezing and sore throat.    Eyes: Negative for visual disturbance.   Respiratory: Negative for cough, shortness of breath and wheezing.    Cardiovascular: Negative for chest pain, palpitations and leg swelling.   Gastrointestinal: Negative for abdominal pain, blood in stool, nausea and vomiting.   Genitourinary: Negative for difficulty urinating and hematuria.        Pt says he has had trouble making it to the bathroom to urinate because he's been too fatigue to get up and go   Musculoskeletal: Negative for back pain.   Skin: Negative for rash and wound.   Neurological: Negative for syncope and numbness.       Physical Exam     Initial Vitals [12/16/19 1636]   BP Pulse Resp Temp SpO2   124/63 79 18 98.2 °F (36.8 °C) 96 %      MAP       --         Physical  Exam    Constitutional: He appears well-developed. No distress.   HENT:   Head: Normocephalic and atraumatic.   Dry oral mucosa   Eyes: EOM are normal. Pupils are equal, round, and reactive to light. No scleral icterus.   Neck: Normal range of motion. Neck supple. No JVD present.   Cardiovascular: Normal rate.   No murmur heard.  Pulmonary/Chest: Breath sounds normal. No respiratory distress. He has no wheezes. He exhibits no tenderness.   Abdominal: Soft. Bowel sounds are normal. He exhibits no distension. There is no tenderness.   Musculoskeletal: He exhibits no edema or tenderness.   Neurological: He is alert and oriented to person, place, and time.   Skin: Skin is warm and dry. No rash noted. No erythema.         ED Course   Procedures  Labs Reviewed   CBC W/ AUTO DIFFERENTIAL   COMPREHENSIVE METABOLIC PANEL   B-TYPE NATRIURETIC PEPTIDE   TROPONIN I          Imaging Results    None          Medical Decision Making:   History:   Old Medical Records: I decided to obtain old medical records.  Initial Assessment:   Pt is 89yo M who is complaining of fatigue and weight loss. Pt appears dry on exam- likely 2/2 to poor oral intake for the last week.   Differential Diagnosis:   Pneumonia- pt has hx increased fatigue & decreased oral intake & pt said his friend heard fluid in his lungs. Pt also has elevated WBC to 15. However, pt denies SOB, cough, fever, or URI symptoms  UTI- pt has possible incontinence although sounds like he's just not making it to the bathroom in time 2/2 fatigue; pt also appears dry on exam  A fib- pt had what appears to be new onset a fib on 1st EKG in ED   Clinical Tests:   Lab Tests: Ordered and Reviewed  The following lab test(s) were unremarkable: Lactate       <> Summary of Lab: CBC showed Hb at baseline; elevated WBC to 15  CMP showed elevated alk phos to 182. Electrolytes and Cr are at baseline   BNP was elevated form baseline to 155.  Troponin was .057    Radiological Study: Ordered and  Reviewed  Medical Tests: Ordered and Reviewed  ED Management:  Ordered CXR, repeat EKG, BMP, CBC, and UA to assess for pna, new onset a fib, dehydration, anemia, and UTI   Gave pt 500cc bolus X2 for dehydration.  CBC showed elevated WBC to 15. CXR showed what appears to be multifocal pna. Ordered blood cultures & lactate & gave pt azithromycin & ceftriaxone.   Other:   I discussed test(s) with the performing physician.                                 Clinical Impression:       ICD-10-CM ICD-9-CM   1. Multifocal pneumonia J18.9 486   2. Multiple complaints R68.89 780.99   3. Fatigue R53.83 780.79   4. Weight loss R63.4 783.21         Disposition:   Disposition: Admitted  Condition: Stable                     Laurence Mcgowan MD  Resident  12/16/19 2629

## 2019-12-17 NOTE — HPI
"Randall Strickland is a 89 yo M presenting to the ED with fatigue and FTT with poor PO intake. He has TAVR (8/30/16), dementia, chroinc diastolic HF, HTN, BPH with urinary obstruction, and osteoarthritis. Pt's wife said their friend who was a pediatrician visited and listened to pts lungs & told pt he should go to the ED because he has fluid on his lungs. Per wife, patient has a minot cough at baseline but was unable to tell provider when this initially started. She was in [place] earlier in the week and when she returned home, her daughter who had been with the patient, stated his cough was getting worse and was productive. He is demented at baseline but AO to self, place, and date. Denies fever, N/V, and diarrhea. He and wife do not remember the last BM he had but his wife believes it was recent. Of note pt has also had 20lb weight loss over the last 2 months. Patient has difficulty walking with frequent falls. Last fall was at the [employer] 3 weeks ago. He has a large dried over lesion on his Left arm. He did hit his head. Did not go to the ED at that time. No changes in mentation since this incident.     In the ED a CXR was concerning for multilobar PNA and he was given ceftriaxone and azithromycin. EKG demonstrated new AF unknown to the patient. Also given 1L of NS.     Last seen by his cardiologist in October for heart failure. Last echo on May of 2019 with EF of 55% and "Grade II (moderate) left ventricular diastolic dysfunction consistent with pseudonormalization." Last seen by his neurologist in September of 2019 during which time it was suspected that his gait disturbance was parkinsonian characteristics. He was never started on levodopa.   "

## 2019-12-17 NOTE — CARE UPDATE
Mr. Pereira KATRINA Strickland Jr. arrived to [facility]. He was seen & examined. HP reviewed. Antibiotics ordered. No immediate concerns/needs per the patient. Will monitor overnight.

## 2019-12-17 NOTE — ASSESSMENT & PLAN NOTE
CXR with multifocal nodular opacities  Continue Rocephin/Azithromycin for community acquired  Legionella ur ag ordered  WBC improving with current abx 15 -> 13  Sats stable on RA, oxygen protocol if needed

## 2019-12-17 NOTE — PROGRESS NOTES
"[facility] Hospital Medicine  Progress Note    Patient Name: Randall Strickland Jr.  MRN: [id number]  Patient Class: IP- Inpatient   Admission Date: 12/16/2019  Length of Stay: 1 days  Attending Physician: Fabiola Arroyo MD  Primary Care Provider: Tiago Delaney MD        Subjective:     Principal Problem:Multifocal pneumonia        HPI:  Randall Strickland is a 89 yo M presenting to the ED with fatigue and FTT with poor PO intake. He has TAVR (8/30/16), dementia, chroinc diastolic HF, HTN, BPH with urinary obstruction, and osteoarthritis. Pt's wife said their friend who was a pediatrician visited and listened to pts lungs & told pt he should go to the ED because he has fluid on his lungs. Per wife, patient has a minot cough at baseline but was unable to tell provider when this initially started. She was in [place] earlier in the week and when she returned home, her daughter who had been with the patient, stated his cough was getting worse and was productive. He is demented at baseline but AO to self, place, and date. Denies fever, N/V, and diarrhea. He and wife do not remember the last BM he had but his wife believes it was recent. Of note pt has also had 20lb weight loss over the last 2 months. Patient has difficulty walking with frequent falls. Last fall was at the [employer] 3 weeks ago. He has a large dried over lesion on his Left arm. He did hit his head. Did not go to the ED at that time. No changes in mentation since this incident.     In the ED a CXR was concerning for multilobar PNA and he was given ceftriaxone and azithromycin. EKG demonstrated new AF unknown to the patient. Also given 1L of NS.     Last seen by his cardiologist in October for heart failure. Last echo on May of 2019 with EF of 55% and "Grade II (moderate) left ventricular diastolic dysfunction consistent with pseudonormalization." Last seen by his neurologist in September of 2019 during which time it was suspected that his gait " disturbance was parkinsonian characteristics. He was never started on levodopa.     Overview/Hospital Course:  Patient admitted with multifocal pneumonia associated with decreased PO intake and newly diagnosed atrial fibrillation. He was started on empiric rocephin/azithromycin. Cardiology was consulted.    Interval History:  Pt seen and examined at bedside with his wife. He reports feeling much improved from yesterday. He denies SOB. He had dry cough. Appetite improved today and he is eating well. Denies fever, chills.    Review of Systems   Constitutional: Negative for chills and fever.   Respiratory: Positive for cough. Negative for shortness of breath.    Cardiovascular: Negative for chest pain.   Gastrointestinal: Negative for nausea and vomiting.     Objective:     Vital Signs (Most Recent):  Temp: 98.3 °F (36.8 °C) (12/17/19 1227)  Pulse: 82 (12/17/19 1227)  Resp: 20 (12/17/19 1227)  BP: (!) 144/67 (12/17/19 1227)  SpO2: 96 % (12/17/19 1227) Vital Signs (24h Range):  Temp:  [97.5 °F (36.4 °C)-98.3 °F (36.8 °C)] 98.3 °F (36.8 °C)  Pulse:  [64-82] 82  Resp:  [] 20  SpO2:  [94 %-98 %] 96 %  BP: (123-159)/(61-78) 144/67     Weight: 67.2 kg (148 lb 2.4 oz)  Body mass index is 22.53 kg/m².    Intake/Output Summary (Last 24 hours) at 12/17/2019 1353  Last data filed at 12/17/2019 1300  Gross per 24 hour   Intake 1813.33 ml   Output 450 ml   Net 1363.33 ml      Physical Exam   Constitutional: He is oriented to person, place, and time. He appears well-developed and well-nourished. No distress.   Cardiovascular: Normal rate, normal heart sounds and intact distal pulses.   Pulmonary/Chest: Effort normal. He has rales (scattered).   Abdominal: Soft. Bowel sounds are normal. There is no tenderness.   Musculoskeletal: Normal range of motion. He exhibits no edema.   Neurological: He is alert and oriented to person, place, and time.   Skin: Skin is warm and dry.   Psychiatric: He has a normal mood and affect. His  behavior is normal.   Nursing note and vitals reviewed.      Significant Labs:   BMP:   Recent Labs   Lab 12/17/19  0433   GLU 92      K 3.5      CO2 28   BUN 26   CREATININE 0.8   CALCIUM 8.7   MG 1.9     CMP:   Recent Labs   Lab 12/16/19  1925 12/17/19  0433    139   K 3.6 3.5   CL 98 101   CO2 32* 28    92   BUN 32* 26   CREATININE 0.9 0.8   CALCIUM 9.4 8.7   PROT 7.7 6.7   ALBUMIN 2.4* 2.2*   BILITOT 1.0 0.9   ALKPHOS 182* 164*   AST 43* 40   ALT 22 21   ANIONGAP 11 10   EGFRNONAA >60.0 >60     All pertinent labs within the past 24 hours have been reviewed.    Significant Imaging: I have reviewed all pertinent imaging results/findings within the past 24 hours.      Assessment/Plan:      * Multifocal pneumonia  CXR with multifocal nodular opacities  Continue Rocephin/Azithromycin for community acquired  Legionella ur ag ordered  WBC improving with current abx 15 -> 13  Sats stable on RA, oxygen protocol if needed      New onset atrial fibrillation  Noted on ECG in the ED. Rate controlled.   No a. Fib on review of prior ECGs  Echo pending  Cardiology consulted for evaluation  CHADS-VASc 5 but given h/o TAVR, will hold off on anticoagulation      PVD (peripheral vascular disease)  Continue DAPT      Dementia  AOX3. Per wife he is presenting at his baseline mentation.   Monitor for delirium      S/P aortic valve replacement  S/p TAVR 8/30/16        Chronic diastolic congestive heart failure  Euvolemic on exam  Continue torsemide. Monitor I/Os      Hypertension, essential  Continue home Norvasc 5mg      BPH with urinary obstruction  Continue finasteride and Tamsulosin       VTE Risk Mitigation (From admission, onward)         Ordered     IP VTE HIGH RISK PATIENT  Once      12/16/19 2159     Place GUEVARA hose  Until discontinued      12/16/19 2159     Place sequential compression device  Until discontinued      12/16/19 2159                      Fabiola Arroyo MD  Department of Hospital Medicine    [facility]

## 2019-12-17 NOTE — ASSESSMENT & PLAN NOTE
Evaluated with CXR in the ED and consistence with leukocytosis on CBC. Unknown to patient and wife he had a viral illness prior to this week but his cough has worrsened over this week. Does not appear septic on exam with no change in mentation or tachycardia. Admits to RUDOLPH but has not needed oxygen in the ED and he admits to this being chronic. Azithromycin and Ceftriaxone given in the ED.     PLAN  -- Admit to medicine   -- Continue Azithromycin and Ceftriaxone   -- Daily CBC and CMP  -- Legionella antigen test  -- Resp Cx  -- continue to monitor vitals and resp status

## 2019-12-17 NOTE — PROGRESS NOTES
9:37am - SW met with pt and attempted to complete discharge assessment; pt wanted SW to call wife.    12:42pm - SW called pt's wife, Sade, [phone], no answer.

## 2019-12-17 NOTE — H&P
[facility] Hospital Medicine  History & Physical    Patient Name: Randall Strickland Jr.  MRN: [id number]  Admission Date: 12/16/2019  Attending Physician: Ishaan Mijares MD   Primary Care Provider: Tiago Delaney MD    [facility] Medicine Team: [facility] HOSP MED 5 Royce Bonner MD     Patient information was obtained from patient, spouse/SO and ER records.     Subjective:     Principal Problem:Multifocal pneumonia    Chief Complaint:   Chief Complaint   Patient presents with    Multiple complaints     fluid in lungs,not eating , trouble walking for past few days, sleeping alot        HPI: Randall Strickland is a 89 yo M presenting to the ED with fatigue and FTT with poor PO intake. He has TAVR (8/30/16), dementia, chroinc diastolic HF, HTN, BPH with urinary obstruction, and osteoarthritis. Pt's wife said their friend who was a pediatrician visited and listened to pts lungs & told pt he should go to the ED because he has fluid on his lungs. Per wife, patient has a minot cough at baseline but was unable to tell provider when this initially started. She was in [place] earlier in the week and when she returned home, her daughter who had been with the patient, stated his cough was getting worse and was productive. He is demented at baseline but AO to self, place, and date. Denies fever, N/V, and diarrhea. He and wife do not remember the last BM he had but his wife believes it was recent. Of note pt has also had 20lb weight loss over the last 2 months. Patient has difficulty walking with frequent falls. Last fall was at the [employer] 3 weeks ago. He has a large dried over lesion on his Left arm. He did hit his head. Did not go to the ED at that time. No changes in mentation since this incident.     In the ED a CXR was concerning for multilobar PNA and he was given ceftriaxone and azithromycin. EKG demonstrated new AF unknown to the patient. Also given 1L of NS.     Last seen by his cardiologist in October for  "heart failure. Last echo on May of 2019 with EF of 55% and "Grade II (moderate) left ventricular diastolic dysfunction consistent with pseudonormalization." Last seen by his neurologist in September of 2019 during which time it was suspected that his gait disturbance was parkinsonian characteristics. He was never started on levodopa.     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    PVD (peripheral vascular disease) 8/22/2018    Stenosis of aortic and mitral valves     aortic DAHLIA 1.1 CM       Past Surgical History:   Procedure Laterality Date    APPENDECTOMY      cararact  car    CARDIAC CATHETERIZATION      CARDIAC VALVE SURGERY      CATARACT EXTRACTION BILATERAL W/ ANTERIOR VITRECTOMY      bilaterial    CATARACT EXTRACTION W/  INTRAOCULAR LENS IMPLANT Bilateral     CHOLECYSTECTOMY      EYE SURGERY      JOINT REPLACEMENT      bilateral hip    KNEE SURGERY      Bilateral    TOTAL HIP ARTHROPLASTY      Bilaterial       Review of patient's allergies indicates:  No Known Allergies    No current facility-administered medications on file prior to encounter.      Current Outpatient Medications on File Prior to Encounter   Medication Sig    amitriptyline (ELAVIL) 25 MG tablet TAKE 1 TABLET(25 MG) BY MOUTH EVERY EVENING    amLODIPine (NORVASC) 5 MG tablet TAKE 1 TABLET(5 MG) BY MOUTH EVERY DAY    aspirin 81 MG Chew Take 81 mg by mouth once daily.      CHOLESTYRAMINE LIGHT 4 gram packet DISSOLVE 1 PACKET IN LIQUID AND DRINK TWICE DAILY    clopidogrel (PLAVIX) 75 mg tablet TAKE 1 TABLET BY MOUTH ONCE DAILY    finasteride (PROSCAR) 5 mg tablet TAKE 1 TABLET(5 MG) BY MOUTH EVERY DAY    fluorometholone 0.1% (FML) 0.1 % DrpS     gabapentin (NEURONTIN) 300 MG capsule TAKE 1 CAPSULE(300 MG) BY MOUTH EVERY EVENING    " mupirocin (BACTROBAN) 2 % ointment Apply topically 3 (three) times daily as needed.    nystatin (MYCOSTATIN) 100,000 unit/mL suspension     omeprazole (PRILOSEC) 20 MG capsule TAKE 1 CAPSULE(20 MG) BY MOUTH EVERY DAY    polycarbophil (FIBERCON) 625 mg tablet Take 625 mg by mouth once daily.    rivastigmine tartrate (EXELON) 3 MG capsule TAKE 1 CAPSULE(3 MG) BY MOUTH TWICE DAILY    tamsulosin (FLOMAX) 0.4 mg Cap TAKE ONE CAPSULE BY MOUTH DAILY    thiamine 100 MG tablet Take 100 mg by mouth once daily.    tolterodine (DETROL LA) 4 MG 24 hr capsule TAKE 1 CAPSULE(4 MG) BY MOUTH EVERY DAY    torsemide (DEMADEX) 20 MG Tab Take 1 tablet (20 mg total) by mouth once daily.    triamcinolone acetonide 0.1% (KENALOG) 0.1 % cream Apply topically 2 (two) times daily.    triamcinolone acetonide 0.1% (KENALOG) 0.1 % cream Apply topically 2 (two) times daily.     Family History     Problem Relation (Age of Onset)    No Known Problems Father, Mother, Sister, Brother, Maternal Aunt, Maternal Uncle, Paternal Aunt, Paternal Uncle, Maternal Grandmother, Maternal Grandfather, Paternal Grandmother, Paternal Grandfather, Daughter, Son, Daughter, Son        Tobacco Use    Smoking status: Never Smoker    Smokeless tobacco: Never Used   Substance and Sexual Activity    Alcohol use: Yes     Comment: less than one  drink weekly    Drug use: No    Sexual activity: Not on file     Review of Systems   Constitutional: Positive for activity change, appetite change and fatigue. Negative for fever.   HENT: Negative for congestion and mouth sores.    Eyes: Negative for photophobia and visual disturbance.   Respiratory: Positive for cough and shortness of breath. Negative for apnea and chest tightness.    Cardiovascular: Negative for chest pain, palpitations and leg swelling.   Gastrointestinal: Negative for abdominal distention, abdominal pain, constipation, diarrhea, nausea and vomiting.   Endocrine: Negative for polyphagia and  polyuria.   Genitourinary: Positive for frequency and urgency. Negative for dysuria.   Musculoskeletal: Positive for back pain. Negative for arthralgias.   Neurological: Negative for dizziness, light-headedness and headaches.   Hematological: Negative for adenopathy. Bruises/bleeds easily.   Psychiatric/Behavioral: Negative for agitation and behavioral problems.     Objective:     Vital Signs (Most Recent):  Temp: 97.5 °F (36.4 °C) (12/16/19 2049)  Pulse: 70 (12/16/19 2049)  Resp: 16 (12/16/19 2049)  BP: (!) 143/63 (12/16/19 2049)  SpO2: 98 % (12/16/19 2049) Vital Signs (24h Range):  Temp:  [97.5 °F (36.4 °C)-98.2 °F (36.8 °C)] 97.5 °F (36.4 °C)  Pulse:  [70-79] 70  Resp:  [16-18] 16  SpO2:  [96 %-98 %] 98 %  BP: (123-150)/(63-74) 143/63     Weight: 64.4 kg (142 lb)  Body mass index is 21.59 kg/m².    Physical Exam   Constitutional: He is oriented to person, place, and time. He appears well-developed and well-nourished.   HENT:   Head: Normocephalic and atraumatic.   Mouth/Throat: No oropharyngeal exudate.   White plaque ulcers noted throughout mouth    Eyes: Pupils are equal, round, and reactive to light. EOM are normal. Right eye exhibits no discharge. Left eye exhibits no discharge.   Neck: Normal range of motion. Neck supple.   Cardiovascular: Normal heart sounds and intact distal pulses.   No murmur heard.  Pulmonary/Chest: Effort normal and breath sounds normal. No stridor. No respiratory distress.   Abdominal: Soft. Bowel sounds are normal. A hernia (umbilical) is present.   Musculoskeletal: Normal range of motion. He exhibits no edema or deformity.   Neurological: He is alert and oriented to person, place, and time. No cranial nerve deficit.   Skin: Skin is warm and dry.   2X3in scab on the left arm    Psychiatric: He has a normal mood and affect.   Nursing note and vitals reviewed.        CRANIAL NERVES     CN III, IV, VI   Pupils are equal, round, and reactive to light.  Extraocular motions are normal.         Significant Labs:   CBC:   Recent Labs   Lab 12/16/19 1923 12/16/19 2028   WBC 15.21*  --    HGB 11.7*  --    HCT 36.4* 32*     --      CMP:   Recent Labs   Lab 12/16/19 1925      K 3.6   CL 98   CO2 32*      BUN 32*   CREATININE 0.9   CALCIUM 9.4   PROT 7.7   ALBUMIN 2.4*   BILITOT 1.0   ALKPHOS 182*   AST 43*   ALT 22   ANIONGAP 11   EGFRNONAA >60.0       Significant Imaging: I have reviewed and interpreted all pertinent imaging results/findings within the past 24 hours.    Assessment/Plan:     * Multifocal pneumonia  Evaluated with CXR in the ED and consistence with leukocytosis on CBC. Unknown to patient and wife he had a viral illness prior to this week but his cough has worrsened over this week. Does not appear septic on exam with no change in mentation or tachycardia. Admits to RUDOLPH but has not needed oxygen in the ED and he admits to this being chronic. Azithromycin and Ceftriaxone given in the ED.     PLAN  -- Admit to medicine   -- Continue Azithromycin and Ceftriaxone   -- Daily CBC and CMP  -- Legionella antigen test  -- Resp Cx  -- continue to monitor vitals and resp status      New onset atrial fibrillation  Noted on EKG in the ED. Rate controlled. Unknown to wife and patient who deny ever having an arrhythmia or being informed of one. Denying palpitations and dizziness/lightheadidness.    PLAN   -- Telemetry   -- TTE   -- Considering adding Eliquis but this CI in patient with valve replacement; CHADSVASC of 5       PVD (peripheral vascular disease)  Continue DAPT      Dementia  AOX3. Per wife he is presenting at his baseline mentation.       S/P aortic valve replacement  CI for Eliquis with treatment of AF      Hypertension, essential  Continue home Norvasc 5mg      BPH with urinary obstruction  Patient wearing diaper.     PLAN  -- continue finasteride and Tamsulosin       Chronic lower back pain  Tylenol PRN        VTE Risk Mitigation (From admission, onward)          Ordered     IP VTE HIGH RISK PATIENT  Once      12/16/19 2159     Place GUEVARA hose  Until discontinued      12/16/19 2159     Place sequential compression device  Until discontinued      12/16/19 2159                   Royce Bonner MD  Department of Hospital Medicine   [facility]

## 2019-12-17 NOTE — ASSESSMENT & PLAN NOTE
Noted on EKG in the ED. Rate controlled. Unknown to wife and patient who deny ever having an arrhythmia or being informed of one. Denying palpitations and dizziness/lightheadidness.    PLAN   -- Telemetry   -- TTE   -- Considering adding Eliquis but this CI in patient with valve replacement; CHADSVASC of 5

## 2019-12-18 VITALS
BODY MASS INDEX: 22.45 KG/M2 | RESPIRATION RATE: 18 BRPM | TEMPERATURE: 98 F | WEIGHT: 148.13 LBS | SYSTOLIC BLOOD PRESSURE: 121 MMHG | OXYGEN SATURATION: 94 % | HEIGHT: 68 IN | DIASTOLIC BLOOD PRESSURE: 59 MMHG | HEART RATE: 74 BPM

## 2019-12-18 LAB
ANION GAP SERPL CALC-SCNC: 10 MMOL/L (ref 8–16)
AORTIC ROOT ANNULUS: 2.18 CM
AORTIC VALVE CUSP SEPERATION: 1.14 CM
AV INDEX (PROSTH): 0.66
AV MEAN GRADIENT: 7 MMHG
AV PEAK GRADIENT: 12 MMHG
AV VALVE AREA: 1.92 CM2
AV VELOCITY RATIO: 0.57
BASOPHILS # BLD AUTO: 0.02 K/UL (ref 0–0.2)
BASOPHILS NFR BLD: 0.2 % (ref 0–1.9)
BSA FOR ECHO PROCEDURE: 1.79 M2
BUN SERPL-MCNC: 22 MG/DL (ref 10–30)
CALCIUM SERPL-MCNC: 8.3 MG/DL (ref 8.7–10.5)
CHLORIDE SERPL-SCNC: 101 MMOL/L (ref 95–110)
CO2 SERPL-SCNC: 27 MMOL/L (ref 23–29)
CREAT SERPL-MCNC: 0.8 MG/DL (ref 0.5–1.4)
CV ECHO LV RWT: 0.53 CM
DIFFERENTIAL METHOD: ABNORMAL
DOP CALC AO PEAK VEL: 1.74 M/S
DOP CALC AO VTI: 32.72 CM
DOP CALC LVOT AREA: 2.9 CM2
DOP CALC LVOT DIAMETER: 1.92 CM
DOP CALC LVOT PEAK VEL: 1 M/S
DOP CALC LVOT STROKE VOLUME: 62.88 CM3
DOP CALCLVOT PEAK VEL VTI: 21.73 CM
E WAVE DECELERATION TIME: 344.73 MSEC
E/E' RATIO: 12.67 M/S
ECHO LV POSTERIOR WALL: 1.05 CM (ref 0.6–1.1)
EOSINOPHIL # BLD AUTO: 0.1 K/UL (ref 0–0.5)
EOSINOPHIL NFR BLD: 0.8 % (ref 0–8)
ERYTHROCYTE [DISTWIDTH] IN BLOOD BY AUTOMATED COUNT: 13.3 % (ref 11.5–14.5)
EST. GFR  (AFRICAN AMERICAN): >60 ML/MIN/1.73 M^2
EST. GFR  (NON AFRICAN AMERICAN): >60 ML/MIN/1.73 M^2
FRACTIONAL SHORTENING: 35 % (ref 28–44)
GLUCOSE SERPL-MCNC: 97 MG/DL (ref 70–110)
HCT VFR BLD AUTO: 33.2 % (ref 40–54)
HGB BLD-MCNC: 10.6 G/DL (ref 14–18)
IMM GRANULOCYTES # BLD AUTO: 0.3 K/UL (ref 0–0.04)
IMM GRANULOCYTES NFR BLD AUTO: 2.7 % (ref 0–0.5)
INTERVENTRICULAR SEPTUM: 1.13 CM (ref 0.6–1.1)
L PNEUMO AG UR QL IA: NOT DETECTED
LA MAJOR: 6.34 CM
LA MINOR: 6.56 CM
LA WIDTH: 3.62 CM
LEFT ATRIUM SIZE: 3.64 CM
LEFT ATRIUM VOLUME INDEX: 40.2 ML/M2
LEFT ATRIUM VOLUME: 72.22 CM3
LEFT INTERNAL DIMENSION IN SYSTOLE: 2.54 CM (ref 2.1–4)
LEFT VENTRICLE DIASTOLIC VOLUME INDEX: 37.27 ML/M2
LEFT VENTRICLE DIASTOLIC VOLUME: 67.03 ML
LEFT VENTRICLE MASS INDEX: 78 G/M2
LEFT VENTRICLE SYSTOLIC VOLUME INDEX: 12.9 ML/M2
LEFT VENTRICLE SYSTOLIC VOLUME: 23.16 ML
LEFT VENTRICULAR INTERNAL DIMENSION IN DIASTOLE: 3.93 CM (ref 3.5–6)
LEFT VENTRICULAR MASS: 139.88 G
LV LATERAL E/E' RATIO: 11.4 M/S
LV SEPTAL E/E' RATIO: 14.25 M/S
LYMPHOCYTES # BLD AUTO: 1.9 K/UL (ref 1–4.8)
LYMPHOCYTES NFR BLD: 16.8 % (ref 18–48)
MCH RBC QN AUTO: 30.8 PG (ref 27–31)
MCHC RBC AUTO-ENTMCNC: 31.9 G/DL (ref 32–36)
MCV RBC AUTO: 97 FL (ref 82–98)
MONOCYTES # BLD AUTO: 1.6 K/UL (ref 0.3–1)
MONOCYTES NFR BLD: 13.9 % (ref 4–15)
MV PEAK E VEL: 1.14 M/S
NEUTROPHILS # BLD AUTO: 7.3 K/UL (ref 1.8–7.7)
NEUTROPHILS NFR BLD: 65.6 % (ref 38–73)
NRBC BLD-RTO: 0 /100 WBC
PISA TR MAX VEL: 2.32 M/S
PLATELET # BLD AUTO: 278 K/UL (ref 150–350)
PMV BLD AUTO: 9.4 FL (ref 9.2–12.9)
POTASSIUM SERPL-SCNC: 3.4 MMOL/L (ref 3.5–5.1)
PV PEAK VELOCITY: 1.16 CM/S
RA MAJOR: 6.29 CM
RA WIDTH: 4.44 CM
RBC # BLD AUTO: 3.44 M/UL (ref 4.6–6.2)
SINUS: 2.02 CM
SODIUM SERPL-SCNC: 138 MMOL/L (ref 136–145)
STJ: 1.64 CM
TDI LATERAL: 0.1 M/S
TDI SEPTAL: 0.08 M/S
TDI: 0.09 M/S
TR MAX PG: 22 MMHG
WBC # BLD AUTO: 11.18 K/UL (ref 3.9–12.7)

## 2019-12-18 PROCEDURE — 36415 COLL VENOUS BLD VENIPUNCTURE: CPT | Mod: HCNC

## 2019-12-18 PROCEDURE — 25000003 PHARM REV CODE 250: Mod: HCNC | Performed by: INTERNAL MEDICINE

## 2019-12-18 PROCEDURE — 25000003 PHARM REV CODE 250: Mod: HCNC | Performed by: STUDENT IN AN ORGANIZED HEALTH CARE EDUCATION/TRAINING PROGRAM

## 2019-12-18 PROCEDURE — 80048 BASIC METABOLIC PNL TOTAL CA: CPT | Mod: HCNC

## 2019-12-18 PROCEDURE — 99238 HOSP IP/OBS DSCHRG MGMT 30/<: CPT | Mod: HCNC,,, | Performed by: INTERNAL MEDICINE

## 2019-12-18 PROCEDURE — 97162 PT EVAL MOD COMPLEX 30 MIN: CPT | Mod: HCNC

## 2019-12-18 PROCEDURE — 99238 PR HOSPITAL DISCHARGE DAY,<30 MIN: ICD-10-PCS | Mod: HCNC,,, | Performed by: INTERNAL MEDICINE

## 2019-12-18 PROCEDURE — 85025 COMPLETE CBC W/AUTO DIFF WBC: CPT | Mod: HCNC

## 2019-12-18 RX ORDER — DOXYCYCLINE 100 MG/1
100 CAPSULE ORAL EVERY 12 HOURS
Qty: 10 CAPSULE | Refills: 0 | Status: SHIPPED | OUTPATIENT
Start: 2019-12-18 | End: 2019-12-23

## 2019-12-18 RX ADMIN — AMLODIPINE BESYLATE 5 MG: 5 TABLET ORAL at 09:12

## 2019-12-18 RX ADMIN — Medication 100 MG: at 09:12

## 2019-12-18 RX ADMIN — RIVASTIGMINE TARTRATE 1.5 MG: 1.5 CAPSULE ORAL at 09:12

## 2019-12-18 RX ADMIN — FINASTERIDE 5 MG: 5 TABLET, FILM COATED ORAL at 09:12

## 2019-12-18 RX ADMIN — PANTOPRAZOLE SODIUM 40 MG: 40 TABLET, DELAYED RELEASE ORAL at 09:12

## 2019-12-18 RX ADMIN — TORSEMIDE 20 MG: 20 TABLET ORAL at 09:12

## 2019-12-18 RX ADMIN — CLOPIDOGREL BISULFATE 75 MG: 75 TABLET ORAL at 09:12

## 2019-12-18 RX ADMIN — ASPIRIN 81 MG CHEWABLE TABLET 81 MG: 81 TABLET CHEWABLE at 09:12

## 2019-12-18 RX ADMIN — TAMSULOSIN HYDROCHLORIDE 0.4 MG: 0.4 CAPSULE ORAL at 09:12

## 2019-12-18 RX ADMIN — APIXABAN 5 MG: 2.5 TABLET, FILM COATED ORAL at 12:12

## 2019-12-18 NOTE — DISCHARGE SUMMARY
"[facility] Hospital Medicine  Discharge Summary      Patient Name: Randall Strickland Jr.  MRN: [id number]  Admission Date: 12/16/2019  Hospital Length of Stay: 2 days  Discharge Date and Time:  12/18/2019 12:56 PM  Attending Physician: Fabiola Arroyo MD   Discharging Provider: Fabiola Arroyo MD  Primary Care Provider: Tiago Delaeny MD      HPI:   Randall Strickland is a 89 yo M presenting to the ED with fatigue and FTT with poor PO intake. He has TAVR (8/30/16), dementia, chroinc diastolic HF, HTN, BPH with urinary obstruction, and osteoarthritis. Pt's wife said their friend who was a pediatrician visited and listened to pts lungs & told pt he should go to the ED because he has fluid on his lungs. Per wife, patient has a minot cough at baseline but was unable to tell provider when this initially started. She was in [place] earlier in the week and when she returned home, her daughter who had been with the patient, stated his cough was getting worse and was productive. He is demented at baseline but AO to self, place, and date. Denies fever, N/V, and diarrhea. He and wife do not remember the last BM he had but his wife believes it was recent. Of note pt has also had 20lb weight loss over the last 2 months. Patient has difficulty walking with frequent falls. Last fall was at the [facility]  3 weeks ago. He has a large dried over lesion on his Left arm. He did hit his head. Did not go to the ED at that time. No changes in mentation since this incident.     In the ED a CXR was concerning for multilobar PNA and he was given ceftriaxone and azithromycin. EKG demonstrated new AF unknown to the patient. Also given 1L of NS.     Last seen by his cardiologist in October for heart failure. Last echo on May of 2019 with EF of 55% and "Grade II (moderate) left ventricular diastolic dysfunction consistent with pseudonormalization." Last seen by his neurologist in September of 2019 during which time it was suspected that " his gait disturbance was parkinsonian characteristics. He was never started on levodopa.     * No surgery found *      Hospital Course:   Patient admitted with multifocal pneumonia associated with decreased PO intake and newly diagnosed atrial fibrillation. He was started on empiric rocephin/azithromycin. Cardiology was consulted and recommended starting apixaban. He had rapid improvement in symptoms, resolved leukocytosis and oxygen sats were stable on room air. He was adamant about going home and did not want any further testing done during this admission. He was instructed to follow-up with his PCP within 2 weeks. I discussed with patient and his wife that he should have follow-up imaging in a few weeks to ensure resolution of pneumonia and that no other underlying issue is present.     Consults:   Consults (From admission, onward)        Status Ordering Provider     Inpatient consult to Cardiology  Once     Provider:  Thong Jorge MD    Completed BALDOMERO JOHNSON          No new Assessment & Plan notes have been filed under this hospital service since the last note was generated.  Service: Hospital Medicine    Final Active Diagnoses:    Diagnosis Date Noted POA    PRINCIPAL PROBLEM:  Multifocal pneumonia [J18.9] 12/16/2019 Yes    New onset atrial fibrillation [I48.91] 12/17/2019 Yes    PVD (peripheral vascular disease) [I73.9] 08/22/2018 Yes    Dementia [F03.90] 02/22/2017 Yes    S/P aortic valve replacement [Z95.2] 10/11/2016 Not Applicable    Chronic diastolic congestive heart failure [I50.32] 04/07/2016 Yes    Hypertension, essential [I10] 10/19/2015 Yes    BPH with urinary obstruction [N40.1, N13.8] 09/30/2013 Yes      Problems Resolved During this Admission:       Discharged Condition: good    Disposition: Home or Self Care    Follow Up:  Follow-up Information     Tiago Delaney MD In 2 weeks.    Specialties:  Family Medicine, Sports Medicine  Why:  hospital follow-up for pneumonia and  atrial fibrillation  Contact information:  [address]  [facility] [address]  [phone]                 Patient Instructions:      Diet Cardiac     Activity as tolerated       Significant Diagnostic Studies: Labs:   BMP:   Recent Labs   Lab 12/16/19 1925 12/17/19 0433 12/18/19  0323    92 97    139 138   K 3.6 3.5 3.4*   CL 98 101 101   CO2 32* 28 27   BUN 32* 26 22   CREATININE 0.9 0.8 0.8   CALCIUM 9.4 8.7 8.3*   MG  --  1.9  --    , CBC   Recent Labs   Lab 12/16/19 1923 12/17/19 0433 12/18/19 0323   WBC 15.21*  --  13.98* 11.18   HGB 11.7*  --  10.5* 10.6*   HCT 36.4*   < > 33.0* 33.2*     --  287 278    < > = values in this interval not displayed.    and All labs within the past 24 hours have been reviewed    Pending Diagnostic Studies:     Procedure Component Value Units Date/Time    Legionella antigen, urine [[id number]] Collected:  12/17/19 0012    Order Status:  Sent Lab Status:  In process Updated:  12/17/19 0016    Specimen:  Urine, Clean Catch          Medications:  Reconciled Home Medications:      Medication List      START taking these medications    apixaban 5 mg Tab  Commonly known as:  ELIQUIS  Take 1 tablet (5 mg total) by mouth 2 (two) times daily.     doxycycline 100 MG capsule  Commonly known as:  MONODOX  Take 1 capsule (100 mg total) by mouth every 12 (twelve) hours. for 5 days        CHANGE how you take these medications    triamcinolone acetonide 0.1% 0.1 % cream  Commonly known as:  KENALOG  Apply topically 2 (two) times daily.  What changed:  Another medication with the same name was removed. Continue taking this medication, and follow the directions you see here.        CONTINUE taking these medications    amitriptyline 25 MG tablet  Commonly known as:  ELAVIL  TAKE 1 TABLET(25 MG) BY MOUTH EVERY EVENING     amLODIPine 5 MG tablet  Commonly known as:  NORVASC  TAKE 1 TABLET(5 MG) BY MOUTH EVERY DAY     aspirin 81 MG Chew  Take 81 mg by mouth once  daily.     finasteride 5 mg tablet  Commonly known as:  PROSCAR  TAKE 1 TABLET(5 MG) BY MOUTH EVERY DAY     gabapentin 300 MG capsule  Commonly known as:  NEURONTIN  TAKE 1 CAPSULE(300 MG) BY MOUTH EVERY EVENING     omeprazole 20 MG capsule  Commonly known as:  PRILOSEC  TAKE 1 CAPSULE(20 MG) BY MOUTH EVERY DAY     rivastigmine tartrate 3 MG capsule  Commonly known as:  EXELON  TAKE 1 CAPSULE(3 MG) BY MOUTH TWICE DAILY     tamsulosin 0.4 mg Cap  Commonly known as:  FLOMAX  TAKE ONE CAPSULE BY MOUTH DAILY     thiamine 100 MG tablet  Take 100 mg by mouth once daily.     torsemide 20 MG Tab  Commonly known as:  DEMADEX  Take 1 tablet (20 mg total) by mouth once daily.        STOP taking these medications    Cholestyramine Light 4 gram Pwpk  Generic drug:  cholestyramine-aspartame     clopidogrel 75 mg tablet  Commonly known as:  PLAVIX     fluorometholone 0.1% 0.1 % Drps  Commonly known as:  FML     mupirocin 2 % ointment  Commonly known as:  BACTROBAN     nystatin 100,000 unit/mL suspension  Commonly known as:  MYCOSTATIN     polycarbophil 625 mg tablet  Commonly known as:  FIBERCON     tolterodine 4 MG 24 hr capsule  Commonly known as:  DETROL LA            Indwelling Lines/Drains at time of discharge:   Lines/Drains/Airways     None                 Time spent on the discharge of patient: 30 minutes  Patient was seen and examined on the date of discharge and determined to be suitable for discharge.         Fabiola Arroyo MD  Department of Hospital Medicine  [facility]

## 2019-12-18 NOTE — CONSULTS
[facility]  Consult    History of Present Illness:  Mr. Strickland is a 89-year-old gentleman who was brought to the hospital for medical evaluation when his wife found him upon her return from [place] to be weak, anorexic, and had a cough.  A pediatrician friend noted crackles in the lungs, and suggested he come to the emergency room for further evaluation.    He has a history of having had a TAVR procedure for severe aortic stenosis in 2016, has had chronic diastolic heart failure.  He has had dementia without behavioral disturbance.  He has apparently lost about 20 lb in weight over the last 6 months.    PTA Medications   Medication Sig    amitriptyline (ELAVIL) 25 MG tablet TAKE 1 TABLET(25 MG) BY MOUTH EVERY EVENING    amLODIPine (NORVASC) 5 MG tablet TAKE 1 TABLET(5 MG) BY MOUTH EVERY DAY    aspirin 81 MG Chew Take 81 mg by mouth once daily.      CHOLESTYRAMINE LIGHT 4 gram packet DISSOLVE 1 PACKET IN LIQUID AND DRINK TWICE DAILY    clopidogrel (PLAVIX) 75 mg tablet TAKE 1 TABLET BY MOUTH ONCE DAILY    finasteride (PROSCAR) 5 mg tablet TAKE 1 TABLET(5 MG) BY MOUTH EVERY DAY    fluorometholone 0.1% (FML) 0.1 % DrpS     gabapentin (NEURONTIN) 300 MG capsule TAKE 1 CAPSULE(300 MG) BY MOUTH EVERY EVENING    mupirocin (BACTROBAN) 2 % ointment Apply topically 3 (three) times daily as needed.    nystatin (MYCOSTATIN) 100,000 unit/mL suspension     omeprazole (PRILOSEC) 20 MG capsule TAKE 1 CAPSULE(20 MG) BY MOUTH EVERY DAY    polycarbophil (FIBERCON) 625 mg tablet Take 625 mg by mouth once daily.    rivastigmine tartrate (EXELON) 3 MG capsule TAKE 1 CAPSULE(3 MG) BY MOUTH TWICE DAILY    tamsulosin (FLOMAX) 0.4 mg Cap TAKE ONE CAPSULE BY MOUTH DAILY    thiamine 100 MG tablet Take 100 mg by mouth once daily.    tolterodine (DETROL LA) 4 MG 24 hr capsule TAKE 1 CAPSULE(4 MG) BY MOUTH EVERY DAY    torsemide (DEMADEX) 20 MG Tab Take 1 tablet (20 mg total) by mouth once daily.     triamcinolone acetonide 0.1% (KENALOG) 0.1 % cream Apply topically 2 (two) times daily.    triamcinolone acetonide 0.1% (KENALOG) 0.1 % cream Apply topically 2 (two) times daily.       Review of patient's allergies indicates:  No Known Allergies    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    PVD (peripheral vascular disease) 8/22/2018    Stenosis of aortic and mitral valves     aortic DAHLIA 1.1 CM     Past Surgical History:   Procedure Laterality Date    APPENDECTOMY      cararact  car    CARDIAC CATHETERIZATION      CARDIAC VALVE SURGERY      CATARACT EXTRACTION BILATERAL W/ ANTERIOR VITRECTOMY      bilaterial    CATARACT EXTRACTION W/  INTRAOCULAR LENS IMPLANT Bilateral     CHOLECYSTECTOMY      EYE SURGERY      JOINT REPLACEMENT      bilateral hip    KNEE SURGERY      Bilateral    TOTAL HIP ARTHROPLASTY      Bilaterial     Family History   Problem Relation Age of Onset    No Known Problems Father     No Known Problems Mother     No Known Problems Sister     No Known Problems Brother     No Known Problems Maternal Aunt     No Known Problems Maternal Uncle     No Known Problems Paternal Aunt     No Known Problems Paternal Uncle     No Known Problems Maternal Grandmother     No Known Problems Maternal Grandfather     No Known Problems Paternal Grandmother     No Known Problems Paternal Grandfather     No Known Problems Daughter     No Known Problems Son     No Known Problems Daughter     No Known Problems Son     Amblyopia Neg Hx     Blindness Neg Hx     Cancer Neg Hx     Cataracts Neg Hx     Diabetes Neg Hx     Glaucoma Neg Hx     Hypertension Neg Hx     Macular degeneration Neg Hx     Retinal detachment Neg Hx     Strabismus Neg Hx     Stroke Neg Hx      Thyroid disease Neg Hx     Anemia Neg Hx     Arrhythmia Neg Hx     Asthma Neg Hx     Clotting disorder Neg Hx     Fainting Neg Hx     Heart attack Neg Hx     Heart disease Neg Hx     Heart failure Neg Hx     Hyperlipidemia Neg Hx     Atrial Septal Defect Neg Hx      Social History     Tobacco Use    Smoking status: Never Smoker    Smokeless tobacco: Never Used   Substance Use Topics    Alcohol use: Yes     Comment: less than one  drink weekly    Drug use: No        Physical Exam:  Chest few crackles  Heart irregularly irregular  Soft basal ejection systolic murmur.  No aortic insufficiency.  No gallop.  The extremities are warm.  No pedal edema.    Impression:  New onset atrial fibrillation                         Multifocal pneumonia                         Previous TAVR                         Diastolic congestive heart failure                         History of hypertension                         20 lb weight loss                         Dementia                         [id number]    Would institute NOAC  Await CT of chest  Will review the echocardiogram  Will follow with you    Thank you for the opportunity of seeing Randall Strickland Jr. in consultation

## 2019-12-18 NOTE — PLAN OF CARE
[facility]    HOME HEALTH ORDERS  FACE TO FACE ENCOUNTER    Patient Name: Randall Strickland Jr.  YOB: 1927    PCP: Tiago Delaney MD   PCP Address: [address] / [place] [address]  PCP Phone Number: [phone]  PCP Fax: [phone]    Encounter Date: 12/18/2019    Admit to Home Health    Diagnoses:  Active Hospital Problems    Diagnosis  POA    *Multifocal pneumonia [J18.9]  Yes    New onset atrial fibrillation [I48.91]  Yes    PVD (peripheral vascular disease) [I73.9]  Yes    Dementia [F03.90]  Yes     Mini-Cog score 2 (2/22/17)  Pettigrew Cognitive Assessment:   20/30 (12/2017)      S/P aortic valve replacement [Z95.2]  Not Applicable    Chronic diastolic congestive heart failure [I50.32]  Yes    Hypertension, essential [I10]  Yes    BPH with urinary obstruction [N40.1, N13.8]  Yes      Resolved Hospital Problems   No resolved problems to display.       Future Appointments   Date Time Provider Department Center   12/20/2019  8:00 AM Samuel Maldonado, PT [facility]   1/7/2020  3:20 PM Swapna Delarosa MD [facility] NEURO [address]   2/17/2020  1:00 PM Tiago Delaney MD [facility] IM [facility] PCW     Follow-up Information     Tiago Delaney MD In 2 weeks.    Specialties:  Family Medicine, Sports Medicine  Why:  hospital follow-up for pneumonia and atrial fibrillation  Contact information:  [address]  [facility] [address]  [phone]                     I have seen and examined this patient face to face today. My clinical findings that support the need for the home health skilled services and home bound status are the following:  Weakness/numbness causing balance and gait disturbance due to Weakness/Debility making it taxing to leave home.  Medical restrictions requiring assistance of another human to leave home due to  Unstable ambulation.    Allergies:Review of patient's allergies indicates:  No Known Allergies    Diet: cardiac  diet    Activities: activity as tolerated    Nursing:   SN to complete comprehensive assessment including routine vital signs. Instruct on disease process and s/s of complications to report to MD. Review/verify medication list sent home with the patient at time of discharge  and instruct patient/caregiver as needed. Frequency may be adjusted depending on start of care date.    Notify MD if SBP > 160 or < 90; DBP > 90 or < 50; HR > 120 or < 50; Temp > 101      CONSULTS:    Physical Therapy to evaluate and treat. Evaluate for home safety and equipment needs; Establish/upgrade home exercise program. Perform / instruct on therapeutic exercises, gait training, transfer training, and Range of Motion.  Occupational Therapy to evaluate and treat. Evaluate home environment for safety and equipment needs. Perform/Instruct on transfers, ADL training, ROM, and therapeutic exercises.  Aide to provide assistance with personal care, ADLs, and vital signs.    MISCELLANEOUS CARE:  N/A    WOUND CARE ORDERS  n/a      Medications: Review discharge medications with patient and family and provide education.      Current Discharge Medication List      START taking these medications    Details   apixaban (ELIQUIS) 5 mg Tab Take 1 tablet (5 mg total) by mouth 2 (two) times daily.  Qty: 60 tablet, Refills: 1      doxycycline (MONODOX) 100 MG capsule Take 1 capsule (100 mg total) by mouth every 12 (twelve) hours. for 5 days  Qty: 10 capsule, Refills: 0         CONTINUE these medications which have NOT CHANGED    Details   amitriptyline (ELAVIL) 25 MG tablet TAKE 1 TABLET(25 MG) BY MOUTH EVERY EVENING  Qty: 90 tablet, Refills: 0    Comments: **Patient requests 90 days supply**  Associated Diagnoses: Fecal smearing      amLODIPine (NORVASC) 5 MG tablet TAKE 1 TABLET(5 MG) BY MOUTH EVERY DAY  Qty: 30 tablet, Refills: 6      aspirin 81 MG Chew Take 81 mg by mouth once daily.        finasteride (PROSCAR) 5 mg tablet TAKE 1 TABLET(5 MG) BY MOUTH  EVERY DAY  Qty: 90 tablet, Refills: 0    Associated Diagnoses: BPH with urinary obstruction      gabapentin (NEURONTIN) 300 MG capsule TAKE 1 CAPSULE(300 MG) BY MOUTH EVERY EVENING  Qty: 90 capsule, Refills: 3    Associated Diagnoses: DDD (degenerative disc disease), lumbar; Left lumbar radiculitis; Greater trochanteric bursitis of left hip      omeprazole (PRILOSEC) 20 MG capsule TAKE 1 CAPSULE(20 MG) BY MOUTH EVERY DAY  Qty: 30 capsule, Refills: 0      rivastigmine tartrate (EXELON) 3 MG capsule TAKE 1 CAPSULE(3 MG) BY MOUTH TWICE DAILY  Qty: 60 capsule, Refills: 11    Associated Diagnoses: Dementia without behavioral disturbance, unspecified dementia type      tamsulosin (FLOMAX) 0.4 mg Cap TAKE ONE CAPSULE BY MOUTH DAILY  Qty: 90 capsule, Refills: 3      thiamine 100 MG tablet Take 100 mg by mouth once daily.      torsemide (DEMADEX) 20 MG Tab Take 1 tablet (20 mg total) by mouth once daily.  Qty: 30 tablet, Refills: 11    Associated Diagnoses: Chronic diastolic congestive heart failure; Dependent edema      triamcinolone acetonide 0.1% (KENALOG) 0.1 % cream Apply topically 2 (two) times daily.  Qty: 80 g, Refills: 1         STOP taking these medications       CHOLESTYRAMINE LIGHT 4 gram packet Comments:   Reason for Stopping:         clopidogrel (PLAVIX) 75 mg tablet Comments:   Reason for Stopping:         fluorometholone 0.1% (FML) 0.1 % DrpS Comments:   Reason for Stopping:         mupirocin (BACTROBAN) 2 % ointment Comments:   Reason for Stopping:         nystatin (MYCOSTATIN) 100,000 unit/mL suspension Comments:   Reason for Stopping:         polycarbophil (FIBERCON) 625 mg tablet Comments:   Reason for Stopping:         tolterodine (DETROL LA) 4 MG 24 hr capsule Comments:   Reason for Stopping:               I certify that this patient is confined to his home and needs intermittent skilled nursing care, physical therapy and occupational therapy.

## 2019-12-18 NOTE — DISCHARGE INSTRUCTIONS
Thank you for choosing [facility] as your Health Care Provider. [facility] strives to provide the best healthcare available to you. In the next few days you may receive a Survey, either by mail or email,  asking you to rate our care that was provided to you during your stay.  Please return the survey to us, as your feedback is important. We aim to meet your expectations of safe, quality health care.    From your [facility] Care Team.      Apixaban oral tablets  What is this medicine?  APIXABAN (a PIX a ban) is an anticoagulant (blood thinner). It is used to lower the chance of stroke in people with a medical condition called atrial fibrillation. It is also used to treat or prevent blood clots in the lungs or in the veins.  How should I use this medicine?  Take this medicine by mouth with a glass of water. Follow the directions on the prescription label. You can take it with or without food. If it upsets your stomach, take it with food. Take your medicine at regular intervals. Do not take it more often than directed. Do not stop taking except on your doctor's advice. Stopping this medicine may increase your risk of a blot clot. Be sure to refill your prescription before you run out of medicine.  Talk to your pediatrician regarding the use of this medicine in children. Special care may be needed.  What side effects may I notice from receiving this medicine?  Side effects that you should report to your doctor or health care professional as soon as possible:  · allergic reactions like skin rash, itching or hives, swelling of the face, lips, or tongue  · signs and symptoms of bleeding such as bloody or black, tarry stools; red or dark-brown urine; spitting up blood or brown material that looks like coffee grounds; red spots on the skin; unusual bruising or bleeding from the eye, gums, or nose  What may interact with this medicine?  This medicine may interact with the following:  · aspirin and  aspirin-like medicines  · certain medicines for fungal infections like ketoconazole and itraconazole  · certain medicines for seizures like carbamazepine and phenytoin  · certain medicines that treat or prevent blood clots like warfarin, enoxaparin, and dalteparin  · clarithromycin  · NSAIDs, medicines for pain and inflammation, like ibuprofen or naproxen  · rifampin  · ritonavir  · Enio's wort  What if I miss a dose?  If you miss a dose, take it as soon as you can. If it is almost time for your next dose, take only that dose. Do not take double or extra doses.  Where should I keep my medicine?  Keep out of the reach of children.  Store at room temperature between 20 and 25 degrees C (68 and 77 degrees F). Throw away any unused medicine after the expiration date.  What should I tell my health care provider before I take this medicine?  They need to know if you have any of these conditions:  · bleeding disorders  · bleeding in the brain  · blood in your stools (black or tarry stools) or if you have blood in your vomit  · history of stomach bleeding  · kidney disease  · liver disease  · mechanical heart valve  · an unusual or allergic reaction to apixaban, other medicines, foods, dyes, or preservatives  · pregnant or trying to get pregnant  · breast-feeding  What should I watch for while using this medicine?  Notify your doctor or health care professional and seek emergency treatment if you develop breathing problems; changes in vision; chest pain; severe, sudden headache; pain, swelling, warmth in the leg; trouble speaking; sudden numbness or weakness of the face, arm, or leg. These can be signs that your condition has gotten worse.  If you are going to have surgery, tell your doctor or health care professional that you are taking this medicine.  Tell your health care professional that you use this medicine before you have a spinal or epidural procedure. Sometimes people who take this medicine have bleeding  problems around the spine when they have a spinal or epidural procedure. This bleeding is very rare. If you have a spinal or epidural procedure while on this medicine, call your health care professional immediately if you have back pain, numbness or tingling (especially in your legs and feet), muscle weakness, paralysis, or loss of bladder or bowel control.  Avoid sports and activities that might cause injury while you are using this medicine. Severe falls or injuries can cause unseen bleeding. Be careful when using sharp tools or knives. Consider using an electric razor. Take special care brushing or flossing your teeth. Report any injuries, bruising, or red spots on the skin to your doctor or health care professional.  NOTE:This sheet is a summary. It may not cover all possible information. If you have questions about this medicine, talk to your doctor, pharmacist, or health care provider. Copyright© 2017 [employer]          Doxycycline tablets or capsules  What is this medicine?  DOXYCYCLINE (dox asia MOROCHONAMRATA shukla) is a tetracycline antibiotic. It kills certain bacteria or stops their growth. It is used to treat many kinds of infections, like dental, skin, respiratory, and urinary tract infections. It also treats acne, Lyme disease, malaria, and certain sexually transmitted infections.  How should I use this medicine?  Take this medicine by mouth with a full glass of water. Follow the directions on the prescription label. It is best to take this medicine without food, but if it upsets your stomach take it with food. Take your medicine at regular intervals. Do not take your medicine more often than directed. Take all of your medicine as directed even if you think you are better. Do not skip doses or stop your medicine early.  Talk to your pediatrician regarding the use of this medicine in children. While this drug may be prescribed for selected conditions, precautions do apply.  What side effects may I notice from  receiving this medicine?  Side effects that you should report to your doctor or health care professional as soon as possible:  · allergic reactions like skin rash, itching or hives, swelling of the face, lips, or tongue  · difficulty breathing  · fever  · itching in the rectal or genital area  · pain on swallowing  · redness, blistering, peeling or loosening of the skin, including inside the mouth  · severe stomach pain or cramps  · unusual bleeding or bruising  · unusually weak or tired  · yellowing of the eyes or skin  Side effects that usually do not require medical attention (report to your doctor or health care professional if they continue or are bothersome):  · diarrhea  · loss of appetite  · nausea, vomiting  What may interact with this medicine?  · antacids  · barbiturates  · birth control pills  · bismuth subsalicylate  · carbamazepine  · methoxyflurane  · other antibiotics  · phenytoin  · vitamins that contain iron  · warfarin  What if I miss a dose?  If you miss a dose, take it as soon as you can. If it is almost time for your next dose, take only that dose. Do not take double or extra doses.  Where should I keep my medicine?  Keep out of the reach of children.  Store at room temperature, below 30 degrees C (86 degrees F). Protect from light. Keep container tightly closed. Throw away any unused medicine after the expiration date. Taking this medicine after the expiration date can make you seriously ill.  What should I tell my health care provider before I take this medicine?  They need to know if you have any of these conditions:  · liver disease  · long exposure to sunlight like working outdoors  · stomach problems like colitis  · an unusual or allergic reaction to doxycycline, tetracycline antibiotics, other medicines, foods, dyes, or preservatives  · pregnant or trying to get pregnant  · breast-feeding  What should I watch for while using this medicine?  Tell your doctor or health care professional if  your symptoms do not improve.  Do not treat diarrhea with over the counter products. Contact your doctor if you have diarrhea that lasts more than 2 days or if it is severe and watery.  Do not take this medicine just before going to bed. It may not dissolve properly when you lay down and can cause pain in your throat. Drink plenty of fluids while taking this medicine to also help reduce irritation in your throat.  This medicine can make you more sensitive to the sun. Keep out of the sun. If you cannot avoid being in the sun, wear protective clothing and use sunscreen. Do not use sun lamps or tanning beds/booths.  Birth control pills may not work properly while you are taking this medicine. Talk to your doctor about using an extra method of birth control.  If you are being treated for a sexually transmitted infection, avoid sexual contact until you have finished your treatment. Your sexual partner may also need treatment.  Avoid antacids, aluminum, calcium, magnesium, and iron products for 4 hours before and 2 hours after taking a dose of this medicine.  If you are using this medicine to prevent malaria, you should still protect yourself from contact with mosquitos. Stay in screened-in areas, use mosquito nets, keep your body covered, and use an insect repellent.  NOTE:This sheet is a summary. It may not cover all possible information. If you have questions about this medicine, talk to your doctor, pharmacist, or health care provider. Copyright© 2017 [employer]

## 2019-12-18 NOTE — PLAN OF CARE
Pt resting comfortably.  No complaints of pain during shift.  Fair appetite.  Wife at bedside much of afternoon.  Safety measures in place.  Purposeful hourly rounding completed.  Will continue to monitor.

## 2019-12-18 NOTE — PLAN OF CARE
CM met with patient and his wife for initial discharge assessment.  Confirmed demographic information with patient. PCP is Dr. Borja and primary pharmacy is [facility] on [address].     Doesn't receive assistance from any home health agency and is no on dialysis or coumadin. P. Patient and wife confirmed prior to hospitalization he walked with no assistive equipment. The patient has both a rolling walker and standard walker at home. He has reliable transportation to and from appointments and has no difficulty affording medications.     Patient plans to discharge home. Wife will be able to transport patient home once discharged. No financial issues at this time.          12/18/19 1223   Discharge Assessment   Assessment Type Discharge Planning Reassessment   Confirmed/corrected address and phone number on facesheet? Yes   Assessment information obtained from? Patient;Other  (spouse)   Prior to hospitilization cognitive status: Alert/Oriented   Prior to hospitalization functional status: Independent   Current cognitive status: Alert/Oriented   Current Functional Status: Assistive Equipment   Lives With spouse   Able to Return to Prior Arrangements yes   Is patient able to care for self after discharge? Yes   Who are your caregiver(s) and their phone number(s)? neil Wall [phone]   Patient's perception of discharge disposition home or selfcare   Readmission Within the Last 30 Days no previous admission in last 30 days   Patient currently being followed by outpatient case management? No   Patient currently receives any other outside agency services? No   Equipment Currently Used at Home walker, rolling;walker, standard  (hearing aids)   Do you have any problems affording any of your prescribed medications? No   Is the patient taking medications as prescribed? yes   Does the patient have transportation home? Yes   Transportation Anticipated family or friend will provide   Does the patient receive services  at the [facility] Clinic? No   Discharge Plan A Home   DME Needed Upon Discharge  none   Patient/Family in Agreement with Plan yes

## 2019-12-18 NOTE — PLAN OF CARE
PT saw the patient states the patient needs HH.     LMSW met with the patient and wife at the bedside. Both are agreeable to HH. LMSW provided a list for the patient to choose from. Patient choice is [facility].     LMSW will sent when HH orders are completed.        12/18/19 3213   Post-Acute Status   Post-Acute Authorization Home Health/Hospice   Home Health/Hospice Status Discharge Plan Changed

## 2019-12-18 NOTE — PLAN OF CARE
[facility] had a delay in start of care date - patient already discharged - referral forwarded to [facility] & they accepted patient - attempted to notify patient x 3 but no answer      12/18/19 8767   Final Note   Assessment Type Final Discharge Note   Anticipated Discharge Disposition Home-Health   What phone number can be called within the next 1-3 days to see how you are doing after discharge? [phone]   Discharge plans and expectations educations in teach back method with documentation complete? Yes   Right Care Referral Info   Post Acute Recommendation Home-care   Facility Name [facility]

## 2019-12-18 NOTE — PLAN OF CARE
Problem: Physical Therapy Goal  Goal: Physical Therapy Goal  Description  Goals to be met by: 1/18/20    Patient will perform the following to increase strength, improve mobility, and return to prior level of function:    1. Supine <> sit with mod I.  2. Sit<>stand with mod I with least restrictive assistive device.  3. Gait x 150 feet with mod I with least restrictive assistive device.   Outcome: Ongoing, Progressing     PT orders received. PT evaluation completed and goals/POC established. Pt tolerated evaluation well with no adverse reactions. Pt performed bed mobility with min A, sit <> stand with CGA, and ambulation x 50 ft with CGA and no AD. 1 loss of balance during ambulation, but pt was able to self correct. Pt will benefit from skilled PT services to address impairments and functional limitations. Recommend discharge to home with HHPT/OT. Also recommended use of rolling walker at home for increased stability during ambulation.

## 2019-12-18 NOTE — NURSING
Discharge instructions given, questions answered, pt and wife acknowledged understanding. IV removed with catheter intact. Telemetry removed and returned to monitor tech. Belongings sent home with pt. Transported to the [facility] via wheelchair. Transported home per wife.

## 2019-12-18 NOTE — PLAN OF CARE
VSS and afebrile. Pt orientated to self and place. Tolerating diet. Abx given per orders. Pt incontinent to urine and stool, changed and turned q2h. Redness noted at 0330, barrier paste applied. Plan of care reviewed with patient. Purposeful hourly rounding done. Call light at bedside, bed at lowest position, brakes on, non skid socks on. Will continue to monitor.

## 2019-12-18 NOTE — PT/OT/SLP EVAL
Physical Therapy Evaluation    Patient Name:  Randall Strickland Jr.   MRN:  [id number]    Recommendations:     Discharge Recommendations:  home, home health OT, home health PT   Discharge Equipment Recommendations: none   Barriers to discharge: None    Assessment:     Randall Strickland Jr. is a 89 y.o. male admitted with a medical diagnosis of Multifocal pneumonia.  He presents with the following impairments/functional limitations:  weakness, impaired endurance, impaired self care skills, impaired functional mobilty, gait instability, impaired balance, decreased lower extremity function, decreased ROM.    PT orders received. PT evaluation completed and goals/POC established. Pt tolerated evaluation well with no adverse reactions. Pt performed bed mobility with min A, sit <> stand with CGA, and ambulation x 50 ft with CGA and no AD. 1 loss of balance during ambulation, but pt was able to self correct. Pt will benefit from skilled PT services to address impairments and functional limitations. Recommend discharge to home with HHPT/OT. Also recommended use of rolling walker at home for increased stability during ambulation.     Rehab Prognosis: Good; patient would benefit from acute skilled PT services to address these deficits and reach maximum level of function.    Recent Surgery: * No surgery found *      Plan:     During this hospitalization, patient to be seen 5 x/week to address the identified rehab impairments via gait training, therapeutic activities, therapeutic exercises, neuromuscular re-education and progress toward the following goals:    · Plan of Care Expires:  01/18/20    Subjective     Chief Complaint: None stated  Patient/Family Comments/goals: None stated  Pain/Comfort:  · Pain Rating 1: 0/10  · Pain Rating Post-Intervention 1: 0/10    Patients cultural, spiritual, Islam conflicts given the current situation: no    Living Environment:  Pt lives with his wife in a single story house with no steps to enter and a  tub shower.  Prior to admission, patients level of function was independent for ambulation and transfers. Required assistance for bathing, but was able to perform dressing/grooming/feeding without assistance.  Equipment used at home: walker, rolling, rollator.  DME owned (not currently used): none.  Upon discharge, patient will have assistance from his wife.    Objective:     Communicated with RN prior to session.  Patient found supine with telemetry, Condom Catheter  upon PT entry to room.    General Precautions: Standard, fall   Orthopedic Precautions:N/A   Braces: N/A     Exams:  · Cognition:   · Patient is oriented to person, place, time, and situation.  · Pt follows approximately 100% of multiple step commands.    · Mood: Pleasant and cooperative.   · Musculoskeletal:  · Posture:  Forward head, rounded shoulders  · LE ROM/Strength:   · R ROM: WNL  · L ROM: WNL  · R Strength:   · Hip flexion: 4/5  · Knee extension: 4/5  · Dorsiflexion: 4/5   · L Strength:   · Hip flexion: 4/5  · Knee extension: 4/5  · Dorsiflexion: 4/5   · Neuromuscular:  · Sensation: Intact to light touch bilateral LEs.   · Tone/Reflexes/Coordination: No impairments identified with functional mobility. No formal testing performed.   · Balance:   · Static sitting: SBA  · Dynamic sitting: CGA  · Static standing: CGA  · Dynamic standing: CGA  · Visual-vestibular: No impairments identified with functional mobility. No formal testing performed.  · Integument: Visible skin intact    Functional Mobility:  · Bed Mobility:     · Supine to Sit: minimum assistance  · Sit to Supine: minimum assistance  · Transfers:     · Sit to Stand:  contact guard assistance with no AD  · Gait: 50 ft with contact guard assistance and no AD.   · Pt had 1 loss of balance during a turn, but was able to self correct.   · Demonstrated decreased madisyn, narrow SHEYLA, decreased trunk flexion, bilateral knee flexion during stance, and increased trunk flexion.    AM-PAC 6 CLICK  MOBILITY  Total Score:19     Patient left supine with all lines intact, call button in reach and wife present.    GOALS:   Multidisciplinary Problems     Physical Therapy Goals        Problem: Physical Therapy Goal    Goal Priority Disciplines Outcome Goal Variances Interventions   Physical Therapy Goal     PT, PT/OT Ongoing, Progressing     Description:  Goals to be met by: 1/18/20    Patient will perform the following to increase strength, improve mobility, and return to prior level of function:    1. Supine <> sit with mod I.  2. Sit<>stand with mod I with least restrictive assistive device.  3. Gait x 150 feet with mod I with least restrictive assistive device.                    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    PVD (peripheral vascular disease) 8/22/2018    Stenosis of aortic and mitral valves     aortic DAHLIA 1.1 CM       Past Surgical History:   Procedure Laterality Date    APPENDECTOMY      cararact  car    CARDIAC CATHETERIZATION      CARDIAC VALVE SURGERY      CATARACT EXTRACTION BILATERAL W/ ANTERIOR VITRECTOMY      bilaterial    CATARACT EXTRACTION W/  INTRAOCULAR LENS IMPLANT Bilateral     CHOLECYSTECTOMY      EYE SURGERY      JOINT REPLACEMENT      bilateral hip    KNEE SURGERY      Bilateral    TOTAL HIP ARTHROPLASTY      Bilaterial       Time Tracking:     PT Received On: 12/18/19  PT Start Time: 1337     PT Stop Time: 1400  PT Total Time (min): 23 min     Billable Minutes: Evaluation 23      Johanna Rivera, PT  12/18/2019

## 2019-12-18 NOTE — PROGRESS NOTES
Pt has his wallet in the bed with him.  RN offered to lock wallet up but pt declined, stating he would rather keep it with him in the bed.  RN expressed concern that wallet may end up in the laundry.  Will tell charge nurse.

## 2019-12-20 ENCOUNTER — PATIENT OUTREACH (OUTPATIENT)
Dept: ADMINISTRATIVE | Facility: CLINIC | Age: 84
End: 2019-12-20

## 2019-12-20 PROCEDURE — G0179 MD RECERTIFICATION HHA PT: HCPCS | Mod: ,,, | Performed by: INTERNAL MEDICINE

## 2019-12-20 PROCEDURE — G0179 PR HOME HEALTH MD RECERTIFICATION: ICD-10-PCS | Mod: ,,, | Performed by: INTERNAL MEDICINE

## 2019-12-20 NOTE — PATIENT INSTRUCTIONS
Pneumonia (Adult)  Pneumonia is an infection deep within the lungs. It is in the small air sacs (alveoli). Pneumonia may be caused by a virus or bacteria. Pneumonia caused by bacteria is usually treated with an antibiotic. Severe cases may need to be treated in the hospital. Milder cases can be treated at home. Symptoms usually start to get better during the first 2 days of treatment.    Home care  Follow these guidelines when caring for yourself at home:  · Rest at home for the first 2 to 3 days, or until you feel stronger. Dont let yourself get overly tired when you go back to your activities.  · Stay away from cigarette smoke - yours or other peoples.  · You may use acetaminophen or ibuprofen to control fever or pain, unless another medicine was prescribed. If you have chronic liver or kidney disease, talk with your healthcare provider before using these medicines. Also talk with your provider if youve had a stomach ulcer or gastrointestinal bleeding. Dont give aspirin to anyone younger than 18 years of age who is ill with a fever. It may cause severe liver damage.  · Your appetite may be poor, so a light diet is fine.  · Drink 6 to 8 glasses of fluids every day to make sure you are getting enough fluids. Beverages can include water, sport drinks, sodas without caffeine, juices, tea, or soup. Fluids will help loosen secretions in the lung. This will make it easier for you to cough up the phlegm (sputum). If you also have heart or kidney disease, check with your healthcare provider before you drink extra fluids.  · Take antibiotic medicine prescribed until it is all gone, even if you are feeling better after a few days.  Follow-up care  Follow up with your healthcare provider in the next 2 to 3 days, or as advised. This is to be sure the medicine is helping you get better.  If you are 65 or older, you should get a pneumococcal vaccine and a yearly flu (influenza) shot. You should also get these vaccines if  you have chronic lung disease like asthma, emphysema, or COPD. Recently, a second type of pneumonia vaccine has become available for everyone over 65 years old. This is in addition to the previous vaccine. Ask your provider about this.  When to seek medical advice  Call your healthcare provider right away if any of these occur:  · You dont get better within the first 48 hours of treatment  · Shortness of breath gets worse  · Rapid breathing (more than 25 breaths per minute)  · Coughing up blood  · Chest pain gets worse with breathing  · Fever of 100.4°F (38°C) or higher that doesnt get better with fever medicine  · Weakness, dizziness, or fainting that gets worse  · Thirst or dry mouth that gets worse  · Sinus pain, headache, or a stiff neck  · Chest pain not caused by coughing  Date Last Reviewed: 1/1/2017  © 6635-9852 [employer], [employer]. [address] [place] [id number]. All rights reserved. This information is not intended as a substitute for professional medical care. Always follow your healthcare professional's instructions.

## 2019-12-21 LAB
BACTERIA BLD CULT: NORMAL
BACTERIA BLD CULT: NORMAL

## 2019-12-23 ENCOUNTER — PES CALL (OUTPATIENT)
Dept: ADMINISTRATIVE | Facility: CLINIC | Age: 84
End: 2019-12-23

## 2020-01-03 ENCOUNTER — DOCUMENTATION ONLY (OUTPATIENT)
Dept: INTERNAL MEDICINE | Facility: CLINIC | Age: 85
End: 2020-01-03

## 2020-01-03 ENCOUNTER — EXTERNAL HOME HEALTH (OUTPATIENT)
Dept: HOME HEALTH SERVICES | Facility: HOSPITAL | Age: 85
End: 2020-01-03
Payer: MEDICARE

## 2020-01-22 ENCOUNTER — OFFICE VISIT (OUTPATIENT)
Dept: INTERNAL MEDICINE | Facility: CLINIC | Age: 85
End: 2020-01-22
Attending: FAMILY MEDICINE
Payer: MEDICARE

## 2020-01-22 ENCOUNTER — LAB VISIT (OUTPATIENT)
Dept: LAB | Facility: HOSPITAL | Age: 85
End: 2020-01-22
Attending: FAMILY MEDICINE
Payer: MEDICARE

## 2020-01-22 VITALS
BODY MASS INDEX: 22.35 KG/M2 | HEART RATE: 105 BPM | WEIGHT: 147.5 LBS | DIASTOLIC BLOOD PRESSURE: 58 MMHG | SYSTOLIC BLOOD PRESSURE: 98 MMHG | OXYGEN SATURATION: 99 % | HEIGHT: 68 IN

## 2020-01-22 DIAGNOSIS — R63.4 UNINTENTIONAL WEIGHT LOSS: ICD-10-CM

## 2020-01-22 DIAGNOSIS — R79.9 ABNORMAL BLOOD CHEMISTRY: ICD-10-CM

## 2020-01-22 DIAGNOSIS — N18.30 CHRONIC KIDNEY DISEASE, STAGE III (MODERATE): ICD-10-CM

## 2020-01-22 DIAGNOSIS — N39.0 URINARY TRACT INFECTION WITHOUT HEMATURIA, SITE UNSPECIFIED: ICD-10-CM

## 2020-01-22 DIAGNOSIS — I48.91 NEW ONSET ATRIAL FIBRILLATION: ICD-10-CM

## 2020-01-22 DIAGNOSIS — Z95.2 S/P AORTIC VALVE REPLACEMENT: ICD-10-CM

## 2020-01-22 DIAGNOSIS — R35.0 URINARY FREQUENCY: ICD-10-CM

## 2020-01-22 DIAGNOSIS — R26.9 GAIT DISTURBANCE: ICD-10-CM

## 2020-01-22 DIAGNOSIS — F03.90 DEMENTIA WITHOUT BEHAVIORAL DISTURBANCE, UNSPECIFIED DEMENTIA TYPE: ICD-10-CM

## 2020-01-22 DIAGNOSIS — I42.9 CARDIOMYOPATHY, IDIOPATHIC: ICD-10-CM

## 2020-01-22 DIAGNOSIS — I35.0 NONRHEUMATIC AORTIC VALVE STENOSIS: ICD-10-CM

## 2020-01-22 DIAGNOSIS — J18.9 MULTIFOCAL PNEUMONIA: Primary | ICD-10-CM

## 2020-01-22 LAB
CREAT SERPL-MCNC: 1.2 MG/DL (ref 0.5–1.4)
EST. GFR  (AFRICAN AMERICAN): >60 ML/MIN/1.73 M^2
EST. GFR  (NON AFRICAN AMERICAN): 52 ML/MIN/1.73 M^2

## 2020-01-22 PROCEDURE — 99999 PR PBB SHADOW E&M-EST. PATIENT-LVL V: CPT | Mod: PBBFAC,HCNC,, | Performed by: FAMILY MEDICINE

## 2020-01-22 PROCEDURE — 84075 ASSAY ALKALINE PHOSPHATASE: CPT | Mod: HCNC

## 2020-01-22 PROCEDURE — 84080 ASSAY ALKALINE PHOSPHATASES: CPT | Mod: HCNC

## 2020-01-22 PROCEDURE — 36415 COLL VENOUS BLD VENIPUNCTURE: CPT | Mod: HCNC

## 2020-01-22 PROCEDURE — 99214 PR OFFICE/OUTPT VISIT, EST, LEVL IV, 30-39 MIN: ICD-10-PCS | Mod: HCNC,S$GLB,, | Performed by: FAMILY MEDICINE

## 2020-01-22 PROCEDURE — 99999 PR PBB SHADOW E&M-EST. PATIENT-LVL V: ICD-10-PCS | Mod: PBBFAC,HCNC,, | Performed by: FAMILY MEDICINE

## 2020-01-22 PROCEDURE — 82565 ASSAY OF CREATININE: CPT | Mod: HCNC

## 2020-01-22 PROCEDURE — 99214 OFFICE O/P EST MOD 30 MIN: CPT | Mod: HCNC,S$GLB,, | Performed by: FAMILY MEDICINE

## 2020-01-22 NOTE — PLAN OF CARE
Patient discharged home 2/10/2017 with no needs.  Follow up appts on [facility].       02/11/17 1320   Final Note   Assessment Type Final Discharge Note   Discharge Disposition Home   Hospital Follow Up  Appt(s) scheduled? Yes   Discharge/Hospital Encounter Summary to (non-[facility]) PCP n/a   Referral to Outpatient Case Management complete? n/a   Referral to / orders for Home Health Complete? n/a   30 day supply of medicines given at discharge, if documented non-compliance / non-adherence? n/a   Any social issues identified prior to discharge? No   Did you assess the readiness or willingness of the family or caregiver to support self management of care? Yes   Right Care Referral Info   Post Acute Recommendation No Care      Spoke to pt wife who stated the pt was sleeping and that she will let him know to contact our office.

## 2020-01-22 NOTE — Clinical Note
Patient admitted recently - pneumonia - apparantly started on eliquis for new A fib. He did not start for unknown reason. I asked him to go ahead. Consulting you both, I have a concern about anticoag given his hx of falls, but CVA risk if un treated. Thank you.

## 2020-01-22 NOTE — PROGRESS NOTES
Subjective:       Patient ID: Randall Strickland Jr. is a 89 y.o. male.    Chief Complaint: Hospital Follow Up    Patient presents for a post hospitalization follow up visit. Current complaints addressed in the ROS section. Reviewed the pertinent chart data including (but not limited to) labs, imaging, cardiovascular, procedures and available ER/DC Summary and inpatient provider notes.    Copy D/C Summary:    Admission Date: 12/16/2019  Hospital Length of Stay: 2 days  Discharge Date and Time:  12/18/2019 12:56 PM  Attending Physician: Fabiola Arroyo MD   Discharging Provider: Fabiola Arroyo MD  Primary Care Provider: Tiago Delaney MD        HPI:   Randall Strickland is a 89 yo M presenting to the ED with fatigue and FTT with poor PO intake. He has TAVR (8/30/16), dementia, chroinc diastolic HF, HTN, BPH with urinary obstruction, and osteoarthritis. Pt's wife said their friend who was a pediatrician visited and listened to pts lungs & told pt he should go to the ED because he has fluid on his lungs. Per wife, patient has a minot cough at baseline but was unable to tell provider when this initially started. She was in [place] earlier in the week and when she returned home, her daughter who had been with the patient, stated his cough was getting worse and was productive. He is demented at baseline but AO to self, place, and date. Denies fever, N/V, and diarrhea. He and wife do not remember the last BM he had but his wife believes it was recent. Of note pt has also had 20lb weight loss over the last 2 months. Patient has difficulty walking with frequent falls. Last fall was at the [facility] 3 weeks ago. He has a large dried over lesion on his Left arm. He did hit his head. Did not go to the ED at that time. No changes in mentation since this incident.      In the ED a CXR was concerning for multilobar PNA and he was given ceftriaxone and azithromycin. EKG demonstrated new AF unknown to the patient. Also given 1L of NS.  "     Last seen by his cardiologist in October for heart failure. Last echo on May of 2019 with EF of 55% and "Grade II (moderate) left ventricular diastolic dysfunction consistent with pseudonormalization." Last seen by his neurologist in September of 2019 during which time it was suspected that his gait disturbance was parkinsonian characteristics. He was never started on levodopa.      * No surgery found *       Hospital Course:   Patient admitted with multifocal pneumonia associated with decreased PO intake and newly diagnosed atrial fibrillation. He was started on empiric rocephin/azithromycin. Cardiology was consulted and recommended starting apixaban. He had rapid improvement in symptoms, resolved leukocytosis and oxygen sats were stable on room air. He was adamant about going home and did not want any further testing done during this admission. He was instructed to follow-up with his PCP within 2 weeks. I discussed with patient and his wife that he should have follow-up imaging in a few weeks to ensure resolution of pneumonia and that no other underlying issue is present.      Consults:     Consults (From admission, onward)        Status Ordering Provider        Inpatient consult to Cardiology  Once    Provider:  Thong Jorge MD   Completed BALDOMERO JOHNSON           No new Assessment & Plan notes have been filed under this hospital service since the last note was generated.  Service: Hospital Medicine       Final Active Diagnoses:    Diagnosis Date Noted POA   PRINCIPAL PROBLEM:  Multifocal pneumonia (J18.9) 12/16/2019 Yes   New onset atrial fibrillation (I48.91) 12/17/2019 Yes   PVD (peripheral vascular disease) (I73.9) 08/22/2018 Yes   Dementia (F03.90) 02/22/2017 Yes   S/P aortic valve replacement (Z95.2) 10/11/2016 Not Applicable   Chronic diastolic congestive heart failure (I50.32) 04/07/2016 Yes   Hypertension, essential (I10) 10/19/2015 Yes   BPH with urinary obstruction (N40.1, " N13.8) 09/30/2013 Yes     Problems Resolved During this Admission:        Discharged Condition: good     Disposition: Home or Self Care     Follow Up:    Follow-up Information       Tiago Delaney MD In 2 weeks.   Specialties:  Family Medicine, Sports Medicine  Why:  hospital follow-up for pneumonia and atrial fibrillation  Contact information:  [address]  [facility] [address]  [phone]           Patient Instructions:        Diet Cardiac       Activity as tolerated        Significant Diagnostic Studies: Labs:   BMP:     Recent Labs  Lab 12/16/19 1925 12/17/19 0433 12/18/19  0323   92 97   139 138  K 3.6 3.5 3.4*  CL 98 101 101  CO2 32* 28 27  BUN 32* 26 22  CREATININE 0.9 0.8 0.8  CALCIUM 9.4 8.7 8.3*  MG  --  1.9  --   , CBC     Recent Labs  Lab 12/16/19 1923 12/17/19 0433 12/18/19 0323  WBC 15.21*  --  13.98* 11.18  HGB 11.7*  --  10.5* 10.6*  HCT 36.4*   < > 33.0* 33.2*    --  287 278   < > = values in this interval not displayed.   and All labs within the past 24 hours have been reviewed       Pending Diagnostic Studies:     Procedure Component Value Units Date/Time    Legionella antigen, urine ([id number]) Collected:  12/17/19 0012    Order Status:  Sent Lab Status:  In process Updated:  12/17/19 0016    Specimen:  Urine, Clean Catch          Medications:  Reconciled Home Medications:        Medication List     START taking these medications   apixaban 5 mg Tab  Commonly known as:  ELIQUIS  Take 1 tablet (5 mg total) by mouth 2 (two) times daily.     doxycycline 100 MG capsule  Commonly known as:  MONODOX  Take 1 capsule (100 mg total) by mouth every 12 (twelve) hours. for 5 days        CHANGE how you take these medications   triamcinolone acetonide 0.1% 0.1 % cream  Commonly known as:  KENALOG  Apply topically 2 (two) times daily.  What changed:  Another medication with the same name was removed. Continue taking this medication, and follow the directions you see  here.        CONTINUE taking these medications   amitriptyline 25 MG tablet  Commonly known as:  ELAVIL  TAKE 1 TABLET(25 MG) BY MOUTH EVERY EVENING     amLODIPine 5 MG tablet  Commonly known as:  NORVASC  TAKE 1 TABLET(5 MG) BY MOUTH EVERY DAY     aspirin 81 MG Chew  Take 81 mg by mouth once daily.     finasteride 5 mg tablet  Commonly known as:  PROSCAR  TAKE 1 TABLET(5 MG) BY MOUTH EVERY DAY     gabapentin 300 MG capsule  Commonly known as:  NEURONTIN  TAKE 1 CAPSULE(300 MG) BY MOUTH EVERY EVENING     omeprazole 20 MG capsule  Commonly known as:  PRILOSEC  TAKE 1 CAPSULE(20 MG) BY MOUTH EVERY DAY     rivastigmine tartrate 3 MG capsule  Commonly known as:  EXELON  TAKE 1 CAPSULE(3 MG) BY MOUTH TWICE DAILY     tamsulosin 0.4 mg Cap  Commonly known as:  FLOMAX  TAKE ONE CAPSULE BY MOUTH DAILY     thiamine 100 MG tablet  Take 100 mg by mouth once daily.     torsemide 20 MG Tab  Commonly known as:  DEMADEX  Take 1 tablet (20 mg total) by mouth once daily.        STOP taking these medications   Cholestyramine Light 4 gram Pwpk  Generic drug:  cholestyramine-aspartame     clopidogrel 75 mg tablet  Commonly known as:  PLAVIX     fluorometholone 0.1% 0.1 % Drps  Commonly known as:  FML     mupirocin 2 % ointment  Commonly known as:  BACTROBAN     nystatin 100,000 unit/mL suspension  Commonly known as:  MYCOSTATIN     polycarbophil 625 mg tablet  Commonly known as:  FIBERCON     tolterodine 4 MG 24 hr capsule  Commonly known as:  DETROL LA         Indwelling Lines/Drains at time of discharge:     Lines/Drains/Airways     None        Time spent on the discharge of patient: 30 minutes  Patient was seen and examined on the date of discharge and determined to be suitable for discharge.      Fabiola Arroyo MD  Department of Hospital Medicine  [facility]    Electronically signed by Fabiola Arroyo MD at 12/18/2019 12:56 PM     Review of Systems   Constitutional: Positive for unexpected weight change.  Negative for chills, fatigue and fever.   HENT: Positive for hearing loss. Negative for congestion and trouble swallowing.    Eyes: Negative for redness and visual disturbance.   Respiratory: Negative for cough, chest tightness and shortness of breath.    Cardiovascular: Negative for chest pain, palpitations and leg swelling.   Gastrointestinal: Negative for abdominal pain and blood in stool.   Genitourinary: Positive for enuresis, frequency and urgency. Negative for difficulty urinating and hematuria.   Musculoskeletal: Positive for arthralgias and gait problem. Negative for back pain, joint swelling, myalgias and neck pain.   Skin: Negative for color change and rash.   Neurological: Positive for weakness and light-headedness. Negative for tremors, facial asymmetry, speech difficulty, numbness and headaches.   Hematological: Negative for adenopathy. Does not bruise/bleed easily.   Psychiatric/Behavioral: Positive for confusion. Negative for behavioral problems and sleep disturbance. The patient is not nervous/anxious.        Objective:      Physical Exam   Constitutional: He appears well-developed and well-nourished.   HENT:   Head: Normocephalic and atraumatic.   Eyes: Conjunctivae are normal. No scleral icterus.   Neck: Normal range of motion. Neck supple. Carotid bruit is not present.   Cardiovascular: Normal rate, regular rhythm and intact distal pulses. Exam reveals distant heart sounds. Exam reveals no gallop and no friction rub.   Murmur heard.  Pulmonary/Chest: Effort normal. No respiratory distress. He has decreased breath sounds. He has no wheezes. He has no rales.   Abdominal: He exhibits no distension and no mass. There is no tenderness. There is no guarding.   Musculoskeletal: He exhibits no edema or deformity.   Neurological: He is alert. He is not disoriented. He displays no tremor. No cranial nerve deficit. Coordination and gait normal.   Skin: Skin is warm and dry. No rash noted. He is not  diaphoretic. No erythema.   Psychiatric: He has a normal mood and affect. His behavior is normal. Judgment and thought content normal.   Nursing note and vitals reviewed.      Assessment:       1. Multifocal pneumonia    2. Unintentional weight loss    3. Nonrheumatic aortic valve stenosis    4. S/P aortic valve replacement    5. Chronic kidney disease, stage III (moderate)    6. New onset atrial fibrillation    7. Cardiomyopathy, idiopathic    8. Abnormal blood chemistry    9. Urinary tract infection without hematuria, site unspecified    10. Dementia without behavioral disturbance, unspecified dementia type    11. Gait disturbance    12. Urinary frequency        Plan:     Medication List with Changes/Refills   Current Medications    AMITRIPTYLINE (ELAVIL) 25 MG TABLET    TAKE 1 TABLET(25 MG) BY MOUTH EVERY EVENING    AMLODIPINE (NORVASC) 5 MG TABLET    TAKE 1 TABLET(5 MG) BY MOUTH EVERY DAY    APIXABAN (ELIQUIS) 5 MG TAB    Take 1 tablet (5 mg total) by mouth 2 (two) times daily.    ASPIRIN 81 MG CHEW    Take 81 mg by mouth once daily.      FINASTERIDE (PROSCAR) 5 MG TABLET    TAKE 1 TABLET(5 MG) BY MOUTH EVERY DAY    GABAPENTIN (NEURONTIN) 300 MG CAPSULE    TAKE 1 CAPSULE(300 MG) BY MOUTH EVERY EVENING    OMEPRAZOLE (PRILOSEC) 20 MG CAPSULE    TAKE 1 CAPSULE(20 MG) BY MOUTH EVERY DAY    RIVASTIGMINE TARTRATE (EXELON) 3 MG CAPSULE    TAKE 1 CAPSULE(3 MG) BY MOUTH TWICE DAILY    TAMSULOSIN (FLOMAX) 0.4 MG CAP    TAKE ONE CAPSULE BY MOUTH DAILY    THIAMINE 100 MG TABLET    Take 100 mg by mouth once daily.    TORSEMIDE (DEMADEX) 20 MG TAB    Take 1 tablet (20 mg total) by mouth once daily.   Discontinued Medications    TRIAMCINOLONE ACETONIDE 0.1% (KENALOG) 0.1 % CREAM    Apply topically 2 (two) times daily.     Randall was seen today for hospital follow up.    Diagnoses and all orders for this visit:    Multifocal pneumonia  -     Ambulatory referral to Outpatient Case Management    Unintentional weight loss  -      Ambulatory referral to Gastroenterology  -     CT Chest Without Contrast; Future  -     CT Abdomen Pelvis With Contrast; Future  -     Creatinine, serum; Future  -     Ambulatory referral to Outpatient Case Management    Nonrheumatic aortic valve stenosis  -     Ambulatory referral to Cardiology  -     Ambulatory referral to Outpatient Case Management    S/P aortic valve replacement  -     Ambulatory referral to Cardiology  -     Ambulatory referral to Outpatient Case Management    Chronic kidney disease, stage III (moderate)  -     Ambulatory referral to Outpatient Case Management    New onset atrial fibrillation  -     Ambulatory referral to Cardiology  -     EKG 12-lead; Future  -     Ambulatory referral to Outpatient Case Management    Cardiomyopathy, idiopathic  -     EKG 12-lead; Future  -     Ambulatory referral to Outpatient Case Management    Abnormal blood chemistry  -     Alkaline phosphatase, isoenzymes; Future  -     Ambulatory referral to Outpatient Case Management    Urinary tract infection without hematuria, site unspecified  -     Urinalysis; Future  -     Urinalysis Microscopic; Future  -     Urine culture; Future  -     Ambulatory referral to Outpatient Case Management    Dementia without behavioral disturbance, unspecified dementia type  -     Ambulatory referral to Neurology  -     Ambulatory referral to Outpatient Case Management    Gait disturbance  -     Ambulatory referral to Neurology  -     Ambulatory referral to Outpatient Case Management    Urinary frequency  -     Ambulatory referral to Urology  -     Ambulatory referral to Outpatient Case Management      See meds, orders, follow up, routing and instructions sections of encounter and AVS. Discussed with patient and provided on AVS.    Transitional Care Note    Family and/or Caretaker present at visit?  No.  Diagnostic tests reviewed/disposition: I have reviewed all completed as well as pending diagnostic tests at the time of  discharge.  Disease/illness education: ELIZA  Home health/community services discussion/referrals: Patient does not have home health established from hospital visit.  They do not need home health.  If needed, we will set up home health for the patient.   Establishment or re-establishment of referral orders for community resources: complex case manager.   Discussion with other health care providers: multiple consults.       Hospital follow-up for recent admission.  Discharge summary was reviewed.  Patient is a poor historian.  He was a bit unclear as to the reason for his admission.  This appears to be a pneumonia which is currently resolved, asymptomatic.  Apparently had atrial fibrillation during hospitalization and Cardiology consult recommended initiation of Eliquis.  Patient was unaware of that medication and apparently not taking it.  He has no other cardiopulmonary complaints at this time beyond his baseline dyspnea.  Weight loss has continued.  Patient has no appetite changes for difficulty swallowing reported.  No abdominal pain reported.  Last year 175 lb, last visit with me 157 lb and today 147 lbs.  Reports a couple of falls recently.  States he is having progressive difficulty with his gait, as well.    Concerns for anticoagulation would include slightly low blood count and history of falls.  Concern of non treatment would be higher stroke risk.  Re-consult to Cardiology, Neurology.  In the meantime advised him to take the Eliquis.  Close follow-up with me in 6 weeks.

## 2020-01-22 NOTE — PATIENT INSTRUCTIONS
Schedule lab orders for today.     You should be taking new blood thinner - apixiban (eliquis), prescribed by the hospital at discharge.  
healthy male pw hemospermia x 2 weeks gradually becoming less and less, but still present.  and now R testicular pain x last few days.  no abd pain.  no n/v.  no fever. no dysuria.

## 2020-01-28 ENCOUNTER — OUTPATIENT CASE MANAGEMENT (OUTPATIENT)
Dept: ADMINISTRATIVE | Facility: OTHER | Age: 85
End: 2020-01-28

## 2020-01-28 ENCOUNTER — TELEPHONE (OUTPATIENT)
Dept: INTERNAL MEDICINE | Facility: CLINIC | Age: 85
End: 2020-01-28

## 2020-01-28 ENCOUNTER — OFFICE VISIT (OUTPATIENT)
Dept: NEUROLOGY | Facility: CLINIC | Age: 85
End: 2020-01-28
Attending: FAMILY MEDICINE
Payer: MEDICARE

## 2020-01-28 VITALS
SYSTOLIC BLOOD PRESSURE: 114 MMHG | HEIGHT: 68 IN | DIASTOLIC BLOOD PRESSURE: 65 MMHG | HEART RATE: 81 BPM | BODY MASS INDEX: 22.43 KG/M2

## 2020-01-28 DIAGNOSIS — F03.90 DEMENTIA WITHOUT BEHAVIORAL DISTURBANCE, UNSPECIFIED DEMENTIA TYPE: ICD-10-CM

## 2020-01-28 DIAGNOSIS — R26.9 GAIT DISORDER: Primary | ICD-10-CM

## 2020-01-28 DIAGNOSIS — I48.91 NEW ONSET ATRIAL FIBRILLATION: ICD-10-CM

## 2020-01-28 PROBLEM — J18.9 MULTIFOCAL PNEUMONIA: Status: RESOLVED | Noted: 2019-12-16 | Resolved: 2020-01-28

## 2020-01-28 PROCEDURE — 1126F PR PAIN SEVERITY QUANTIFIED, NO PAIN PRESENT: ICD-10-PCS | Mod: HCNC,S$GLB,, | Performed by: PSYCHIATRY & NEUROLOGY

## 2020-01-28 PROCEDURE — 99999 PR PBB SHADOW E&M-EST. PATIENT-LVL III: CPT | Mod: PBBFAC,HCNC,, | Performed by: PSYCHIATRY & NEUROLOGY

## 2020-01-28 PROCEDURE — 1101F PT FALLS ASSESS-DOCD LE1/YR: CPT | Mod: HCNC,CPTII,S$GLB, | Performed by: PSYCHIATRY & NEUROLOGY

## 2020-01-28 PROCEDURE — 1101F PR PT FALLS ASSESS DOC 0-1 FALLS W/OUT INJ PAST YR: ICD-10-PCS | Mod: HCNC,CPTII,S$GLB, | Performed by: PSYCHIATRY & NEUROLOGY

## 2020-01-28 PROCEDURE — 1159F MED LIST DOCD IN RCRD: CPT | Mod: HCNC,S$GLB,, | Performed by: PSYCHIATRY & NEUROLOGY

## 2020-01-28 PROCEDURE — 1126F AMNT PAIN NOTED NONE PRSNT: CPT | Mod: HCNC,S$GLB,, | Performed by: PSYCHIATRY & NEUROLOGY

## 2020-01-28 PROCEDURE — 99214 PR OFFICE/OUTPT VISIT, EST, LEVL IV, 30-39 MIN: ICD-10-PCS | Mod: HCNC,S$GLB,, | Performed by: PSYCHIATRY & NEUROLOGY

## 2020-01-28 PROCEDURE — 99499 UNLISTED E&M SERVICE: CPT | Mod: HCNC,S$GLB,, | Performed by: PSYCHIATRY & NEUROLOGY

## 2020-01-28 PROCEDURE — 99999 PR PBB SHADOW E&M-EST. PATIENT-LVL III: ICD-10-PCS | Mod: PBBFAC,HCNC,, | Performed by: PSYCHIATRY & NEUROLOGY

## 2020-01-28 PROCEDURE — 99214 OFFICE O/P EST MOD 30 MIN: CPT | Mod: HCNC,S$GLB,, | Performed by: PSYCHIATRY & NEUROLOGY

## 2020-01-28 PROCEDURE — 1159F PR MEDICATION LIST DOCUMENTED IN MEDICAL RECORD: ICD-10-PCS | Mod: HCNC,S$GLB,, | Performed by: PSYCHIATRY & NEUROLOGY

## 2020-01-28 PROCEDURE — 99499 RISK ADDL DX/OHS AUDIT: ICD-10-PCS | Mod: HCNC,S$GLB,, | Performed by: PSYCHIATRY & NEUROLOGY

## 2020-01-28 RX ORDER — CLOPIDOGREL BISULFATE 75 MG/1
75 TABLET ORAL DAILY
Qty: 30 TABLET | Refills: 11 | Status: SHIPPED | OUTPATIENT
Start: 2020-01-28 | End: 2020-03-04 | Stop reason: ALTCHOICE

## 2020-01-28 NOTE — PROGRESS NOTES
"Randall Stevens I. Chief Complaints during this visit:  f/u Patient visit for  Gait disturbance    Tiago Borja MD  [address] [place] [address]    Primary Care Physician  Tiago Borja MD  [address]  [facility] [address]      History of present illness:   89 y.o.  M seen in f/u for dementia and gait disturbance.      Admitted in December for pneumonia (2 days).  Prescribed eliquis for new afib, though pcp has concerns using this given patient's known fall risk.  He is not currently taking eliquis, that he is aware of, and the last cardiology note in the hospital seemed to defer this (per notes:  CHADS-VASc 5 but given h/o TAVR, will hold off on anticoagulation).    He has an old med list which he says is out-of-date, but plavix is on that list (was taking until it was discontinued inpt).    Feeling pretty good.  No falls in last few months.      9/4/19  Has fallen once in the house, backwards, minor abraision on his head.      5/3/19:  Unaccompanied.    Completed PT, found it helpful.  No falls.  Forgot his hearing aids today.  Pain in left shoulder, short episodes.  Several times/day.  No obvious trigger.  Does not matter if he has moved arm.  Concerned that in past, this resulted in a cardiac stent.    Interval history 1/7/19:  He gets out of breath with long walk from garage, but did not have to take break.  No falls.  Appetite good.  Eating "too much ice cream."  Sleeping well.  Mood fine.  Mildly irritated with his gait.  Rides bike without problem.  +nocturia  Endorses bowel leakage, mild.  Continued low back pain.    Interval history 8/13/18:  He reports that his gait is about the same, no falls.    Interval history 5/14/18:  Accompanied by wife and here to review the results of cisternogram.  No falls.    Denies heart failure or cardiomyopathy.  Still has low back pain that worsens as he walks.    From my note note 3/23/18:  Unaccompanied, but I spoke with wife by phone.  " Here today for f/u results.  He has started the vitamin supplements.  No falls.    Interval history 12/19/17:  consultation at the request of  Dr. Monk for evaluation of gait disorder.  Had aortic valve replacement and since then, he has more trouble walking, a couple falls (last was a month ago).  Worse with eyes closed and in dark.  Has history of back problems with RFA in recent time, bilateral knee surgery.  Does not use cane or walker and resistent to using them.  Feet remain swollen.  No syncope or presyncope.    Has known aortic atherosclerosis and aortic valve replacement.  Bilateral carotid disease is on problem list, but I can't find study to confirm this.      II.  Review of systems:  As in HPI,  otherwise, balance 3 systems reviewed and are negative.    III.  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    PVD (peripheral vascular disease) 8/22/2018    Stenosis of aortic and mitral valves     aortic DAHLIA 1.1 CM     Family History   Problem Relation Age of Onset    No Known Problems Father      Social History     Socioeconomic History    Marital status:      Spouse name: Sade   Social Needs   Occupational History    Occupation: Retired, insurance company     Employer: retired 1992   Tobacco Use    Smoking status: Never Smoker    Smokeless tobacco: Never Used   Substance and Sexual Activity    Alcohol use: Yes     Comment: less than one  drink weekly         Current Outpatient Medications on File Prior to Visit   Medication Sig Dispense Refill    amitriptyline (ELAVIL) 25 MG tablet TAKE 1 TABLET(25 MG) BY MOUTH EVERY EVENING 90 tablet 0    amLODIPine (NORVASC) 5 MG tablet TAKE 1 TABLET(5 MG) BY MOUTH EVERY DAY 30 tablet 6    aspirin 81 MG Chew Take 81 mg by  "mouth once daily.        finasteride (PROSCAR) 5 mg tablet TAKE 1 TABLET(5 MG) BY MOUTH EVERY DAY 90 tablet 0    gabapentin (NEURONTIN) 300 MG capsule TAKE 1 CAPSULE(300 MG) BY MOUTH EVERY EVENING 90 capsule 3    omeprazole (PRILOSEC) 20 MG capsule TAKE 1 CAPSULE(20 MG) BY MOUTH EVERY DAY 30 capsule 0    rivastigmine tartrate (EXELON) 3 MG capsule TAKE 1 CAPSULE(3 MG) BY MOUTH TWICE DAILY 60 capsule 11    tamsulosin (FLOMAX) 0.4 mg Cap TAKE ONE CAPSULE BY MOUTH DAILY 90 capsule 3    thiamine 100 MG tablet Take 100 mg by mouth once daily.      torsemide (DEMADEX) 20 MG Tab Take 1 tablet (20 mg total) by mouth once daily. 30 tablet 11    [DISCONTINUED] apixaban (ELIQUIS) 5 mg Tab Take 1 tablet (5 mg total) by mouth 2 (two) times daily. (Patient not taking: Reported on 1/22/2020) 60 tablet 1     No current facility-administered medications on file prior to visit.        PRIOR problem-specific medications tried:  na    Review of patient's allergies indicates:  No Known Allergies    IV. Physical Exam    Vitals:    01/28/20 1025   BP: 114/65   Pulse: 81   Height: 5' 8" (1.727 m)     General appearance: Well nourished, well developed, no acute distress.             -------------------------------------------------------------  Facial Expression: normal       Affect: full       Orientation to time & place:  Oriented to 2010, place, person and situation       Attention & concentration:  Normal attention span and concentration       Memory:  0/5  Language: Spontaneous, fluent; able to repeat and name objects        Fund of knowledge:  Aware of current events        Speech:  normal (not dysarthric)  -------------------------------------------------------  Cranial nerves: wears glasses, visual fields full, optic discs not visualized, pupils equal round and reactive, extraocular movements intact,       facial sensation intact, face symmetrical, hearing poor, palate raises midline, shoulder shrug strength normal, tongue " protrudes midline.        -------------------------------------------------------  Musculoskeletal  Muscle tone: 1: Slight cogwheel Rigidity only detected with activation maneuver. RUE        Muscle Bulk: all 4 extremities normal        Muscle strength:  5/5 in all 4 extremities        No pronator drift  Sensation: loss in vibration up to knees       Deep tendon Reflexes: 1 bilateral biceps, triceps, none at patella and ankles        --------------------------------------------------------------  Cerebellar and Coordination  Gait:  low step height, mild imbalance, + enbloc turn, no festination.       Finger-nose: no dysmetria       Rapid Alternating Movements (pronation/supination):  R slowing; L normal  --------------------------------------------------------------  MOVEMENT DISORDERS FOCUSED EXAM  Abnormality of movement (bradykinesia, hyperkinesia) present? No    Tremor present?   No   Posture:  stooped  Postural stability: + Rhomberg    V.  Laboratory/ Radiological Data:     EKG 5/3/19  Sinus bradycardia with Premature atrial complexes  Left axis deviation  Abnormal ECG  When compared with ECG of 31-AUG-2016 03:29,  QRS duration has increased  Nonspecific T wave abnormality has replaced inverted T waves in Lateral  Leads    Cisternogram 4/26/18:  No evidence of normal pressure hydrocephalus.    CT head 12/26/17:  Age-appropriate generalized cerebral volume loss  Mild prominence of the lateral and third ventricles which may be compensatory to volume loss however slightly disproportionate cannot exclude component of hydrocephalus.  There is a small hypodensity in the left cerebellum suggestive for remote infarction.      Lab Results   Component Value Date    TSH 0.993 12/03/2019     Lab Results   Component Value Date    ZFOFFAXI49 355 12/19/2017     Lab Results   Component Value Date    HGBA1C 5.7 04/10/2014        MRI brain 12/26/17:  Mild prominence of the lateral and third ventricles which may be compensatory  to volume loss however slightly disproportionate cannot exclude component of hydrocephalus.    There is a small hypodensity in the left cerebellum suggestive for remote infarction.        VI. Assessment and Plan            Problem List Items Addressed This Visit        1 - High    Gait disorder - Primary    Overview     Mild imbalance, frontal gait ataxia vs lower body parkinsonism.  NPH ruled out 4/2018         Current Assessment & Plan     No recent falls, but moderate fall risk.  I am more comfortable with him being on plavix than eliquis, but defer this end decision to his cardiologist.  Currently, I do not know what he is actually taking.            2     New onset atrial fibrillation    Current Assessment & Plan     As per inpt cardiology, no need for added anticoagulation (was on plavix).  This was (perhaps erroneously?) replaced with eliquis while inpt, but seems patient may not actually be taking it and may still be taking his plavix.         Relevant Medications    clopidogrel (PLAVIX) 75 mg tablet       3     Dementia    Overview     Mini-Cog score 2 (2/22/17)  [place] Cognitive Assessment:   20/30 (12/2017)                   Follow up in about 4 months (around 5/28/2020).

## 2020-01-28 NOTE — ASSESSMENT & PLAN NOTE
No recent falls, but moderate fall risk.  I am more comfortable with him being on plavix than eliquis, but defer this end decision to his cardiologist.  Currently, I do not know what he is actually taking.

## 2020-01-28 NOTE — ASSESSMENT & PLAN NOTE
As per inpt cardiology, no need for added anticoagulation (was on plavix).  This was (perhaps erroneously?) replaced with eliquis while inpt, but seems patient may not actually be taking it and may still be taking his plavix.

## 2020-01-28 NOTE — TELEPHONE ENCOUNTER
----- Message from Chasity Ramirez LMSW sent at 1/28/2020 12:19 PM CST -----  This LMSW received a referral on the above patient.  This LMSW contacted patient regarding referral.  Patient stated there were no needs at this time.    Thank you for the referral,  Chasity Ramirez LMSW

## 2020-01-28 NOTE — PROGRESS NOTES
Outpatient Care Management   - Low Risk Patient Assessment    Patient: Randall Strickland Jr.  MRN:  [id number]  Date of Service:  1/28/2020  Completed by:  Chasity Ramirez LMSW  Referral Date: 01/22/2020  Program:      Reason for Visit   Patient presents with    Social Work Assessment - Low/Mod Risk    PHQ-9    Case Closure       Patient Summary     OPCM Social Work Assessment (Low/Moderate Risk)    General  Have you had to make a decision between paying for any of the following in the last 2 months?:  None  Transportation means:  Self  Employment status:  Retired and not working  Do you have any of the following?:  Medical power of   Current symptoms:  Memory problems  Assessments  Was the PHQ Depression Screening completed this visit?:  Yes  Was the YUE-7 Screening completed this visit?:  No       Patient spouse answered the phone. LMSW ask to speak with patient or caregiver. Spouse provided patient the phone to complete assessment.  This LMSW completed assessment with patient. Patient resides with spouse. Patient denies information on Emergency Alert System. Patient denied using any assisted devices to ambulate. Patient denied any falls in the last month. Patient reports can complete ADL's without assistance. Patient denied needing assistance with food, shelter, medication or medical. Patient reports he drives himself to and from appointments. Patient denied he has an MPOA or POA on file. Patient denied infromation on Advance Care Planning. Patient reports experiencing memory problems but states not significant. LMSW completed PHQ screening with patient. Patient scored a 0. Patient denied SI or HI. Patient reports no needs currently. LMSW will close case.    Complex Care Plan     Care plan was discussed and completed today with input from patient and/or caregiver.    Goals    None         Patient Instructions     No follow-ups on file.    Todays OPCM Self-Management Care Plan was developed with  the patients/caregivers input and was based on identified barriers from todays assessment.  Goals were written today with the patient/caregiver and the patient has agreed to work towards these goals to improve his/her overall well-being. Patient verbalized understanding of the care plan, goals, and all of today's instructions. Encouraged patient/caregiver to communicate with his/her physician and health care team about health conditions and the treatment plan.  Provided my contact information today and encouraged patient/caregiver to call me with any questions as needed.

## 2020-01-28 NOTE — LETTER
January 28, 2020      Tiago Borja MD  [address]  [facility] [address]           [facility]  [address]  [facility] [address]  Phone: [phone]  Fax: [phone]          Patient: Randall Strickland Jr.   MR Number: [id number]   YOB: 1927   Date of Visit: 1/28/2020       Dear Dr. Tiago Borja:    Thank you for referring Randall Strickland to me for evaluation. Attached you will find relevant portions of my assessment and plan of care.    If you have questions, please do not hesitate to call me. I look forward to following Randall Strickland along with you.    Sincerely,    Swapna Delarosa MD    Enclosure  CC:  No Recipients    If you would like to receive this communication electronically, please contact [email or web address] or [phone] to request more information on [facility] access.    For providers and/or their staff who would like to refer a patient to [facility], please contact us through our one-stop-shop provider referral line, [facility], at [phone].    If you feel you have received this communication in error or would no longer like to receive these types of communications, please e-mail [email or web address]

## 2020-01-29 ENCOUNTER — HOSPITAL ENCOUNTER (OUTPATIENT)
Dept: RADIOLOGY | Facility: HOSPITAL | Age: 85
Discharge: HOME | End: 2020-01-29
Attending: FAMILY MEDICINE
Payer: MEDICARE

## 2020-01-29 DIAGNOSIS — R63.4 UNINTENTIONAL WEIGHT LOSS: ICD-10-CM

## 2020-01-29 PROCEDURE — 71260 CT CHEST ABDOMEN PELVIS WITH CONTRAST (XPD): ICD-10-PCS | Mod: 26,HCNC,, | Performed by: RADIOLOGY

## 2020-01-29 PROCEDURE — 74177 CT ABD & PELVIS W/CONTRAST: CPT | Mod: TC,HCNC

## 2020-01-29 PROCEDURE — 25500020 PHARM REV CODE 255: Mod: HCNC | Performed by: FAMILY MEDICINE

## 2020-01-29 PROCEDURE — 74177 CT ABD & PELVIS W/CONTRAST: CPT | Mod: 26,HCNC,, | Performed by: RADIOLOGY

## 2020-01-29 PROCEDURE — 74177 CT CHEST ABDOMEN PELVIS WITH CONTRAST (XPD): ICD-10-PCS | Mod: 26,HCNC,, | Performed by: RADIOLOGY

## 2020-01-29 PROCEDURE — 71260 CT THORAX DX C+: CPT | Mod: 26,HCNC,, | Performed by: RADIOLOGY

## 2020-01-29 RX ADMIN — IOHEXOL 15 ML: 350 INJECTION, SOLUTION INTRAVENOUS at 02:01

## 2020-01-29 RX ADMIN — IOHEXOL 75 ML: 350 INJECTION, SOLUTION INTRAVENOUS at 02:01

## 2020-01-29 NOTE — TELEPHONE ENCOUNTER
Patient was away.  I explained to wife  that he should be taking the Eliquis twice daily and to stop the aspirin and Plavix according to Dr. Monk advice.  She stated that she would tell him.  She did write the information down.  I will give him a follow-up call in a couple of days.

## 2020-01-29 NOTE — TELEPHONE ENCOUNTER
----- Message from Hieu Monk MD sent at 1/29/2020  6:55 AM CST -----  He was found to have new onset AF on his admit to [facility] 12/16 for pneumonia. He was seen by Dr Jorge and recommended eliquis due a CHADS-vac 5. He need not be on aspirin or plavix. His stroke risk is higher than a fatal fall and should be on eliquis 2.5 mg BID. I'll arrange an appointment Pat.  ----- Message -----  From: Tiago Borja MD  Sent: 1/28/2020   6:24 PM CST  To: Hieu Monk MD, MD Dr. Lacho Cruz, Thank you, looks like he is not taking the eliquis. He saw Dr. Delarosa today and her note indicates he is on Plavix and ASA. He sees you in a couple of weeks, please let me know if I need to do anything in the meantime. R/ Vinny Borja        ----- Message -----  From: Hieu Monk MD  Sent: 1/23/2020   6:55 AM CST  To: MD Vinny De Luna Stroke risk is 40 times higher that a fatal fall. Pat  ----- Message -----  From: Tiago Borja MD  Sent: 1/22/2020  10:36 AM CST  To: Hieu Monk MD    Patient admitted recently - pneumonia - apparantly started on eliquis for new A fib. He did not start for unknown reason. I asked him to go ahead. Consulting you both, I have a concern about anticoag given his hx of falls, but CVA risk if un treated. Thank you.

## 2020-01-30 ENCOUNTER — TELEPHONE (OUTPATIENT)
Dept: INTERNAL MEDICINE | Facility: CLINIC | Age: 85
End: 2020-01-30

## 2020-01-30 DIAGNOSIS — R91.1 LUNG NODULE: ICD-10-CM

## 2020-01-30 DIAGNOSIS — K63.9 LESION OF COLON: Primary | ICD-10-CM

## 2020-01-30 DIAGNOSIS — R91.8 PULMONARY NODULES: ICD-10-CM

## 2020-01-30 DIAGNOSIS — Z77.090 ASBESTOS EXPOSURE: ICD-10-CM

## 2020-01-30 LAB
ALP BONE CFR SERPL: ABNORMAL % (ref 28–66)
ALP INTEST CFR SERPL: ABNORMAL % (ref 1–24)
ALP LIVER CFR SERPL: ABNORMAL % (ref 25–69)
ALP PLAC CFR SERPL: ABNORMAL %
ALP SERPL-CCNC: 220 U/L (ref 40–115)

## 2020-01-30 NOTE — TELEPHONE ENCOUNTER
Called patient and wife together and discussed labs and or test results. Patient expressed understanding and had the opportunity to ask questions. Any questions were answered. See meds, orders, follow up and instructions sections of encounter.    Specific issues include:  Discussed abnormalites on CT - unspecified, but concerning area in colon and pulm nodules.

## 2020-01-31 ENCOUNTER — DOCUMENTATION ONLY (OUTPATIENT)
Dept: INTERNAL MEDICINE | Facility: CLINIC | Age: 85
End: 2020-01-31

## 2020-02-04 ENCOUNTER — OFFICE VISIT (OUTPATIENT)
Dept: UROLOGY | Facility: CLINIC | Age: 85
End: 2020-02-04
Attending: FAMILY MEDICINE
Payer: MEDICARE

## 2020-02-04 ENCOUNTER — LAB VISIT (OUTPATIENT)
Dept: LAB | Facility: HOSPITAL | Age: 85
End: 2020-02-04
Payer: MEDICARE

## 2020-02-04 VITALS — SYSTOLIC BLOOD PRESSURE: 105 MMHG | DIASTOLIC BLOOD PRESSURE: 69 MMHG | HEART RATE: 99 BPM

## 2020-02-04 DIAGNOSIS — N40.1 BPH WITH URINARY OBSTRUCTION: Primary | ICD-10-CM

## 2020-02-04 DIAGNOSIS — N40.1 BPH WITH URINARY OBSTRUCTION: ICD-10-CM

## 2020-02-04 DIAGNOSIS — N13.8 BPH WITH URINARY OBSTRUCTION: Primary | ICD-10-CM

## 2020-02-04 DIAGNOSIS — M54.16 LEFT LUMBAR RADICULITIS: ICD-10-CM

## 2020-02-04 DIAGNOSIS — M51.36 DDD (DEGENERATIVE DISC DISEASE), LUMBAR: ICD-10-CM

## 2020-02-04 DIAGNOSIS — N13.8 BPH WITH URINARY OBSTRUCTION: ICD-10-CM

## 2020-02-04 DIAGNOSIS — M70.62 GREATER TROCHANTERIC BURSITIS OF LEFT HIP: ICD-10-CM

## 2020-02-04 LAB — COMPLEXED PSA SERPL-MCNC: 0.25 NG/ML (ref 0–4)

## 2020-02-04 PROCEDURE — 36415 COLL VENOUS BLD VENIPUNCTURE: CPT | Mod: HCNC

## 2020-02-04 PROCEDURE — 1159F MED LIST DOCD IN RCRD: CPT | Mod: HCNC,S$GLB,, | Performed by: UROLOGY

## 2020-02-04 PROCEDURE — 1101F PR PT FALLS ASSESS DOC 0-1 FALLS W/OUT INJ PAST YR: ICD-10-PCS | Mod: HCNC,CPTII,S$GLB, | Performed by: UROLOGY

## 2020-02-04 PROCEDURE — 99999 PR PBB SHADOW E&M-EST. PATIENT-LVL III: ICD-10-PCS | Mod: PBBFAC,HCNC,, | Performed by: UROLOGY

## 2020-02-04 PROCEDURE — 1126F AMNT PAIN NOTED NONE PRSNT: CPT | Mod: HCNC,S$GLB,, | Performed by: UROLOGY

## 2020-02-04 PROCEDURE — 99214 OFFICE O/P EST MOD 30 MIN: CPT | Mod: HCNC,S$GLB,, | Performed by: UROLOGY

## 2020-02-04 PROCEDURE — 1126F PR PAIN SEVERITY QUANTIFIED, NO PAIN PRESENT: ICD-10-PCS | Mod: HCNC,S$GLB,, | Performed by: UROLOGY

## 2020-02-04 PROCEDURE — 84153 ASSAY OF PSA TOTAL: CPT | Mod: HCNC

## 2020-02-04 PROCEDURE — 99214 PR OFFICE/OUTPT VISIT, EST, LEVL IV, 30-39 MIN: ICD-10-PCS | Mod: HCNC,S$GLB,, | Performed by: UROLOGY

## 2020-02-04 PROCEDURE — 99999 PR PBB SHADOW E&M-EST. PATIENT-LVL III: CPT | Mod: PBBFAC,HCNC,, | Performed by: UROLOGY

## 2020-02-04 PROCEDURE — 1101F PT FALLS ASSESS-DOCD LE1/YR: CPT | Mod: HCNC,CPTII,S$GLB, | Performed by: UROLOGY

## 2020-02-04 PROCEDURE — 1159F PR MEDICATION LIST DOCUMENTED IN MEDICAL RECORD: ICD-10-PCS | Mod: HCNC,S$GLB,, | Performed by: UROLOGY

## 2020-02-04 RX ORDER — GABAPENTIN 300 MG/1
CAPSULE ORAL
Qty: 90 CAPSULE | Refills: 0 | Status: SHIPPED | OUTPATIENT
Start: 2020-02-04 | End: 2020-04-24

## 2020-02-04 NOTE — PROGRESS NOTES
Subjective:       Patient ID: Randall Strickland Jr. is a 89 y.o. male.    Chief Complaint: No chief complaint on file.    HPI   Randall Strickland Jr. is a 89 y.o. male with a PMHx of dementia, CHF, and HTN who presents to the clinic for BPH management c/o frequency issues. He reports being otherwise satisfied with his urinary habits. Pos weight loss    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    PVD (peripheral vascular disease) 8/22/2018    Stenosis of aortic and mitral valves     aortic DAHLIA 1.1 CM       Past Surgical History:   Procedure Laterality Date    APPENDECTOMY      cararact  car    CARDIAC CATHETERIZATION      CARDIAC VALVE SURGERY      CATARACT EXTRACTION BILATERAL W/ ANTERIOR VITRECTOMY      bilaterial    CATARACT EXTRACTION W/  INTRAOCULAR LENS IMPLANT Bilateral     CHOLECYSTECTOMY      EYE SURGERY      JOINT REPLACEMENT      bilateral hip    KNEE SURGERY      Bilateral    TOTAL HIP ARTHROPLASTY      Bilaterial       Family History   Problem Relation Age of Onset    No Known Problems Father     No Known Problems Mother     No Known Problems Sister     No Known Problems Brother     No Known Problems Maternal Aunt     No Known Problems Maternal Uncle     No Known Problems Paternal Aunt     No Known Problems Paternal Uncle     No Known Problems Maternal Grandmother     No Known Problems Maternal Grandfather     No Known Problems Paternal Grandmother     No Known Problems Paternal Grandfather     No Known Problems Daughter     No Known Problems Son     No Known Problems Daughter     No Known Problems Son     Amblyopia Neg Hx     Blindness Neg Hx     Cancer Neg Hx     Cataracts Neg Hx     Diabetes Neg Hx     Glaucoma Neg Hx     Hypertension Neg Hx      Macular degeneration Neg Hx     Retinal detachment Neg Hx     Strabismus Neg Hx     Stroke Neg Hx     Thyroid disease Neg Hx     Anemia Neg Hx     Arrhythmia Neg Hx     Asthma Neg Hx     Clotting disorder Neg Hx     Fainting Neg Hx     Heart attack Neg Hx     Heart disease Neg Hx     Heart failure Neg Hx     Hyperlipidemia Neg Hx     Atrial Septal Defect Neg Hx        Social History     Socioeconomic History    Marital status:      Spouse name: Sade    Number of children: Not on file    Years of education: Not on file    Highest education level: Not on file   Occupational History    Occupation: Retired     Employer: retired 1992   Social Needs    Financial resource strain: Not on file    Food insecurity:     Worry: Not on file     Inability: Not on file    Transportation needs:     Medical: Not on file     Non-medical: Not on file   Tobacco Use    Smoking status: Never Smoker    Smokeless tobacco: Never Used   Substance and Sexual Activity    Alcohol use: Yes     Comment: less than one  drink weekly    Drug use: No    Sexual activity: Not on file   Lifestyle    Physical activity:     Days per week: Not on file     Minutes per session: Not on file    Stress: Not on file   Relationships    Social connections:     Talks on phone: Not on file     Gets together: Not on file     Attends Sikhism service: Not on file     Active member of club or organization: Not on file     Attends meetings of clubs or organizations: Not on file     Relationship status: Not on file   Other Topics Concern    Not on file   Social History Narrative    Not on file       Allergies:  Patient has no known allergies.    Medications:    Current Outpatient Medications:     amitriptyline (ELAVIL) 25 MG tablet, TAKE 1 TABLET(25 MG) BY MOUTH EVERY EVENING, Disp: 90 tablet, Rfl: 0    amLODIPine (NORVASC) 5 MG tablet, TAKE 1 TABLET(5 MG) BY MOUTH EVERY DAY, Disp: 30 tablet, Rfl: 6    aspirin 81 MG Chew,  Take 81 mg by mouth once daily.  , Disp: , Rfl:     clopidogrel (PLAVIX) 75 mg tablet, Take 1 tablet (75 mg total) by mouth once daily., Disp: 30 tablet, Rfl: 11    finasteride (PROSCAR) 5 mg tablet, TAKE 1 TABLET(5 MG) BY MOUTH EVERY DAY, Disp: 90 tablet, Rfl: 0    gabapentin (NEURONTIN) 300 MG capsule, TAKE 1 CAPSULE(300 MG) BY MOUTH EVERY EVENING, Disp: 90 capsule, Rfl: 3    omeprazole (PRILOSEC) 20 MG capsule, TAKE 1 CAPSULE(20 MG) BY MOUTH EVERY DAY, Disp: 30 capsule, Rfl: 0    rivastigmine tartrate (EXELON) 3 MG capsule, TAKE 1 CAPSULE(3 MG) BY MOUTH TWICE DAILY, Disp: 60 capsule, Rfl: 11    tamsulosin (FLOMAX) 0.4 mg Cap, TAKE ONE CAPSULE BY MOUTH DAILY, Disp: 90 capsule, Rfl: 3    thiamine 100 MG tablet, Take 100 mg by mouth once daily., Disp: , Rfl:     torsemide (DEMADEX) 20 MG Tab, Take 1 tablet (20 mg total) by mouth once daily., Disp: 30 tablet, Rfl: 11    Review of Systems   Constitutional: Negative for activity change, appetite change, chills, diaphoresis, fatigue, fever and unexpected weight change.   HENT: Negative for congestion, dental problem, hearing loss, mouth sores, postnasal drip, rhinorrhea, sinus pressure and trouble swallowing.    Eyes: Negative for pain, discharge and itching.   Respiratory: Negative for apnea, cough, choking, chest tightness, shortness of breath and wheezing.    Cardiovascular: Negative for chest pain, palpitations and leg swelling.   Gastrointestinal: Negative for abdominal distention, abdominal pain, anal bleeding, blood in stool, constipation and diarrhea.   Endocrine: Negative for polydipsia and polyuria.   Genitourinary: Positive for frequency. Negative for decreased urine volume, difficulty urinating, discharge, dysuria, enuresis, flank pain, genital sores, hematuria, penile pain, penile swelling, scrotal swelling and urgency.   Musculoskeletal: Negative for arthralgias and back pain.   Skin: Negative for color change, rash and wound.   Neurological:  Negative for dizziness, syncope, speech difficulty, light-headedness and headaches.   Hematological: Negative for adenopathy. Does not bruise/bleed easily.   Psychiatric/Behavioral: Negative for behavioral problems and confusion.       Objective:      Physical Exam   Vitals reviewed.  Constitutional: He appears well-developed.   HENT:   Head: Normocephalic.   Cardiovascular: Normal rate.    Pulmonary/Chest: Effort normal.   Abdominal: Soft.   Genitourinary:   Genitourinary Comments: Prostate had a soft, right-sided nodule. No rectal masses.  Grams. 30 grams. Normal perineum.     Neurological: He is alert.   Skin: Skin is warm.     Psychiatric: He has a normal mood and affect.       Assessment:       1. BPH with urinary obstruction        Plan:       Diagnoses and all orders for this visit:    BPH with urinary obstruction  -     Prostate Specific Antigen, Diagnostic; Future    await PSA      Schedule PSA.    ITitus, am acting as a scribe on this patient encounter in the presence and under the supervision of DR. Srinivasan.      02/04/2020 10:33 AM  I, Dr. Srinivasan, personally performed the services described in this documentation.   All medical record entries made by the scribe were at my direction and in my presence.   I have reviewed the chart and agree that the record is accurate and complete.   Kev Srinivasan MD.  10:33 AM 02/04/2020

## 2020-02-04 NOTE — PROGRESS NOTES
Refill Routing Note     Medication(s) are appropriate for refill:    Medication Outside of Protocol    Appointments  past 15m or future 3m with PCP    Date Provider   Last Visit   1/22/2020 Tiago Borja MD   Next Visit   3/4/2020 Tiago Borja MD       Automatic Epic Protocol Generated Data:    Requested Prescriptions   Pending Prescriptions Disp Refills    gabapentin (NEURONTIN) 300 MG capsule [Pharmacy Med Name: GABAPENTIN 300MG CAPSULES] 90 capsule 3     Sig: TAKE 1 CAPSULE(300 MG) BY MOUTH EVERY EVENING       Anticonvulsants Protocol Passed - 2/4/2020  5:28 AM        Passed - Visit with Authorizing provider in past 9 months or upcoming 90 days           Note created:3:17 PM 02/04/2020

## 2020-02-04 NOTE — LETTER
February 4, 2020      Tiago Borja MD  [address]  [facility] [address]           [facility] - Urology 4th Floor  [address]  [facility] [address]  Phone: [phone]          Patient: Randall Strickland Jr.   MR Number: [id number]   YOB: 1927   Date of Visit: 2/4/2020       Dear Dr. Tiago Borja:    Thank you for referring Randall Strickland to me for evaluation. Attached you will find relevant portions of my assessment and plan of care.    If you have questions, please do not hesitate to call me. I look forward to following Randall Strickland along with you.    Sincerely,    Kev Srinivasan Jr., MD    Enclosure  CC:  No Recipients    If you would like to receive this communication electronically, please contact [email or web address] or [phone] to request more information on [employer] Link access.    For providers and/or their staff who would like to refer a patient to [facility], please contact us through our one-stop-shop provider referral line, [facility], at [phone].    If you feel you have received this communication in error or would no longer like to receive these types of communications, please e-mail [email or web address]

## 2020-02-13 ENCOUNTER — OFFICE VISIT (OUTPATIENT)
Dept: CARDIOLOGY | Facility: CLINIC | Age: 85
End: 2020-02-13
Attending: FAMILY MEDICINE
Payer: MEDICARE

## 2020-02-13 ENCOUNTER — OFFICE VISIT (OUTPATIENT)
Dept: URGENT CARE | Facility: CLINIC | Age: 85
End: 2020-02-13
Payer: MEDICARE

## 2020-02-13 ENCOUNTER — TELEPHONE (OUTPATIENT)
Dept: URGENT CARE | Facility: CLINIC | Age: 85
End: 2020-02-13

## 2020-02-13 ENCOUNTER — HOSPITAL ENCOUNTER (OUTPATIENT)
Dept: RADIOLOGY | Facility: OTHER | Age: 85
Discharge: HOME | End: 2020-02-13
Attending: NURSE PRACTITIONER
Payer: MEDICARE

## 2020-02-13 VITALS
HEIGHT: 68 IN | OXYGEN SATURATION: 97 % | WEIGHT: 155.63 LBS | SYSTOLIC BLOOD PRESSURE: 122 MMHG | DIASTOLIC BLOOD PRESSURE: 64 MMHG | BODY MASS INDEX: 23.59 KG/M2 | HEART RATE: 85 BPM

## 2020-02-13 VITALS
HEART RATE: 89 BPM | OXYGEN SATURATION: 100 % | WEIGHT: 155 LBS | RESPIRATION RATE: 18 BRPM | SYSTOLIC BLOOD PRESSURE: 118 MMHG | HEIGHT: 68 IN | BODY MASS INDEX: 23.49 KG/M2 | TEMPERATURE: 98 F | DIASTOLIC BLOOD PRESSURE: 76 MMHG

## 2020-02-13 DIAGNOSIS — I48.91 ATRIAL FIBRILLATION, UNSPECIFIED TYPE: ICD-10-CM

## 2020-02-13 DIAGNOSIS — S09.90XA INJURY OF HEAD, INITIAL ENCOUNTER: ICD-10-CM

## 2020-02-13 DIAGNOSIS — S01.111A LACERATION OF RIGHT EYEBROW, INITIAL ENCOUNTER: ICD-10-CM

## 2020-02-13 DIAGNOSIS — I10 HYPERTENSION, ESSENTIAL: ICD-10-CM

## 2020-02-13 DIAGNOSIS — N18.30 CHRONIC KIDNEY DISEASE, STAGE III (MODERATE): ICD-10-CM

## 2020-02-13 DIAGNOSIS — J18.9 MULTIFOCAL PNEUMONIA: ICD-10-CM

## 2020-02-13 DIAGNOSIS — S01.111A LACERATION OF RIGHT EYEBROW, INITIAL ENCOUNTER: Primary | ICD-10-CM

## 2020-02-13 DIAGNOSIS — I48.91 ATRIAL FIBRILLATION, UNSPECIFIED TYPE: Primary | ICD-10-CM

## 2020-02-13 DIAGNOSIS — F03.90 DEMENTIA WITHOUT BEHAVIORAL DISTURBANCE, UNSPECIFIED DEMENTIA TYPE: ICD-10-CM

## 2020-02-13 DIAGNOSIS — I48.0 PAROXYSMAL ATRIAL FIBRILLATION: ICD-10-CM

## 2020-02-13 DIAGNOSIS — Z79.01 LONG TERM CURRENT USE OF ANTICOAGULANT: ICD-10-CM

## 2020-02-13 DIAGNOSIS — Z95.2 S/P AORTIC VALVE REPLACEMENT: ICD-10-CM

## 2020-02-13 DIAGNOSIS — I35.0 NONRHEUMATIC AORTIC VALVE STENOSIS: Primary | ICD-10-CM

## 2020-02-13 PROCEDURE — 99214 OFFICE O/P EST MOD 30 MIN: CPT | Mod: HCNC,S$GLB,, | Performed by: INTERNAL MEDICINE

## 2020-02-13 PROCEDURE — 70450 CT HEAD/BRAIN W/O DYE: CPT | Mod: TC,HCNC

## 2020-02-13 PROCEDURE — 93000 ELECTROCARDIOGRAM COMPLETE: CPT | Mod: S$GLB,,, | Performed by: INTERNAL MEDICINE

## 2020-02-13 PROCEDURE — 99999 PR PBB SHADOW E&M-EST. PATIENT-LVL III: CPT | Mod: PBBFAC,HCNC,, | Performed by: INTERNAL MEDICINE

## 2020-02-13 PROCEDURE — 99214 PR OFFICE/OUTPT VISIT, EST, LEVL IV, 30-39 MIN: ICD-10-PCS | Mod: HCNC,S$GLB,, | Performed by: INTERNAL MEDICINE

## 2020-02-13 PROCEDURE — 99999 PR PBB SHADOW E&M-EST. PATIENT-LVL III: ICD-10-PCS | Mod: PBBFAC,HCNC,, | Performed by: INTERNAL MEDICINE

## 2020-02-13 PROCEDURE — 1159F MED LIST DOCD IN RCRD: CPT | Mod: HCNC,S$GLB,, | Performed by: INTERNAL MEDICINE

## 2020-02-13 PROCEDURE — 99214 PR OFFICE/OUTPT VISIT, EST, LEVL IV, 30-39 MIN: ICD-10-PCS | Mod: S$GLB,,, | Performed by: NURSE PRACTITIONER

## 2020-02-13 PROCEDURE — 1101F PT FALLS ASSESS-DOCD LE1/YR: CPT | Mod: HCNC,CPTII,S$GLB, | Performed by: INTERNAL MEDICINE

## 2020-02-13 PROCEDURE — 1101F PR PT FALLS ASSESS DOC 0-1 FALLS W/OUT INJ PAST YR: ICD-10-PCS | Mod: HCNC,CPTII,S$GLB, | Performed by: INTERNAL MEDICINE

## 2020-02-13 PROCEDURE — 99214 OFFICE O/P EST MOD 30 MIN: CPT | Mod: S$GLB,,, | Performed by: NURSE PRACTITIONER

## 2020-02-13 PROCEDURE — 70450 CT HEAD WITHOUT CONTRAST: ICD-10-PCS | Mod: 26,HCNC,, | Performed by: RADIOLOGY

## 2020-02-13 PROCEDURE — 1159F PR MEDICATION LIST DOCUMENTED IN MEDICAL RECORD: ICD-10-PCS | Mod: HCNC,S$GLB,, | Performed by: INTERNAL MEDICINE

## 2020-02-13 PROCEDURE — 93000 EKG 12-LEAD: ICD-10-PCS | Mod: S$GLB,,, | Performed by: INTERNAL MEDICINE

## 2020-02-13 PROCEDURE — 70450 CT HEAD/BRAIN W/O DYE: CPT | Mod: 26,HCNC,, | Performed by: RADIOLOGY

## 2020-02-13 RX ORDER — TOLTERODINE 4 MG/1
CAPSULE, EXTENDED RELEASE ORAL
COMMUNITY
Start: 2020-02-04 | End: 2020-05-20 | Stop reason: SDUPTHER

## 2020-02-13 RX ORDER — MUPIROCIN 20 MG/G
OINTMENT TOPICAL
Qty: 22 G | Refills: 1 | Status: SHIPPED | OUTPATIENT
Start: 2020-02-13 | End: 2020-10-19 | Stop reason: ALTCHOICE

## 2020-02-13 NOTE — TELEPHONE ENCOUNTER
I spoke with the patient's wife, he does not have a cell phone and is not yet home.  CT scan negative.  All questions answered.  He will call when he gets home if he has any questions.

## 2020-02-13 NOTE — LETTER
February 13, 2020      Tiago Borja MD  [address]  [facility] [address]           [facility] - Cardiology  [address]  [facility] [address]  Phone: [phone]  Fax: [phone]          Patient: Randall Strickland Jr.   MR Number: [id number]   YOB: 1927   Date of Visit: 2/13/2020       Dear Dr. Tiago Borja:    Thank you for referring Randall Strickland to me for evaluation. Attached you will find relevant portions of my assessment and plan of care.    If you have questions, please do not hesitate to call me. I look forward to following Randall Strickland along with you.    Sincerely,    Hieu Monk MD    Enclosure  CC:  No Recipients    If you would like to receive this communication electronically, please contact [email or web address] or [phone] to request more information on [employer] Link access.    For providers and/or their staff who would like to refer a patient to [facility], please contact us through our one-stop-shop provider referral line, [facility], at [phone].    If you feel you have received this communication in error or would no longer like to receive these types of communications, please e-mail [email or web address]

## 2020-02-13 NOTE — PATIENT INSTRUCTIONS
As discussed... Go to [facility] for CT scan of head.  I will call you with results.  If abnormal, they will have you check into the ER.  See address on cover sheet.    Wash the area gently with soap and water.  Place ointment once daily to prevent infection.  Keep covered with a bandaid.  Tylenol as needed for headache or pain.  Follow up with your PCP for recheck.  Go to the ER immediately if you begin having any vomiting or worsening symptoms.  Head Injury (Adult)    You have a head injury. It does not appear serious at this time. But symptoms of a more serious problem, such as a mild brain injury (concussion) or bruising or bleeding in the brain, may appear later. For this reason, you or someone caring for you will need to watch for the symptoms listed below. Once youre home, also be sure to follow any care instructions youre given.  Home care  Watch for the following symptoms  Seek emergency medical care if you have any of these symptoms over the next hours to days:   · Headache  · Nausea or vomiting  · Dizziness  · Sensitivity to light or noise  · Unusual sleepiness or grogginess  · Trouble falling asleep  · Personality changes  · Vision changes  · Memory loss  · Confusion  · Trouble walking or clumsiness  · Loss of consciousness (even for a short time)  · Inability to be awakened  · Stiff neck  · Weakness or numbness in any part of the body  · Seizures  General care  · If you were prescribed medicines for pain, use them as directed. Note: Dont take other medicines for pain without talking to your provider first.  · To help reduce swelling and pain, apply a cold source to the injured area for up to 20 minutes at a time. Do this as often as directed. Use a cold pack or bag of ice wrapped in a thin towel. Never apply a cold source directly to the skin.  · If you have cuts or scrapes as a result of your head injury, care for them as directed.  · For the next 24 hours (or longer, if instructed):  ¨ Dont  drink alcohol or use sedatives or other medicines that make you sleepy.  ¨ Dont drive or operate machinery.  ¨ Dont do anything strenuous, such as heavy lifting or straining.  ¨ Limit tasks that require concentration. This includes reading, using a smartphone or computer, watching TV, and playing video games.  ¨ Dont return to sports or other activities that could result in another head injury.  Follow-up care  Follow up with your healthcare provider, or as directed. If imaging tests were done, they will be reviewed by a doctor. You will be told the results and any new findings that may affect your care.  When to seek medical advice  Call your healthcare provider right away if any of these occur:  · Pain doesnt get better or worsens  · New or increased swelling or bruising  · Fever of 100.4°F (38°C) or higher, or as directed by your provider  · Increased redness, warmth, drainage, or bleeding from the injured area  · Fluid drainage or bleeding from the nose or ears  · Any depression or bony abnormality in the injured area  Date Last Reviewed: 9/26/2015 © 2000-2017 [employer]. [address] [place] [address], [place] [address]. All rights reserved. This information is not intended as a substitute for professional medical care. Always follow your healthcare professional's instructions.

## 2020-02-13 NOTE — PROGRESS NOTES
Subjective:    Patient ID:  Randall Strickland Jr. is a 89 y.o. male who presents for follow up of post TAVAR, atrial fibrillation    HPI   The patient is a 89 year old male post TAVAR 8/30/16 was admitted to [facility] with pneumonia 12/16/19 and during that stay was noted to have new onset of atrial fibrillation. His BNPs were not elevated on that admit. He has had a 6 # weight loss but has no edema or increased SOB. He had a mechanical fall 2 days ago and sustained a 1 inch laceration over his right eye brow. Will need sutures in that location. EKG today is sinus with 1st degree AV block unchanged from May 2019. He was discharged on eliquis but not on his med list,    Chest CT:  3. Multifocal subsegmental irregular opacities of both lungs increased in conspicuity compared to prior exams and most concerning for an infectious or non infectious inflammatory process.  Recommend follow-up noncontrast chest CT in 3 months time (May 2020)  .    Electronically signed by resident: Mo Diaz  Date: 01/29/2020    \\Echocardiography Findings 12/17/19    Left Ventricle Normal ejection fraction at 68%. Normal left ventricular cavity size. Concentric remodeling observed. Diastolic pattern consistent with atrial fibrillation observed. No wall motion abnormalities.   Right Ventricle Normal cavity size, wall thickness and systolic function. Wall motion normal.   Left Atrium There is mild left atrial enlargement. Left atrial volume index is 40.2 mL/m2.   Right Atrium There is mild right atrial enlargement.   Aortic Valve There is a transcutaneously-placed bioprosthesis present. There is no aortic insufficiency present. The prosthetic valve is normal.   Mitral Valve The mitral valve appears structurally normal. There is normal leaflet mobility. There is mild mitral annular calcification.   Tricuspid Valve The tricuspid valve appears structurally normal. There is normal leaflet mobility.   Pericardium       Lab Results   Component  Value Date     12/18/2019    K 3.4 (L) 12/18/2019     12/18/2019    CO2 27 12/18/2019    BUN 22 12/18/2019    CREATININE 1.2 01/22/2020    GLU 97 12/18/2019    HGBA1C 5.7 04/10/2014    MG 1.9 12/17/2019    AST 40 12/17/2019    ALT 21 12/17/2019    ALBUMIN 2.2 (L) 12/17/2019    PROT 6.7 12/17/2019    BILITOT 0.9 12/17/2019    WBC 11.18 12/18/2019    HGB 10.6 (L) 12/18/2019    HCT 33.2 (L) 12/18/2019    HCT 32 (L) 12/16/2019    MCV 97 12/18/2019     12/18/2019    INR 1.0 08/29/2016    PSA 0.6 03/07/2005    TSH 0.993 12/03/2019         Lab Results   Component Value Date    CHOL 135 05/30/2018    HDL 37 (L) 05/30/2018    TRIG 83 05/30/2018       Lab Results   Component Value Date    LDLCALC 81.4 05/30/2018       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    PVD (peripheral vascular disease) 8/22/2018    Stenosis of aortic and mitral valves     aortic DAHLIA 1.1 CM       Current Outpatient Medications:     amitriptyline (ELAVIL) 25 MG tablet, TAKE 1 TABLET(25 MG) BY MOUTH EVERY EVENING, Disp: 90 tablet, Rfl: 0    amLODIPine (NORVASC) 5 MG tablet, TAKE 1 TABLET(5 MG) BY MOUTH EVERY DAY, Disp: 30 tablet, Rfl: 6    clopidogrel (PLAVIX) 75 mg tablet, Take 1 tablet (75 mg total) by mouth once daily., Disp: 30 tablet, Rfl: 11    finasteride (PROSCAR) 5 mg tablet, TAKE 1 TABLET(5 MG) BY MOUTH EVERY DAY, Disp: 90 tablet, Rfl: 0    gabapentin (NEURONTIN) 300 MG capsule, TAKE 1 CAPSULE(300 MG) BY MOUTH EVERY EVENING, Disp: 90 capsule, Rfl: 0    omeprazole (PRILOSEC) 20 MG capsule, TAKE 1 CAPSULE(20 MG) BY MOUTH EVERY DAY, Disp: 30 capsule, Rfl: 0    rivastigmine tartrate (EXELON) 3 MG capsule, TAKE 1 CAPSULE(3 MG) BY MOUTH TWICE DAILY, Disp: 60 capsule, Rfl: 11    tamsulosin  (FLOMAX) 0.4 mg Cap, TAKE ONE CAPSULE BY MOUTH DAILY, Disp: 90 capsule, Rfl: 3    thiamine 100 MG tablet, Take 100 mg by mouth once daily., Disp: , Rfl:     tolterodine (DETROL LA) 4 MG 24 hr capsule, , Disp: , Rfl:     torsemide (DEMADEX) 20 MG Tab, Take 1 tablet (20 mg total) by mouth once daily., Disp: 30 tablet, Rfl: 11    apixaban (ELIQUIS) 2.5 mg Tab, Take 1 tablet (2.5 mg total) by mouth 2 (two) times daily., Disp: 60 tablet, Rfl: 11          Review of Systems   Constitution: Positive for weight loss. Negative for decreased appetite, diaphoresis, fever, malaise/fatigue and weight gain.   HENT: Negative for congestion, ear discharge, ear pain and nosebleeds.    Eyes: Negative for blurred vision, double vision and visual disturbance.   Cardiovascular: Negative for chest pain, claudication, cyanosis, dyspnea on exertion, irregular heartbeat, leg swelling, near-syncope, orthopnea, palpitations, paroxysmal nocturnal dyspnea and syncope.   Respiratory: Negative for cough, hemoptysis, shortness of breath, sleep disturbances due to breathing, snoring, sputum production and wheezing.    Endocrine: Negative for polydipsia, polyphagia and polyuria.   Hematologic/Lymphatic: Negative for adenopathy and bleeding problem. Does not bruise/bleed easily.   Skin: Negative for color change, nail changes, poor wound healing and rash.   Musculoskeletal: Negative for muscle cramps and muscle weakness.   Gastrointestinal: Negative for abdominal pain, anorexia, change in bowel habit, hematochezia, nausea and vomiting.   Genitourinary: Negative for dysuria, frequency and hematuria.   Neurological: Negative for brief paralysis, difficulty with concentration, excessive daytime sleepiness, dizziness, focal weakness, headaches, light-headedness, seizures, vertigo and weakness.   Psychiatric/Behavioral: Negative for altered mental status and depression.   Allergic/Immunologic: Negative for persistent infections.        Objective:BP  "122/64 (BP Location: Left arm, Patient Position: Lying, BP Method: Medium (Manual))   Pulse 85   Ht 5' 8" (1.727 m)   Wt 70.6 kg (155 lb 10.3 oz)   SpO2 97%   BMI 23.67 kg/m²           Physical Exam   Constitutional: He is oriented to person, place, and time. He appears well-developed and well-nourished.   HENT:   Head: Normocephalic.       Right Ear: External ear normal.   Left Ear: External ear normal.   Nose: Nose normal.   Inspection of lips, teeth and gums normal   Eyes: Pupils are equal, round, and reactive to light. EOM are normal. No scleral icterus.   Neck: Normal range of motion. Neck supple. No JVD present. No tracheal deviation present. No thyromegaly present.   Cardiovascular: Normal rate, regular rhythm and intact distal pulses. Exam reveals no gallop and no friction rub.   No murmur heard.  JVD normal   Pulmonary/Chest: Effort normal and breath sounds normal.           Abdominal: Bowel sounds are normal. He exhibits no distension. There is no hepatosplenomegaly. There is no tenderness. There is no guarding.   Musculoskeletal: Normal range of motion. He exhibits no edema or tenderness.   Lymphadenopathy:   Palpation of neck and groin lymph nodes normal   Neurological: He is alert and oriented to person, place, and time. No cranial nerve deficit. He exhibits normal muscle tone. Coordination normal.   Skin: Skin is dry.   Palpation of skin normal   Psychiatric: His behavior is normal. Judgment and thought content normal.         Assessment:       1. Nonrheumatic aortic valve stenosis    2. S/P aortic valve replacement    3. Chronic kidney disease, stage III (moderate)    4. Hypertension, essential    5. Multifocal pneumonia    6. Dementia without behavioral disturbance, unspecified dementia type    7. Paroxysmal atrial fibrillation    8. Long term current use of anticoagulant         Plan:       Randall was seen today for congestive heart failure.    Diagnoses and all orders for this " visit:    Nonrheumatic aortic valve stenosis    S/P aortic valve replacement    Chronic kidney disease, stage III (moderate)  -     Basic metabolic panel; Future; Expected date: 02/13/2020    Hypertension, essential    Multifocal pneumonia  -     CT Chest Without Contrast; Future; Expected date: 02/13/2020    Dementia without behavioral disturbance, unspecified dementia type    Paroxysmal atrial fibrillation  -     apixaban (ELIQUIS) 2.5 mg Tab; Take 1 tablet (2.5 mg total) by mouth 2 (two) times daily.    Long term current use of anticoagulant  -     CBC Without Differential; Future; Expected date: 02/13/2020    Other orders  -     tolterodine (DETROL LA) 4 MG 24 hr capsule

## 2020-02-13 NOTE — PROGRESS NOTES
"Subjective:       Patient ID: Randall Strickland Jr. is a 89 y.o. male.    Vitals:  height is 5' 8" (1.727 m) and weight is 70.3 kg (155 lb). His temperature is 98 °F (36.7 °C). His blood pressure is 118/76 and his pulse is 89. His respiration is 18 and oxygen saturation is 100%.     Chief Complaint: Head Injury and Laceration (LT EYEBROW 2 DAYS AGO )    Patient presents with c/o laceration to right eyebrow for a fall that occurred 2-3 days ago.  Originally stated 2 but then said, "actually it was probably 3."  He is here alone.  He is on Eliquis.  He states he was in his bedroom at the time and fell, unsure of how he fell.  Denies LOC.  Denies N/V, blurred vision, or headaches.  He was sent here by his cardiologist for concern of the laceration.  He is utd on his tetanus.  The laceration is not bleeding and states that it has not bled since yesterday.      Head Injury    The incident occurred 2 days ago. The injury mechanism was a fall. There was no loss of consciousness. The volume of blood lost was moderate. The quality of the pain is described as aching. The pain is at a severity of 2/10. The pain is mild. The pain has been constant since the injury. Pertinent negatives include no blurred vision, disorientation, headaches, memory loss, numbness, tinnitus, vomiting or weakness. He has tried nothing for the symptoms.   Laceration    The incident occurred 3 to 5 days ago. The laceration is located on the face. The laceration is 3 cm in size. The pain is at a severity of 0/10. The patient is experiencing no pain. The pain has been constant since onset. He reports no foreign bodies present. His tetanus status is UTD.       Constitution: Negative for fatigue.   HENT: Negative for tinnitus, facial swelling and facial trauma.    Neck: Negative for neck stiffness.   Cardiovascular: Negative for chest trauma.   Eyes: Negative for eye trauma, double vision and blurred vision.   Gastrointestinal: Negative for abdominal trauma, " abdominal pain, vomiting and rectal bleeding.   Genitourinary: Negative for hematuria, genital trauma and pelvic pain.   Musculoskeletal: Negative for pain, trauma, joint swelling, abnormal ROM of joint and pain with walking.   Skin: Positive for laceration. Negative for color change, wound and abrasion.   Neurological: Negative for dizziness, history of vertigo, light-headedness, coordination disturbances, headaches, disorientation, altered mental status, loss of consciousness and numbness.   Hematologic/Lymphatic: Negative for history of bleeding disorder.   Psychiatric/Behavioral: Negative for altered mental status and disorientation.       Objective:      Physical Exam   Constitutional: He is oriented to person, place, and time. He appears well-developed and well-nourished. He is cooperative.  Non-toxic appearance. He does not appear ill. No distress.   HENT:   Head: Normocephalic and atraumatic. Head is without abrasion, without contusion and without laceration.       Right Ear: Hearing, tympanic membrane, external ear and ear canal normal. No hemotympanum.   Left Ear: Hearing, tympanic membrane, external ear and ear canal normal. No hemotympanum.   Nose: Nose normal. No mucosal edema, rhinorrhea or nasal deformity. No epistaxis. Right sinus exhibits no maxillary sinus tenderness and no frontal sinus tenderness. Left sinus exhibits no maxillary sinus tenderness and no frontal sinus tenderness.   Mouth/Throat: Uvula is midline, oropharynx is clear and moist and mucous membranes are normal. No trismus in the jaw. Normal dentition. No uvula swelling. No posterior oropharyngeal erythema.   Eyes: Pupils are equal, round, and reactive to light. Conjunctivae, EOM and lids are normal. Right eye exhibits no discharge. Left eye exhibits no discharge. No scleral icterus.   Neck: Trachea normal, normal range of motion, full passive range of motion without pain and phonation normal. Neck supple. No spinous process  tenderness and no muscular tenderness present. No neck rigidity. No tracheal deviation present.   Cardiovascular: Normal rate, regular rhythm, normal heart sounds, intact distal pulses and normal pulses.   Pulmonary/Chest: Effort normal and breath sounds normal. No respiratory distress.   Abdominal: Soft. Normal appearance and bowel sounds are normal. He exhibits no distension, no pulsatile midline mass and no mass. There is no tenderness.   Musculoskeletal: Normal range of motion. He exhibits no edema or deformity.   Neurological: He is alert and oriented to person, place, and time. He has normal strength. No cranial nerve deficit or sensory deficit. He exhibits normal muscle tone. He displays no seizure activity. Coordination normal. GCS eye subscore is 4. GCS verbal subscore is 5. GCS motor subscore is 6.   Skin: Skin is warm, dry, intact, not diaphoretic and not pale. Capillary refill takes less than 2 seconds. Lacerations - head:  HeadLesions:  bruisingabrasion, burn and ecchymosis  Psychiatric: He has a normal mood and affect. His speech is normal and behavior is normal. Judgment and thought content normal. Cognition and memory are normal.   Nursing note and vitals reviewed.  There is an old 2.5 cm laceration to right eyebrow with no bleeding.      Cleaned with saline, Bactroban, and new dressing placed.  No bleeding.  Tolerated well.        Assessment:       1. Laceration of right eyebrow, initial encounter    2. Injury of head, initial encounter        Plan:       Sent for head CT for age and blood thinners with known head trauma.    Laceration of right eyebrow, initial encounter  -     CT Head Without Contrast; Future; Expected date: 02/13/2020  -     mupirocin (BACTROBAN) 2 % ointment; Apply to affected area 1 time daily  Dispense: 22 g; Refill: 1    Injury of head, initial encounter  -     CT Head Without Contrast; Future; Expected date: 02/13/2020  -     mupirocin (BACTROBAN) 2 % ointment; Apply to  affected area 1 time daily  Dispense: 22 g; Refill: 1      Patient Instructions   As discussed... Go to [facility] for CT scan of head.  I will call you with results.  If abnormal, they will have you check into the ER.  See address on cover sheet.    Wash the area gently with soap and water.  Place ointment once daily to prevent infection.  Keep covered with a bandaid.  Tylenol as needed for headache or pain.  Follow up with your PCP for recheck.  Go to the ER immediately if you begin having any vomiting or worsening symptoms.  Head Injury (Adult)    You have a head injury. It does not appear serious at this time. But symptoms of a more serious problem, such as a mild brain injury (concussion) or bruising or bleeding in the brain, may appear later. For this reason, you or someone caring for you will need to watch for the symptoms listed below. Once youre home, also be sure to follow any care instructions youre given.  Home care  Watch for the following symptoms  Seek emergency medical care if you have any of these symptoms over the next hours to days:   · Headache  · Nausea or vomiting  · Dizziness  · Sensitivity to light or noise  · Unusual sleepiness or grogginess  · Trouble falling asleep  · Personality changes  · Vision changes  · Memory loss  · Confusion  · Trouble walking or clumsiness  · Loss of consciousness (even for a short time)  · Inability to be awakened  · Stiff neck  · Weakness or numbness in any part of the body  · Seizures  General care  · If you were prescribed medicines for pain, use them as directed. Note: Dont take other medicines for pain without talking to your provider first.  · To help reduce swelling and pain, apply a cold source to the injured area for up to 20 minutes at a time. Do this as often as directed. Use a cold pack or bag of ice wrapped in a thin towel. Never apply a cold source directly to the skin.  · If you have cuts or scrapes as a result of your head injury, care for  them as directed.  · For the next 24 hours (or longer, if instructed):  ¨ Dont drink alcohol or use sedatives or other medicines that make you sleepy.  ¨ Dont drive or operate machinery.  ¨ Dont do anything strenuous, such as heavy lifting or straining.  ¨ Limit tasks that require concentration. This includes reading, using a smartphone or computer, watching TV, and playing video games.  ¨ Dont return to sports or other activities that could result in another head injury.  Follow-up care  Follow up with your healthcare provider, or as directed. If imaging tests were done, they will be reviewed by a doctor. You will be told the results and any new findings that may affect your care.  When to seek medical advice  Call your healthcare provider right away if any of these occur:  · Pain doesnt get better or worsens  · New or increased swelling or bruising  · Fever of 100.4°F (38°C) or higher, or as directed by your provider  · Increased redness, warmth, drainage, or bleeding from the injured area  · Fluid drainage or bleeding from the nose or ears  · Any depression or bony abnormality in the injured area  Date Last Reviewed: 9/26/2015  © 3734-7997 [employer]. [address] [place] [address], [place] [address]. All rights reserved. This information is not intended as a substitute for professional medical care. Always follow your healthcare professional's instructions.

## 2020-02-15 ENCOUNTER — TELEPHONE (OUTPATIENT)
Dept: INTERNAL MEDICINE | Facility: CLINIC | Age: 85
End: 2020-02-15

## 2020-02-15 DIAGNOSIS — K63.9 LESION OF COLON: Primary | ICD-10-CM

## 2020-02-15 DIAGNOSIS — R79.9 ABNORMAL BLOOD CHEMISTRY: ICD-10-CM

## 2020-02-15 NOTE — TELEPHONE ENCOUNTER
Missed February 6th CRS appointment.  Alkaline phosphatase elevated.  Discussed with patient and wife.    See lab orders and please call patient to schedule ordered lab(s). Thank you.    Please see referral orders and please call patient to schedule a consult with colon and rectal. Thank you.

## 2020-02-15 NOTE — TELEPHONE ENCOUNTER
See radiology orders and please call patient to schedule ordered test, chest CT, 3 months. Thank you.

## 2020-02-17 ENCOUNTER — TELEPHONE (OUTPATIENT)
Dept: INTERNAL MEDICINE | Facility: CLINIC | Age: 85
End: 2020-02-17

## 2020-02-17 NOTE — TELEPHONE ENCOUNTER
----- Message from Tiffany Segovia sent at 2/17/2020 11:16 AM CST -----  Contact: Patient [phone]  Patient stated that she missed a call from you.    Please call and advise.    Thank You

## 2020-02-17 NOTE — TELEPHONE ENCOUNTER
----- Message from Dixie Tilley sent at 2/17/2020 10:11 AM CST -----  Contact: Self Call  [phone]  Please call patient and clarify what procedure Dr Borja want him have?

## 2020-02-20 ENCOUNTER — OFFICE VISIT (OUTPATIENT)
Dept: SURGERY | Facility: CLINIC | Age: 85
End: 2020-02-20
Attending: FAMILY MEDICINE
Payer: MEDICARE

## 2020-02-20 VITALS
HEIGHT: 68 IN | DIASTOLIC BLOOD PRESSURE: 59 MMHG | SYSTOLIC BLOOD PRESSURE: 115 MMHG | BODY MASS INDEX: 23.82 KG/M2 | HEART RATE: 100 BPM | WEIGHT: 157.19 LBS

## 2020-02-20 DIAGNOSIS — R93.5 ABNORMAL CT OF THE ABDOMEN: Primary | ICD-10-CM

## 2020-02-20 PROCEDURE — 99213 OFFICE O/P EST LOW 20 MIN: CPT | Mod: HCNC,S$GLB,, | Performed by: COLON & RECTAL SURGERY

## 2020-02-20 PROCEDURE — 1159F PR MEDICATION LIST DOCUMENTED IN MEDICAL RECORD: ICD-10-PCS | Mod: HCNC,S$GLB,, | Performed by: COLON & RECTAL SURGERY

## 2020-02-20 PROCEDURE — 1101F PR PT FALLS ASSESS DOC 0-1 FALLS W/OUT INJ PAST YR: ICD-10-PCS | Mod: HCNC,CPTII,S$GLB, | Performed by: COLON & RECTAL SURGERY

## 2020-02-20 PROCEDURE — 1159F MED LIST DOCD IN RCRD: CPT | Mod: HCNC,S$GLB,, | Performed by: COLON & RECTAL SURGERY

## 2020-02-20 PROCEDURE — 99999 PR PBB SHADOW E&M-EST. PATIENT-LVL III: CPT | Mod: PBBFAC,HCNC,, | Performed by: COLON & RECTAL SURGERY

## 2020-02-20 PROCEDURE — 1101F PT FALLS ASSESS-DOCD LE1/YR: CPT | Mod: HCNC,CPTII,S$GLB, | Performed by: COLON & RECTAL SURGERY

## 2020-02-20 PROCEDURE — 99999 PR PBB SHADOW E&M-EST. PATIENT-LVL III: ICD-10-PCS | Mod: PBBFAC,HCNC,, | Performed by: COLON & RECTAL SURGERY

## 2020-02-20 PROCEDURE — 1126F PR PAIN SEVERITY QUANTIFIED, NO PAIN PRESENT: ICD-10-PCS | Mod: HCNC,S$GLB,, | Performed by: COLON & RECTAL SURGERY

## 2020-02-20 PROCEDURE — 1126F AMNT PAIN NOTED NONE PRSNT: CPT | Mod: HCNC,S$GLB,, | Performed by: COLON & RECTAL SURGERY

## 2020-02-20 PROCEDURE — 99213 PR OFFICE/OUTPT VISIT, EST, LEVL III, 20-29 MIN: ICD-10-PCS | Mod: HCNC,S$GLB,, | Performed by: COLON & RECTAL SURGERY

## 2020-02-20 NOTE — PROGRESS NOTES
CRS Office Visit    SUBJECTIVE:     Chief Complaint:  Abnormal CT    History of Present Illness:  Patient is a 89 y.o. male presents history of weight loss and abnormal CT scan which shows a thickening of the left colon.  His last colonoscopy was in 2009 he has no GI complaints he states he moves his bowels regularly he is no longer having leakage that he was seen for previously.  He states he is taking 2 FiberCon.  However he also states that his memory is very poor  Review of patient's allergies indicates:  No Known Allergies    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    PVD (peripheral vascular disease) 8/22/2018    Stenosis of aortic and mitral valves     aortic DAHLIA 1.1 CM     Past Surgical History:   Procedure Laterality Date    APPENDECTOMY      cararact  car    CARDIAC CATHETERIZATION      CARDIAC VALVE SURGERY      CATARACT EXTRACTION BILATERAL W/ ANTERIOR VITRECTOMY      bilaterial    CATARACT EXTRACTION W/  INTRAOCULAR LENS IMPLANT Bilateral     CHOLECYSTECTOMY      EYE SURGERY      JOINT REPLACEMENT      bilateral hip    KNEE SURGERY      Bilateral    TOTAL HIP ARTHROPLASTY      Bilaterial     Family History   Problem Relation Age of Onset    No Known Problems Father     No Known Problems Mother     No Known Problems Sister     No Known Problems Brother     No Known Problems Maternal Aunt     No Known Problems Maternal Uncle     No Known Problems Paternal Aunt     No Known Problems Paternal Uncle     No Known Problems Maternal Grandmother     No Known Problems Maternal Grandfather     No Known Problems Paternal Grandmother     No Known Problems Paternal Grandfather     No Known Problems Daughter     No Known Problems Son     No Known Problems Daughter      No Known Problems Son     Amblyopia Neg Hx     Blindness Neg Hx     Cancer Neg Hx     Cataracts Neg Hx     Diabetes Neg Hx     Glaucoma Neg Hx     Hypertension Neg Hx     Macular degeneration Neg Hx     Retinal detachment Neg Hx     Strabismus Neg Hx     Stroke Neg Hx     Thyroid disease Neg Hx     Anemia Neg Hx     Arrhythmia Neg Hx     Asthma Neg Hx     Clotting disorder Neg Hx     Fainting Neg Hx     Heart attack Neg Hx     Heart disease Neg Hx     Heart failure Neg Hx     Hyperlipidemia Neg Hx     Atrial Septal Defect Neg Hx      Social History     Tobacco Use    Smoking status: Never Smoker    Smokeless tobacco: Never Used   Substance Use Topics    Alcohol use: Yes     Comment: less than one  drink weekly    Drug use: No        Review of Systems:  Review of Systems      Constitutional: No fever, chills, or change in activity or appetite.      HENT: No hearing loss, swelling, neck pain or stiffness.       Eyes: No  Itching, discharge, and visual disturbance.      Respiratory: No cough, choking or shortness of breath.       Cardiovascular: No leg swelling or chest pain      Gastrointestinal: No abdominal distention or change in bowel habbits     Genitourinary: No dysuria, frequency or flank pain.      Musculoskeletal: No trouble walking or arthralgias.      Neurological: No weakness, dizziness, or seizures .      Hematological: No adenopathy.      Psychiatric/Behavioral: No hallucinations or changes in behavior.  Remainder ROS  Negative except per HPI      OBJECTIVE:     Vital Signs (Most Recent)  Pulse: 100 (02/20/20 0914)  BP: (!) 115/59 (02/20/20 0914)    Physical Exam:  General: White male in NAD sitting in chair in clinic  Neuro: aaox4 maex4 perrl  Respiratory: resps even unlabored  Cardiac: cap refill <2 sec  Abdomen: Normal, benign.  Extremities: Warm dry and intact       ASSESSMENT/PLAN:     Diagnoses and all orders for this visit:    Abnormal CT of the abdomen     Have  discussed with him possible colonoscopy although at this point I am unsure that he understands increase remember what we talked about will have him come back to clinic with someone else to assure that she understands the risks of a colonoscopy

## 2020-02-20 NOTE — LETTER
February 20, 2020      Tiago Borja MD  [address]  [facility] [address]           [facility]  [address]  [facility] [address]  Phone: [phone]          Patient: Randall Strickland Jr.   MR Number: [id number]   YOB: 1927   Date of Visit: 2/20/2020       Dear Dr. Tiago Borja:    Thank you for referring Randall Strickland to me for evaluation. Attached you will find relevant portions of my assessment and plan of care.    If you have questions, please do not hesitate to call me. I look forward to following Randall Strickland along with you.    Sincerely,    Meir Bran MD    Enclosure  CC:  No Recipients    If you would like to receive this communication electronically, please contact [email or web address] or [phone] to request more information on [employer] Link access.    For providers and/or their staff who would like to refer a patient to [facility], please contact us through our one-stop-shop provider referral line, [facility], at [phone].    If you feel you have received this communication in error or would no longer like to receive these types of communications, please e-mail [email or web address]

## 2020-02-21 ENCOUNTER — TELEPHONE (OUTPATIENT)
Dept: SURGERY | Facility: CLINIC | Age: 85
End: 2020-02-21

## 2020-02-21 NOTE — TELEPHONE ENCOUNTER
Spoke with patient, appt made with instructions to bring another responsible person for colonoscopy instructions.

## 2020-02-21 NOTE — TELEPHONE ENCOUNTER
----- Message from Suri Zimmer sent at 2/21/2020  2:28 PM CST -----  Sofia -- pt is returning your call. Call back [phone]

## 2020-02-24 ENCOUNTER — PATIENT OUTREACH (OUTPATIENT)
Dept: ADMINISTRATIVE | Facility: OTHER | Age: 85
End: 2020-02-24

## 2020-02-26 ENCOUNTER — TELEPHONE (OUTPATIENT)
Dept: ENDOSCOPY | Facility: HOSPITAL | Age: 85
End: 2020-02-26

## 2020-02-26 NOTE — TELEPHONE ENCOUNTER
Called patient regarding Colonoscopy procedure ordered. Patient stated he has a CRS appointment tomorrow , 2/27/20, and would like to discuss procedure in further detail with CRS provider prior to scheduling procedure.

## 2020-02-27 ENCOUNTER — TELEPHONE (OUTPATIENT)
Dept: ENDOSCOPY | Facility: HOSPITAL | Age: 85
End: 2020-02-27

## 2020-02-27 ENCOUNTER — OFFICE VISIT (OUTPATIENT)
Dept: SURGERY | Facility: CLINIC | Age: 85
End: 2020-02-27
Payer: MEDICARE

## 2020-02-27 VITALS
WEIGHT: 154.31 LBS | HEART RATE: 106 BPM | SYSTOLIC BLOOD PRESSURE: 95 MMHG | DIASTOLIC BLOOD PRESSURE: 55 MMHG | HEIGHT: 68 IN | BODY MASS INDEX: 23.39 KG/M2

## 2020-02-27 DIAGNOSIS — R63.4 WEIGHT LOSS, ABNORMAL: Primary | ICD-10-CM

## 2020-02-27 PROCEDURE — 1159F PR MEDICATION LIST DOCUMENTED IN MEDICAL RECORD: ICD-10-PCS | Mod: HCNC,S$GLB,, | Performed by: COLON & RECTAL SURGERY

## 2020-02-27 PROCEDURE — 1101F PT FALLS ASSESS-DOCD LE1/YR: CPT | Mod: HCNC,CPTII,S$GLB, | Performed by: COLON & RECTAL SURGERY

## 2020-02-27 PROCEDURE — 1126F PR PAIN SEVERITY QUANTIFIED, NO PAIN PRESENT: ICD-10-PCS | Mod: HCNC,S$GLB,, | Performed by: COLON & RECTAL SURGERY

## 2020-02-27 PROCEDURE — 1159F MED LIST DOCD IN RCRD: CPT | Mod: HCNC,S$GLB,, | Performed by: COLON & RECTAL SURGERY

## 2020-02-27 PROCEDURE — 99213 OFFICE O/P EST LOW 20 MIN: CPT | Mod: HCNC,S$GLB,, | Performed by: COLON & RECTAL SURGERY

## 2020-02-27 PROCEDURE — 99999 PR PBB SHADOW E&M-EST. PATIENT-LVL III: ICD-10-PCS | Mod: PBBFAC,HCNC,, | Performed by: COLON & RECTAL SURGERY

## 2020-02-27 PROCEDURE — 99213 PR OFFICE/OUTPT VISIT, EST, LEVL III, 20-29 MIN: ICD-10-PCS | Mod: HCNC,S$GLB,, | Performed by: COLON & RECTAL SURGERY

## 2020-02-27 PROCEDURE — 1101F PR PT FALLS ASSESS DOC 0-1 FALLS W/OUT INJ PAST YR: ICD-10-PCS | Mod: HCNC,CPTII,S$GLB, | Performed by: COLON & RECTAL SURGERY

## 2020-02-27 PROCEDURE — 1126F AMNT PAIN NOTED NONE PRSNT: CPT | Mod: HCNC,S$GLB,, | Performed by: COLON & RECTAL SURGERY

## 2020-02-27 PROCEDURE — 99999 PR PBB SHADOW E&M-EST. PATIENT-LVL III: CPT | Mod: PBBFAC,HCNC,, | Performed by: COLON & RECTAL SURGERY

## 2020-02-27 NOTE — TELEPHONE ENCOUNTER
Dr. Bran,  Patient has order for Colonoscopy.  Called patient regarding scheduling Colonoscopy procedure ordered.  Patient stated he had an appointment with you on 2/27/20 and was not sure if he is to proceed with scheduling Colonoscopy procedure at this time.  Please advise.    Thanks,  Jacqueline

## 2020-02-27 NOTE — LETTER
February 27, 2020      Tiago Borja MD  [address]  [facility] [address]           [facility]  [address]  [facility] [address]  Phone: [phone]          Patient: Randall Strickland Jr.   MR Number: [id number]   YOB: 1927   Date of Visit: 2/27/2020       Dear Dr. Tiago Borja:    Thank you for referring Randall Strickland to me for evaluation. Attached you will find relevant portions of my assessment and plan of care.    If you have questions, please do not hesitate to call me. I look forward to following Randall Strickland along with you.    Sincerely,    Meir Bran MD    Enclosure  CC:  No Recipients    If you would like to receive this communication electronically, please contact [email or web address] or [phone] to request more information on [employer] Link access.    For providers and/or their staff who would like to refer a patient to [facility], please contact us through our one-stop-shop provider referral line, [facility], at [phone].    If you feel you have received this communication in error or would no longer like to receive these types of communications, please e-mail [email or web address]

## 2020-02-27 NOTE — TELEPHONE ENCOUNTER
Kelly Wood RN  You 37 minutes ago (3:00 PM)      Dr. Bran said that it can be scheduled after his next appointment which is in 3 weeks.  It can be in 5-6 weeks if you have anything available.   Kelly Sanchez    Routing comment

## 2020-02-27 NOTE — PROGRESS NOTES
CRS Office Visit    SUBJECTIVE:     Chief Complaint:  Abnormal CT    History of Present Illness:  Patient is a 89 y.o. male presents history of weight loss and abnormal CT scan which shows a thickening of the left colon.  He comes in today with his wife to further discuss the indications and contraindications to colonoscopy and a 89-year-old.  It was unclear last time that he understood what was tmemory is very poor  Review of patient's allergies indicates:  No Known Allergies    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    PVD (peripheral vascular disease) 8/22/2018    Stenosis of aortic and mitral valves     aortic DAHLIA 1.1 CM     Past Surgical History:   Procedure Laterality Date    APPENDECTOMY      cararact  car    CARDIAC CATHETERIZATION      CARDIAC VALVE SURGERY      CATARACT EXTRACTION BILATERAL W/ ANTERIOR VITRECTOMY      bilaterial    CATARACT EXTRACTION W/  INTRAOCULAR LENS IMPLANT Bilateral     CHOLECYSTECTOMY      EYE SURGERY      JOINT REPLACEMENT      bilateral hip    KNEE SURGERY      Bilateral    TOTAL HIP ARTHROPLASTY      Bilaterial     Family History   Problem Relation Age of Onset    No Known Problems Father     No Known Problems Mother     No Known Problems Sister     No Known Problems Brother     No Known Problems Maternal Aunt     No Known Problems Maternal Uncle     No Known Problems Paternal Aunt     No Known Problems Paternal Uncle     No Known Problems Maternal Grandmother     No Known Problems Maternal Grandfather     No Known Problems Paternal Grandmother     No Known Problems Paternal Grandfather     No Known Problems Daughter     No Known Problems Son     No Known Problems Daughter     No Known Problems Son     Amblyopia Neg Hx      Blindness Neg Hx     Cancer Neg Hx     Cataracts Neg Hx     Diabetes Neg Hx     Glaucoma Neg Hx     Hypertension Neg Hx     Macular degeneration Neg Hx     Retinal detachment Neg Hx     Strabismus Neg Hx     Stroke Neg Hx     Thyroid disease Neg Hx     Anemia Neg Hx     Arrhythmia Neg Hx     Asthma Neg Hx     Clotting disorder Neg Hx     Fainting Neg Hx     Heart attack Neg Hx     Heart disease Neg Hx     Heart failure Neg Hx     Hyperlipidemia Neg Hx     Atrial Septal Defect Neg Hx      Social History     Tobacco Use    Smoking status: Never Smoker    Smokeless tobacco: Never Used   Substance Use Topics    Alcohol use: Yes     Comment: less than one  drink weekly    Drug use: No        Review of Systems:  Review of Systems      Constitutional: No fever, chills, or change in activity or appetite.      HENT: No hearing loss, swelling, neck pain or stiffness.       Eyes: No  Itching, discharge, and visual disturbance.      Respiratory: No cough, choking or shortness of breath.       Cardiovascular: No leg swelling or chest pain      Gastrointestinal: No abdominal distention or change in bowel habbits     Genitourinary: No dysuria, frequency or flank pain.      Musculoskeletal: No trouble walking or arthralgias.      Neurological: No weakness, dizziness, or seizures .      Hematological: No adenopathy.      Psychiatric/Behavioral: No hallucinations or changes in behavior.  Remainder ROS  Negative except per HPI      OBJECTIVE:     Vital Signs (Most Recent)  Pulse: 100 (02/20/20 0914)  BP: (!) 115/59 (02/20/20 0914)    Physical Exam:  General: White male in NAD sitting in chair in clinic  Neuro: aaox4 maex4 perrl  Respiratory: resps even unlabored  Cardiac: cap refill <2 sec  Abdomen: Normal, benign.  Extremities: Warm dry and intact       ASSESSMENT/PLAN:     After discussing indications and contraindications of colonoscopy with the patient and his wife combined with him being asymptomatic  and him gaining weight after resolution of his pneumonia will repeat the CT scan in 3 weeks if at that time there is any change the patient might consider colonoscopy     Have discussed with him to contact the office if he develops any abdominal symptoms worsening constipation or GI bleeding otherwise will see them in 3 weeks

## 2020-02-28 ENCOUNTER — PES CALL (OUTPATIENT)
Dept: ADMINISTRATIVE | Facility: CLINIC | Age: 85
End: 2020-02-28

## 2020-02-28 NOTE — TELEPHONE ENCOUNTER
Dr. Yina March wants to wait until after he gets the results from the ct scan before he schedules a colonoscopy.     Kelly Sanchez

## 2020-03-01 RX ORDER — OMEPRAZOLE 20 MG/1
CAPSULE, DELAYED RELEASE ORAL
Qty: 30 CAPSULE | Refills: 0 | OUTPATIENT
Start: 2020-03-01

## 2020-03-03 ENCOUNTER — PATIENT OUTREACH (OUTPATIENT)
Dept: ADMINISTRATIVE | Facility: OTHER | Age: 85
End: 2020-03-03

## 2020-03-04 ENCOUNTER — OFFICE VISIT (OUTPATIENT)
Dept: INTERNAL MEDICINE | Facility: CLINIC | Age: 85
End: 2020-03-04
Attending: FAMILY MEDICINE
Payer: MEDICARE

## 2020-03-04 VITALS
SYSTOLIC BLOOD PRESSURE: 106 MMHG | OXYGEN SATURATION: 98 % | WEIGHT: 154.31 LBS | BODY MASS INDEX: 23.39 KG/M2 | HEART RATE: 98 BPM | DIASTOLIC BLOOD PRESSURE: 46 MMHG | HEIGHT: 68 IN

## 2020-03-04 DIAGNOSIS — R63.4 UNINTENTIONAL WEIGHT LOSS: ICD-10-CM

## 2020-03-04 DIAGNOSIS — R79.9 ABNORMAL BLOOD CHEMISTRY: ICD-10-CM

## 2020-03-04 DIAGNOSIS — I50.32 CHRONIC DIASTOLIC CONGESTIVE HEART FAILURE: ICD-10-CM

## 2020-03-04 DIAGNOSIS — Z95.2 S/P AORTIC VALVE REPLACEMENT: ICD-10-CM

## 2020-03-04 DIAGNOSIS — K63.9 LESION OF COLON: Primary | ICD-10-CM

## 2020-03-04 DIAGNOSIS — I48.91 NEW ONSET ATRIAL FIBRILLATION: ICD-10-CM

## 2020-03-04 DIAGNOSIS — Z79.01 CHRONIC ANTICOAGULATION: ICD-10-CM

## 2020-03-04 DIAGNOSIS — F03.90 DEMENTIA WITHOUT BEHAVIORAL DISTURBANCE, UNSPECIFIED DEMENTIA TYPE: ICD-10-CM

## 2020-03-04 PROCEDURE — 99214 PR OFFICE/OUTPT VISIT, EST, LEVL IV, 30-39 MIN: ICD-10-PCS | Mod: HCNC,S$GLB,, | Performed by: FAMILY MEDICINE

## 2020-03-04 PROCEDURE — 1101F PR PT FALLS ASSESS DOC 0-1 FALLS W/OUT INJ PAST YR: ICD-10-PCS | Mod: HCNC,CPTII,S$GLB, | Performed by: FAMILY MEDICINE

## 2020-03-04 PROCEDURE — 1101F PT FALLS ASSESS-DOCD LE1/YR: CPT | Mod: HCNC,CPTII,S$GLB, | Performed by: FAMILY MEDICINE

## 2020-03-04 PROCEDURE — 1126F PR PAIN SEVERITY QUANTIFIED, NO PAIN PRESENT: ICD-10-PCS | Mod: HCNC,S$GLB,, | Performed by: FAMILY MEDICINE

## 2020-03-04 PROCEDURE — 99999 PR PBB SHADOW E&M-EST. PATIENT-LVL IV: ICD-10-PCS | Mod: PBBFAC,HCNC,, | Performed by: FAMILY MEDICINE

## 2020-03-04 PROCEDURE — 1126F AMNT PAIN NOTED NONE PRSNT: CPT | Mod: HCNC,S$GLB,, | Performed by: FAMILY MEDICINE

## 2020-03-04 PROCEDURE — 99999 PR PBB SHADOW E&M-EST. PATIENT-LVL IV: CPT | Mod: PBBFAC,HCNC,, | Performed by: FAMILY MEDICINE

## 2020-03-04 PROCEDURE — 1159F PR MEDICATION LIST DOCUMENTED IN MEDICAL RECORD: ICD-10-PCS | Mod: HCNC,S$GLB,, | Performed by: FAMILY MEDICINE

## 2020-03-04 PROCEDURE — 1159F MED LIST DOCD IN RCRD: CPT | Mod: HCNC,S$GLB,, | Performed by: FAMILY MEDICINE

## 2020-03-04 PROCEDURE — 99214 OFFICE O/P EST MOD 30 MIN: CPT | Mod: HCNC,S$GLB,, | Performed by: FAMILY MEDICINE

## 2020-03-04 NOTE — Clinical Note
patient was still taking plavix as of today. I advised him to stop. Continue eliquis. Very poor understanding of his medications. Thank you.

## 2020-03-04 NOTE — PROGRESS NOTES
Subjective:       Patient ID: Randall Strickland Jr. is a 89 y.o. male.    Chief Complaint: Follow-up    Established patient follows up for management of chronic medical illnesses with complaints today. Please see dictation and ROS for interval problems, specific complaints and disease management discussion.    P, S, Fm, Soc Hx's; Meds, allergies reviewed and reconciled.  Health maintenance file reviewed and addressed items due.    Presents alone, plavix still on his med list. I discussed w/ Dr. Monk and per that plan called wife to tell them to stop ASA and plavix and to take eliquis BID. (1/28) Alk Phos not reported correctly. Saw Dr. Solomon and re-scans pending. Weight stable, appetitie OK.    Fell and went to ER. No apparent sequella, had CT.      Review of Systems   Unable to perform ROS: Mental status change   Constitutional: Negative for unexpected weight change.   Respiratory: Negative for chest tightness and shortness of breath.    Cardiovascular: Negative for chest pain.   Gastrointestinal: Positive for abdominal distention. Negative for abdominal pain.   Genitourinary: Negative for difficulty urinating.   Musculoskeletal: Positive for back pain.   Neurological: Negative for dizziness and syncope.   Psychiatric/Behavioral: Positive for confusion and decreased concentration. Negative for sleep disturbance.       Objective:      Physical Exam   Constitutional: He appears well-developed and well-nourished.   HENT:   Head: Normocephalic and atraumatic.   Eyes: Conjunctivae are normal. No scleral icterus.   Neck: Normal range of motion. Neck supple. Carotid bruit is not present.   Cardiovascular: Normal rate, regular rhythm and intact distal pulses. Exam reveals distant heart sounds. Exam reveals no gallop and no friction rub.   Murmur heard.  Pulmonary/Chest: Effort normal. No respiratory distress. He has decreased breath sounds. He has no wheezes. He has no rales.   Abdominal: He exhibits no distension. There is  no tenderness.   Musculoskeletal: He exhibits no edema or deformity.   Neurological: He is alert. He displays no tremor. No cranial nerve deficit. Coordination and gait normal.   Skin: Skin is warm and dry. No rash noted. He is not diaphoretic. No erythema.   Psychiatric: He has a normal mood and affect. His speech is normal and behavior is normal.   Nursing note and vitals reviewed.      Assessment:       1. Lesion of colon    2. Unintentional weight loss    3. New onset atrial fibrillation    4. Chronic anticoagulation    5. Dementia without behavioral disturbance, unspecified dementia type    6. S/P aortic valve replacement    7. Chronic diastolic congestive heart failure    8. Abnormal blood chemistry        Plan:     Medication List with Changes/Refills   Current Medications    AMITRIPTYLINE (ELAVIL) 25 MG TABLET    TAKE 1 TABLET(25 MG) BY MOUTH EVERY EVENING    AMLODIPINE (NORVASC) 5 MG TABLET    TAKE 1 TABLET(5 MG) BY MOUTH EVERY DAY    APIXABAN (ELIQUIS) 2.5 MG TAB    Take 1 tablet (2.5 mg total) by mouth 2 (two) times daily.    FINASTERIDE (PROSCAR) 5 MG TABLET    TAKE 1 TABLET(5 MG) BY MOUTH EVERY DAY    GABAPENTIN (NEURONTIN) 300 MG CAPSULE    TAKE 1 CAPSULE(300 MG) BY MOUTH EVERY EVENING    MUPIROCIN (BACTROBAN) 2 % OINTMENT    Apply to affected area 1 time daily    OMEPRAZOLE (PRILOSEC) 20 MG CAPSULE    TAKE 1 CAPSULE(20 MG) BY MOUTH EVERY DAY    RIVASTIGMINE TARTRATE (EXELON) 3 MG CAPSULE    TAKE 1 CAPSULE(3 MG) BY MOUTH TWICE DAILY    TAMSULOSIN (FLOMAX) 0.4 MG CAP    TAKE ONE CAPSULE BY MOUTH DAILY    THIAMINE 100 MG TABLET    Take 100 mg by mouth once daily.    TOLTERODINE (DETROL LA) 4 MG 24 HR CAPSULE        TORSEMIDE (DEMADEX) 20 MG TAB    Take 1 tablet (20 mg total) by mouth once daily.   Discontinued Medications    APIXABAN (ELIQUIS) 2.5 MG TAB    Take 1 tablet (2.5 mg total) by mouth 2 (two) times daily.    CLOPIDOGREL (PLAVIX) 75 MG TABLET    Take 1 tablet (75 mg total) by mouth once daily.      Randall was seen today for follow-up.    Diagnoses and all orders for this visit:    Lesion of colon    Unintentional weight loss    New onset atrial fibrillation  -     Ambulatory referral/consult to Cardiology; Future    Chronic anticoagulation  -     Ambulatory referral/consult to Cardiology; Future    Dementia without behavioral disturbance, unspecified dementia type    S/P aortic valve replacement  -     Ambulatory referral/consult to Cardiology; Future    Chronic diastolic congestive heart failure  -     Ambulatory referral/consult to Cardiology; Future    Abnormal blood chemistry  -     Alkaline phosphatase, isoenzymes; Future      See meds, orders, follow up, routing and instructions sections of encounter and AVS. Discussed with patient and provided on AVS.    Patient presented alone today.  He is confused about his medications.  Does not have insight into medications, dosages and indications.  His weight is stabilized at this time at 154.  Previous readings of 157 than 147.  He had multiple scans.  This demonstrated an area in the colon and also had areas on the chest scan.  There are followups pending.  It appears there are multiple orders pending.  His alkaline phosphatase isoenzymes were not performed correctly and need to repeat.  His current situation began in December with pneumonia he had a follow-up with us for repeat imaging which demonstrated abnormalities.    Patient initially refused to take his Eliquis after being diagnosed with atrial fibrillation in the hospital.  He saw his neurologist to continued him on Plavix and aspirin.  Cardiologist recommended discontinuing those and going with Eliquis.  I called and discussed this with his wife.  She seemed clear on these instructions.  He was part of that conversation.  Appears he is continuing to take Plavix at this time and I discussed discontinuing his Plavix and aspirin at this time, continue Eliquis.  We provided this in writing today.    copy  chart today to cardiologist and neurologist.  Await his CT scanning.  Decision concerning colonoscopy is pending.  Asked him to follow up in 4 months to go over all is progress.    He has no pain complaints at this time.  He invited me to dinner with his wife.  I explained I felt that was awfully nice of him, but declined for the time being.  His activity level has declined.  Used to play tennis frequently.  [employer].  States he goes there approximately once a week with his wife to eat lunch or dinner.

## 2020-03-05 ENCOUNTER — TELEPHONE (OUTPATIENT)
Dept: GASTROENTEROLOGY | Facility: CLINIC | Age: 85
End: 2020-03-05

## 2020-03-05 DIAGNOSIS — R15.1 FECAL SMEARING: ICD-10-CM

## 2020-03-05 RX ORDER — AMITRIPTYLINE HYDROCHLORIDE 25 MG/1
TABLET, FILM COATED ORAL
Qty: 90 TABLET | Refills: 0 | Status: SHIPPED | OUTPATIENT
Start: 2020-03-05 | End: 2020-03-09 | Stop reason: SDUPTHER

## 2020-03-05 NOTE — TELEPHONE ENCOUNTER
Attempted to contact patient to let him know today's appt may not be necessary if not having upper GI symptoms.  Wife states patient is already on the way.

## 2020-03-09 ENCOUNTER — TELEPHONE (OUTPATIENT)
Dept: SURGERY | Facility: CLINIC | Age: 85
End: 2020-03-09

## 2020-03-09 DIAGNOSIS — R15.1 FECAL SMEARING: ICD-10-CM

## 2020-03-09 RX ORDER — AMITRIPTYLINE HYDROCHLORIDE 25 MG/1
TABLET, FILM COATED ORAL
Qty: 90 TABLET | Refills: 0 | Status: SHIPPED | OUTPATIENT
Start: 2020-03-09 | End: 2022-01-01

## 2020-03-09 NOTE — TELEPHONE ENCOUNTER
Notified that prescription for elavil was sent to [facility] and will need prior authorization.  The pharmacy will let him know when the prescription is ready and can mail it to him if necessary, verbalized understanding-

## 2020-03-09 NOTE — TELEPHONE ENCOUNTER
----- Message from Makenzie Coon NP sent at 3/9/2020 12:23 PM CDT -----  Please inform pt I have sent his elavil rx to the [facility] to assist in the prior auth. If they are able to fill it they will notify him of the price and can mail it to him if he would like.  Thanks.

## 2020-03-11 ENCOUNTER — TELEPHONE (OUTPATIENT)
Dept: PHARMACY | Facility: CLINIC | Age: 85
End: 2020-03-11

## 2020-03-12 ENCOUNTER — PATIENT OUTREACH (OUTPATIENT)
Dept: ADMINISTRATIVE | Facility: OTHER | Age: 85
End: 2020-03-12

## 2020-03-12 ENCOUNTER — TELEPHONE (OUTPATIENT)
Dept: SURGERY | Facility: CLINIC | Age: 85
End: 2020-03-12

## 2020-03-12 NOTE — PROGRESS NOTES
Chart reviewed.   Immunizations: updated  Orders placed: n/a  Upcoming appts to satisfy CRISS topics: n/a  Open case request for Colonoscopy.

## 2020-03-13 ENCOUNTER — OFFICE VISIT (OUTPATIENT)
Dept: CARDIOLOGY | Facility: CLINIC | Age: 85
End: 2020-03-13
Attending: FAMILY MEDICINE
Payer: MEDICARE

## 2020-03-13 VITALS
OXYGEN SATURATION: 98 % | WEIGHT: 155.88 LBS | HEART RATE: 100 BPM | BODY MASS INDEX: 23.63 KG/M2 | SYSTOLIC BLOOD PRESSURE: 120 MMHG | HEIGHT: 68 IN | DIASTOLIC BLOOD PRESSURE: 60 MMHG

## 2020-03-13 DIAGNOSIS — Z79.01 CHRONIC ANTICOAGULATION: ICD-10-CM

## 2020-03-13 DIAGNOSIS — I50.32 CHRONIC DIASTOLIC CONGESTIVE HEART FAILURE: ICD-10-CM

## 2020-03-13 DIAGNOSIS — I48.11 LONGSTANDING PERSISTENT ATRIAL FIBRILLATION: ICD-10-CM

## 2020-03-13 DIAGNOSIS — I48.91 NEW ONSET ATRIAL FIBRILLATION: ICD-10-CM

## 2020-03-13 DIAGNOSIS — E87.6 HYPOKALEMIA: ICD-10-CM

## 2020-03-13 DIAGNOSIS — Z95.2 S/P AORTIC VALVE REPLACEMENT: ICD-10-CM

## 2020-03-13 DIAGNOSIS — Z79.01 LONG TERM CURRENT USE OF ANTICOAGULANT: ICD-10-CM

## 2020-03-13 DIAGNOSIS — I10 HYPERTENSION, ESSENTIAL: Primary | ICD-10-CM

## 2020-03-13 PROCEDURE — 1101F PR PT FALLS ASSESS DOC 0-1 FALLS W/OUT INJ PAST YR: ICD-10-PCS | Mod: HCNC,CPTII,S$GLB, | Performed by: INTERNAL MEDICINE

## 2020-03-13 PROCEDURE — 1159F MED LIST DOCD IN RCRD: CPT | Mod: HCNC,S$GLB,, | Performed by: INTERNAL MEDICINE

## 2020-03-13 PROCEDURE — 99999 PR PBB SHADOW E&M-EST. PATIENT-LVL III: ICD-10-PCS | Mod: PBBFAC,HCNC,, | Performed by: INTERNAL MEDICINE

## 2020-03-13 PROCEDURE — 99213 PR OFFICE/OUTPT VISIT, EST, LEVL III, 20-29 MIN: ICD-10-PCS | Mod: HCNC,S$GLB,, | Performed by: INTERNAL MEDICINE

## 2020-03-13 PROCEDURE — 1126F PR PAIN SEVERITY QUANTIFIED, NO PAIN PRESENT: ICD-10-PCS | Mod: HCNC,S$GLB,, | Performed by: INTERNAL MEDICINE

## 2020-03-13 PROCEDURE — 99999 PR PBB SHADOW E&M-EST. PATIENT-LVL III: CPT | Mod: PBBFAC,HCNC,, | Performed by: INTERNAL MEDICINE

## 2020-03-13 PROCEDURE — 99213 OFFICE O/P EST LOW 20 MIN: CPT | Mod: HCNC,S$GLB,, | Performed by: INTERNAL MEDICINE

## 2020-03-13 PROCEDURE — 1101F PT FALLS ASSESS-DOCD LE1/YR: CPT | Mod: HCNC,CPTII,S$GLB, | Performed by: INTERNAL MEDICINE

## 2020-03-13 PROCEDURE — 1159F PR MEDICATION LIST DOCUMENTED IN MEDICAL RECORD: ICD-10-PCS | Mod: HCNC,S$GLB,, | Performed by: INTERNAL MEDICINE

## 2020-03-13 PROCEDURE — 1126F AMNT PAIN NOTED NONE PRSNT: CPT | Mod: HCNC,S$GLB,, | Performed by: INTERNAL MEDICINE

## 2020-03-13 RX ORDER — POTASSIUM CHLORIDE 750 MG/1
10 CAPSULE, EXTENDED RELEASE ORAL ONCE
Qty: 30 CAPSULE | Refills: 11 | Status: SHIPPED | OUTPATIENT
Start: 2020-03-13 | End: 2020-03-13

## 2020-03-13 NOTE — LETTER
March 13, 2020      Tiago Borja MD  [address]  [place] [address]           [facility]  [address]  [place] [address]  Phone: [phone]          Patient: Randall Strickland Jr.   MR Number: [id number]   YOB: 1927   Date of Visit: 3/13/2020       Dear Dr. Tiago Borja:    Thank you for referring Randall Strickland to me for evaluation. Attached you will find relevant portions of my assessment and plan of care.    If you have questions, please do not hesitate to call me. I look forward to following Randall Strickland along with you.    Sincerely,    Hieu Monk MD    Enclosure  CC:  No Recipients    If you would like to receive this communication electronically, please contact [email or web address] or [phone] to request more information on [employer] Link access.    For providers and/or their staff who would like to refer a patient to [facility], please contact us through our one-stop-shop provider referral line, [facility] , at [phone].    If you feel you have received this communication in error or would no longer like to receive these types of communications, please e-mail [email or web address]

## 2020-03-13 NOTE — PROGRESS NOTES
Subjective:    Patient ID:  Randall Strickland Jr. is a 89 y.o. male who presents for follow-up of Congestive Heart Failure      HPI   The patient is a 89 year old male post TAVAR 8/30/16 was admitted to [facility] with pneumonia 12/16/19 and during that stay was noted to have new onset of atrial fibrillation. His BNPs were not elevated on that admit. He has had a 6 # weight loss but has no edema or increased SOB.A recent CT abdomin to evaluate weight loss revealed thickening of left colon. He is being evaluated by Dr Bran. His hemoglobin is stable.Clinically he is improved since his last visit and SOB is improved, no edema and stable weight.  Lab Results   Component Value Date     12/18/2019    K 3.4 (L) 12/18/2019     12/18/2019    CO2 27 12/18/2019    BUN 22 12/18/2019    CREATININE 1.2 01/22/2020    GLU 97 12/18/2019    HGBA1C 5.7 04/10/2014    MG 1.9 12/17/2019    AST 40 12/17/2019    ALT 21 12/17/2019    ALBUMIN 2.2 (L) 12/17/2019    PROT 6.7 12/17/2019    BILITOT 0.9 12/17/2019    WBC 11.18 12/18/2019    HGB 10.6 (L) 12/18/2019    HCT 33.2 (L) 12/18/2019    HCT 32 (L) 12/16/2019    MCV 97 12/18/2019     12/18/2019    INR 1.0 08/29/2016    PSA 0.6 03/07/2005    TSH 0.993 12/03/2019         Lab Results   Component Value Date    CHOL 135 05/30/2018    HDL 37 (L) 05/30/2018    TRIG 83 05/30/2018       Lab Results   Component Value Date    LDLCALC 81.4 05/30/2018       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    PVD (peripheral vascular disease) 8/22/2018    Stenosis of aortic and mitral valves     aortic DAHLIA 1.1 CM       Current Outpatient Medications:     amitriptyline (ELAVIL) 25 MG tablet, TAKE 1 TABLET(25 MG) BY MOUTH EVERY EVENING, Disp: 90 tablet, Rfl: 0     amLODIPine (NORVASC) 5 MG tablet, TAKE 1 TABLET(5 MG) BY MOUTH EVERY DAY, Disp: 30 tablet, Rfl: 6    apixaban (ELIQUIS) 2.5 mg Tab, Take 1 tablet (2.5 mg total) by mouth 2 (two) times daily., Disp: 60 tablet, Rfl: 11    finasteride (PROSCAR) 5 mg tablet, TAKE 1 TABLET(5 MG) BY MOUTH EVERY DAY, Disp: 90 tablet, Rfl: 0    gabapentin (NEURONTIN) 300 MG capsule, TAKE 1 CAPSULE(300 MG) BY MOUTH EVERY EVENING, Disp: 90 capsule, Rfl: 0    mupirocin (BACTROBAN) 2 % ointment, Apply to affected area 1 time daily, Disp: 22 g, Rfl: 1    omeprazole (PRILOSEC) 20 MG capsule, TAKE 1 CAPSULE(20 MG) BY MOUTH EVERY DAY, Disp: 30 capsule, Rfl: 0    rivastigmine tartrate (EXELON) 3 MG capsule, TAKE 1 CAPSULE(3 MG) BY MOUTH TWICE DAILY, Disp: 60 capsule, Rfl: 11    tamsulosin (FLOMAX) 0.4 mg Cap, TAKE ONE CAPSULE BY MOUTH DAILY, Disp: 90 capsule, Rfl: 3    thiamine 100 MG tablet, Take 100 mg by mouth once daily., Disp: , Rfl:     tolterodine (DETROL LA) 4 MG 24 hr capsule, , Disp: , Rfl:     torsemide (DEMADEX) 20 MG Tab, Take 1 tablet (20 mg total) by mouth once daily., Disp: 30 tablet, Rfl: 11    potassium chloride (MICRO-K) 10 MEQ CpSR, Take 1 capsule (10 mEq total) by mouth once. for 1 dose, Disp: 30 capsule, Rfl: 11          Review of Systems   Constitution: Negative for decreased appetite, diaphoresis, fever, malaise/fatigue, weight gain and weight loss.   HENT: Negative for congestion, ear discharge, ear pain and nosebleeds.    Eyes: Negative for blurred vision, double vision and visual disturbance.   Cardiovascular: Negative for chest pain, claudication, cyanosis, dyspnea on exertion, irregular heartbeat, leg swelling, near-syncope, orthopnea, palpitations, paroxysmal nocturnal dyspnea and syncope.   Respiratory: Negative for cough, hemoptysis, shortness of breath, sleep disturbances due to breathing, snoring, sputum production and wheezing.    Endocrine: Negative for polydipsia, polyphagia and polyuria.  "  Hematologic/Lymphatic: Negative for adenopathy and bleeding problem. Does not bruise/bleed easily.   Skin: Negative for color change, nail changes, poor wound healing and rash.   Musculoskeletal: Negative for muscle cramps and muscle weakness.   Gastrointestinal: Negative for abdominal pain, anorexia, change in bowel habit, hematochezia, nausea and vomiting.   Genitourinary: Negative for dysuria, frequency and hematuria.   Neurological: Negative for brief paralysis, difficulty with concentration, excessive daytime sleepiness, dizziness, focal weakness, headaches, light-headedness, seizures, vertigo and weakness.   Psychiatric/Behavioral: Negative for altered mental status and depression.   Allergic/Immunologic: Negative for persistent infections.        Objective:/60   Pulse 100   Ht 5' 8" (1.727 m)   Wt 70.7 kg (155 lb 13.8 oz)   SpO2 98%   BMI 23.70 kg/m²             Physical Exam   Constitutional: He is oriented to person, place, and time. He appears well-developed and well-nourished.   HENT:   Head: Normocephalic.   Right Ear: External ear normal.   Left Ear: External ear normal.   Nose: Nose normal.   Inspection of lips, teeth and gums normal   Eyes: Pupils are equal, round, and reactive to light. EOM are normal. No scleral icterus.   Neck: Normal range of motion. Neck supple. No JVD present. No tracheal deviation present. No thyromegaly present.   Cardiovascular: Normal rate, regular rhythm and intact distal pulses. Exam reveals no gallop and no friction rub.   No murmur heard.  Pulmonary/Chest: Effort normal and breath sounds normal.   Abdominal: Bowel sounds are normal. He exhibits no distension. There is no hepatosplenomegaly. There is no tenderness. There is no guarding.   Musculoskeletal: Normal range of motion. He exhibits no edema or tenderness.   Lymphadenopathy:   Palpation of neck and groin lymph nodes normal   Neurological: He is alert and oriented to person, place, and time. No cranial " nerve deficit. He exhibits normal muscle tone. Coordination normal.   Skin: Skin is dry.   Palpation of skin normal   Psychiatric: His behavior is normal. Judgment and thought content normal.         Assessment:       1. Hypertension, essential    2. New onset atrial fibrillation    3. Chronic anticoagulation    4. S/P aortic valve replacement    5. Chronic diastolic congestive heart failure    6. Longstanding persistent atrial fibrillation    7. Long term current use of anticoagulant    8. Hypokalemia         Plan:       Randall was seen today for congestive heart failure.    Diagnoses and all orders for this visit:    Hypertension, essential  -     Basic metabolic panel; Future; Expected date: 06/11/2020    New onset atrial fibrillation  -     Ambulatory referral/consult to Cardiology    Chronic anticoagulation  -     Ambulatory referral/consult to Cardiology    S/P aortic valve replacement  -     Ambulatory referral/consult to Cardiology    Chronic diastolic congestive heart failure  -     Ambulatory referral/consult to Cardiology  -     Basic metabolic panel; Future; Expected date: 06/11/2020    Longstanding persistent atrial fibrillation    Long term current use of anticoagulant  -     CBC auto differential; Future; Expected date: 06/11/2020    Hypokalemia  -     Basic metabolic panel; Future; Expected date: 06/11/2020  -     potassium chloride (MICRO-K) 10 MEQ CpSR; Take 1 capsule (10 mEq total) by mouth once. for 1 dose

## 2020-03-16 DIAGNOSIS — R60.9 DEPENDENT EDEMA: ICD-10-CM

## 2020-03-16 DIAGNOSIS — I50.32 CHRONIC DIASTOLIC CONGESTIVE HEART FAILURE: ICD-10-CM

## 2020-03-16 RX ORDER — TORSEMIDE 20 MG/1
TABLET ORAL
Qty: 30 TABLET | Refills: 11 | Status: SHIPPED | OUTPATIENT
Start: 2020-03-16 | End: 2020-03-26

## 2020-03-18 ENCOUNTER — HOSPITAL ENCOUNTER (OUTPATIENT)
Dept: RADIOLOGY | Facility: HOSPITAL | Age: 85
Discharge: HOME | End: 2020-03-18
Attending: COLON & RECTAL SURGERY
Payer: MEDICARE

## 2020-03-18 DIAGNOSIS — R63.4 WEIGHT LOSS, ABNORMAL: ICD-10-CM

## 2020-03-18 PROCEDURE — 74177 CT ABD & PELVIS W/CONTRAST: CPT | Mod: 26,HCNC,, | Performed by: RADIOLOGY

## 2020-03-18 PROCEDURE — 71260 CT THORAX DX C+: CPT | Mod: 26,HCNC,, | Performed by: RADIOLOGY

## 2020-03-18 PROCEDURE — 25500020 PHARM REV CODE 255: Mod: HCNC | Performed by: COLON & RECTAL SURGERY

## 2020-03-18 PROCEDURE — 74177 CT CHEST ABDOMEN PELVIS WITH CONTRAST (XPD): ICD-10-PCS | Mod: 26,HCNC,, | Performed by: RADIOLOGY

## 2020-03-18 PROCEDURE — 71260 CT CHEST ABDOMEN PELVIS WITH CONTRAST (XPD): ICD-10-PCS | Mod: 26,HCNC,, | Performed by: RADIOLOGY

## 2020-03-18 PROCEDURE — 74177 CT ABD & PELVIS W/CONTRAST: CPT | Mod: TC,HCNC

## 2020-03-18 RX ADMIN — IOHEXOL 30 ML: 350 INJECTION, SOLUTION INTRAVENOUS at 12:03

## 2020-03-18 RX ADMIN — IOHEXOL 75 ML: 350 INJECTION, SOLUTION INTRAVENOUS at 12:03

## 2020-03-18 RX ADMIN — IOHEXOL 15 ML: 350 INJECTION, SOLUTION INTRAVENOUS at 12:03

## 2020-03-18 RX ADMIN — IOHEXOL 15 ML: 350 INJECTION, SOLUTION INTRAVENOUS at 11:03

## 2020-03-19 LAB
CREAT SERPL-MCNC: 1.3 MG/DL (ref 0.5–1.4)
SAMPLE: NORMAL

## 2020-03-26 DIAGNOSIS — R60.9 DEPENDENT EDEMA: ICD-10-CM

## 2020-03-26 DIAGNOSIS — I50.32 CHRONIC DIASTOLIC CONGESTIVE HEART FAILURE: ICD-10-CM

## 2020-03-26 RX ORDER — TORSEMIDE 20 MG/1
TABLET ORAL
Qty: 30 TABLET | Refills: 11 | Status: SHIPPED | OUTPATIENT
Start: 2020-03-26 | End: 2022-01-01

## 2020-03-26 RX ORDER — OMEPRAZOLE 20 MG/1
CAPSULE, DELAYED RELEASE ORAL
Qty: 30 CAPSULE | Refills: 0 | OUTPATIENT
Start: 2020-03-26

## 2020-04-06 PROBLEM — R74.8 ELEVATED ALKALINE PHOSPHATASE LEVEL: Status: ACTIVE | Noted: 2020-04-06

## 2020-04-24 DIAGNOSIS — M54.16 LEFT LUMBAR RADICULITIS: ICD-10-CM

## 2020-04-24 DIAGNOSIS — M51.36 DDD (DEGENERATIVE DISC DISEASE), LUMBAR: ICD-10-CM

## 2020-04-24 DIAGNOSIS — M70.62 GREATER TROCHANTERIC BURSITIS OF LEFT HIP: ICD-10-CM

## 2020-04-24 RX ORDER — GABAPENTIN 300 MG/1
CAPSULE ORAL
Qty: 90 CAPSULE | Refills: 0 | Status: SHIPPED | OUTPATIENT
Start: 2020-04-24 | End: 2020-06-22

## 2020-04-24 NOTE — PROGRESS NOTES
Refill Routing Note    Medication(s) are not appropriate for processing by [facility]:       Outside of protocol        Medication reconciliation completed: No      Appointments [id number] or future 3m with PCP    Date Provider   Last Visit   3/4/2020 Tiago Borja MD   Next Visit   7/2/2020 Tiago Borja MD     Automatic Epic Protocol Generated Data:    Requested Prescriptions   Pending Prescriptions Disp Refills    gabapentin (NEURONTIN) 300 MG capsule [Pharmacy Med Name: GABAPENTIN 300MG CAPSULES] 90 capsule 0     Sig: TAKE 1 CAPSULE(300 MG) BY MOUTH EVERY EVENING       Anticonvulsants Protocol Passed - 4/24/2020 11:58 AM        Passed - Visit with Authorizing provider in past 9 months or upcoming 90 days           Note composed:4:22 PM 04/24/2020

## 2020-04-27 RX ORDER — OMEPRAZOLE 20 MG/1
CAPSULE, DELAYED RELEASE ORAL
Qty: 30 CAPSULE | Refills: 0 | Status: SHIPPED | OUTPATIENT
Start: 2020-04-27 | End: 2020-06-16

## 2020-05-07 DIAGNOSIS — N13.8 BPH WITH URINARY OBSTRUCTION: ICD-10-CM

## 2020-05-07 DIAGNOSIS — N40.1 BPH WITH URINARY OBSTRUCTION: ICD-10-CM

## 2020-05-07 RX ORDER — FINASTERIDE 5 MG/1
5 TABLET, FILM COATED ORAL DAILY
Qty: 90 TABLET | Refills: 3 | Status: SHIPPED | OUTPATIENT
Start: 2020-05-07 | End: 2022-01-01

## 2020-05-07 RX ORDER — TAMSULOSIN HYDROCHLORIDE 0.4 MG/1
1 CAPSULE ORAL DAILY
Qty: 90 CAPSULE | Refills: 3 | Status: SHIPPED | OUTPATIENT
Start: 2020-05-07 | End: 2022-01-01

## 2020-05-14 ENCOUNTER — TELEPHONE (OUTPATIENT)
Dept: NEUROLOGY | Facility: CLINIC | Age: 85
End: 2020-05-14

## 2020-05-14 ENCOUNTER — TELEPHONE (OUTPATIENT)
Dept: ENDOSCOPY | Facility: HOSPITAL | Age: 85
End: 2020-05-14

## 2020-05-14 ENCOUNTER — PATIENT OUTREACH (OUTPATIENT)
Dept: ADMINISTRATIVE | Facility: OTHER | Age: 85
End: 2020-05-14

## 2020-05-14 NOTE — TELEPHONE ENCOUNTER
Called and spoke to  regarding his appt with  on tomorrow. I stated if he would like to do a virtual visit with  instead of coming in for his appt.    He stated that he has 2 other appts tomorrow so he will be here for his appt

## 2020-05-14 NOTE — TELEPHONE ENCOUNTER
Contacted patient to scheduled Colonoscopy procedure ordered. Patient stated he was informed after having CT scan in March, that he no longer needs Colonoscopy procedure.  Please advise.    Thanks,  Jacqueline

## 2020-05-15 ENCOUNTER — OFFICE VISIT (OUTPATIENT)
Dept: INTERNAL MEDICINE | Facility: CLINIC | Age: 85
End: 2020-05-15
Payer: MEDICARE

## 2020-05-15 ENCOUNTER — HOSPITAL ENCOUNTER (OUTPATIENT)
Dept: RADIOLOGY | Facility: HOSPITAL | Age: 85
Discharge: HOME | End: 2020-05-15
Attending: INTERNAL MEDICINE
Payer: MEDICARE

## 2020-05-15 ENCOUNTER — OFFICE VISIT (OUTPATIENT)
Dept: NEUROLOGY | Facility: CLINIC | Age: 85
End: 2020-05-15
Attending: FAMILY MEDICINE
Payer: MEDICARE

## 2020-05-15 VITALS
SYSTOLIC BLOOD PRESSURE: 125 MMHG | DIASTOLIC BLOOD PRESSURE: 54 MMHG | HEIGHT: 68 IN | DIASTOLIC BLOOD PRESSURE: 69 MMHG | OXYGEN SATURATION: 97 % | BODY MASS INDEX: 24.02 KG/M2 | WEIGHT: 158.5 LBS | SYSTOLIC BLOOD PRESSURE: 96 MMHG | WEIGHT: 158.5 LBS | HEIGHT: 68 IN | HEART RATE: 88 BPM | BODY MASS INDEX: 24.02 KG/M2 | HEART RATE: 98 BPM

## 2020-05-15 DIAGNOSIS — I50.32 CHRONIC DIASTOLIC CONGESTIVE HEART FAILURE: ICD-10-CM

## 2020-05-15 DIAGNOSIS — I70.0 AORTIC ATHEROSCLEROSIS: ICD-10-CM

## 2020-05-15 DIAGNOSIS — N18.30 CHRONIC KIDNEY DISEASE, STAGE III (MODERATE): ICD-10-CM

## 2020-05-15 DIAGNOSIS — F03.90 DEMENTIA WITHOUT BEHAVIORAL DISTURBANCE, UNSPECIFIED DEMENTIA TYPE: ICD-10-CM

## 2020-05-15 DIAGNOSIS — Z95.2 S/P AORTIC VALVE REPLACEMENT: ICD-10-CM

## 2020-05-15 DIAGNOSIS — I42.9 CARDIOMYOPATHY, IDIOPATHIC: ICD-10-CM

## 2020-05-15 DIAGNOSIS — I77.9 BILATERAL CAROTID ARTERY DISEASE, UNSPECIFIED TYPE: ICD-10-CM

## 2020-05-15 DIAGNOSIS — R26.9 GAIT DISORDER: Primary | ICD-10-CM

## 2020-05-15 DIAGNOSIS — I10 HYPERTENSION, ESSENTIAL: ICD-10-CM

## 2020-05-15 DIAGNOSIS — Z00.00 ENCOUNTER FOR PREVENTIVE HEALTH EXAMINATION: Primary | ICD-10-CM

## 2020-05-15 DIAGNOSIS — R26.9 GAIT DISORDER: ICD-10-CM

## 2020-05-15 DIAGNOSIS — I73.9 PVD (PERIPHERAL VASCULAR DISEASE): ICD-10-CM

## 2020-05-15 DIAGNOSIS — Z79.01 CHRONIC ANTICOAGULATION: ICD-10-CM

## 2020-05-15 DIAGNOSIS — M47.816 FACET ARTHRITIS, DEGENERATIVE, LUMBAR SPINE: ICD-10-CM

## 2020-05-15 DIAGNOSIS — R26.9 ABNORMALITY OF GAIT AND MOBILITY: ICD-10-CM

## 2020-05-15 DIAGNOSIS — J18.9 MULTIFOCAL PNEUMONIA: ICD-10-CM

## 2020-05-15 DIAGNOSIS — I48.11 LONGSTANDING PERSISTENT ATRIAL FIBRILLATION: ICD-10-CM

## 2020-05-15 PROCEDURE — 71250 CT THORAX DX C-: CPT | Mod: TC,HCNC

## 2020-05-15 PROCEDURE — 99499 UNLISTED E&M SERVICE: CPT | Mod: S$GLB,,, | Performed by: NURSE PRACTITIONER

## 2020-05-15 PROCEDURE — 99499 RISK ADDL DX/OHS AUDIT: ICD-10-PCS | Mod: HCNC,S$GLB,, | Performed by: PSYCHIATRY & NEUROLOGY

## 2020-05-15 PROCEDURE — 71250 CT CHEST WITHOUT CONTRAST: ICD-10-PCS | Mod: 26,HCNC,, | Performed by: RADIOLOGY

## 2020-05-15 PROCEDURE — 99213 OFFICE O/P EST LOW 20 MIN: CPT | Mod: HCNC,S$GLB,, | Performed by: PSYCHIATRY & NEUROLOGY

## 2020-05-15 PROCEDURE — 71250 CT THORAX DX C-: CPT | Mod: 26,HCNC,, | Performed by: RADIOLOGY

## 2020-05-15 PROCEDURE — 99213 PR OFFICE/OUTPT VISIT, EST, LEVL III, 20-29 MIN: ICD-10-PCS | Mod: HCNC,S$GLB,, | Performed by: PSYCHIATRY & NEUROLOGY

## 2020-05-15 PROCEDURE — 1126F AMNT PAIN NOTED NONE PRSNT: CPT | Mod: HCNC,S$GLB,, | Performed by: PSYCHIATRY & NEUROLOGY

## 2020-05-15 PROCEDURE — G0439 PR MEDICARE ANNUAL WELLNESS SUBSEQUENT VISIT: ICD-10-PCS | Mod: HCNC,S$GLB,, | Performed by: NURSE PRACTITIONER

## 2020-05-15 PROCEDURE — 99999 PR PBB SHADOW E&M-EST. PATIENT-LVL IV: CPT | Mod: PBBFAC,HCNC,, | Performed by: NURSE PRACTITIONER

## 2020-05-15 PROCEDURE — 99999 PR PBB SHADOW E&M-EST. PATIENT-LVL III: CPT | Mod: PBBFAC,HCNC,, | Performed by: PSYCHIATRY & NEUROLOGY

## 2020-05-15 PROCEDURE — 1126F PR PAIN SEVERITY QUANTIFIED, NO PAIN PRESENT: ICD-10-PCS | Mod: HCNC,S$GLB,, | Performed by: PSYCHIATRY & NEUROLOGY

## 2020-05-15 PROCEDURE — 1101F PT FALLS ASSESS-DOCD LE1/YR: CPT | Mod: HCNC,CPTII,S$GLB, | Performed by: PSYCHIATRY & NEUROLOGY

## 2020-05-15 PROCEDURE — 1101F PR PT FALLS ASSESS DOC 0-1 FALLS W/OUT INJ PAST YR: ICD-10-PCS | Mod: HCNC,CPTII,S$GLB, | Performed by: PSYCHIATRY & NEUROLOGY

## 2020-05-15 PROCEDURE — 1159F PR MEDICATION LIST DOCUMENTED IN MEDICAL RECORD: ICD-10-PCS | Mod: HCNC,S$GLB,, | Performed by: PSYCHIATRY & NEUROLOGY

## 2020-05-15 PROCEDURE — 99999 PR PBB SHADOW E&M-EST. PATIENT-LVL IV: ICD-10-PCS | Mod: PBBFAC,HCNC,, | Performed by: NURSE PRACTITIONER

## 2020-05-15 PROCEDURE — 99499 RISK ADDL DX/OHS AUDIT: ICD-10-PCS | Mod: S$GLB,,, | Performed by: NURSE PRACTITIONER

## 2020-05-15 PROCEDURE — G0439 PPPS, SUBSEQ VISIT: HCPCS | Mod: HCNC,S$GLB,, | Performed by: NURSE PRACTITIONER

## 2020-05-15 PROCEDURE — 1159F MED LIST DOCD IN RCRD: CPT | Mod: HCNC,S$GLB,, | Performed by: PSYCHIATRY & NEUROLOGY

## 2020-05-15 PROCEDURE — 99499 UNLISTED E&M SERVICE: CPT | Mod: HCNC,S$GLB,, | Performed by: PSYCHIATRY & NEUROLOGY

## 2020-05-15 PROCEDURE — 99999 PR PBB SHADOW E&M-EST. PATIENT-LVL III: ICD-10-PCS | Mod: PBBFAC,HCNC,, | Performed by: PSYCHIATRY & NEUROLOGY

## 2020-05-15 NOTE — PATIENT INSTRUCTIONS
Counseling and Referral of Other Preventative  (Italic type indicates deductible and co-insurance are waived)    Patient Name: Randall Strickland  Today's Date: 5/15/2020    Health Maintenance       Date Due Completion Date    Shingles Vaccine (2 of 3) 05/20/2015 3/25/2015    Lipid Panel 05/30/2019 5/30/2018 (Done)    Override on 5/30/2018: Done    Colonoscopy 02/22/2020 2/22/2017 (ClinicallyNA)    Override on 2/22/2017: Not Clinically Appropriate    Override on 1/18/2011: Done    TETANUS VACCINE 05/05/2026 5/5/2016        No orders of the defined types were placed in this encounter.    The following information is provided to all patients.  This information is to help you find resources for any of the problems found today that may be affecting your health:                Living healthy guide: www.[facility].[place].gov      Understanding Diabetes: www.diabetes.org      Eating healthy: www.cdc.gov/healthyweight      [facility] home safety checklist: www.cdc.gov/steadi/patient.html      Agency on Aging: www.goea.[place].[facility]      Alcoholics anonymous (AA): www.aa.org      Physical Activity: www.reji.nih.gov/bk7ncll      Tobacco use: www.quitwithusla.org

## 2020-05-15 NOTE — LETTER
May 15, 2020      Tiago Borja MD  [address]  [facility] [address]           [facility]  [address]  [facility] [address]  Phone: [phone]  Fax: [phone]          Patient: Randall Strickland Jr.   MR Number: [id number]   YOB: 1927   Date of Visit: 5/15/2020       Dear Dr. Tiago Borja:    Thank you for referring Randall Strickland to me for evaluation. Attached you will find relevant portions of my assessment and plan of care.    If you have questions, please do not hesitate to call me. I look forward to following Randall Strickland along with you.    Sincerely,    Swapna Delarosa MD    Enclosure  CC:  No Recipients    If you would like to receive this communication electronically, please contact [email or web address] or [phone] to request more information on [employer] access.    For providers and/or their staff who would like to refer a patient to [facility], please contact us through our one-stop-shop provider referral line, [facility], at [phone].    If you feel you have received this communication in error or would no longer like to receive these types of communications, please e-mail [email or web address]

## 2020-05-15 NOTE — PROGRESS NOTES
"Randall Stevens I. Chief Complaints during this visit:  f/u Patient visit for  Gait disturbance    Tiago Borja MD  [address] [place] [address]    Primary Care Physician  Tiago Borja MD  [address]  [facility] [id number]      History of present illness:   89 y.o.  M seen in f/u for dementia and gait disturbance.    Accompanied by wife.  Doing well, no falls.  Walks to corner daily.  Memory is "ok" per wife.  Hearing is poor.    1/28/20  Admitted in December for pneumonia (2 days).  Prescribed eliquis for new afib, though pcp has concerns using this given patient's known fall risk.  He is not currently taking eliquis, that he is aware of, and the last cardiology note in the hospital seemed to defer this (per notes:  CHADS-VASc 5 but given h/o TAVR, will hold off on anticoagulation).  He has an old med list which he says is out-of-date, but plavix is on that list (was taking until it was discontinued inpt).  Feeling pretty good.  No falls in last few months.    9/4/19  Has fallen once in the house, backwards, minor abraision on his head.    5/3/19:  Unaccompanied.    Completed PT, found it helpful.  No falls.  Forgot his hearing aids today.  Pain in left shoulder, short episodes.  Several times/day.  No obvious trigger.  Does not matter if he has moved arm.  Concerned that in past, this resulted in a cardiac stent.    Interval history 1/7/19:  He gets out of breath with long walk from garage, but did not have to take break.  No falls.  Appetite good.  Eating "too much ice cream."  Sleeping well.  Mood fine.  Mildly irritated with his gait.  Rides bike without problem.  +nocturia  Endorses bowel leakage, mild.  Continued low back pain.    Interval history 8/13/18:  He reports that his gait is about the same, no falls.    Interval history 5/14/18:  Accompanied by wife and here to review the results of cisternogram.  No falls.    Denies heart failure or cardiomyopathy.  Still has low " back pain that worsens as he walks.    From my note note 3/23/18:  Unaccompanied, but I spoke with wife by phone.  Here today for f/u results.  He has started the vitamin supplements.  No falls.    Interval history 12/19/17:  consultation at the request of  Dr. Monk for evaluation of gait disorder.  Had aortic valve replacement and since then, he has more trouble walking, a couple falls (last was a month ago).  Worse with eyes closed and in dark.  Has history of back problems with RFA in recent time, bilateral knee surgery.  Does not use cane or walker and resistent to using them.  Feet remain swollen.  No syncope or presyncope.    Has known aortic atherosclerosis and aortic valve replacement.  Bilateral carotid disease is on problem list, but I can't find study to confirm this.      II.  Review of systems:  As in HPI,  otherwise, balance 3 systems reviewed and are negative.    III.  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    PVD (peripheral vascular disease) 8/22/2018    Stenosis of aortic and mitral valves     aortic DAHLIA 1.1 CM     Family History   Problem Relation Age of Onset    No Known Problems Father      Social History     Socioeconomic History    Marital status:      Spouse name: Sade   Social Needs   Occupational History    Occupation: Retired, insurance company     Employer: retired 1992   Tobacco Use    Smoking status: Never Smoker    Smokeless tobacco: Never Used   Substance and Sexual Activity    Alcohol use: Yes     Comment: less than one  drink weekly         Current Outpatient Medications on File Prior to Visit   Medication Sig Dispense Refill    amitriptyline (ELAVIL) 25 MG tablet TAKE 1 TABLET(25 MG) BY MOUTH EVERY EVENING 90 tablet 0    amLODIPine  "(NORVASC) 5 MG tablet TAKE 1 TABLET(5 MG) BY MOUTH EVERY DAY 30 tablet 6    apixaban (ELIQUIS) 2.5 mg Tab Take 1 tablet (2.5 mg total) by mouth 2 (two) times daily. 60 tablet 11    finasteride (PROSCAR) 5 mg tablet Take 1 tablet (5 mg total) by mouth once daily. 90 tablet 3    gabapentin (NEURONTIN) 300 MG capsule TAKE 1 CAPSULE(300 MG) BY MOUTH EVERY EVENING 90 capsule 0    mupirocin (BACTROBAN) 2 % ointment Apply to affected area 1 time daily 22 g 1    rivastigmine tartrate (EXELON) 3 MG capsule TAKE 1 CAPSULE(3 MG) BY MOUTH TWICE DAILY 60 capsule 11    tamsulosin (FLOMAX) 0.4 mg Cap Take 1 capsule (0.4 mg total) by mouth once daily. 90 capsule 3    thiamine 100 MG tablet Take 100 mg by mouth once daily.      tolterodine (DETROL LA) 4 MG 24 hr capsule       torsemide (DEMADEX) 20 MG Tab TAKE 1 TABLET(20 MG) BY MOUTH EVERY DAY 30 tablet 11    omeprazole (PRILOSEC) 20 MG capsule TAKE 1 CAPSULE(20 MG) BY MOUTH EVERY DAY (Patient not taking: Reported on 5/15/2020) 30 capsule 0     No current facility-administered medications on file prior to visit.        PRIOR problem-specific medications tried:  na    Review of patient's allergies indicates:  No Known Allergies    IV. Physical Exam    Vitals:    05/15/20 1053   BP: 125/69   Pulse: 98   Weight: 71.9 kg (158 lb 8.2 oz)   Height: 5' 8" (1.727 m)     General appearance: Well nourished, well developed, no acute distress.             -------------------------------------------------------------  Facial Expression: normal       Affect: full       Orientation to time & place:  Oriented to 2010, place, person and situation       Attention & concentration:  Normal attention span and concentration       Memory:  0/5  Language: Spontaneous, fluent; able to repeat and name objects        Fund of knowledge:  Aware of current events        Speech:  normal (not dysarthric)  -------------------------------------------------------  Cranial nerves: wears glasses, visual " fields full, optic discs not visualized, pupils equal round and reactive, extraocular movements intact,       facial sensation intact, face symmetrical, hearing poor, palate raises midline, shoulder shrug strength normal, tongue protrudes midline.        -------------------------------------------------------  Musculoskeletal  Muscle tone: 1: Slight cogwheel Rigidity only detected with activation maneuver. RUE        Muscle Bulk: all 4 extremities normal        Muscle strength:  5/5 in all 4 extremities        No pronator drift  Sensation: loss in vibration up to knees       Deep tendon Reflexes: 1 bilateral biceps, triceps, none at patella and ankles        --------------------------------------------------------------  Cerebellar and Coordination  Gait:  low step height, mild imbalance, + enbloc turn, no festination.       Finger-nose: no dysmetria       Rapid Alternating Movements (pronation/supination):  R slowing; L normal  --------------------------------------------------------------  MOVEMENT DISORDERS FOCUSED EXAM  Abnormality of movement (bradykinesia, hyperkinesia) present? No    Tremor present?   No   Posture:  stooped  Postural stability: + Rhomberg    V.  Laboratory/ Radiological Data:     EKG 5/3/19  Sinus bradycardia with Premature atrial complexes  Left axis deviation  Abnormal ECG  When compared with ECG of 31-AUG-2016 03:29,  QRS duration has increased  Nonspecific T wave abnormality has replaced inverted T waves in Lateral  Leads    Cisternogram 4/26/18:  No evidence of normal pressure hydrocephalus.    CT head 12/26/17:  Age-appropriate generalized cerebral volume loss  Mild prominence of the lateral and third ventricles which may be compensatory to volume loss however slightly disproportionate cannot exclude component of hydrocephalus.  There is a small hypodensity in the left cerebellum suggestive for remote infarction.      Lab Results   Component Value Date    TSH 0.993 12/03/2019     Lab  Results   Component Value Date    [id number] 355 12/19/2017     Lab Results   Component Value Date    HGBA1C 5.7 04/10/2014        MRI brain 12/26/17:  Mild prominence of the lateral and third ventricles which may be compensatory to volume loss however slightly disproportionate cannot exclude component of hydrocephalus.    There is a small hypodensity in the left cerebellum suggestive for remote infarction.        VI. Assessment and Plan            Problem List Items Addressed This Visit        1 - High    Gait disorder - Primary    Overview     Mild imbalance, frontal gait ataxia vs lower body parkinsonism.  NPH ruled out 4/2018         Current Assessment & Plan     Mild imbalance, looking slightly more parkinsonian each visit, but still not significant enough, in my opinion, to warrant risking dopaminergics.  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   -> consider levodopa if symptoms become more clearly parkinsonian.            2     Dementia    Overview     Mini-Cog score 2 (2/22/17)  [place] Cognitive Assessment:   20/30 (12/2017)            3     Aortic atherosclerosis    Bilateral carotid artery disease              No follow-ups on file.

## 2020-05-15 NOTE — PROGRESS NOTES
"Randall Strickland presented for a  Medicare AWV and comprehensive Health Risk Assessment today. The following components were reviewed and updated:    · Medical history  · Family History  · Social history  · Allergies and Current Medications  · Health Risk Assessment  · Health Maintenance  · Care Team     ** See Completed Assessments for Annual Wellness Visit within the encounter summary.**       The following assessments were completed:  · Living Situation  · CAGE  · Depression Screening  · Timed Get Up and Go  · Whisper Test  · Cognitive Function Screening* Dementia  · Nutrition Screening  · ADL Screening  · PAQ Screening    Vitals:    05/15/20 1003   BP: (!) 96/54   Pulse: 88   SpO2: 97%   Weight: 71.9 kg (158 lb 8.2 oz)   Height: 5' 8" (1.727 m)     Body mass index is 24.1 kg/m².  Physical Exam   Constitutional: He appears well-developed and well-nourished.   HENT:   Head: Normocephalic and atraumatic.   Nose: Nose normal.   Cardiovascular: Normal rate and regular rhythm.   Murmur heard.  Pulmonary/Chest: Effort normal. No respiratory distress.   Musculoskeletal:   Abnormal gait   Neurological: He is alert.   Skin: Skin is warm and dry.   Psychiatric: He has a normal mood and affect. His behavior is normal. Thought content normal.   Nursing note and vitals reviewed.        Diagnoses and health risks identified today and associated recommendations/orders:    1. Encounter for preventive health examination  Assessment performed. Health maintenance updated. Chart review completed.    2. Abnormality of gait and mobility  Chronic. Continue current regimen. Followed by Neurology, visit today  No assistive device use.    3. Bilateral carotid artery disease, unspecified type  Chronic. Continue current regimen as instructed by Cardiology.  Stable.    4. Chronic diastolic congestive heart failure  Chronic. Continue current regimen as instructed by Cardiology.  Stable.    5. PVD (peripheral vascular disease)  Chronic. Continue " current regimen as instructed by Cardiology.  Stable.    6. Cardiomyopathy, idiopathic  Chronic. Continue current regimen as instructed by Cardiology.  Stable.    7. Aortic atherosclerosis  Noted on imaging. Chronic. Stable.  Followed by PCP.    8. Hypertension, essential  Chronic. Stable. Followed by PCP    9. Longstanding persistent atrial fibrillation  Chronic. Continue current regimen as instructed by Cardiology.  Stable.    10. S/P aortic valve replacement  Chronic. Continue current regimen as instructed by Cardiology.  Stable.    11. Chronic kidney disease, stage III (moderate)  Continue as instructed. Followed by PCP  Component      Latest Ref Rng & Units 1/22/2020   Creatinine      0.5 - 1.4 mg/dL 1.2   Chronic.    12. Chronic anticoagulation  Chronic. Continue current regimen as instructed by Cardiology.  Stable.    13. Gait disorder  Chronic. Continue current regimen. Followed by Neurology, visit today    14. Dementia without behavioral disturbance, unspecified dementia type  Chronic. Continue current regimen. Followed by Neurology, visit today    15. Facet arthritis, degenerative, lumbar spine  Stable.      Provided Randall with a 5-10 year written screening schedule and personal prevention plan. Recommendations were developed using the USPSTF age appropriate recommendations. Education, counseling, and referrals were provided as needed. After Visit Summary printed and given to patient which includes a list of additional screenings\tests needed.    Follow up for Annual Wellness Visit in 1 year, F/U with PCP as instructed, ;sooner if problems.    HEIDI Schmitz

## 2020-05-21 RX ORDER — TOLTERODINE 4 MG/1
4 CAPSULE, EXTENDED RELEASE ORAL DAILY
Qty: 30 CAPSULE | Refills: 11 | Status: SHIPPED | OUTPATIENT
Start: 2020-05-21 | End: 2022-01-01

## 2020-06-09 ENCOUNTER — OFFICE VISIT (OUTPATIENT)
Dept: UROLOGY | Facility: CLINIC | Age: 85
End: 2020-06-09
Payer: MEDICARE

## 2020-06-09 VITALS
BODY MASS INDEX: 23.2 KG/M2 | WEIGHT: 152.56 LBS | SYSTOLIC BLOOD PRESSURE: 103 MMHG | DIASTOLIC BLOOD PRESSURE: 66 MMHG | HEART RATE: 99 BPM

## 2020-06-09 DIAGNOSIS — N40.2 PROSTATE NODULE: Primary | ICD-10-CM

## 2020-06-09 PROCEDURE — 1159F MED LIST DOCD IN RCRD: CPT | Mod: HCNC,S$GLB,, | Performed by: UROLOGY

## 2020-06-09 PROCEDURE — 1101F PR PT FALLS ASSESS DOC 0-1 FALLS W/OUT INJ PAST YR: ICD-10-PCS | Mod: HCNC,CPTII,S$GLB, | Performed by: UROLOGY

## 2020-06-09 PROCEDURE — 1159F PR MEDICATION LIST DOCUMENTED IN MEDICAL RECORD: ICD-10-PCS | Mod: HCNC,S$GLB,, | Performed by: UROLOGY

## 2020-06-09 PROCEDURE — 99214 OFFICE O/P EST MOD 30 MIN: CPT | Mod: HCNC,S$GLB,, | Performed by: UROLOGY

## 2020-06-09 PROCEDURE — 99214 PR OFFICE/OUTPT VISIT, EST, LEVL IV, 30-39 MIN: ICD-10-PCS | Mod: HCNC,S$GLB,, | Performed by: UROLOGY

## 2020-06-09 PROCEDURE — 99999 PR PBB SHADOW E&M-EST. PATIENT-LVL III: ICD-10-PCS | Mod: PBBFAC,HCNC,, | Performed by: UROLOGY

## 2020-06-09 PROCEDURE — 99999 PR PBB SHADOW E&M-EST. PATIENT-LVL III: CPT | Mod: PBBFAC,HCNC,, | Performed by: UROLOGY

## 2020-06-09 PROCEDURE — 1101F PT FALLS ASSESS-DOCD LE1/YR: CPT | Mod: HCNC,CPTII,S$GLB, | Performed by: UROLOGY

## 2020-06-09 NOTE — PROGRESS NOTES
Subjective:       Patient ID: Randall Strickland Jr. is a 89 y.o. male.    Chief Complaint: Other (checkup)    HPI patient has a soft right-sided prostate nodule and is here for repeat examination.  His PSA is 0.25.  He is voiding well.  No back or bone pain out of the ordinary.  He is voiding well.    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    PVD (peripheral vascular disease) 8/22/2018    Stenosis of aortic and mitral valves     aortic DAHLIA 1.1 CM       Past Surgical History:   Procedure Laterality Date    APPENDECTOMY      cararact  car    CARDIAC CATHETERIZATION      CARDIAC VALVE SURGERY      CATARACT EXTRACTION BILATERAL W/ ANTERIOR VITRECTOMY      bilaterial    CATARACT EXTRACTION W/  INTRAOCULAR LENS IMPLANT Bilateral     CHOLECYSTECTOMY      EYE SURGERY      JOINT REPLACEMENT      bilateral hip    KNEE SURGERY      Bilateral    TOTAL HIP ARTHROPLASTY      Bilaterial       Family History   Problem Relation Age of Onset    No Known Problems Father     No Known Problems Mother     No Known Problems Sister     No Known Problems Brother     No Known Problems Maternal Aunt     No Known Problems Maternal Uncle     No Known Problems Paternal Aunt     No Known Problems Paternal Uncle     No Known Problems Maternal Grandmother     No Known Problems Maternal Grandfather     No Known Problems Paternal Grandmother     No Known Problems Paternal Grandfather     No Known Problems Daughter     No Known Problems Son     No Known Problems Daughter     No Known Problems Son     Amblyopia Neg Hx     Blindness Neg Hx     Cancer Neg Hx     Cataracts Neg Hx     Diabetes Neg Hx     Glaucoma Neg Hx     Hypertension Neg Hx     Macular degeneration Neg Hx     Retinal detachment  Neg Hx     Strabismus Neg Hx     Stroke Neg Hx     Thyroid disease Neg Hx     Anemia Neg Hx     Arrhythmia Neg Hx     Asthma Neg Hx     Clotting disorder Neg Hx     Fainting Neg Hx     Heart attack Neg Hx     Heart disease Neg Hx     Heart failure Neg Hx     Hyperlipidemia Neg Hx     Atrial Septal Defect Neg Hx        Social History     Socioeconomic History    Marital status:      Spouse name: Sade    Number of children: Not on file    Years of education: Not on file    Highest education level: Not on file   Occupational History    Occupation: Retired     Employer: retired 1992   Social Needs    Financial resource strain: Not on file    Food insecurity:     Worry: Not on file     Inability: Not on file    Transportation needs:     Medical: Not on file     Non-medical: Not on file   Tobacco Use    Smoking status: Never Smoker    Smokeless tobacco: Never Used   Substance and Sexual Activity    Alcohol use: Yes     Comment: less than one  drink weekly    Drug use: No    Sexual activity: Not on file   Lifestyle    Physical activity:     Days per week: Not on file     Minutes per session: Not on file    Stress: Not on file   Relationships    Social connections:     Talks on phone: Not on file     Gets together: Not on file     Attends Anabaptist service: Not on file     Active member of club or organization: Not on file     Attends meetings of clubs or organizations: Not on file     Relationship status: Not on file   Other Topics Concern    Not on file   Social History Narrative    Not on file       Allergies:  Patient has no known allergies.    Medications:    Current Outpatient Medications:     amitriptyline (ELAVIL) 25 MG tablet, TAKE 1 TABLET(25 MG) BY MOUTH EVERY EVENING, Disp: 90 tablet, Rfl: 0    amLODIPine (NORVASC) 5 MG tablet, TAKE 1 TABLET(5 MG) BY MOUTH EVERY DAY, Disp: 30 tablet, Rfl: 6    apixaban (ELIQUIS) 2.5 mg Tab, Take 1 tablet (2.5 mg total) by mouth 2  (two) times daily., Disp: 60 tablet, Rfl: 11    finasteride (PROSCAR) 5 mg tablet, Take 1 tablet (5 mg total) by mouth once daily., Disp: 90 tablet, Rfl: 3    gabapentin (NEURONTIN) 300 MG capsule, TAKE 1 CAPSULE(300 MG) BY MOUTH EVERY EVENING, Disp: 90 capsule, Rfl: 0    mupirocin (BACTROBAN) 2 % ointment, Apply to affected area 1 time daily, Disp: 22 g, Rfl: 1    omeprazole (PRILOSEC) 20 MG capsule, TAKE 1 CAPSULE(20 MG) BY MOUTH EVERY DAY (Patient not taking: Reported on 5/15/2020), Disp: 30 capsule, Rfl: 0    rivastigmine tartrate (EXELON) 3 MG capsule, TAKE 1 CAPSULE(3 MG) BY MOUTH TWICE DAILY, Disp: 60 capsule, Rfl: 11    tamsulosin (FLOMAX) 0.4 mg Cap, Take 1 capsule (0.4 mg total) by mouth once daily., Disp: 90 capsule, Rfl: 3    thiamine 100 MG tablet, Take 100 mg by mouth once daily., Disp: , Rfl:     tolterodine (DETROL LA) 4 MG 24 hr capsule, Take 1 capsule (4 mg total) by mouth once daily., Disp: 30 capsule, Rfl: 11    torsemide (DEMADEX) 20 MG Tab, TAKE 1 TABLET(20 MG) BY MOUTH EVERY DAY, Disp: 30 tablet, Rfl: 11    Review of Systems   Constitutional: Negative for activity change, appetite change, chills, diaphoresis, fatigue, fever and unexpected weight change.   HENT: Negative for congestion, dental problem, hearing loss, mouth sores, postnasal drip, rhinorrhea, sinus pressure and trouble swallowing.    Eyes: Negative for pain, discharge and itching.   Respiratory: Negative for apnea, cough, choking, chest tightness, shortness of breath and wheezing.    Cardiovascular: Negative for chest pain, palpitations and leg swelling.   Gastrointestinal: Negative for abdominal distention, abdominal pain, anal bleeding, blood in stool, constipation, diarrhea, nausea, rectal pain and vomiting.   Endocrine: Negative for polydipsia and polyuria.   Genitourinary: Negative for decreased urine volume, difficulty urinating, discharge, dysuria, enuresis, flank pain, frequency, genital sores, hematuria, penile  pain, penile swelling, scrotal swelling, testicular pain and urgency.   Musculoskeletal: Negative for arthralgias, back pain and myalgias.   Skin: Negative for color change, rash and wound.   Neurological: Negative for dizziness, syncope, speech difficulty, light-headedness and headaches.   Hematological: Negative for adenopathy. Does not bruise/bleed easily.   Psychiatric/Behavioral: Negative for behavioral problems, confusion, hallucinations and sleep disturbance.       Objective:      Physical Exam   Constitutional: He appears well-developed.   HENT:   Head: Normocephalic.   Cardiovascular: Normal rate.    Pulmonary/Chest: Effort normal.   Abdominal: Soft.   Genitourinary: Prostate normal.   Genitourinary Comments: Right-sided soft movable nodule.  No hard nodules.  No definitive evidence of prostate cancer   Neurological: He is alert.   Skin: Skin is warm.     Psychiatric: He has a normal mood and affect.       Assessment:       1. Prostate nodule        Plan:       Randall was seen today for other.    Diagnoses and all orders for this visit:    Prostate nodule        return to clinic 1 year for repeat examination.  I would treat this very conservatively and I do not think there is an indication for biopsy at this time

## 2020-06-19 ENCOUNTER — PATIENT OUTREACH (OUTPATIENT)
Dept: ADMINISTRATIVE | Facility: HOSPITAL | Age: 85
End: 2020-06-19

## 2020-06-19 DIAGNOSIS — M51.36 DDD (DEGENERATIVE DISC DISEASE), LUMBAR: ICD-10-CM

## 2020-06-19 DIAGNOSIS — M54.16 LEFT LUMBAR RADICULITIS: ICD-10-CM

## 2020-06-19 DIAGNOSIS — M70.62 GREATER TROCHANTERIC BURSITIS OF LEFT HIP: ICD-10-CM

## 2020-06-19 DIAGNOSIS — E78.5 HYPERLIPIDEMIA, UNSPECIFIED HYPERLIPIDEMIA TYPE: Primary | ICD-10-CM

## 2020-06-20 NOTE — PROGRESS NOTES
Refill Routing Note    Medication(s) are not appropriate for processing by [facility]:       Outside of protocol           Medication reconciliation completed: No      Automatic Epic Protocol Generated Data:    Requested Prescriptions   Pending Prescriptions Disp Refills    gabapentin (NEURONTIN) 300 MG capsule [Pharmacy Med Name: GABAPENTIN 300MG CAPSULES] 90 capsule 0     Sig: TAKE 1 CAPSULE(300 MG) BY MOUTH EVERY EVENING       Anticonvulsants Protocol Passed - 6/19/2020  7:35 PM        Passed - Visit with Authorizing provider in past 9 months or upcoming 90 days              Appointments  past 12m or future 3m with PCP    Date Provider   Last Visit   3/4/2020 Tiago Borja MD   Next Visit   7/2/2020 Tiago Borja MD   ED visits in past 90 days: 0     Note composed:5:35 PM 06/20/2020

## 2020-06-22 RX ORDER — GABAPENTIN 300 MG/1
CAPSULE ORAL
Qty: 90 CAPSULE | Refills: 0 | Status: SHIPPED | OUTPATIENT
Start: 2020-06-22 | End: 2022-01-01

## 2020-06-24 ENCOUNTER — PATIENT OUTREACH (OUTPATIENT)
Dept: ADMINISTRATIVE | Facility: HOSPITAL | Age: 85
End: 2020-06-24

## 2020-07-08 ENCOUNTER — LAB VISIT (OUTPATIENT)
Dept: LAB | Facility: HOSPITAL | Age: 85
End: 2020-07-08
Attending: FAMILY MEDICINE
Payer: MEDICARE

## 2020-07-08 ENCOUNTER — OFFICE VISIT (OUTPATIENT)
Dept: INTERNAL MEDICINE | Facility: CLINIC | Age: 85
End: 2020-07-08
Attending: FAMILY MEDICINE
Payer: MEDICARE

## 2020-07-08 VITALS
BODY MASS INDEX: 22.95 KG/M2 | DIASTOLIC BLOOD PRESSURE: 66 MMHG | HEART RATE: 115 BPM | SYSTOLIC BLOOD PRESSURE: 114 MMHG | OXYGEN SATURATION: 98 % | HEIGHT: 68 IN | WEIGHT: 151.44 LBS

## 2020-07-08 DIAGNOSIS — R79.89 ABNORMAL COMPLETE BLOOD COUNT: ICD-10-CM

## 2020-07-08 DIAGNOSIS — Z79.01 CHRONIC ANTICOAGULATION: ICD-10-CM

## 2020-07-08 DIAGNOSIS — I50.32 CHRONIC DIASTOLIC CONGESTIVE HEART FAILURE: ICD-10-CM

## 2020-07-08 DIAGNOSIS — E78.5 HYPERLIPIDEMIA, UNSPECIFIED HYPERLIPIDEMIA TYPE: ICD-10-CM

## 2020-07-08 DIAGNOSIS — Z95.2 S/P AORTIC VALVE REPLACEMENT: ICD-10-CM

## 2020-07-08 DIAGNOSIS — R63.4 UNINTENTIONAL WEIGHT LOSS: ICD-10-CM

## 2020-07-08 DIAGNOSIS — R63.4 UNINTENTIONAL WEIGHT LOSS: Primary | ICD-10-CM

## 2020-07-08 DIAGNOSIS — I48.11 LONGSTANDING PERSISTENT ATRIAL FIBRILLATION: ICD-10-CM

## 2020-07-08 DIAGNOSIS — R74.8 ELEVATED ALKALINE PHOSPHATASE LEVEL: ICD-10-CM

## 2020-07-08 DIAGNOSIS — F03.90 DEMENTIA WITHOUT BEHAVIORAL DISTURBANCE, UNSPECIFIED DEMENTIA TYPE: ICD-10-CM

## 2020-07-08 DIAGNOSIS — I10 HYPERTENSION, ESSENTIAL: ICD-10-CM

## 2020-07-08 LAB
ALBUMIN SERPL BCP-MCNC: 3.2 G/DL (ref 3.5–5.2)
ALP SERPL-CCNC: 163 U/L (ref 55–135)
ALT SERPL W/O P-5'-P-CCNC: 14 U/L (ref 10–44)
ANION GAP SERPL CALC-SCNC: 9 MMOL/L (ref 8–16)
AST SERPL-CCNC: 23 U/L (ref 10–40)
BILIRUB SERPL-MCNC: 0.5 MG/DL (ref 0.1–1)
BUN SERPL-MCNC: 30 MG/DL (ref 10–30)
CALCIUM SERPL-MCNC: 9.2 MG/DL (ref 8.7–10.5)
CHLORIDE SERPL-SCNC: 105 MMOL/L (ref 95–110)
CHOLEST SERPL-MCNC: 124 MG/DL (ref 120–199)
CHOLEST/HDLC SERPL: 4 {RATIO} (ref 2–5)
CO2 SERPL-SCNC: 25 MMOL/L (ref 23–29)
CREAT SERPL-MCNC: 1.6 MG/DL (ref 0.5–1.4)
ERYTHROCYTE [DISTWIDTH] IN BLOOD BY AUTOMATED COUNT: 13.5 % (ref 11.5–14.5)
EST. GFR  (AFRICAN AMERICAN): 42.3 ML/MIN/1.73 M^2
EST. GFR  (NON AFRICAN AMERICAN): 36.6 ML/MIN/1.73 M^2
FERRITIN SERPL-MCNC: 420 NG/ML (ref 20–300)
GLUCOSE SERPL-MCNC: 87 MG/DL (ref 70–110)
HCT VFR BLD AUTO: 34.8 % (ref 40–54)
HDLC SERPL-MCNC: 31 MG/DL (ref 40–75)
HDLC SERPL: 25 % (ref 20–50)
HGB BLD-MCNC: 11.2 G/DL (ref 14–18)
IRON SERPL-MCNC: 64 UG/DL (ref 45–160)
LDLC SERPL CALC-MCNC: 72.2 MG/DL (ref 63–159)
MCH RBC QN AUTO: 31.7 PG (ref 27–31)
MCHC RBC AUTO-ENTMCNC: 32.2 G/DL (ref 32–36)
MCV RBC AUTO: 99 FL (ref 82–98)
NONHDLC SERPL-MCNC: 93 MG/DL
PLATELET # BLD AUTO: 44 K/UL (ref 150–350)
PMV BLD AUTO: 13.8 FL (ref 9.2–12.9)
POTASSIUM SERPL-SCNC: 4.1 MMOL/L (ref 3.5–5.1)
PROT SERPL-MCNC: 8.3 G/DL (ref 6–8.4)
RBC # BLD AUTO: 3.53 M/UL (ref 4.6–6.2)
SODIUM SERPL-SCNC: 139 MMOL/L (ref 136–145)
TRIGL SERPL-MCNC: 104 MG/DL (ref 30–150)
WBC # BLD AUTO: 6.49 K/UL (ref 3.9–12.7)

## 2020-07-08 PROCEDURE — 99999 PR PBB SHADOW E&M-EST. PATIENT-LVL IV: CPT | Mod: PBBFAC,HCNC,, | Performed by: FAMILY MEDICINE

## 2020-07-08 PROCEDURE — 99214 OFFICE O/P EST MOD 30 MIN: CPT | Mod: HCNC,S$GLB,, | Performed by: FAMILY MEDICINE

## 2020-07-08 PROCEDURE — 84080 ASSAY ALKALINE PHOSPHATASES: CPT | Mod: HCNC

## 2020-07-08 PROCEDURE — 99499 RISK ADDL DX/OHS AUDIT: ICD-10-PCS | Mod: S$GLB,,, | Performed by: FAMILY MEDICINE

## 2020-07-08 PROCEDURE — 85027 COMPLETE CBC AUTOMATED: CPT | Mod: HCNC

## 2020-07-08 PROCEDURE — 1101F PT FALLS ASSESS-DOCD LE1/YR: CPT | Mod: HCNC,CPTII,S$GLB, | Performed by: FAMILY MEDICINE

## 2020-07-08 PROCEDURE — 84075 ASSAY ALKALINE PHOSPHATASE: CPT | Mod: HCNC

## 2020-07-08 PROCEDURE — 99499 UNLISTED E&M SERVICE: CPT | Mod: S$GLB,,, | Performed by: FAMILY MEDICINE

## 2020-07-08 PROCEDURE — 99214 PR OFFICE/OUTPT VISIT, EST, LEVL IV, 30-39 MIN: ICD-10-PCS | Mod: HCNC,S$GLB,, | Performed by: FAMILY MEDICINE

## 2020-07-08 PROCEDURE — 80061 LIPID PANEL: CPT | Mod: HCNC

## 2020-07-08 PROCEDURE — 1159F PR MEDICATION LIST DOCUMENTED IN MEDICAL RECORD: ICD-10-PCS | Mod: HCNC,S$GLB,, | Performed by: FAMILY MEDICINE

## 2020-07-08 PROCEDURE — 80053 COMPREHEN METABOLIC PANEL: CPT | Mod: HCNC

## 2020-07-08 PROCEDURE — 1126F PR PAIN SEVERITY QUANTIFIED, NO PAIN PRESENT: ICD-10-PCS | Mod: HCNC,S$GLB,, | Performed by: FAMILY MEDICINE

## 2020-07-08 PROCEDURE — 1101F PR PT FALLS ASSESS DOC 0-1 FALLS W/OUT INJ PAST YR: ICD-10-PCS | Mod: HCNC,CPTII,S$GLB, | Performed by: FAMILY MEDICINE

## 2020-07-08 PROCEDURE — 1159F MED LIST DOCD IN RCRD: CPT | Mod: HCNC,S$GLB,, | Performed by: FAMILY MEDICINE

## 2020-07-08 PROCEDURE — 99999 PR PBB SHADOW E&M-EST. PATIENT-LVL IV: ICD-10-PCS | Mod: PBBFAC,HCNC,, | Performed by: FAMILY MEDICINE

## 2020-07-08 PROCEDURE — 1126F AMNT PAIN NOTED NONE PRSNT: CPT | Mod: HCNC,S$GLB,, | Performed by: FAMILY MEDICINE

## 2020-07-08 PROCEDURE — 82728 ASSAY OF FERRITIN: CPT | Mod: HCNC

## 2020-07-08 PROCEDURE — 83540 ASSAY OF IRON: CPT | Mod: HCNC

## 2020-07-08 PROCEDURE — 36415 COLL VENOUS BLD VENIPUNCTURE: CPT | Mod: HCNC

## 2020-07-08 NOTE — PROGRESS NOTES
Subjective:       Patient ID: Randall Strickland Jr. is a 89 y.o. male.    Chief Complaint: Follow-up (4 MONTH F/U )    Established patient follows up for management of chronic medical illnesses with complaints today. Please see dictation and ROS for interval problems, specific complaints and disease management discussion.    P, S, Fm, Soc Hx's; Meds, allergies reviewed and reconciled.  Health maintenance file reviewed and addressed items due.    Four-month follow-up at my request.  He had seen a colorectal provider.  The changes on his CT scan from January were not apparent on his CT scan on March 18, 2020.  This was an area of concern in the ascending colon.  The original scan was obtained for weight loss.  Patient states his appetite is decreased but has no mechanical symptoms.    We had attempted to check a follow-up alkaline phosphatase but for some reason unable to get a good result.  Will check again today.  No diffuse bone pain is reported.  Some chronic back pain that he tends to under emphasized today.    In general doing well, poor historian.  Poor recollection of medications.  Compliant seems to be in order.    Review of Systems   Unable to perform ROS: Dementia   Constitutional: Positive for activity change and appetite change. Negative for chills, diaphoresis, fatigue, fever and unexpected weight change.   HENT: Negative for congestion, postnasal drip, rhinorrhea, sore throat and trouble swallowing.    Eyes: Negative for visual disturbance.   Respiratory: Negative for cough, choking, chest tightness, shortness of breath and wheezing.    Cardiovascular: Negative for chest pain and leg swelling.   Gastrointestinal: Negative for abdominal distention, abdominal pain, diarrhea, nausea and vomiting.   Genitourinary: Negative for difficulty urinating.   Musculoskeletal: Positive for arthralgias and neck pain. Negative for back pain and myalgias.   Skin: Negative for rash.   Neurological: Negative for weakness,  light-headedness and headaches.   Psychiatric/Behavioral: Positive for decreased concentration.       Objective:      Physical Exam  Vitals signs and nursing note reviewed.   Constitutional:       Appearance: He is well-developed. He is not diaphoretic.   HENT:      Head: Normocephalic and atraumatic.   Eyes:      General: No scleral icterus.     Conjunctiva/sclera: Conjunctivae normal.   Neck:      Musculoskeletal: Normal range of motion and neck supple.      Vascular: No carotid bruit.   Cardiovascular:      Rate and Rhythm: Normal rate and regular rhythm.      Heart sounds: Heart sounds are distant. No murmur. No friction rub. No gallop.    Pulmonary:      Effort: Pulmonary effort is normal. No respiratory distress.      Breath sounds: Normal breath sounds. No wheezing or rales.   Abdominal:      General: There is no distension.      Tenderness: There is no abdominal tenderness.   Musculoskeletal:         General: No deformity.   Skin:     General: Skin is warm and dry.      Findings: No erythema or rash.   Neurological:      Mental Status: He is alert.      GCS: GCS eye subscore is 4. GCS verbal subscore is 5. GCS motor subscore is 6.      Cranial Nerves: No cranial nerve deficit.      Motor: No tremor.      Coordination: Coordination normal.      Gait: Gait normal.   Psychiatric:         Attention and Perception: Attention normal.         Mood and Affect: Mood normal.         Speech: Speech normal.         Behavior: Behavior normal.         Thought Content: Thought content is not delusional.         Cognition and Memory: Memory is impaired.         Assessment:       1. Unintentional weight loss    2. Longstanding persistent atrial fibrillation    3. Chronic anticoagulation    4. Elevated alkaline phosphatase level    5. Dementia without behavioral disturbance, unspecified dementia type    6. S/P aortic valve replacement    7. Hypertension, essential    8. Hyperlipidemia, unspecified hyperlipidemia type    9.  Chronic diastolic congestive heart failure    10. Abnormal complete blood count        Plan:     Medication List with Changes/Refills   Current Medications    AMITRIPTYLINE (ELAVIL) 25 MG TABLET    TAKE 1 TABLET(25 MG) BY MOUTH EVERY EVENING    AMLODIPINE (NORVASC) 5 MG TABLET    TAKE 1 TABLET(5 MG) BY MOUTH EVERY DAY    APIXABAN (ELIQUIS) 2.5 MG TAB    Take 1 tablet (2.5 mg total) by mouth 2 (two) times daily.    FINASTERIDE (PROSCAR) 5 MG TABLET    Take 1 tablet (5 mg total) by mouth once daily.    GABAPENTIN (NEURONTIN) 300 MG CAPSULE    TAKE 1 CAPSULE(300 MG) BY MOUTH EVERY EVENING    MUPIROCIN (BACTROBAN) 2 % OINTMENT    Apply to affected area 1 time daily    OMEPRAZOLE (PRILOSEC) 20 MG CAPSULE    TAKE 1 CAPSULE(20 MG) BY MOUTH EVERY DAY    RIVASTIGMINE TARTRATE (EXELON) 3 MG CAPSULE    TAKE 1 CAPSULE(3 MG) BY MOUTH TWICE DAILY    TAMSULOSIN (FLOMAX) 0.4 MG CAP    Take 1 capsule (0.4 mg total) by mouth once daily.    THIAMINE 100 MG TABLET    Take 100 mg by mouth once daily.    TOLTERODINE (DETROL LA) 4 MG 24 HR CAPSULE    Take 1 capsule (4 mg total) by mouth once daily.    TORSEMIDE (DEMADEX) 20 MG TAB    TAKE 1 TABLET(20 MG) BY MOUTH EVERY DAY     Randall was seen today for follow-up.    Diagnoses and all orders for this visit:    Unintentional weight loss  -     CBC Without Differential; Future  -     Comprehensive metabolic panel; Future    Longstanding persistent atrial fibrillation    Chronic anticoagulation    Elevated alkaline phosphatase level  -     Alkaline phosphatase, isoenzymes; Future    Dementia without behavioral disturbance, unspecified dementia type    S/P aortic valve replacement    Hypertension, essential    Hyperlipidemia, unspecified hyperlipidemia type  -     Lipid Panel; Future    Chronic diastolic congestive heart failure    Abnormal complete blood count  -     Iron; Future  -     Ferritin; Future      See meds, orders, follow up, routing and instructions sections of encounter and AVS.  Discussed with patient and provided on AVS.      gabapentin is for back pain, amitriptyline is for fecal smearing.  Other providers prescribing.

## 2020-07-21 LAB
ALP BONE CFR SERPL: ABNORMAL %
ALP INTEST CFR SERPL: ABNORMAL %
ALP LIVER CFR SERPL: ABNORMAL %
ALP PLAC CFR SERPL: ABNORMAL %
ALP SERPL-CCNC: 148 U/L (ref 35–144)

## 2020-09-28 ENCOUNTER — TELEPHONE (OUTPATIENT)
Dept: INTERNAL MEDICINE | Facility: CLINIC | Age: 85
End: 2020-09-28

## 2020-09-28 DIAGNOSIS — R79.9 ABNORMAL BLOOD CHEMISTRY: Primary | ICD-10-CM

## 2020-09-28 NOTE — TELEPHONE ENCOUNTER
I was reviewing the patient's chart and laboratory.  He is due for a follow-up visit with his cardiologist, Dr. Monk.  Please contact and schedule him.    Also, his next appointment with me is January but we may want to see him sometime sooner to check his weight and status.  Please schedule an appointment in the next 3-4 weeks with me.  Thank you    There was a separate message about a repeat blood test as well.  Thank you

## 2020-09-28 NOTE — TELEPHONE ENCOUNTER
Please call patient and explain that I would like to repeat some labs since there was a borderline result. See lab orders and please schedule patient. Thank you.    Patient's creatinine was a little bit elevated.  I would like to recheck a basic profile.  Please schedule that.

## 2020-10-12 ENCOUNTER — PATIENT OUTREACH (OUTPATIENT)
Dept: ADMINISTRATIVE | Facility: OTHER | Age: 85
End: 2020-10-12

## 2020-10-13 ENCOUNTER — OFFICE VISIT (OUTPATIENT)
Dept: DERMATOLOGY | Facility: CLINIC | Age: 85
End: 2020-10-13
Payer: MEDICARE

## 2020-10-13 DIAGNOSIS — L98.9 DISEASE OF SKIN AND SUBCUTANEOUS TISSUE: Primary | ICD-10-CM

## 2020-10-13 PROCEDURE — 99999 PR PBB SHADOW E&M-EST. PATIENT-LVL III: ICD-10-PCS | Mod: PBBFAC,HCNC,, | Performed by: DERMATOLOGY

## 2020-10-13 PROCEDURE — 1159F MED LIST DOCD IN RCRD: CPT | Mod: HCNC,S$GLB,, | Performed by: DERMATOLOGY

## 2020-10-13 PROCEDURE — 1100F PR PT FALLS ASSESS DOC 2+ FALLS/FALL W/INJURY/YR: ICD-10-PCS | Mod: HCNC,CPTII,S$GLB, | Performed by: DERMATOLOGY

## 2020-10-13 PROCEDURE — 3288F FALL RISK ASSESSMENT DOCD: CPT | Mod: HCNC,CPTII,S$GLB, | Performed by: DERMATOLOGY

## 2020-10-13 PROCEDURE — 1126F PR PAIN SEVERITY QUANTIFIED, NO PAIN PRESENT: ICD-10-PCS | Mod: HCNC,S$GLB,, | Performed by: DERMATOLOGY

## 2020-10-13 PROCEDURE — 99999 PR PBB SHADOW E&M-EST. PATIENT-LVL III: CPT | Mod: PBBFAC,HCNC,, | Performed by: DERMATOLOGY

## 2020-10-13 PROCEDURE — 1159F PR MEDICATION LIST DOCUMENTED IN MEDICAL RECORD: ICD-10-PCS | Mod: HCNC,S$GLB,, | Performed by: DERMATOLOGY

## 2020-10-13 PROCEDURE — 99202 PR OFFICE/OUTPT VISIT, NEW, LEVL II, 15-29 MIN: ICD-10-PCS | Mod: HCNC,S$GLB,, | Performed by: DERMATOLOGY

## 2020-10-13 PROCEDURE — 3288F PR FALLS RISK ASSESSMENT DOCUMENTED: ICD-10-PCS | Mod: HCNC,CPTII,S$GLB, | Performed by: DERMATOLOGY

## 2020-10-13 PROCEDURE — 99202 OFFICE O/P NEW SF 15 MIN: CPT | Mod: HCNC,S$GLB,, | Performed by: DERMATOLOGY

## 2020-10-13 PROCEDURE — 1126F AMNT PAIN NOTED NONE PRSNT: CPT | Mod: HCNC,S$GLB,, | Performed by: DERMATOLOGY

## 2020-10-13 PROCEDURE — 1100F PTFALLS ASSESS-DOCD GE2>/YR: CPT | Mod: HCNC,CPTII,S$GLB, | Performed by: DERMATOLOGY

## 2020-10-13 RX ORDER — POTASSIUM CHLORIDE 750 MG/1
CAPSULE, EXTENDED RELEASE ORAL
COMMUNITY
Start: 2020-09-26 | End: 2022-01-01

## 2020-10-13 RX ORDER — CHOLESTYRAMINE 4 G/9G
POWDER, FOR SUSPENSION ORAL
COMMUNITY
End: 2022-01-01

## 2020-10-13 RX ORDER — CLOPIDOGREL BISULFATE 75 MG/1
TABLET ORAL
COMMUNITY
Start: 2020-08-23 | End: 2022-01-01

## 2020-10-13 RX ORDER — TRIAMCINOLONE ACETONIDE 1 MG/G
CREAM TOPICAL
Qty: 454 G | Refills: 3 | Status: SHIPPED | OUTPATIENT
Start: 2020-10-13 | End: 2021-01-25

## 2020-10-13 NOTE — PROGRESS NOTES
Subjective:       Patient ID:  Randall Strickladn Jr. is a 89 y.o. male who presents for   Chief Complaint   Patient presents with    Skin Check     UBSE      Rash - Initial  Affected locations: right arm and chest  Duration: 2 months (2 months on right forearm; chest x few years)  Signs / symptoms: itching  Timing: constant  Aggravated by: nothing  Relieving factors/Treatments tried: OTC moisturizer  Improvement on treatment: moderate        Review of Systems   Skin: Positive for itching and rash.   Hematologic/Lymphatic: Bruises/bleeds easily.        Objective:    Physical Exam   Constitutional: He appears well-developed and well-nourished. No distress.   Neurological: He is alert and oriented to person, place, and time. He is not disoriented.   Psychiatric: He has a normal mood and affect.   Skin:   Areas Examined (abnormalities noted in diagram):   Neck Inspection Performed  Chest / Axilla Inspection Performed  Back Inspection Performed  RUE Inspected  LUE Inspection Performed              Diagram Legend     Erythematous scaling macule/papule c/w actinic keratosis       Vascular papule c/w angioma      Pigmented verrucoid papule/plaque c/w seborrheic keratosis      Yellow umbilicated papule c/w sebaceous hyperplasia      Irregularly shaped tan macule c/w lentigo     1-2 mm smooth white papules consistent with Milia      Movable subcutaneous cyst with punctum c/w epidermal inclusion cyst      Subcutaneous movable cyst c/w pilar cyst      Firm pink to brown papule c/w dermatofibroma      Pedunculated fleshy papule(s) c/w skin tag(s)      Evenly pigmented macule c/w junctional nevus     Mildly variegated pigmented, slightly irregular-bordered macule c/w mildly atypical nevus      Flesh colored to evenly pigmented papule c/w intradermal nevus       Pink pearly papule/plaque c/w basal cell carcinoma      Erythematous hyperkeratotic cursted plaque c/w SCC      Surgical scar with no sign of skin cancer recurrence      Open  and closed comedones      Inflammatory papules and pustules      Verrucoid papule consistent consistent with wart     Erythematous eczematous patches and plaques     Dystrophic onycholytic nail with subungual debris c/w onychomycosis     Umbilicated papule    Erythematous-base heme-crusted tan verrucoid plaque consistent with inflamed seborrheic keratosis     Erythematous Silvery Scaling Plaque c/w Psoriasis     See annotation      Assessment / Plan:        Disease of skin and subcutaneous tissue  -     triamcinolone acetonide 0.1% (KENALOG) 0.1 % cream; AAA chest and right forearm bid - may occlude right forearm qhs  Dispense: 454 g; Refill: 3    Discussed good skin care regimen including using a mild soap and moisturizing cream 1 - 2 times per day. Limit to one bath/shower per day and use lukewarm (not hot) water.     Discussed with patient the importance of not manipulating skin lesions. Trauma often exacerbates condition. Trimming nails is recommended to avoid puncturing skin.     Use ice to relieve intense pruritus.           Follow up if symptoms worsen or fail to improve.

## 2020-10-15 ENCOUNTER — LAB VISIT (OUTPATIENT)
Dept: LAB | Facility: HOSPITAL | Age: 85
End: 2020-10-15
Attending: INTERNAL MEDICINE
Payer: MEDICARE

## 2020-10-15 DIAGNOSIS — I50.32 CHRONIC DIASTOLIC CONGESTIVE HEART FAILURE: ICD-10-CM

## 2020-10-15 DIAGNOSIS — I10 HYPERTENSION, ESSENTIAL: ICD-10-CM

## 2020-10-15 DIAGNOSIS — Z79.01 LONG TERM CURRENT USE OF ANTICOAGULANT: ICD-10-CM

## 2020-10-15 DIAGNOSIS — E87.6 HYPOKALEMIA: ICD-10-CM

## 2020-10-15 LAB
ANION GAP SERPL CALC-SCNC: 8 MMOL/L (ref 8–16)
BASOPHILS # BLD AUTO: 0.08 K/UL (ref 0–0.2)
BASOPHILS NFR BLD: 1.1 % (ref 0–1.9)
BUN SERPL-MCNC: 31 MG/DL (ref 10–30)
CALCIUM SERPL-MCNC: 9 MG/DL (ref 8.7–10.5)
CHLORIDE SERPL-SCNC: 103 MMOL/L (ref 95–110)
CO2 SERPL-SCNC: 28 MMOL/L (ref 23–29)
CREAT SERPL-MCNC: 1.7 MG/DL (ref 0.5–1.4)
DIFFERENTIAL METHOD: ABNORMAL
EOSINOPHIL # BLD AUTO: 0.6 K/UL (ref 0–0.5)
EOSINOPHIL NFR BLD: 7.8 % (ref 0–8)
ERYTHROCYTE [DISTWIDTH] IN BLOOD BY AUTOMATED COUNT: 13.6 % (ref 11.5–14.5)
EST. GFR  (AFRICAN AMERICAN): 39.3 ML/MIN/1.73 M^2
EST. GFR  (NON AFRICAN AMERICAN): 34 ML/MIN/1.73 M^2
GLUCOSE SERPL-MCNC: 99 MG/DL (ref 70–110)
HCT VFR BLD AUTO: 33 % (ref 40–54)
HGB BLD-MCNC: 10.6 G/DL (ref 14–18)
IMM GRANULOCYTES # BLD AUTO: 0.03 K/UL (ref 0–0.04)
IMM GRANULOCYTES NFR BLD AUTO: 0.4 % (ref 0–0.5)
LYMPHOCYTES # BLD AUTO: 1.9 K/UL (ref 1–4.8)
LYMPHOCYTES NFR BLD: 25.4 % (ref 18–48)
MCH RBC QN AUTO: 31.3 PG (ref 27–31)
MCHC RBC AUTO-ENTMCNC: 32.1 G/DL (ref 32–36)
MCV RBC AUTO: 97 FL (ref 82–98)
MONOCYTES # BLD AUTO: 1.7 K/UL (ref 0.3–1)
MONOCYTES NFR BLD: 22.2 % (ref 4–15)
NEUTROPHILS # BLD AUTO: 3.2 K/UL (ref 1.8–7.7)
NEUTROPHILS NFR BLD: 43.1 % (ref 38–73)
NRBC BLD-RTO: 0 /100 WBC
PLATELET # BLD AUTO: 63 K/UL (ref 150–350)
PMV BLD AUTO: 12.9 FL (ref 9.2–12.9)
POTASSIUM SERPL-SCNC: 4.9 MMOL/L (ref 3.5–5.1)
RBC # BLD AUTO: 3.39 M/UL (ref 4.6–6.2)
SODIUM SERPL-SCNC: 139 MMOL/L (ref 136–145)
WBC # BLD AUTO: 7.52 K/UL (ref 3.9–12.7)

## 2020-10-15 PROCEDURE — 36415 COLL VENOUS BLD VENIPUNCTURE: CPT | Mod: HCNC

## 2020-10-15 PROCEDURE — 80048 BASIC METABOLIC PNL TOTAL CA: CPT | Mod: HCNC

## 2020-10-15 PROCEDURE — 85025 COMPLETE CBC W/AUTO DIFF WBC: CPT | Mod: HCNC

## 2020-10-19 ENCOUNTER — HOSPITAL ENCOUNTER (OUTPATIENT)
Dept: RADIOLOGY | Facility: HOSPITAL | Age: 85
Discharge: HOME | End: 2020-10-19
Attending: INTERNAL MEDICINE
Payer: MEDICARE

## 2020-10-19 ENCOUNTER — OFFICE VISIT (OUTPATIENT)
Dept: INTERNAL MEDICINE | Facility: CLINIC | Age: 85
End: 2020-10-19
Attending: FAMILY MEDICINE
Payer: MEDICARE

## 2020-10-19 ENCOUNTER — OFFICE VISIT (OUTPATIENT)
Dept: CARDIOLOGY | Facility: CLINIC | Age: 85
End: 2020-10-19
Payer: MEDICARE

## 2020-10-19 VITALS
HEIGHT: 68 IN | SYSTOLIC BLOOD PRESSURE: 118 MMHG | BODY MASS INDEX: 23.79 KG/M2 | OXYGEN SATURATION: 96 % | DIASTOLIC BLOOD PRESSURE: 66 MMHG | HEART RATE: 91 BPM | WEIGHT: 156.94 LBS

## 2020-10-19 VITALS
DIASTOLIC BLOOD PRESSURE: 56 MMHG | WEIGHT: 156.94 LBS | BODY MASS INDEX: 23.79 KG/M2 | OXYGEN SATURATION: 97 % | HEART RATE: 104 BPM | SYSTOLIC BLOOD PRESSURE: 118 MMHG | HEIGHT: 68 IN

## 2020-10-19 DIAGNOSIS — I48.11 LONGSTANDING PERSISTENT ATRIAL FIBRILLATION: ICD-10-CM

## 2020-10-19 DIAGNOSIS — I50.32 CHRONIC DIASTOLIC CONGESTIVE HEART FAILURE: ICD-10-CM

## 2020-10-19 DIAGNOSIS — N18.30 STAGE 3 CHRONIC KIDNEY DISEASE, UNSPECIFIED WHETHER STAGE 3A OR 3B CKD: ICD-10-CM

## 2020-10-19 DIAGNOSIS — R26.9 GAIT DISORDER: ICD-10-CM

## 2020-10-19 DIAGNOSIS — Z79.01 CHRONIC ANTICOAGULATION: ICD-10-CM

## 2020-10-19 DIAGNOSIS — I73.9 PVD (PERIPHERAL VASCULAR DISEASE): ICD-10-CM

## 2020-10-19 DIAGNOSIS — Z95.2 S/P AORTIC VALVE REPLACEMENT: ICD-10-CM

## 2020-10-19 DIAGNOSIS — I48.91 ATRIAL FIBRILLATION, UNSPECIFIED TYPE: Primary | ICD-10-CM

## 2020-10-19 DIAGNOSIS — S20.212A CONTUSION OF CHEST WALL, LEFT, INITIAL ENCOUNTER: ICD-10-CM

## 2020-10-19 DIAGNOSIS — F03.90 DEMENTIA WITHOUT BEHAVIORAL DISTURBANCE, UNSPECIFIED DEMENTIA TYPE: ICD-10-CM

## 2020-10-19 DIAGNOSIS — I10 HYPERTENSION, ESSENTIAL: ICD-10-CM
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
## 2020-10-19 DIAGNOSIS — R74.8 ELEVATED ALKALINE PHOSPHATASE LEVEL: ICD-10-CM

## 2020-10-19 DIAGNOSIS — I35.0 NONRHEUMATIC AORTIC VALVE STENOSIS: ICD-10-CM

## 2020-10-19 DIAGNOSIS — I42.9 CARDIOMYOPATHY, IDIOPATHIC: ICD-10-CM

## 2020-10-19 DIAGNOSIS — Z79.01 LONG TERM CURRENT USE OF ANTICOAGULANT: ICD-10-CM

## 2020-10-19 DIAGNOSIS — R63.4 UNINTENTIONAL WEIGHT LOSS: Primary | ICD-10-CM

## 2020-10-19 PROCEDURE — 1159F PR MEDICATION LIST DOCUMENTED IN MEDICAL RECORD: ICD-10-PCS | Mod: HCNC,S$GLB,, | Performed by: INTERNAL MEDICINE

## 2020-10-19 PROCEDURE — 71046 X-RAY EXAM CHEST 2 VIEWS: CPT | Mod: 26,HCNC,, | Performed by: RADIOLOGY

## 2020-10-19 PROCEDURE — 3288F FALL RISK ASSESSMENT DOCD: CPT | Mod: HCNC,CPTII,S$GLB, | Performed by: INTERNAL MEDICINE

## 2020-10-19 PROCEDURE — 3288F PR FALLS RISK ASSESSMENT DOCUMENTED: ICD-10-PCS | Mod: HCNC,CPTII,S$GLB, | Performed by: INTERNAL MEDICINE

## 2020-10-19 PROCEDURE — 99999 PR PBB SHADOW E&M-EST. PATIENT-LVL V: CPT | Mod: PBBFAC,HCNC,, | Performed by: FAMILY MEDICINE

## 2020-10-19 PROCEDURE — 1159F MED LIST DOCD IN RCRD: CPT | Mod: HCNC,S$GLB,, | Performed by: FAMILY MEDICINE

## 2020-10-19 PROCEDURE — 71100 X-RAY EXAM RIBS UNI 2 VIEWS: CPT | Mod: TC,HCNC,FY,LT

## 2020-10-19 PROCEDURE — 1101F PT FALLS ASSESS-DOCD LE1/YR: CPT | Mod: HCNC,CPTII,S$GLB, | Performed by: FAMILY MEDICINE

## 2020-10-19 PROCEDURE — 1100F PR PT FALLS ASSESS DOC 2+ FALLS/FALL W/INJURY/YR: ICD-10-PCS | Mod: HCNC,CPTII,S$GLB, | Performed by: INTERNAL MEDICINE

## 2020-10-19 PROCEDURE — 1125F AMNT PAIN NOTED PAIN PRSNT: CPT | Mod: HCNC,S$GLB,, | Performed by: INTERNAL MEDICINE

## 2020-10-19 PROCEDURE — 99213 OFFICE O/P EST LOW 20 MIN: CPT | Mod: HCNC,S$GLB,, | Performed by: INTERNAL MEDICINE

## 2020-10-19 PROCEDURE — 99499 RISK ADDL DX/OHS AUDIT: ICD-10-PCS | Mod: S$GLB,,, | Performed by: FAMILY MEDICINE

## 2020-10-19 PROCEDURE — 1159F MED LIST DOCD IN RCRD: CPT | Mod: HCNC,S$GLB,, | Performed by: INTERNAL MEDICINE

## 2020-10-19 PROCEDURE — 99214 OFFICE O/P EST MOD 30 MIN: CPT | Mod: HCNC,S$GLB,, | Performed by: FAMILY MEDICINE

## 2020-10-19 PROCEDURE — 71046 XR CHEST PA AND LATERAL: ICD-10-PCS | Mod: 26,HCNC,, | Performed by: RADIOLOGY

## 2020-10-19 PROCEDURE — 1101F PR PT FALLS ASSESS DOC 0-1 FALLS W/OUT INJ PAST YR: ICD-10-PCS | Mod: HCNC,CPTII,S$GLB, | Performed by: FAMILY MEDICINE

## 2020-10-19 PROCEDURE — 99999 PR PBB SHADOW E&M-EST. PATIENT-LVL V: ICD-10-PCS | Mod: PBBFAC,HCNC,, | Performed by: INTERNAL MEDICINE

## 2020-10-19 PROCEDURE — 1125F AMNT PAIN NOTED PAIN PRSNT: CPT | Mod: HCNC,S$GLB,, | Performed by: FAMILY MEDICINE

## 2020-10-19 PROCEDURE — 99999 PR PBB SHADOW E&M-EST. PATIENT-LVL V: ICD-10-PCS | Mod: PBBFAC,HCNC,, | Performed by: FAMILY MEDICINE

## 2020-10-19 PROCEDURE — 99999 PR PBB SHADOW E&M-EST. PATIENT-LVL V: CPT | Mod: PBBFAC,HCNC,, | Performed by: INTERNAL MEDICINE

## 2020-10-19 PROCEDURE — 1125F PR PAIN SEVERITY QUANTIFIED, PAIN PRESENT: ICD-10-PCS | Mod: HCNC,S$GLB,, | Performed by: INTERNAL MEDICINE

## 2020-10-19 PROCEDURE — 1159F PR MEDICATION LIST DOCUMENTED IN MEDICAL RECORD: ICD-10-PCS | Mod: HCNC,S$GLB,, | Performed by: FAMILY MEDICINE

## 2020-10-19 PROCEDURE — 1125F PR PAIN SEVERITY QUANTIFIED, PAIN PRESENT: ICD-10-PCS | Mod: HCNC,S$GLB,, | Performed by: FAMILY MEDICINE

## 2020-10-19 PROCEDURE — 71100 XR RIBS 2 VIEW LEFT: ICD-10-PCS | Mod: 26,HCNC,LT, | Performed by: RADIOLOGY

## 2020-10-19 PROCEDURE — 99499 UNLISTED E&M SERVICE: CPT | Mod: S$GLB,,, | Performed by: FAMILY MEDICINE

## 2020-10-19 PROCEDURE — 99214 PR OFFICE/OUTPT VISIT, EST, LEVL IV, 30-39 MIN: ICD-10-PCS | Mod: HCNC,S$GLB,, | Performed by: FAMILY MEDICINE

## 2020-10-19 PROCEDURE — 1100F PTFALLS ASSESS-DOCD GE2>/YR: CPT | Mod: HCNC,CPTII,S$GLB, | Performed by: INTERNAL MEDICINE

## 2020-10-19 PROCEDURE — 71046 X-RAY EXAM CHEST 2 VIEWS: CPT | Mod: TC,HCNC,FY

## 2020-10-19 PROCEDURE — 99213 PR OFFICE/OUTPT VISIT, EST, LEVL III, 20-29 MIN: ICD-10-PCS | Mod: HCNC,S$GLB,, | Performed by: INTERNAL MEDICINE

## 2020-10-19 PROCEDURE — 71100 X-RAY EXAM RIBS UNI 2 VIEWS: CPT | Mod: 26,HCNC,LT, | Performed by: RADIOLOGY

## 2020-10-19 NOTE — PROGRESS NOTES
Subjective:    Patient ID:  Randall Strickland Jr. is a 89 y.o. 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 His BNPs were not elevated on that admit. He has had a 6 # weight loss but has no edema or increased SOB.A recent CT abdomin to evaluate weight loss revealed thickening of left colon. He was to be evaluated by Dr Bran..He has been doing well and denies chest pain or RUDOLPH. He has had 2  recent falls with contusion to his left lateral chest and continues in pain.  Lab Results   Component Value Date     10/15/2020    K 4.9 10/15/2020     10/15/2020    CO2 28 10/15/2020    BUN 31 (H) 10/15/2020    CREATININE 1.7 (H) 10/15/2020    GLU 99 10/15/2020    HGBA1C 5.7 04/10/2014    MG 1.9 12/17/2019    AST 23 07/08/2020    ALT 14 07/08/2020    ALBUMIN 3.2 (L) 07/08/2020    PROT 8.3 07/08/2020    BILITOT 0.5 07/08/2020    WBC 7.52 10/15/2020    HGB 10.6 (L) 10/15/2020    HCT 33.0 (L) 10/15/2020    HCT 32 (L) 12/16/2019    MCV 97 10/15/2020    PLT 63 (L) 10/15/2020    INR 1.0 08/29/2016    PSA 0.6 03/07/2005    TSH 0.993 12/03/2019         Lab Results   Component Value Date    CHOL 124 07/08/2020    HDL 31 (L) 07/08/2020    TRIG 104 07/08/2020       Lab Results   Component Value Date    LDLCALC 72.2 07/08/2020       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    Postsurgical dumping syndrome 4/10/2014    PVD (peripheral vascular disease) 8/22/2018    Squamous cell carcinoma of skin 07/21/2014    right zygomatic cheek/temple     Stenosis of aortic and mitral valves      aortic DAHLIA 1.1 CM       Current Outpatient Medications:     amitriptyline (ELAVIL) 25 MG tablet, TAKE 1 TABLET(25 MG) BY MOUTH EVERY EVENING, Disp: 90 tablet, Rfl: 0    amLODIPine (NORVASC) 5 MG tablet, TAKE 1 TABLET(5 MG) BY MOUTH EVERY DAY, Disp: 30 tablet, Rfl: 6    apixaban (ELIQUIS) 2.5 mg Tab, Take 1 tablet (2.5 mg total) by mouth 2 (two) times daily., Disp: 60 tablet, Rfl: 11    cholestyramine, with sugar, 4 gram Powd, Take by mouth., Disp: , Rfl:     finasteride (PROSCAR) 5 mg tablet, Take 1 tablet (5 mg total) by mouth once daily., Disp: 90 tablet, Rfl: 3    gabapentin (NEURONTIN) 300 MG capsule, TAKE 1 CAPSULE(300 MG) BY MOUTH EVERY EVENING, Disp: 90 capsule, Rfl: 0    mupirocin (BACTROBAN) 2 % ointment, Apply to affected area 1 time daily, Disp: 22 g, Rfl: 1    omeprazole (PRILOSEC) 20 MG capsule, TAKE 1 CAPSULE(20 MG) BY MOUTH EVERY DAY, Disp: 90 capsule, Rfl: 1    potassium chloride (MICRO-K) 10 MEQ CpSR, , Disp: , Rfl:     rivastigmine tartrate (EXELON) 3 MG capsule, TAKE 1 CAPSULE(3 MG) BY MOUTH TWICE DAILY, Disp: 60 capsule, Rfl: 11    tamsulosin (FLOMAX) 0.4 mg Cap, Take 1 capsule (0.4 mg total) by mouth once daily., Disp: 90 capsule, Rfl: 3    thiamine 100 MG tablet, Take 100 mg by mouth once daily., Disp: , Rfl:     tolterodine (DETROL LA) 4 MG 24 hr capsule, Take 1 capsule (4 mg total) by mouth once daily., Disp: 30 capsule, Rfl: 11    torsemide (DEMADEX) 20 MG Tab, TAKE 1 TABLET(20 MG) BY MOUTH EVERY DAY, Disp: 30 tablet, Rfl: 11    triamcinolone acetonide 0.1% (KENALOG) 0.1 % cream, AAA chest and right forearm bid - may occlude right forearm qhs, Disp: 454 g, Rfl: 3    clopidogreL (PLAVIX) 75 mg tablet, , Disp: , Rfl:           Review of Systems   Constitution: Negative for decreased appetite, diaphoresis, fever, malaise/fatigue, weight gain and weight loss.   HENT: Negative for congestion, ear discharge, ear pain and nosebleeds.    Eyes: Negative for blurred vision, double  "vision and visual disturbance.   Cardiovascular: Positive for chest pain (left lateral). Negative for claudication, cyanosis, dyspnea on exertion, irregular heartbeat, leg swelling, near-syncope, orthopnea, palpitations, paroxysmal nocturnal dyspnea and syncope.   Respiratory: Negative for cough, hemoptysis, shortness of breath, sleep disturbances due to breathing, snoring, sputum production and wheezing.    Endocrine: Negative for polydipsia, polyphagia and polyuria.   Hematologic/Lymphatic: Negative for adenopathy and bleeding problem. Does not bruise/bleed easily.   Skin: Negative for color change, nail changes, poor wound healing and rash.   Musculoskeletal: Negative for muscle cramps and muscle weakness.   Gastrointestinal: Negative for abdominal pain, anorexia, change in bowel habit, hematochezia, nausea and vomiting.   Genitourinary: Negative for dysuria, frequency and hematuria.   Neurological: Negative for brief paralysis, difficulty with concentration, excessive daytime sleepiness, dizziness, focal weakness, headaches, light-headedness, seizures, vertigo and weakness.   Psychiatric/Behavioral: Positive for memory loss. Negative for altered mental status and depression.   Allergic/Immunologic: Negative for persistent infections.        Objective:/66 (BP Location: Left arm, Patient Position: Sitting, BP Method: Medium (Automatic))   Pulse 91   Ht 5' 8" (1.727 m)   Wt 71.2 kg (156 lb 15.5 oz)   SpO2 96%   BMI 23.87 kg/m²             Physical Exam   Constitutional: He is oriented to person, place, and time. He appears well-developed and well-nourished.   HENT:   Head: Normocephalic.   Right Ear: External ear normal.   Left Ear: External ear normal.   Nose: Nose normal.   Inspection of lips, teeth and gums normal   Eyes: Pupils are equal, round, and reactive to light. EOM are normal. No scleral icterus.   Neck: Normal range of motion. Neck supple. No JVD present. No tracheal deviation present. No " thyromegaly present.   Cardiovascular: Normal rate, S1 normal, S2 normal and intact distal pulses. An irregularly irregular rhythm present. Exam reveals no gallop and no friction rub.   No murmur heard.  Pulmonary/Chest: Effort normal and breath sounds normal.           Abdominal: Bowel sounds are normal. He exhibits no distension. There is no hepatosplenomegaly. There is no abdominal tenderness. There is no guarding.   Musculoskeletal: Normal range of motion.         General: No tenderness or edema.   Lymphadenopathy:   Palpation of neck and groin lymph nodes normal   Neurological: He is alert and oriented to person, place, and time. No cranial nerve deficit. He exhibits normal muscle tone. Coordination normal.   Skin: Skin is dry.   No ankle nor pretibial edema   Psychiatric: His behavior is normal. Judgment and thought content normal.         Assessment:       1. Atrial fibrillation, unspecified type    2. S/P aortic valve replacement    3. Nonrheumatic aortic valve stenosis    4. Chronic diastolic congestive heart failure    5. Hypertension, essential    6. Longstanding persistent atrial fibrillation    7. Long term current use of anticoagulant    8. Contusion of chest wall, left, initial encounter         Plan:       Randall was seen today for hypertension, essential, fall and back pain.    Diagnoses and all orders for this visit:    Atrial fibrillation, unspecified type    S/P aortic valve replacement    Nonrheumatic aortic valve stenosis    Chronic diastolic congestive heart failure    Hypertension, essential  -     Basic Metabolic Panel; Future; Expected date: 01/17/2021    Longstanding persistent atrial fibrillation    Long term current use of anticoagulant  -     CBC auto differential; Future; Expected date: 01/17/2021    Contusion of chest wall, left, initial encounter  -     X-Ray Chest PA And Lateral; Future; Expected date: 10/19/2020  -     X-Ray Ribs 2 View Left; Future; Expected date: 10/19/2020

## 2020-10-19 NOTE — PROGRESS NOTES
Subjective:       Patient ID: Randall Strickland Jr. is a 89 y.o. male.    Chief Complaint: Follow-up (4 week f/u ) and Flank Pain (left side )    Established patient follows up for management of chronic medical illnesses with complaints today. Please see dictation and ROS for interval problems, specific complaints and disease management discussion.    P, S, Fm, Soc Hx's; Meds, allergies reviewed and reconciled.  Health maintenance file reviewed and addressed items due.    Presents with his wife today.  His healthcare literacy and also ability to hear seem to be waning.  He did remember several details of past visits with me and other physicians.    His alkaline phosphatase has been elevated however we were unable to fractionate this for further definition.  Speaking with he and his wife today they do not appear to be interested in significant aggressive workup at this time.    Wife states his weight has remained stable.  He has some difficulty ambulating.  Apparently fell earlier today and struck the left chest.  He had a chest x-ray ordered by his cardiologist which was reportedly normal.  Physical examination with tenderness to the left lower ribcage.    Last colonoscopies were 2009 and 2011.  Small polyp in 2011 with a 3 year follow-up recommendation.  Mucosal irritation noted in 2009.    Given the weight loss he had a CT scan earlier this year with a follow-up.  There was some irritation in the colon which resolved.  No GI symptoms are present currently.  Again, wife and he did not seem interested in aggressive workup at this time.    Chronic anemia which is stable.  He had normal iron and ferritin levels in 2015 and 2020.    Review of Systems   Constitutional: Positive for fatigue. Negative for chills, fever and unexpected weight change.   HENT: Negative for congestion and trouble swallowing.    Eyes: Negative for redness and visual disturbance.   Respiratory: Negative for cough, chest tightness and shortness of  breath.    Cardiovascular: Positive for chest pain. Negative for palpitations and leg swelling.        Contusion   Gastrointestinal: Negative for abdominal pain and blood in stool.   Genitourinary: Negative for difficulty urinating and hematuria.   Musculoskeletal: Positive for arthralgias, back pain and gait problem. Negative for joint swelling, myalgias and neck pain.   Skin: Negative for color change and rash.   Neurological: Positive for tremors and weakness. Negative for speech difficulty, numbness and headaches.   Hematological: Negative for adenopathy. Does not bruise/bleed easily.   Psychiatric/Behavioral: Positive for confusion and decreased concentration. Negative for behavioral problems and sleep disturbance. The patient is not nervous/anxious.        Objective:      Physical Exam  Vitals signs and nursing note reviewed.   Constitutional:       Appearance: He is well-developed. He is not diaphoretic.   HENT:      Head: Normocephalic and atraumatic.   Eyes:      General: No scleral icterus.     Conjunctiva/sclera: Conjunctivae normal.   Neck:      Musculoskeletal: Normal range of motion and neck supple.      Vascular: No carotid bruit.   Cardiovascular:      Rate and Rhythm: Normal rate and regular rhythm.      Heart sounds: Heart sounds are distant. Murmur present. No friction rub. No gallop.    Pulmonary:      Effort: Pulmonary effort is normal. No respiratory distress.      Breath sounds: Decreased breath sounds present. No wheezing or rales.   Chest:       Abdominal:      General: There is no distension.      Tenderness: There is no abdominal tenderness.   Musculoskeletal:         General: No deformity.   Skin:     General: Skin is warm and dry.      Findings: No erythema or rash.   Neurological:      Mental Status: He is alert.      GCS: GCS eye subscore is 4. GCS verbal subscore is 5. GCS motor subscore is 6.      Cranial Nerves: No cranial nerve deficit.      Motor: Weakness present. No tremor.       Coordination: Coordination abnormal.      Gait: Gait normal.   Psychiatric:         Mood and Affect: Mood normal.         Assessment:       1. Unintentional weight loss    2. Fall, initial encounter    3. Dementia without behavioral disturbance, unspecified dementia type    4. Gait disorder    5. Longstanding persistent atrial fibrillation    6. Chronic anticoagulation    7. Elevated alkaline phosphatase level    8. Hypertension, essential    9. Cardiomyopathy, idiopathic    10. Chronic diastolic congestive heart failure    11. S/P aortic valve replacement    12. Stage 3 chronic kidney disease, unspecified whether stage 3a or 3b CKD    13. PVD (peripheral vascular disease)        Plan:     Medication List with Changes/Refills   Current Medications    AMITRIPTYLINE (ELAVIL) 25 MG TABLET    TAKE 1 TABLET(25 MG) BY MOUTH EVERY EVENING    AMLODIPINE (NORVASC) 5 MG TABLET    TAKE 1 TABLET(5 MG) BY MOUTH EVERY DAY    APIXABAN (ELIQUIS) 2.5 MG TAB    Take 1 tablet (2.5 mg total) by mouth 2 (two) times daily.    CHOLESTYRAMINE, WITH SUGAR, 4 GRAM POWD    Take by mouth.    CLOPIDOGREL (PLAVIX) 75 MG TABLET        FINASTERIDE (PROSCAR) 5 MG TABLET    Take 1 tablet (5 mg total) by mouth once daily.    GABAPENTIN (NEURONTIN) 300 MG CAPSULE    TAKE 1 CAPSULE(300 MG) BY MOUTH EVERY EVENING    OMEPRAZOLE (PRILOSEC) 20 MG CAPSULE    TAKE 1 CAPSULE(20 MG) BY MOUTH EVERY DAY    POTASSIUM CHLORIDE (MICRO-K) 10 MEQ CPSR        RIVASTIGMINE TARTRATE (EXELON) 3 MG CAPSULE    TAKE 1 CAPSULE(3 MG) BY MOUTH TWICE DAILY    TAMSULOSIN (FLOMAX) 0.4 MG CAP    Take 1 capsule (0.4 mg total) by mouth once daily.    THIAMINE 100 MG TABLET    Take 100 mg by mouth once daily.    TOLTERODINE (DETROL LA) 4 MG 24 HR CAPSULE    Take 1 capsule (4 mg total) by mouth once daily.    TORSEMIDE (DEMADEX) 20 MG TAB    TAKE 1 TABLET(20 MG) BY MOUTH EVERY DAY    TRIAMCINOLONE ACETONIDE 0.1% (KENALOG) 0.1 % CREAM    AAA chest and right forearm bid - may occlude  right forearm qhs   Discontinued Medications    MUPIROCIN (BACTROBAN) 2 % OINTMENT    Apply to affected area 1 time daily     Randall was seen today for follow-up and flank pain.    Diagnoses and all orders for this visit:    Unintentional weight loss    Fall, initial encounter  -     Ambulatory referral/consult to Physical/Occupational Therapy; Future  -     Ambulatory referral/consult to Neurology; Future    Dementia without behavioral disturbance, unspecified dementia type  -     Ambulatory referral/consult to Neurology; Future    Gait disorder  -     Ambulatory referral/consult to Physical/Occupational Therapy; Future  -     Ambulatory referral/consult to Neurology; Future    Longstanding persistent atrial fibrillation    Chronic anticoagulation    Elevated alkaline phosphatase level  Comments:  Unable to determine etiology, Isoenzymes not performed on 3 separate samples    Hypertension, essential    Cardiomyopathy, idiopathic    Chronic diastolic congestive heart failure    S/P aortic valve replacement    Stage 3 chronic kidney disease, unspecified whether stage 3a or 3b CKD    PVD (peripheral vascular disease)      See meds, orders, follow up, routing and instructions sections of encounter and AVS. Discussed with patient and provided on AVS.

## 2020-10-19 NOTE — PROGRESS NOTES
Patient, Randall Strickland Jr. (MRN #[id number]), presented with a recent Platelet count less than 150 K/uL consistent with the definition of thrombocytopenia (ICD10 - D69.6).    Platelets   Date Value Ref Range Status   10/15/2020 63 (L) 150 - 350 K/uL Final     The patient's thrombocytopenia was monitored, evaluated, addressed and/or treated. This addendum to the medical record is made on 10/19/2020.

## 2020-10-21 ENCOUNTER — TELEPHONE (OUTPATIENT)
Dept: NEUROLOGY | Facility: CLINIC | Age: 85
End: 2020-10-21

## 2020-10-21 NOTE — TELEPHONE ENCOUNTER
Called and left vm to return call to schedule appt for memory. Next available is 01/25/21 Dr. Ramirez.

## 2020-11-11 ENCOUNTER — CLINICAL SUPPORT (OUTPATIENT)
Dept: REHABILITATION | Facility: HOSPITAL | Age: 85
End: 2020-11-11
Attending: FAMILY MEDICINE
Payer: MEDICARE

## 2020-11-11 DIAGNOSIS — M47.816 OSTEOARTHRITIS OF LUMBAR SPINE, UNSPECIFIED SPINAL OSTEOARTHRITIS COMPLICATION STATUS: Primary | ICD-10-CM
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
## 2020-11-11 DIAGNOSIS — R26.9 GAIT DISORDER: ICD-10-CM

## 2020-11-11 PROCEDURE — 97162 PT EVAL MOD COMPLEX 30 MIN: CPT | Mod: PO

## 2020-11-11 PROCEDURE — 97140 MANUAL THERAPY 1/> REGIONS: CPT | Mod: PO

## 2020-11-11 NOTE — PROGRESS NOTES
[facility]  Physical Therapy Initial Evaluation    Date: 11/11/2020   Name: Randall Strickland JrEllis  Clinic Number: [id number]    Therapy Diagnosis:   Encounter Diagnoses   Name Primary?    Fall, initial encounter     Gait disorder     Osteoarthritis of lumbar spine, unspecified spinal osteoarthritis complication status Yes     Physician: Tiago Borja MD    Physician Orders: PT Eval and Treat   Medical Diagnosis from Referral:   W19.XXXA (ICD-10-CM) - Fall, initial encounter   R26.9 (ICD-10-CM) - Gait disorder       Evaluation Date: 11/11/2020  Authorization Period Expiration: 12/31/20  Plan of Care Expiration: 1/31/21  Visit # / Visits authorized: 1/ 99    Time In: 108 pm  Time Out: 155 pm  Total Appointment Time (timed & untimed codes): 47 minutes, moderate complex eval, MT1    Precautions: Fall risk, memory loss    Subjective   Date of onset: years ago but recently had 2 falls in last 6 weeks once during daytime and once at night fall on carpet and needed assist from neighbors to come off the floor  History of current condition - Randall reports: had recent fall and back injury but the fall risk is the biggest deficit and concern at this time with AD available but infrequent use.  Pt was previously seen over 1.5 years ago for gait, weakness and falls with some success. Currently mild pain only at L back and able to ambulate and transfer without issues.   No changes in medication recently, no complaints of vertigo or dizziness and had a visual exam earlier this year and unremarkable.      Medical History:   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    Postsurgical dumping syndrome 4/10/2014    PVD (peripheral vascular disease) 8/22/2018    Squamous cell  carcinoma of skin 07/21/2014    right zygomatic cheek/temple     Stenosis of aortic and mitral valves     aortic DAHLIA 1.1 CM       Surgical History:   Randall Strickland Jr.  has a past surgical history that includes Knee surgery; Total hip arthroplasty; cararact (car); Cataract extraction bilateral w/ anterior vitrectomy; Cholecystectomy; Appendectomy; Eye surgery; Cataract extraction w/  intraocular lens implant (Bilateral); Cardiac catheterization; Cardiac valuve replacement; and Joint replacement.    Medications:   Randall has a current medication list which includes the following prescription(s): amitriptyline, amlodipine, apixaban, cholestyramine (with sugar), clopidogrel, finasteride, gabapentin, omeprazole, potassium chloride, rivastigmine tartrate, tamsulosin, thiamine, tolterodine, torsemide, and triamcinolone acetonide 0.1%.    Allergies:   Review of patient's allergies indicates:  No Known Allergies     Imaging, FINDINGS:  There is aortic valve reconstruction.  No pneumothorax, pleural fluid or lung contusion seen.  There is DJD and aortic plaque.  No rib fracture or rib lesion seen.     Impression:     No acute process seen.:     EXAMINATION:  XR CHEST PA AND LATERAL     CLINICAL HISTORY:  Contusion of left front wall of thorax, initial encounter     FINDINGS:  Heart size is normal.  Lungs are clear.  The bones showed DJD.  There is aortic valve reconstruction.     Impression:     No acute process seen    Prior Therapy: PT about 2 years ago for low back and L shoulder with some improvements  Social History: lives with their spouse  Environment: 1 story home with 1 step to enter, uses no AD but does have RW and SPC available  Occupation: not currently employed  Prior Level of Function: independent with all ADLs, currently going to gym 1-2x/week to ride bike and lift weights, he currently drives  Current Level of Function: difficulty walking long distances, stair ambulation    Pain:Current 3/10, worst 6/10, best  "0/10   Location: mid back   Description: Aching, no radicular symptoms  Pain with coughing/sneezing, B&B, sleep disturbance: denies all  Aggravating Factors: Morning  Easing Factors: movement  Pts goals: get around better and make sure to improve the falls    Objective     Lumbar ROM: (measured in degrees) no pain with AROM   Degrees Quality   flexion   85    extension   10    Rotation R  40    Rotation L 45      Active/Passive Hip ROM: (measured in degrees)    RLE LLE   Flexion 100/ 100/   Extension -15/ -15/     AROM knee   RLE LLE   Flexion 90/ 90/   Extension -28/ -15/       Lower Extremity Strength (graded 0-5 out of 5)   RLE LLE   Hip flexion: 4+/5 4+/5   Hip extension: 4-/5 4-/5   Ankle dorsiflexion: 4+/5 4+/5   Ankle plantarflexion 4+/5 4+/5   Knee flexion 5/5 5/5   Knee extension 5/5 5/5   Hip adduction 5/5 5/5   Hip abduction 4+/5 4+/5     Special Tests: ((+): pos.; (-): neg.)   · Fabers Test: neg  · Hip scourTest: neg  · SLR Test: neg for hip and LBP  · Palpation:unable to reproduce pain on palpation except PA of the L4 L5 vertebrae otherwise soft tissue: iliacus psoas adductor, IT band, TFL glut med and piriformis neg for pain on palpation unable to reproduce  · Gait: step to 50% of the time and minimal step through gait 50% of the time with minimal foot clearance, decreased madisyn and step length and flat foot contact on ambulation without AD in gym today.  · Functional mobility: bed mobility, supine to sit to supine, sit to stand I/S due to increased time to execute. Stand step transfers and gait without AD Supervision limited community level   · Balance assessment ie Mckeon to be performed at later time.   · Dynamic sitting balance reaching 8-10 outside SHEYLA good + Static standing balance Good and dynamic standing balance reaching 4-6" outside SHEYLA Fair +    CMS Impairment/Limitation/Restriction for FOTO back Survey    Therapist reviewed FOTO scores for Randall Strickland Jr. on 11/11/2020.   FOTO documents " entered into [facility] - see Media section.             TREATMENT   Treatment Time In: 145 pm  Treatment Time Out: 155 pm  Total Treatment time (time-based codes) separate from Evaluation: 10 minutes      Randall received the following manual therapy techniques: Manual traction, Myofacial release and Soft tissue Mobilization were applied to the: adductor, QL for 10 minutes, including:  Hip knee traction     .    Home Exercises and Patient Education Provided    Education provided:   - HEP, pain management and safety awareness    Written Home Exercises Provided: Patient instructed to cont prior HEP.  Exercises were reviewed and Randall was able to demonstrate them prior to the end of the session.  Randall demonstrated good  understanding of the education provided.     See EMR under Patient Instructions for exercises provided prior visit.    Assessment   Randall is a 89 y.o. male referred to outpatient Physical Therapy with a medical diagnosis of fall and gait deficits. Pt presents with recent history of falls limited by memory as wife was present to help with history and carryover of plan of care to explain to patient as needed. Lumbar spine cleared unable to reproduce only pain on direct PA glide on L4 vertebrae no pain with AROM and MMT.  Pt limited by decreased AROM knees and trunk. Pt with decreased gait skills and posture with balance to be properly assessed at a later time.     Pt prognosis is Good.   Pt will benefit from skilled outpatient Physical Therapy to address the deficits stated above and in the chart below, provide pt/family education, and to maximize pt's level of independence.     Plan of care discussed with patient: Yes  Pt's spiritual, cultural and educational needs considered and patient is agreeable to the plan of care and goals as stated below:     Anticipated Barriers for therapy: none    Medical Necessity is demonstrated by the following  History  Co-morbidities and personal factors that may impact the plan of  care Co-morbidities:   advanced age, history of cancer, HTN and BPH, valve disease, multiple ortho deficits    Personal Factors:   age     low   Examination  Body Structures and Functions, activity limitations and participation restrictions that may impact the plan of care Body Regions:   back  lower extremities  trunk    Body Systems:    gross symmetry  ROM  gross coordinated movement  balance  gait    Participation Restrictions:   none    Activity limitations:   Learning and applying knowledge  no deficits    General Tasks and Commands  no deficits    Communication  no deficits    Mobility  walking  moving around using equipment (WC)    Self care  no deficits    Domestic Life  no deficits    Interactions/Relationships  no deficits    Life Areas  no deficits    Community and Social Life  recreation and leisure         moderate     Clinical Presentation stable and uncomplicated moderate   Decision Making/ Complexity Score: moderate     Goals:STG 3 weeks  1.  Pt to be independent with HEP for increased functional mobility and pain control.  2.  Pt to increase AROM at B knee improve knee extension 5 degs for increased functional mobility and transfers.  3.  Pt to decrease pain to less than 3/10 after session for increased functional mobility.  4.  PT to complete balance assessment and set goals for balance  5.  PT to assess proper AD for patient and patient to be Mod I at outdoors with proper AD +500' to include ramps and negotiate 6 steps 1 hand rails    Long Term Goals: 8 weeks   1.  Pt to be independent with pain management strategies and verbalize 2 strategies for increased functional mobility and pain control.  2.  Pt to increase AROM at B hip extension improve 5  degs for increased functional mobility and transfers.  3.  Pt to decrease pain to less than 2/10 after session for increased functional mobility.  4.  Pt to ambulate 1000' mod I with proper AD to include change in surface to include carpet, grass, loose  gravel with no LOB in open environment to include open conversation without LOB.    Plan   Plan of care Certification: 11/11/2020 to 12/31/20.    Outpatient Physical Therapy 2 times weekly for 10 weeks to include the following interventions: Cervical/Lumbar Traction, Gait Training, Manual Therapy, Moist Heat/ Ice, Neuromuscular Re-ed, Patient Education, Therapeutic Activites and Therapeutic Exercise.     Nila Padilla, PT

## 2020-11-12 NOTE — PLAN OF CARE
[facility]  Physical Therapy Initial Evaluation    Date: 11/11/2020   Name: Randall Strickland JrEllis  Clinic Number: [id number]    Therapy Diagnosis:   Encounter Diagnoses   Name Primary?    Fall, initial encounter     Gait disorder     Osteoarthritis of lumbar spine, unspecified spinal osteoarthritis complication status Yes     Physician: Tiago Borja MD    Physician Orders: PT Eval and Treat   Medical Diagnosis from Referral:   W19.XXXA (ICD-10-CM) - Fall, initial encounter   R26.9 (ICD-10-CM) - Gait disorder       Evaluation Date: 11/11/2020  Authorization Period Expiration: 12/31/20  Plan of Care Expiration: 1/31/21  Visit # / Visits authorized: 1/ 99    Time In: 108 pm  Time Out: 155 pm  Total Appointment Time (timed & untimed codes): 47 minutes, moderate complex eval, MT1    Precautions: Fall risk, memory loss    Subjective   Date of onset: years ago but recently had 2 falls in last 6 weeks once during daytime and once at night fall on carpet and needed assist from neighbors to come off the floor  History of current condition - Randall reports: had recent fall and back injury but the fall risk is the biggest deficit and concern at this time with AD available but infrequent use.  Pt was previously seen over 1.5 years ago for gait, weakness and falls with some success. Currently mild pain only at L back and able to ambulate and transfer without issues.   No changes in medication recently, no complaints of vertigo or dizziness and had a visual exam earlier this year and unremarkable.      Medical History:   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    Postsurgical dumping syndrome 4/10/2014    PVD (peripheral vascular disease) 8/22/2018    Squamous cell  carcinoma of skin 07/21/2014    right zygomatic cheek/temple     Stenosis of aortic and mitral valves     aortic DAHLIA 1.1 CM       Surgical History:   Randall Strickland Jr.  has a past surgical history that includes Knee surgery; Total hip arthroplasty; cararact (car); Cataract extraction bilateral w/ anterior vitrectomy; Cholecystectomy; Appendectomy; Eye surgery; Cataract extraction w/  intraocular lens implant (Bilateral); Cardiac catheterization; Cardiac valuve replacement; and Joint replacement.    Medications:   Randall has a current medication list which includes the following prescription(s): amitriptyline, amlodipine, apixaban, cholestyramine (with sugar), clopidogrel, finasteride, gabapentin, omeprazole, potassium chloride, rivastigmine tartrate, tamsulosin, thiamine, tolterodine, torsemide, and triamcinolone acetonide 0.1%.    Allergies:   Review of patient's allergies indicates:  No Known Allergies     Imaging, FINDINGS:  There is aortic valve reconstruction.  No pneumothorax, pleural fluid or lung contusion seen.  There is DJD and aortic plaque.  No rib fracture or rib lesion seen.     Impression:     No acute process seen.:     EXAMINATION:  XR CHEST PA AND LATERAL     CLINICAL HISTORY:  Contusion of left front wall of thorax, initial encounter     FINDINGS:  Heart size is normal.  Lungs are clear.  The bones showed DJD.  There is aortic valve reconstruction.     Impression:     No acute process seen    Prior Therapy: PT about 2 years ago for low back and L shoulder with some improvements  Social History: lives with their spouse  Environment: 1 story home with 1 step to enter, uses no AD but does have RW and SPC available  Occupation: not currently employed  Prior Level of Function: independent with all ADLs, currently going to gym 1-2x/week to ride bike and lift weights, he currently drives  Current Level of Function: difficulty walking long distances, stair ambulation    Pain:Current 3/10, worst 6/10, best  "0/10   Location: mid back   Description: Aching, no radicular symptoms  Pain with coughing/sneezing, B&B, sleep disturbance: denies all  Aggravating Factors: Morning  Easing Factors: movement  Pts goals: get around better and make sure to improve the falls    Objective     Lumbar ROM: (measured in degrees) no pain with AROM   Degrees Quality   flexion   85    extension   10    Rotation R  40    Rotation L 45      Active/Passive Hip ROM: (measured in degrees)    RLE LLE   Flexion 100/ 100/   Extension -15/ -15/     AROM knee   RLE LLE   Flexion 90/ 90/   Extension -28/ -15/       Lower Extremity Strength (graded 0-5 out of 5)   RLE LLE   Hip flexion: 4+/5 4+/5   Hip extension: 4-/5 4-/5   Ankle dorsiflexion: 4+/5 4+/5   Ankle plantarflexion 4+/5 4+/5   Knee flexion 5/5 5/5   Knee extension 5/5 5/5   Hip adduction 5/5 5/5   Hip abduction 4+/5 4+/5     Special Tests: ((+): pos.; (-): neg.)   · Fabers Test: neg  · Hip scourTest: neg  · SLR Test: neg for hip and LBP  · Palpation:unable to reproduce pain on palpation except PA of the L4 L5 vertebrae otherwise soft tissue: iliacus psoas adductor, IT band, TFL glut med and piriformis neg for pain on palpation unable to reproduce  · Gait: step to 50% of the time and minimal step through gait 50% of the time with minimal foot clearance, decreased madisyn and step length and flat foot contact on ambulation without AD in gym today.  · Functional mobility: bed mobility, supine to sit to supine, sit to stand I/S due to increased time to execute. Stand step transfers and gait without AD Supervision limited community level   · Balance assessment ie Mckeon to be performed at later time.   · Dynamic sitting balance reaching 8-10 outside SHEYLA good + Static standing balance Good and dynamic standing balance reaching 4-6" outside SHEYLA Fair +    CMS Impairment/Limitation/Restriction for FOTO back Survey    Therapist reviewed FOTO scores for Randall Strickland Jr. on 11/11/2020.   FOTO documents " entered into [employer] - see Media section.             TREATMENT   Treatment Time In: 145 pm  Treatment Time Out: 155 pm  Total Treatment time (time-based codes) separate from Evaluation: 10 minutes      Randall received the following manual therapy techniques: Manual traction, Myofacial release and Soft tissue Mobilization were applied to the: adductor, QL for 10 minutes, including:  Hip knee traction     .    Home Exercises and Patient Education Provided    Education provided:   - HEP, pain management and safety awareness    Written Home Exercises Provided: Patient instructed to cont prior HEP.  Exercises were reviewed and Randall was able to demonstrate them prior to the end of the session.  Randall demonstrated good  understanding of the education provided.     See EMR under Patient Instructions for exercises provided prior visit.    Assessment   Randall is a 89 y.o. male referred to outpatient Physical Therapy with a medical diagnosis of fall and gait deficits. Pt presents with recent history of falls limited by memory as wife was present to help with history and carryover of plan of care to explain to patient as needed. Lumbar spine cleared unable to reproduce only pain on direct PA glide on L4 vertebrae no pain with AROM and MMT.  Pt limited by decreased AROM knees and trunk. Pt with decreased gait skills and posture with balance to be properly assessed at a later time.     Pt prognosis is Good.   Pt will benefit from skilled outpatient Physical Therapy to address the deficits stated above and in the chart below, provide pt/family education, and to maximize pt's level of independence.     Plan of care discussed with patient: Yes  Pt's spiritual, cultural and educational needs considered and patient is agreeable to the plan of care and goals as stated below:     Anticipated Barriers for therapy: none    Medical Necessity is demonstrated by the following  History  Co-morbidities and personal factors that may impact the plan of  care Co-morbidities:   advanced age, history of cancer, HTN and BPH, valve disease, multiple ortho deficits    Personal Factors:   age     low   Examination  Body Structures and Functions, activity limitations and participation restrictions that may impact the plan of care Body Regions:   back  lower extremities  trunk    Body Systems:    gross symmetry  ROM  gross coordinated movement  balance  gait    Participation Restrictions:   none    Activity limitations:   Learning and applying knowledge  no deficits    General Tasks and Commands  no deficits    Communication  no deficits    Mobility  walking  moving around using equipment (WC)    Self care  no deficits    Domestic Life  no deficits    Interactions/Relationships  no deficits    Life Areas  no deficits    Community and Social Life  recreation and leisure         moderate     Clinical Presentation stable and uncomplicated moderate   Decision Making/ Complexity Score: moderate     Goals:STG 3 weeks  1.  Pt to be independent with HEP for increased functional mobility and pain control.  2.  Pt to increase AROM at B knee improve knee extension 5 degs for increased functional mobility and transfers.  3.  Pt to decrease pain to less than 3/10 after session for increased functional mobility.  4.  PT to complete balance assessment and set goals for balance  5.  PT to assess proper AD for patient and patient to be Mod I at outdoors with proper AD +500' to include ramps and negotiate 6 steps 1 hand rails    Long Term Goals: 8 weeks   1.  Pt to be independent with pain management strategies and verbalize 2 strategies for increased functional mobility and pain control.  2.  Pt to increase AROM at B hip extension improve 5  degs for increased functional mobility and transfers.  3.  Pt to decrease pain to less than 2/10 after session for increased functional mobility.  4.  Pt to ambulate 1000' mod I with proper AD to include change in surface to include carpet, grass, loose  gravel with no LOB in open environment to include open conversation without LOB.    Plan   Plan of care Certification: 11/11/2020 to 12/31/20.    Outpatient Physical Therapy 2 times weekly for 10 weeks to include the following interventions: Cervical/Lumbar Traction, Gait Training, Manual Therapy, Moist Heat/ Ice, Neuromuscular Re-ed, Patient Education, Therapeutic Activites and Therapeutic Exercise.     Nila Padilla, PT    Agree with PT/OT assessment and approve continuation of care. MD Vanessa, MA

## 2020-11-17 NOTE — PROGRESS NOTES
Physical Therapy Daily Treatment Note     Name: Randall Strickland Jr.  Clinic Number: [id number]    Therapy Diagnosis:   Encounter Diagnoses   Name Primary?    Impaired functional mobility, balance, gait, and endurance Yes    Bilateral leg weakness      Physician: Tiago Borja MD    Visit Date: 11/18/2020    Physician Orders: PT Eval and Treat   Medical Diagnosis from Referral:   W19.XXXA (ICD-10-CM) - Fall, initial encounter   R26.9 (ICD-10-CM) - Gait disorder   Evaluation Date: 11/11/2020  Authorization Period Expiration: 12/31/20  Plan of Care Expiration: 1/31/21  Visit # / Visits authorized: 2/ 99     Time In: 110 pm   Time Out: 155 pm   Total Appointment Time (timed & untimed codes): 45 minutes     Precautions: Fall risk, memory loss    Missed visits: 0  Cancelled visits: 0  Subjective     Pt reports: no complaints upon arrival. Patient is pleasant and agreeable.  HEP to be initiated as appropriate.   Response to previous treatment: No adverse effects reported   Functional change: Ongoing     Pain: 0/10  Location:  n/a     Objective     Randall received therapeutic exercises to develop strength and endurance for 0 minutes including:      Randall participated in neuromuscular re-education activities to improve: Balance and Coordination for 45 minutes. The following activities were included:       Evaluation [unfilled]   Single Limb Stance R LE 3s, multiple attempts  (<10 sec = HIGH FALL RISK)   Single Limb Stance L LE 3s, multiple attempts  (<10 sec = HIGH FALL RISK)   30 second Chair Rise 10 full reps completed with no arms; occasional failure and decreased descent control   5 times sit-stand 10.0 seconds with arms; does not come to full stand (hands on armrest the entire time)      Evaluation [unfilled]   Timed Up and Go 16.8 sec with  No Assistive Device   Self Selected Walking Speed 0.71 m/sec (6m/8.4s) with  No Assistive Device   Fast Walking Speed DNT   2 Minute Walk Test 295 feet   4 Step Square Test DNT      Postural control: MCTSIB: Evaluation [unfilled]   1. Eyes Open/feet together/Firm:  30 seconds   2. Eyes Closed/feet together/Firm:  20 seconds   3. Eyes Open/feet together/Foam:  30 seconds   4. Eyes Closed/feet together/Foam:  25 seconds       Functional Gait Assessment:   1. Gait on level surface =  1  2. Change in Gait Speed = 3  3. Gait with horizontal head turns  = 2  4. Gait with vertical head turns = 2  5. Gait with pivot turns = 3  6. Step over obstacle = 2  7. Gait with Narrow SHEYLA = 0  8. Gait with eyes closed = 2  9. Ambulating Backwards = 2  10. Steps = 2    Score 19/30     Fwd ambulation tossing soccer ball 3 x 30 feet  Bwd ambulation in open environment 3 x 30 feet    Randall participated in dynamic functional therapeutic activities to improve functional performance for 0 minutes, including:      Home Exercises Provided and Patient Education Provided     Education provided:   - POC, Proper technique with therapeutic interventions, Benefits/purpose of today's therapeutic interventions    Written Home Exercises Provided: not at this time.    Assessment   Randall tolerated today's session well. Time was spent to established baseline of balance and functional mobility measures for updated goals. Today's interventions were appropriately challenging, with most challenge with dual task ambulation. He exhibited moderate fatigue with ambulation tasks. Therapy to continued per established POC.       Randall is progressing well towards his goals.   Pt prognosis is Good.     Pt will continue to benefit from skilled outpatient physical therapy to address the deficits listed in the problem list box on initial evaluation, provide pt/family education and to maximize pt's level of independence in the home and community environment.     Pt's spiritual, cultural and educational needs considered and pt agreeable to plan of care and goals.     Anticipated barriers to physical therapy: None    Goals:   Goals:STG 3 weeks  1.  Pt  to be independent with HEP for increased functional mobility and pain control. Ongoing   DiscontinueDiscontinue  4.  PT to complete balance assessment and set goals for balance Ongoing  5.  PT to assess proper AD for patient and patient to be Mod I at outdoors with proper AD +500' to include ramps and negotiate 6 steps 1 hand rails Ongoing    Patient will exhibit improved 30 Second Chair Rise to >/= 12 reps, indicating improved functional LE strength. Ongoing    Patient will exhibit improved Timed Up and Go time to </= 14.8 seconds indicating improved safety with functional mobility tasks. Ongoing    Patient will exhibit improved Functional Gait Assessment score to >/= 21 /30 indicating improved dynamic balance for increased safety ambulatory tasks Ongoing       Long Term Goals: 8 weeks   DiscontinueDiscontinueDiscontinue4.  Pt to ambulate 1000' mod I with proper AD to include change in surface to include carpet, grass, loose gravel with no LOB in open environment to include open conversation without LOB. Ongoing    Patient will exhibit improved 30 Second Chair Rise to >/= 14 reps, indicating improved functional LE strength. Ongoing    Patient will exhibit improved Timed Up and Go time to </= 30 seconds indicating improved safety with functional mobility tasks. Ongoing    Patient will exhibit improved Functional Gait Assessment score to >/= 23 /30 indicating improved dynamic balance for increased safety ambulatory tasks Ongoing    Pt will improve Self Selected Walking Speed to at least 0.81 m/s for more normalized community gait and decreased fall risk.  Ongoing    Patient will maintain balance in MCTSIB condition # 2 for a duration of 30 seconds indicating improved static postural balance. Ongoing    Patient will maintain balance in MCTSIB condition # 4 for a duration of 30 seconds indicating improved static postural balance. Ongoing        Plan     Continue per POC, emphasizing LE weakness and dynamic  balance    Aviva Saul, PT

## 2020-11-18 ENCOUNTER — CLINICAL SUPPORT (OUTPATIENT)
Dept: REHABILITATION | Facility: HOSPITAL | Age: 85
End: 2020-11-18
Attending: FAMILY MEDICINE
Payer: MEDICARE

## 2020-11-18 DIAGNOSIS — Z74.09 IMPAIRED FUNCTIONAL MOBILITY, BALANCE, GAIT, AND ENDURANCE: Primary | ICD-10-CM

## 2020-11-18 DIAGNOSIS — R29.898 BILATERAL LEG WEAKNESS: ICD-10-CM

## 2020-11-18 PROCEDURE — 97112 NEUROMUSCULAR REEDUCATION: CPT | Mod: PO

## 2020-11-23 NOTE — PROGRESS NOTES
Physical Therapy Daily Treatment Note     Name: Randall Strickland Jr.  Clinic Number: [id number]    Therapy Diagnosis:   Encounter Diagnoses   Name Primary?    Impaired functional mobility, balance, gait, and endurance     Bilateral leg weakness      Physician: Tiago Borja MD    Visit Date: 11/24/2020    Physician Orders: PT Eval and Treat   Medical Diagnosis from Referral:   W19.XXXA (ICD-10-CM) - Fall, initial encounter   R26.9 (ICD-10-CM) - Gait disorder   Evaluation Date: 11/11/2020  Authorization Period Expiration: 12/31/20  Plan of Care Expiration: 1/31/21  Visit # / Visits authorized: 3/ 99     Time In: 0330pm  Time Out: 0415 pm  Total Appointment Time (timed & untimed codes): 45  minutes     Precautions: Fall risk, memory loss    Missed visits: 0  Cancelled visits: 0  Subjective     Pt reports: no complaints upon arrival. He missed his last visit because he could not find the clinic location.  HEP to be initiated as appropriate.   Response to previous treatment: No adverse effects reported   Functional change: Ongoing     Pain: 0/10  Location:  n/a     Objective     Randall received therapeutic exercises to develop strength and endurance for 8 minutes including:    Scifit Recumbent Stepper L2 x 8 minutes    Randall participated in neuromuscular re-education activities to improve: Balance and Coordination for 37 minutes. The following activities were included:     Airex, eyes open 3 x 30s, SBA  Airex, eyes closed 3 x 30s, CGA  Airex, Standing Head/Trunk rotation L<>R retrieving a 2.2# med ball from therapist standing behind patient 2 x 30s, Occ CGA     Fwd ambulation tossing soccer ball 2 x 30 feet  Bwd ambulation in open environment 2 x 30 feet    Foam fitter fwd step ups, no UE support 2 x 10 reps bilaterally    Alternating cone (two) taps with green cones 2 x 45 seconds, frequent UE support    Wooden 2pt fitter A/p Weightshifts 2 x 30s, CGA    Sidestepping over hurdles (next visit)    Randall participated in  dynamic functional therapeutic activities to improve functional performance for 0 minutes, including:      Home Exercises Provided and Patient Education Provided     Education provided:   - POC, Proper technique with therapeutic interventions, Benefits/purpose of today's therapeutic interventions    Written Home Exercises Provided: not at this time.    Assessment   Randall tolerated today's session well, with appropriate challenge observed throughout. Continued dual task ambulation training is required due to evident decline with gait quality. Visual dependence is evident with static balance tasks; patient also heavily reliant on UE with balance tasks, with frequent touchdown support observed.       Randall is progressing well towards his goals.   Pt prognosis is Good.     Pt will continue to benefit from skilled outpatient physical therapy to address the deficits listed in the problem list box on initial evaluation, provide pt/family education and to maximize pt's level of independence in the home and community environment.     Pt's spiritual, cultural and educational needs considered and pt agreeable to plan of care and goals.     Anticipated barriers to physical therapy: None    Goals:   Goals:STG 3 weeks  1.  Pt to be independent with HEP for increased functional mobility and pain control. Ongoing  2.  PT to complete balance assessment and set goals for balance Ongoing  3.  PT to assess proper AD for patient and patient to be Mod I at outdoors with proper AD +500' to include ramps and negotiate 6 steps 1 hand rails Ongoing  4. Patient will exhibit improved 30 Second Chair Rise to >/= 12 reps, indicating improved functional LE strength. Ongoing  5. Patient will exhibit improved Timed Up and Go time to </= 14.8 seconds indicating improved safety with functional mobility tasks. Ongoing  6. Patient will exhibit improved Functional Gait Assessment score to >/= 21 /30 indicating improved dynamic balance for increased safety  ambulatory tasks Ongoing     Long Term Goals: 8 weeks   1.  Pt to ambulate 1000' mod I with proper AD to include change in surface to include carpet, grass, loose gravel with no LOB in open environment to include open conversation without LOB. Ongoing  2. Patient will exhibit improved 30 Second Chair Rise to >/= 14 reps, indicating improved functional LE strength. Ongoing  3. Patient will exhibit improved Timed Up and Go time to </= 30 seconds indicating improved safety with functional mobility tasks. Ongoing  4. Patient will exhibit improved Functional Gait Assessment score to >/= 23 /30 indicating improved dynamic balance for increased safety ambulatory tasks Ongoing  5. Pt will improve Self Selected Walking Speed to at least 0.81 m/s for more normalized community gait and decreased fall risk.  Ongoing  6. Patient will maintain balance in MCTSIB condition # 2 for a duration of 30 seconds indicating improved static postural balance. Ongoing  7. Patient will maintain balance in MCTSIB condition # 4 for a duration of 30 seconds indicating improved static postural balance. Ongoing      Plan     Continue per POC, emphasizing LE weakness and dynamic balance    Aviva Hughes PT

## 2020-11-24 ENCOUNTER — CLINICAL SUPPORT (OUTPATIENT)
Dept: REHABILITATION | Facility: HOSPITAL | Age: 85
End: 2020-11-24
Attending: FAMILY MEDICINE
Payer: MEDICARE

## 2020-11-24 DIAGNOSIS — R29.898 BILATERAL LEG WEAKNESS: ICD-10-CM

## 2020-11-24 DIAGNOSIS — Z74.09 IMPAIRED FUNCTIONAL MOBILITY, BALANCE, GAIT, AND ENDURANCE: ICD-10-CM

## 2020-11-24 PROCEDURE — 97112 NEUROMUSCULAR REEDUCATION: CPT | Mod: PO

## 2020-11-24 PROCEDURE — 97110 THERAPEUTIC EXERCISES: CPT | Mod: PO

## 2020-12-02 ENCOUNTER — CLINICAL SUPPORT (OUTPATIENT)
Dept: REHABILITATION | Facility: HOSPITAL | Age: 85
End: 2020-12-02
Attending: FAMILY MEDICINE
Payer: MEDICARE

## 2020-12-02 DIAGNOSIS — Z74.09 IMPAIRED FUNCTIONAL MOBILITY, BALANCE, GAIT, AND ENDURANCE: ICD-10-CM

## 2020-12-02 DIAGNOSIS — R29.898 BILATERAL LEG WEAKNESS: ICD-10-CM

## 2020-12-02 PROCEDURE — 97110 THERAPEUTIC EXERCISES: CPT | Mod: PO,CQ

## 2020-12-02 PROCEDURE — 97112 NEUROMUSCULAR REEDUCATION: CPT | Mod: PO,CQ

## 2020-12-02 NOTE — PROGRESS NOTES
"  Physical Therapy Daily Treatment Note     Name: Randall Strickland Jr.  Clinic Number: [id number]    Therapy Diagnosis:   No diagnosis found.  Physician: Tiago Borja MD    Visit Date: 12/2/2020    Physician Orders: PT Eval and Treat   Medical Diagnosis from Referral:   W19.XXXA (ICD-10-CM) - Fall, initial encounter   R26.9 (ICD-10-CM) - Gait disorder   Evaluation Date: 11/11/2020  Authorization Period Expiration: 12/31/20  Plan of Care Expiration: 1/31/21  Visit # / Visits authorized: 4/ 99     Time In: 12:00  Time Out: 12:45  Total Appointment Time (timed & untimed codes): 45  minutes     Precautions: Fall risk, memory loss    Missed visits: 0  Cancelled visits: 0  Subjective     Pt reports: " I'm doing pretty good for a 89 year old man."   HEP to be initiated as appropriate.   Response to previous treatment: No adverse effects reported   Functional change: Ongoing     Pain: 0/10  Location:  n/a     Objective     Randall received therapeutic exercises to develop strength and endurance for 8 minutes including:    Scifit Recumbent Stepper L2 x 8 minutes    Randall participated in neuromuscular re-education activities to improve: Balance and Coordination for 37 minutes. The following activities were included:     // bars: CGA  X 6 laps of forward stepping over orange hurdles with occasional 1 UE support required  X 6 laps of side stepping over orange hurdles with occasional 1 UE support     Long airex foam pad: CGA  X 6 laps of side stepping with occasional 1 finger support  X 6 laps of forward tandem steps with occasional 1 finger support  2 x 30 sec each of tandem stance with occasional 1 UE support      4 point foam fitter board:  2 x 30 sec static standing with WBOS with eyes open, no UE support.  2 x 30 sec static standing with NBOS with eyes open, no UE support.  2 x 30 sec of static standing with eyes closed, occasional UE support  X 60 sec of medial/lateral weight shifting with occasional UE support  X 60 sec " anterior/posterior weight shifting with occasional UE support    Randall participated in dynamic functional therapeutic activities to improve functional performance for 0 minutes, including:      Home Exercises Provided and Patient Education Provided     Education provided:   - POC, Proper technique with therapeutic interventions, Benefits/purpose of today's therapeutic interventions    Written Home Exercises Provided: not at this time.    Assessment   Randall tolerated today's session well today and did not have any problems noted.  Randall is very cooperative and requires occasional 1 finger support to 1 UE support for safety with balance activities.  Randall did not have any loss of balance noted during tx session.        Randall is progressing well towards his goals.   Pt prognosis is Good.     Pt will continue to benefit from skilled outpatient physical therapy to address the deficits listed in the problem list box on initial evaluation, provide pt/family education and to maximize pt's level of independence in the home and community environment.     Pt's spiritual, cultural and educational needs considered and pt agreeable to plan of care and goals.     Anticipated barriers to physical therapy: None    Goals:   Goals:STG 3 weeks  1.  Pt to be independent with HEP for increased functional mobility and pain control. Ongoing  2.  PT to complete balance assessment and set goals for balance Ongoing  3.  PT to assess proper AD for patient and patient to be Mod I at outdoors with proper AD +500' to include ramps and negotiate 6 steps 1 hand rails Ongoing  4. Patient will exhibit improved 30 Second Chair Rise to >/= 12 reps, indicating improved functional LE strength. Ongoing  5. Patient will exhibit improved Timed Up and Go time to </= 14.8 seconds indicating improved safety with functional mobility tasks. Ongoing  6. Patient will exhibit improved Functional Gait Assessment score to >/= 21 /30 indicating improved dynamic balance for  increased safety ambulatory tasks Ongoing     Long Term Goals: 8 weeks   1.  Pt to ambulate 1000' mod I with proper AD to include change in surface to include carpet, grass, loose gravel with no LOB in open environment to include open conversation without LOB. Ongoing  2. Patient will exhibit improved 30 Second Chair Rise to >/= 14 reps, indicating improved functional LE strength. Ongoing  3. Patient will exhibit improved Timed Up and Go time to </= 30 seconds indicating improved safety with functional mobility tasks. Ongoing  4. Patient will exhibit improved Functional Gait Assessment score to >/= 23 /30 indicating improved dynamic balance for increased safety ambulatory tasks Ongoing  5. Pt will improve Self Selected Walking Speed to at least 0.81 m/s for more normalized community gait and decreased fall risk.  Ongoing  6. Patient will maintain balance in MCTSIB condition # 2 for a duration of 30 seconds indicating improved static postural balance. Ongoing  7. Patient will maintain balance in MCTSIB condition # 4 for a duration of 30 seconds indicating improved static postural balance. Ongoing      Plan     Continue per POC, emphasizing LE weakness and dynamic balance    Paulina Jackson, PTA

## 2020-12-04 ENCOUNTER — CLINICAL SUPPORT (OUTPATIENT)
Dept: REHABILITATION | Facility: HOSPITAL | Age: 85
End: 2020-12-04
Attending: FAMILY MEDICINE
Payer: MEDICARE

## 2020-12-04 DIAGNOSIS — R29.898 BILATERAL LEG WEAKNESS: ICD-10-CM

## 2020-12-04 DIAGNOSIS — Z74.09 IMPAIRED FUNCTIONAL MOBILITY, BALANCE, GAIT, AND ENDURANCE: ICD-10-CM

## 2020-12-04 PROCEDURE — 97110 THERAPEUTIC EXERCISES: CPT | Mod: PO

## 2020-12-04 NOTE — PROGRESS NOTES
"  Physical Therapy Daily Treatment Note     Name: Randall Strickland Jr.  Clinic Number: [id number]    Therapy Diagnosis:   Encounter Diagnoses   Name Primary?    Impaired functional mobility, balance, gait, and endurance     Bilateral leg weakness      Physician: Tiago Borja MD    Visit Date: 12/4/2020    Physician Orders: PT Eval and Treat   Medical Diagnosis from Referral:   W19.XXXA (ICD-10-CM) - Fall, initial encounter   R26.9 (ICD-10-CM) - Gait disorder   Evaluation Date: 11/11/2020  Authorization Period Expiration: 12/31/20  Plan of Care Expiration: 1/31/21  Visit # / Visits authorized: 5/ 99     Time In: 1210  Time Out: 12:50   Total Appointment Time (timed & untimed codes): 40 minutes     Precautions: Fall risk, memory loss    Missed visits: 0  Cancelled visits: 0  Subjective     Pt reports: no complaints upon arrival.  HEP to be initiated as appropriate.   Response to previous treatment: No adverse effects reported   Functional change: Ongoing     Pain: 0/10  Location:  n/a     Objective     Randall received therapeutic exercises to develop strength and endurance for 45 minutes including:    Scifit Recumbent Stepper L3 x 8 minutes    Fwd Step ups 4" 1 x 10 reps, SBA  Fwd Step up 6" 1 x 10 reps, SBA    Lateral step ups 4" 1 x 10 reps, SBA  Lateral step ups 6" 1 x 10 reps, SBA    Pilates Box, [place] L2, hip/knee flexion <> flexion 2 x 10 reps, BUE support    HEP education:  Heel raises 3 x 15 reps, no UE support  Toe raises 3 x 15 reps, BUE support  Lateral walking x 8 laps, no UE support  Standing marches, 3 x 30s, no UE support  Hamstring stretch, 2 x 30s, bilateral    Randall participated in neuromuscular re-education activities to improve: Balance and Coordination for 0 minutes. The following activities were included:    Not performed this date. Full session spent on LE strengthening and HEP education     // bars: CGA  X 6 laps of forward stepping over orange hurdles with occasional 1 UE support " required  X 6 laps of side stepping over orange hurdles with occasional 1 UE support     Long airex foam pad: CGA  X 6 laps of side stepping with occasional 1 finger support  X 6 laps of forward tandem steps with occasional 1 finger support  2 x 30 sec each of tandem stance with occasional 1 UE support      4 point foam fitter board:  2 x 30 sec static standing with WBOS with eyes open, no UE support.  2 x 30 sec static standing with NBOS with eyes open, no UE support.  2 x 30 sec of static standing with eyes closed, occasional UE support  X 60 sec of medial/lateral weight shifting with occasional UE support  X 60 sec anterior/posterior weight shifting with occasional UE support    Randall participated in dynamic functional therapeutic activities to improve functional performance for 0 minutes, including:      Home Exercises Provided and Patient Education Provided     Education provided:   - POC, Proper technique with therapeutic interventions, Benefits/purpose of today's therapeutic interventions    Written Home Exercises Provided: not at this time.    Assessment   Randall tolerated today's session well today and did not have any problems noted. Today's session emphasized LE strengthening and HEP education for self-management outside of PT visits. He exhibits good carryover. PT to continue per established POC.       Randall is progressing well towards his goals.   Pt prognosis is Good.     Pt will continue to benefit from skilled outpatient physical therapy to address the deficits listed in the problem list box on initial evaluation, provide pt/family education and to maximize pt's level of independence in the home and community environment.     Pt's spiritual, cultural and educational needs considered and pt agreeable to plan of care and goals.     Anticipated barriers to physical therapy: None    Goals:   Goals:STG 3 weeks  1.  Pt to be independent with HEP for increased functional mobility and pain control. Ongoing  2.   PT to complete balance assessment and set goals for balance Ongoing  3.  PT to assess proper AD for patient and patient to be Mod I at outdoors with proper AD +500' to include ramps and negotiate 6 steps 1 hand rails Ongoing  4. Patient will exhibit improved 30 Second Chair Rise to >/= 12 reps, indicating improved functional LE strength. Ongoing  5. Patient will exhibit improved Timed Up and Go time to </= 14.8 seconds indicating improved safety with functional mobility tasks. Ongoing  6. Patient will exhibit improved Functional Gait Assessment score to >/= 21 /30 indicating improved dynamic balance for increased safety ambulatory tasks Ongoing     Long Term Goals: 8 weeks   1.  Pt to ambulate 1000' mod I with proper AD to include change in surface to include carpet, grass, loose gravel with no LOB in open environment to include open conversation without LOB. Ongoing  2. Patient will exhibit improved 30 Second Chair Rise to >/= 14 reps, indicating improved functional LE strength. Ongoing  3. Patient will exhibit improved Timed Up and Go time to </= 30 seconds indicating improved safety with functional mobility tasks. Ongoing  4. Patient will exhibit improved Functional Gait Assessment score to >/= 23 /30 indicating improved dynamic balance for increased safety ambulatory tasks Ongoing  5. Pt will improve Self Selected Walking Speed to at least 0.81 m/s for more normalized community gait and decreased fall risk.  Ongoing  6. Patient will maintain balance in MCTSIB condition # 2 for a duration of 30 seconds indicating improved static postural balance. Ongoing  7. Patient will maintain balance in MCTSIB condition # 4 for a duration of 30 seconds indicating improved static postural balance. Ongoing      Plan     Continue per POC, emphasizing LE weakness and dynamic balance    Aviva Saul PT

## 2020-12-08 NOTE — PROGRESS NOTES
"  Physical Therapy Daily Treatment Note     Name: Randall Strickland Jr.  Clinic Number: [id number]    Therapy Diagnosis:   Encounter Diagnoses   Name Primary?    Impaired functional mobility, balance, gait, and endurance     Bilateral leg weakness      Physician: Tiago Borja MD    Visit Date: 12/9/2020    Physician Orders: PT Eval and Treat   Medical Diagnosis from Referral:   W19.XXXA (ICD-10-CM) - Fall, initial encounter   R26.9 (ICD-10-CM) - Gait disorder   Evaluation Date: 11/11/2020  Authorization Period Expiration: 12/31/20  Plan of Care Expiration: 1/31/21  Visit # / Visits authorized: 6/ 99     Time In: 1407  Time Out: 1445   Total Appointment Time (timed & untimed codes): 38 minutes     Precautions: Fall risk, memory loss    Missed visits: 0  Cancelled visits: 0  Subjective     Pt reports: no complaints upon arrival.   He was compliant with home exercise program.    Response to previous treatment: No adverse effects reported   Functional change: Ongoing     Pain: 0/10  Location:  n/a     Objective     Randall received therapeutic exercises to develop strength and endurance for 25 minutes including:    Scifit Recumbent Stepper L3 x 8 minutes    Fwd Step ups 4" 1 x 10 reps, SBA  Fwd Step up 6" 1 x 10 reps, SBA    Lateral step ups 4" 1 x 10 reps, SBA  Lateral step ups 6" 1 x 10 reps, SBA    Randall participated in neuromuscular re-education activities to improve: Balance and Coordination for 13 minutes. The following activities were included:     // bars: CGA  Airex, eyes closed  3 x 30s  1 x 30 s Tandem stance ea LE back    Long airex foam pad: CGA  X 6 laps of side stepping with occasional 1 finger support  X 6 laps of forward tandem steps with occasional 1 finger support    2 point fitter board:    X 60 sec of medial/lateral weight shifting with occasional UE support  X 60 sec anterior/posterior weight shifting with occasional UE support  2 x 30 sec of static standing with eyes closed, occasional UE " support    Randall participated in dynamic functional therapeutic activities to improve functional performance for 0 minutes, including:      Home Exercises Provided and Patient Education Provided     Education provided:   - POC, Proper technique with therapeutic interventions, Benefits/purpose of today's therapeutic interventions    Written Home Exercises Provided: not at this time.    Assessment   Randall tolerated today's session well today and did not have any problems noted. He continues to exhibit improved activity tolerance, requiring less rest breaks throughout session. PT to continue per established POC. Reassessment next visit.      Randall is progressing well towards his goals.   Pt prognosis is Good.     Pt will continue to benefit from skilled outpatient physical therapy to address the deficits listed in the problem list box on initial evaluation, provide pt/family education and to maximize pt's level of independence in the home and community environment.     Pt's spiritual, cultural and educational needs considered and pt agreeable to plan of care and goals.     Anticipated barriers to physical therapy: None    Goals:   Goals:STG 3 weeks  1.  Pt to be independent with HEP for increased functional mobility and pain control. Ongoing  2.  PT to complete balance assessment and set goals for balance Ongoing  3.  PT to assess proper AD for patient and patient to be Mod I at outdoors with proper AD +500' to include ramps and negotiate 6 steps 1 hand rails Ongoing  4. Patient will exhibit improved 30 Second Chair Rise to >/= 12 reps, indicating improved functional LE strength. Ongoing  5. Patient will exhibit improved Timed Up and Go time to </= 14.8 seconds indicating improved safety with functional mobility tasks. Ongoing  6. Patient will exhibit improved Functional Gait Assessment score to >/= 21 /30 indicating improved dynamic balance for increased safety ambulatory tasks Ongoing     Long Term Goals: 8 weeks   1.   Pt to ambulate 1000' mod I with proper AD to include change in surface to include carpet, grass, loose gravel with no LOB in open environment to include open conversation without LOB. Ongoing  2. Patient will exhibit improved 30 Second Chair Rise to >/= 14 reps, indicating improved functional LE strength. Ongoing  3. Patient will exhibit improved Timed Up and Go time to </= 30 seconds indicating improved safety with functional mobility tasks. Ongoing  4. Patient will exhibit improved Functional Gait Assessment score to >/= 23 /30 indicating improved dynamic balance for increased safety ambulatory tasks Ongoing  5. Pt will improve Self Selected Walking Speed to at least 0.81 m/s for more normalized community gait and decreased fall risk.  Ongoing  6. Patient will maintain balance in MCTSIB condition # 2 for a duration of 30 seconds indicating improved static postural balance. Ongoing  7. Patient will maintain balance in MCTSIB condition # 4 for a duration of 30 seconds indicating improved static postural balance. Ongoing      Plan     Continue per POC, emphasizing LE weakness and dynamic balance    Aviva Hughes, PT

## 2020-12-09 ENCOUNTER — CLINICAL SUPPORT (OUTPATIENT)
Dept: REHABILITATION | Facility: HOSPITAL | Age: 85
End: 2020-12-09
Attending: FAMILY MEDICINE
Payer: MEDICARE

## 2020-12-09 DIAGNOSIS — Z74.09 IMPAIRED FUNCTIONAL MOBILITY, BALANCE, GAIT, AND ENDURANCE: ICD-10-CM

## 2020-12-09 DIAGNOSIS — R29.898 BILATERAL LEG WEAKNESS: ICD-10-CM

## 2020-12-09 PROCEDURE — 97110 THERAPEUTIC EXERCISES: CPT | Mod: PO

## 2020-12-09 PROCEDURE — 97112 NEUROMUSCULAR REEDUCATION: CPT | Mod: PO

## 2020-12-11 ENCOUNTER — CLINICAL SUPPORT (OUTPATIENT)
Dept: REHABILITATION | Facility: HOSPITAL | Age: 85
End: 2020-12-11
Attending: FAMILY MEDICINE
Payer: MEDICARE

## 2020-12-11 DIAGNOSIS — R29.898 BILATERAL LEG WEAKNESS: ICD-10-CM

## 2020-12-11 DIAGNOSIS — Z74.09 IMPAIRED FUNCTIONAL MOBILITY, BALANCE, GAIT, AND ENDURANCE: ICD-10-CM

## 2020-12-11 PROCEDURE — 97110 THERAPEUTIC EXERCISES: CPT | Mod: PO

## 2020-12-11 PROCEDURE — 97112 NEUROMUSCULAR REEDUCATION: CPT | Mod: PO

## 2020-12-11 NOTE — PROGRESS NOTES
"  Physical Therapy Daily Treatment Note     Name: Randall Strickland Jr.  Clinic Number: [id number]    Therapy Diagnosis:   Encounter Diagnoses   Name Primary?    Impaired functional mobility, balance, gait, and endurance     Bilateral leg weakness      Physician: Tiago Borja MD    Visit Date: 12/11/2020    Physician Orders: PT Eval and Treat   Medical Diagnosis from Referral:   W19.XXXA (ICD-10-CM) - Fall, initial encounter   R26.9 (ICD-10-CM) - Gait disorder   Evaluation Date: 11/11/2020  Authorization Period Expiration: 12/31/20  Plan of Care Expiration: 1/31/21  Visit # / Visits authorized: 7/ 99     Time In: 1204   Time Out: 1249   Total Appointment Time (timed & untimed codes): 45 minutes     Precautions: Fall risk, memory loss    Missed visits: 0  Cancelled visits: 0  Subjective     Pt reports: no complaints upon arrival.   He was compliant with home exercise program.    Response to previous treatment: No adverse effects reported   Functional change: Ongoing     Pain: 0/10  Location:  n/a     Objective     Randall received therapeutic exercises to develop strength and endurance for 25 minutes including:    Scifit Recumbent Stepper L3 x 10 minutes    30 Sec Chair Rise: 11 reps  Timed Up and Go: 13.4 seconds  SSWS: 0.79 m/s    Fwd Step ups 4" 2 x 10 reps, SBA    Lateral step ups foam fitter 2 x 10 reps, SBA    Pilates box, L1, hip/knee flexion<>extension, 2 x 10 reps bilaterally, no UE support    Randall participated in neuromuscular re-education activities to improve: Balance and Coordination for 20 minutes. The following activities were included:      Postural control: MCTSIB: Evaluation 12/11/2020   1. Eyes Open/feet together/Firm:  30 seconds   2. Eyes Closed/feet together/Firm:  30 seconds   3. Eyes Open/feet together/Foam:  30 seconds   4. Eyes Closed/feet together/Foam:  5 seconds     Functional Gait Assessment:   1. Gait on level surface =  2  2. Change in Gait Speed = 3  3. Gait with horizontal head turns  " = 2  4. Gait with vertical head turns = 2  5. Gait with pivot turns = 3  6. Step over obstacle = 1  7. Gait with Narrow SHEYLA = 0  8. Gait with eyes closed = 2  9. Ambulating Backwards = 2  10. Steps = 1    Score 18/30       Randall participated in dynamic functional therapeutic activities to improve functional performance for 0 minutes, including:      Home Exercises Provided and Patient Education Provided     Education provided:   - POC, Proper technique with therapeutic interventions, Benefits/purpose of today's therapeutic interventions    Written Home Exercises Provided: not at this time.    Assessment   Reassessment period: 11/11/2020-12/11/2020. Pt reassessed for progress today, exhibiting fair progress. Timed Up and Go and Self-Selected Walking Speed have improved, indicating improved functional mobility. No significant positive change was achieved towards improving dynamic and static balance as well as functional LE strength. PT will continue per established POC with discharge planned for the end of current month.     Randall is progressing well towards his goals.   Pt prognosis is Good.     Pt will continue to benefit from skilled outpatient physical therapy to address the deficits listed in the problem list box on initial evaluation, provide pt/family education and to maximize pt's level of independence in the home and community environment.     Pt's spiritual, cultural and educational needs considered and pt agreeable to plan of care and goals.     Anticipated barriers to physical therapy: None    Goals:   Goals:STG 3 weeks   1.  Pt to be independent with HEP for increased functional mobility and pain control. Progressing  2.  PT to complete balance assessment and set goals for balance Met and Discontinued  3.  PT to assess proper AD for patient and patient to be Mod I at outdoors with proper AD +500' to include ramps and negotiate 6 steps 1 hand rails Ongoing  4. Patient will exhibit improved 30 Second Chair  Rise to >/= 12 reps, indicating improved functional LE strength. Progressing  5. Patient will exhibit improved Timed Up and Go time to </= 14.8 seconds indicating improved safety with functional mobility tasks. Met 12/10/2020  6. Patient will exhibit improved Functional Gait Assessment score to >/= 21 /30 indicating improved dynamic balance for increased safety ambulatory tasks Ongoing     Long Term Goals: 8 weeks   1.  Pt to ambulate 1000' mod I with proper AD to include change in surface to include carpet, grass, loose gravel with no LOB in open environment to include open conversation without LOB. Ongoing  2. Patient will exhibit improved 30 Second Chair Rise to >/= 14 reps, indicating improved functional LE strength. Progressing  3.Discontinue  4. Patient will exhibit improved Functional Gait Assessment score to >/= 23 /30 indicating improved dynamic balance for increased safety ambulatory tasks Ongoing  5. Pt will improve Self Selected Walking Speed to at least 0.81 m/s for more normalized community gait and decreased fall risk.  Progressing  6. Patient will maintain balance in MCTSIB condition # 2 for a duration of 30 seconds indicating improved static postural balance. Met 12/10/2020  7. Patient will maintain balance in MCTSIB condition # 4 for a duration of 30 seconds indicating improved static postural balance. Ongoing      Plan     Continue per POC, emphasizing LE weakness and dynamic balance    Aviva Saul PT

## 2020-12-15 NOTE — PROGRESS NOTES
"  Physical Therapy Daily Treatment Note     Name: Randall Strickland Jr.  Clinic Number: [id number]    Therapy Diagnosis:   Encounter Diagnoses   Name Primary?    Impaired functional mobility, balance, gait, and endurance     Bilateral leg weakness      Physician: Tiago Borja MD    Visit Date: 12/16/2020    Physician Orders: PT Eval and Treat   Medical Diagnosis from Referral:   W19.XXXA (ICD-10-CM) - Fall, initial encounter   R26.9 (ICD-10-CM) - Gait disorder   Evaluation Date: 11/11/2020  Authorization Period Expiration: 12/31/20  Plan of Care Expiration: 1/31/21  Visit # / Visits authorized: 8/ 99     Time In: 1400  Time Out: 1445   Total Appointment Time (timed & untimed codes): 45 minutes     Precautions: Fall risk, memory loss    Missed visits: 0  Cancelled visits: 0  Subjective     Pt reports: no complaints upon arrival.   He was compliant with home exercise program.    Response to previous treatment: No adverse effects reported   Functional change: Ongoing     Pain: 0/10  Location:  n/a     Objective     Randall received therapeutic exercises to develop strength and endurance for 30 minutes including:    Nustep Recumbent Stepper L3 x 10 minutes    Fwd Step ups 6" 1 x 10 reps, no UE support, SBA  Lateral step ups 6" 1 x 10 reps, no UE support, SBA    Pilates box, White L2 1 Black L2 1 Black L3, hip/knee flexion<>extension, 2 x 10 reps bilaterally, BUE support    Randall participated in neuromuscular re-education activities to improve: Balance and Coordination for 15 minutes. The following activities were included:    Alternating cone taps on foam fitter, 3 x 45 seconds    Tandem ambulation fwd only in //bars x 6 laps    Side stepping on foam balance beam x 6 laps with ea LE leading    Airex balance, eyes closed 3 x 30s, CGA - SBA    Biodex Postural Stability, up to L5 3 x 45 seconds, CGA - SBA    Randall participated in dynamic functional therapeutic activities to improve functional performance for 0 minutes, " including:      Home Exercises Provided and Patient Education Provided     Education provided:   - POC, Proper technique with therapeutic interventions, Benefits/purpose of today's therapeutic interventions    Written Home Exercises Provided: not at this time.    Assessment   Randall tolerated today's session well. He was most challenged with static balance tasks today, but overall performance was good. PT to continue per established POC.    Randall is progressing well towards his goals.   Pt prognosis is Good.     Pt will continue to benefit from skilled outpatient physical therapy to address the deficits listed in the problem list box on initial evaluation, provide pt/family education and to maximize pt's level of independence in the home and community environment.     Pt's spiritual, cultural and educational needs considered and pt agreeable to plan of care and goals.     Anticipated barriers to physical therapy: None    Goals:   Goals:STG 3 weeks   1.  Pt to be independent with HEP for increased functional mobility and pain control. Progressing  2.  PT to complete balance assessment and set goals for balance Met and Discontinued  3.  PT to assess proper AD for patient and patient to be Mod I at outdoors with proper AD +500' to include ramps and negotiate 6 steps 1 hand rails Ongoing  4. Patient will exhibit improved 30 Second Chair Rise to >/= 12 reps, indicating improved functional LE strength. Progressing  5. Patient will exhibit improved Timed Up and Go time to </= 14.8 seconds indicating improved safety with functional mobility tasks. Met 12/10/2020  6. Patient will exhibit improved Functional Gait Assessment score to >/= 21 /30 indicating improved dynamic balance for increased safety ambulatory tasks Ongoing     Long Term Goals: 8 weeks   1.  Pt to ambulate 1000' mod I with proper AD to include change in surface to include carpet, grass, loose gravel with no LOB in open environment to include open conversation  without LOB. Ongoing  2. Patient will exhibit improved 30 Second Chair Rise to >/= 14 reps, indicating improved functional LE strength. Progressing  3.Discontinue  4. Patient will exhibit improved Functional Gait Assessment score to >/= 23 /30 indicating improved dynamic balance for increased safety ambulatory tasks Ongoing  5. Pt will improve Self Selected Walking Speed to at least 0.81 m/s for more normalized community gait and decreased fall risk.  Progressing  6. Patient will maintain balance in MCTSIB condition # 2 for a duration of 30 seconds indicating improved static postural balance. Met 12/10/2020  7. Patient will maintain balance in MCTSIB condition # 4 for a duration of 30 seconds indicating improved static postural balance. Ongoing      Plan     Continue per POC, emphasizing LE weakness and dynamic balance    Aviva Saul PT

## 2020-12-16 ENCOUNTER — CLINICAL SUPPORT (OUTPATIENT)
Dept: REHABILITATION | Facility: HOSPITAL | Age: 85
End: 2020-12-16
Attending: FAMILY MEDICINE
Payer: MEDICARE

## 2020-12-16 DIAGNOSIS — R29.898 BILATERAL LEG WEAKNESS: ICD-10-CM

## 2020-12-16 DIAGNOSIS — Z74.09 IMPAIRED FUNCTIONAL MOBILITY, BALANCE, GAIT, AND ENDURANCE: ICD-10-CM

## 2020-12-16 PROCEDURE — 97112 NEUROMUSCULAR REEDUCATION: CPT | Mod: HCNC,PO

## 2020-12-16 PROCEDURE — 97110 THERAPEUTIC EXERCISES: CPT | Mod: HCNC,PO

## 2020-12-17 NOTE — PROGRESS NOTES
Physical Therapy Daily Treatment Note     Name: Randall Strickland Jr.  Clinic Number: [id number]    Therapy Diagnosis:   Encounter Diagnoses   Name Primary?    Impaired functional mobility, balance, gait, and endurance     Bilateral leg weakness      Physician: Tiago Borja MD    Visit Date: 12/18/2020    Physician Orders: PT Eval and Treat   Medical Diagnosis from Referral:   W19.XXXA (ICD-10-CM) - Fall, initial encounter   R26.9 (ICD-10-CM) - Gait disorder   Evaluation Date: 11/11/2020  Authorization Period Expiration: 12/31/20  Plan of Care Expiration: 1/31/21  Visit # / Visits authorized: 9/ 99     Time In: 1225   Time Out: 1300   Total Appointment Time (timed & untimed codes): 35 minutes     Precautions: Fall risk, memory loss    Missed visits: 0  Cancelled visits: 0  Subjective     Pt reports: no complaints upon arrival.   He was compliant with home exercise program.    Response to previous treatment: No adverse effects reported   Functional change: Ongoing     Pain: 0/10  Location:  n/a     Objective     Randall received therapeutic exercises to develop strength and endurance for 10 minutes including:    Scifit Recumbent Stepper L3 x 10 minutes    Randall participated in neuromuscular re-education activities to improve: Balance and Coordination for 25 minutes. The following activities were included:    Alternating cone taps on foam fitter, 3 x 45 seconds    Tandem ambulation fwd only in //bars x 4 laps    Side stepping on foam balance beam x 2 laps with ea LE leading    Tandem stance 1 x 30s with ea LE fwd, freq UE touchdown support     Airex balance, eyes closed 3 x 30s, CGA - SBA    2 pt wooden fitter, lateral weightshifts 2 x 30s  2 pt wooden fitter, fwd/bwd weightshifts 2 x 30s    Randall participated in dynamic functional therapeutic activities to improve functional performance for 0 minutes, including:      Home Exercises Provided and Patient Education Provided     Education provided:   - POC, Proper  technique with therapeutic interventions, Benefits/purpose of today's therapeutic interventions    Written Home Exercises Provided: not at this time.    Assessment   Randall tolerated today's session well. He remains appropriately challenged with static balance tasks. He was unable to maintain stability with bwd weightshifts on wooden fitter board. PT to continue per established POC, with expected discharge at the end of current month.    Randall is progressing well towards his goals.   Pt prognosis is Good.     Pt will continue to benefit from skilled outpatient physical therapy to address the deficits listed in the problem list box on initial evaluation, provide pt/family education and to maximize pt's level of independence in the home and community environment.     Pt's spiritual, cultural and educational needs considered and pt agreeable to plan of care and goals.     Anticipated barriers to physical therapy: None    Goals:   Goals:STG 3 weeks   1.  Pt to be independent with HEP for increased functional mobility and pain control. Progressing  2.  PT to complete balance assessment and set goals for balance Met and Discontinued  3.  PT to assess proper AD for patient and patient to be Mod I at outdoors with proper AD +500' to include ramps and negotiate 6 steps 1 hand rails Ongoing  4. Patient will exhibit improved 30 Second Chair Rise to >/= 12 reps, indicating improved functional LE strength. Progressing  5. Patient will exhibit improved Timed Up and Go time to </= 14.8 seconds indicating improved safety with functional mobility tasks. Met 12/10/2020  6. Patient will exhibit improved Functional Gait Assessment score to >/= 21 /30 indicating improved dynamic balance for increased safety ambulatory tasks Ongoing     Long Term Goals: 8 weeks   1.  Pt to ambulate 1000' mod I with proper AD to include change in surface to include carpet, grass, loose gravel with no LOB in open environment to include open conversation  without LOB. Ongoing  2. Patient will exhibit improved 30 Second Chair Rise to >/= 14 reps, indicating improved functional LE strength. Progressing  3. Patient will exhibit improved Functional Gait Assessment score to >/= 23 /30 indicating improved dynamic balance for increased safety ambulatory tasks Ongoing  4. Pt will improve Self Selected Walking Speed to at least 0.81 m/s for more normalized community gait and decreased fall risk.  Progressing  5. Patient will maintain balance in MCTSIB condition # 2 for a duration of 30 seconds indicating improved static postural balance. Met 12/10/2020  6. Patient will maintain balance in MCTSIB condition # 4 for a duration of 30 seconds indicating improved static postural balance. Ongoing      Plan     Continue per POC, emphasizing LE weakness and dynamic balance    Aviva Saul PT

## 2020-12-18 ENCOUNTER — CLINICAL SUPPORT (OUTPATIENT)
Dept: REHABILITATION | Facility: HOSPITAL | Age: 85
End: 2020-12-18
Attending: FAMILY MEDICINE
Payer: MEDICARE

## 2020-12-18 DIAGNOSIS — R29.898 BILATERAL LEG WEAKNESS: ICD-10-CM

## 2020-12-18 DIAGNOSIS — Z74.09 IMPAIRED FUNCTIONAL MOBILITY, BALANCE, GAIT, AND ENDURANCE: ICD-10-CM

## 2020-12-18 PROCEDURE — 97112 NEUROMUSCULAR REEDUCATION: CPT | Mod: HCNC,PO

## 2020-12-18 PROCEDURE — 97110 THERAPEUTIC EXERCISES: CPT | Mod: HCNC,PO

## 2020-12-22 NOTE — PROGRESS NOTES
"  Physical Therapy Daily Treatment Note     Name: Randall Strickland Jr.  Clinic Number: [id number]    Therapy Diagnosis:   Encounter Diagnoses   Name Primary?    Impaired functional mobility, balance, gait, and endurance     Bilateral leg weakness      Physician: Tiago Borja MD    Visit Date: 12/23/2020    Physician Orders: PT Eval and Treat   Medical Diagnosis from Referral:   W19.XXXA (ICD-10-CM) - Fall, initial encounter   R26.9 (ICD-10-CM) - Gait disorder   Evaluation Date: 11/11/2020  Authorization Period Expiration: 12/31/20  Plan of Care Expiration: 1/31/21  Visit # / Visits authorized: 10/ 99     Time In: 1350  Time Out: 1430   Total Appointment Time (timed & untimed codes): 40 minutes     Precautions: Fall risk, memory loss    Missed visits: 0  Cancelled visits: 0  Subjective     Pt reports: no complaints upon arrival.   He was compliant with home exercise program.    Response to previous treatment: No adverse effects reported   Functional change: Ongoing     Pain: 0/10  Location:  n/a     Objective     Randall received therapeutic exercises to develop strength and endurance for 25 minutes including:    Scifit Recumbent Stepper L3.5 x 10 minutes    6" step ups x 10 reps with ea LE leading, no UE support  Marching in place, 3# cuff weight 3 x 30 sec    Standing Heel raises 3 x 15 reps  Standing Toe raises 3 x 15 reps     Pilates Box, White L3 Black L2 hip/knee flexion <> extension 2 x 10 reps bilaterally    Randall participated in neuromuscular re-education activities to improve: Balance and Coordination for 15 minutes. The following activities were included:    Alternating cone taps on foam fitter, 3 x 45 seconds    Tandem ambulation fwd only in //bars x 4 laps     Airex balance, eyes closed 3 x 30s, CGA - SBA    2 pt wooden fitter, fwd/bwd weightshifts 2 x 30s    Randall participated in dynamic functional therapeutic activities to improve functional performance for 0 minutes, including:      Home Exercises " Provided and Patient Education Provided     Education provided:   - POC, Proper technique with therapeutic interventions, Benefits/purpose of today's therapeutic interventions    Written Home Exercises Provided: not at this time.    Assessment   Randall tolerated today's session well, with no excessive difficulty noted. He is appropriate and agreeable to discharge next visit.    Randall is progressing well towards his goals.   Pt prognosis is Good.     Pt will continue to benefit from skilled outpatient physical therapy to address the deficits listed in the problem list box on initial evaluation, provide pt/family education and to maximize pt's level of independence in the home and community environment.     Pt's spiritual, cultural and educational needs considered and pt agreeable to plan of care and goals.     Anticipated barriers to physical therapy: None    Goals:   Goals:STG 3 weeks   1.  Pt to be independent with HEP for increased functional mobility and pain control. Progressing  2.  PT to complete balance assessment and set goals for balance Met and Discontinued  3.  PT to assess proper AD for patient and patient to be Mod I at outdoors with proper AD +500' to include ramps and negotiate 6 steps 1 hand rails Ongoing  4. Patient will exhibit improved 30 Second Chair Rise to >/= 12 reps, indicating improved functional LE strength. Progressing  5. Patient will exhibit improved Timed Up and Go time to </= 14.8 seconds indicating improved safety with functional mobility tasks. Met 12/10/2020  6. Patient will exhibit improved Functional Gait Assessment score to >/= 21 /30 indicating improved dynamic balance for increased safety ambulatory tasks Ongoing     Long Term Goals: 8 weeks   1.  Pt to ambulate 1000' mod I with proper AD to include change in surface to include carpet, grass, loose gravel with no LOB in open environment to include open conversation without LOB. Ongoing  2. Patient will exhibit improved 30 Second  Chair Rise to >/= 14 reps, indicating improved functional LE strength. Progressing  3. Patient will exhibit improved Functional Gait Assessment score to >/= 23 /30 indicating improved dynamic balance for increased safety ambulatory tasks Ongoing  4. Pt will improve Self Selected Walking Speed to at least 0.81 m/s for more normalized community gait and decreased fall risk.  Progressing  5. Patient will maintain balance in MCTSIB condition # 2 for a duration of 30 seconds indicating improved static postural balance. Met 12/10/2020  6. Patient will maintain balance in MCTSIB condition # 4 for a duration of 30 seconds indicating improved static postural balance. Ongoing      Plan     Continue per POC, emphasizing LE weakness and dynamic balance    Aviva Saul PT

## 2020-12-23 ENCOUNTER — CLINICAL SUPPORT (OUTPATIENT)
Dept: REHABILITATION | Facility: HOSPITAL | Age: 85
End: 2020-12-23
Attending: FAMILY MEDICINE
Payer: MEDICARE

## 2020-12-23 DIAGNOSIS — Z74.09 IMPAIRED FUNCTIONAL MOBILITY, BALANCE, GAIT, AND ENDURANCE: ICD-10-CM

## 2020-12-23 DIAGNOSIS — R29.898 BILATERAL LEG WEAKNESS: ICD-10-CM

## 2020-12-23 PROCEDURE — 97112 NEUROMUSCULAR REEDUCATION: CPT | Mod: HCNC,PO

## 2020-12-23 PROCEDURE — 97110 THERAPEUTIC EXERCISES: CPT | Mod: HCNC,PO

## 2020-12-29 NOTE — PROGRESS NOTES
Physical Therapy Daily Treatment Note     Name: Randall Strickland Jr.  Clinic Number: [id number]    Therapy Diagnosis:   Encounter Diagnoses   Name Primary?    Impaired functional mobility, balance, gait, and endurance     Bilateral leg weakness      Physician: Tiago Borja MD    Visit Date: 12/30/2020    Physician Orders: PT Eval and Treat   Medical Diagnosis from Referral:   W19.XXXA (ICD-10-CM) - Fall, initial encounter   R26.9 (ICD-10-CM) - Gait disorder   Evaluation Date: 11/11/2020  Authorization Period Expiration: 12/31/20  Plan of Care Expiration: 1/31/21  Visit # / Visits authorized: 11/ 99     Time In: 1400  Time Out: 1430  Total Appointment Time (timed & untimed codes): 30 minutes     Precautions: Fall risk, memory loss    Missed visits: 0  Cancelled visits: 0  Subjective     Pt reports: no complaints upon arrival.   He was compliant with home exercise program.    Response to previous treatment: No adverse effects reported   Functional change: Ongoing     Pain: 0/10  Location:  n/a     Objective     Randall received therapeutic exercises to develop strength and endurance for 20 minutes including:    Scifit Recumbent Stepper L3.5 x 10 minutes    30 Sec Chair Rise: 10 reps  TUG: 15.8 sec  SSWS: 0.61 m/s    Randall participated in neuromuscular re-education activities to improve: Balance and Coordination for 10 minutes. The following activities were included:    Functional Gait Assessment:   1. Gait on level surface =  1  2. Change in Gait Speed = 3  3. Gait with horizontal head turns  = 2  4. Gait with vertical head turns = 3  5. Gait with pivot turns = 3  6. Step over obstacle = 1  7. Gait with Narrow SHEYLA = 0  8. Gait with eyes closed = 2  9. Ambulating Backwards = 2  10. Steps = 2    Score 19/30       Postural control: MCTSIB: Evaluation 12/30/2020   1. Eyes Open/feet together/Firm:  30 seconds   2. Eyes Closed/feet together/Firm:  30 seconds   3. Eyes Open/feet together/Foam:  30 seconds   4. Eyes  Closed/feet together/Foam:  8 seconds seconds       Randall participated in dynamic functional therapeutic activities to improve functional performance for 0 minutes, including:      Home Exercises Provided and Patient Education Provided     Education provided:   - POC, Proper technique with therapeutic interventions, Benefits/purpose of today's therapeutic interventions    Written Home Exercises Provided: not at this time.      PHYSICAL THERAPY ASSESSMENT & DISCHARGE SUMMARY     Mr. Strickland has attended 11 physical therapy visits with fair progress. He exhibits slight improvement of static balance. However, no measurable positive change was achieved towards improving functional mobility and dynamic balance despite several weeks of physical therapy participation. Lack of progress likely related to mild cognitive impairment and poor home carryover. Based on objective findings, patient is appropriate for discharge with HEP at this time.     Goals:STG 3 weeks   1.  Pt to be independent with HEP for increased functional mobility and pain control. Met 12/30/2020  2.  PT to complete balance assessment and set goals for balance Met and Discontinued  3.  PT to assess proper AD for patient and patient to be Mod I at outdoors with proper AD +500' to include ramps and negotiate 6 steps 1 hand rails Discontinued  4. Patient will exhibit improved 30 Second Chair Rise to >/= 12 reps, indicating improved functional LE strength. Not Met  5. Patient will exhibit improved Timed Up and Go time to </= 14.8 seconds indicating improved safety with functional mobility tasks. Met 12/10/2020 Declined - Not Met 12/30/2020  6. Patient will exhibit improved Functional Gait Assessment score to >/= 21 /30 indicating improved dynamic balance for increased safety ambulatory tasks Not Met     Long Term Goals: 8 weeks   1.  Pt to ambulate 1000' mod I with proper AD to include change in surface to include carpet, grass, loose gravel with no LOB in open  environment to include open conversation without LOB. Discontinued  2. Patient will exhibit improved 30 Second Chair Rise to >/= 14 reps, indicating improved functional LE strength. Not Met  3. Patient will exhibit improved Functional Gait Assessment score to >/= 23 /30 indicating improved dynamic balance for increased safety ambulatory tasks Not Met  4. Pt will improve Self Selected Walking Speed to at least 0.81 m/s for more normalized community gait and decreased fall risk.  Not Met  5. Patient will maintain balance in MCTSIB condition # 2 for a duration of 30 seconds indicating improved static postural balance. Met 12/10/2020  6. Patient will maintain balance in MCTSIB condition # 4 for a duration of 30 seconds indicating improved static postural balance. Not Met      Discharge reason: End of plan of care, no benefit from PT noted, patient expressed desire to discharge    PLAN   This patient is discharged from [facility].     Aviva Hughes, PT  12/30/2020

## 2020-12-30 ENCOUNTER — CLINICAL SUPPORT (OUTPATIENT)
Dept: REHABILITATION | Facility: HOSPITAL | Age: 85
End: 2020-12-30
Attending: FAMILY MEDICINE
Payer: MEDICARE

## 2020-12-30 DIAGNOSIS — Z74.09 IMPAIRED FUNCTIONAL MOBILITY, BALANCE, GAIT, AND ENDURANCE: ICD-10-CM

## 2020-12-30 DIAGNOSIS — R29.898 BILATERAL LEG WEAKNESS: ICD-10-CM

## 2020-12-30 PROCEDURE — 97110 THERAPEUTIC EXERCISES: CPT | Mod: HCNC,PO

## 2020-12-30 PROCEDURE — 97112 NEUROMUSCULAR REEDUCATION: CPT | Mod: HCNC,PO

## 2020-12-31 NOTE — PLAN OF CARE
Physical Therapy Daily Treatment Note     Name: Randall Strickland Jr.  Clinic Number: [id number]    Therapy Diagnosis:   Encounter Diagnoses   Name Primary?    Impaired functional mobility, balance, gait, and endurance     Bilateral leg weakness      Physician: Tiago Borja MD    Visit Date: 12/30/2020    Physician Orders: PT Eval and Treat   Medical Diagnosis from Referral:   W19.XXXA (ICD-10-CM) - Fall, initial encounter   R26.9 (ICD-10-CM) - Gait disorder   Evaluation Date: 11/11/2020  Authorization Period Expiration: 12/31/20  Plan of Care Expiration: 1/31/21  Visit # / Visits authorized: 11/ 99     Time In: 1400  Time Out: 1430  Total Appointment Time (timed & untimed codes): 30 minutes     Precautions: Fall risk, memory loss    Missed visits: 0  Cancelled visits: 0  Subjective     Pt reports: no complaints upon arrival.   He was compliant with home exercise program.    Response to previous treatment: No adverse effects reported   Functional change: Ongoing     Pain: 0/10  Location:  n/a       PHYSICAL THERAPY ASSESSMENT & DISCHARGE SUMMARY     Mr. Strickland has attended 11 physical therapy visits with fair progress. He exhibits slight improvement of static balance. However, no measurable positive change was achieved towards improving functional mobility and dynamic balance despite several weeks of physical therapy participation. Lack of progress likely related to mild cognitive impairment and poor home carryover. Based on objective findings, patient is appropriate for discharge with HEP at this time.     Goals:STG 3 weeks   1.  Pt to be independent with HEP for increased functional mobility and pain control. Met 12/30/2020  2.  PT to complete balance assessment and set goals for balance Met and Discontinued  3.  PT to assess proper AD for patient and patient to be Mod I at outdoors with proper AD +500' to include ramps and negotiate 6 steps 1 hand rails Discontinued  4. Patient will exhibit improved 30 Second  Chair Rise to >/= 12 reps, indicating improved functional LE strength. Not Met  5. Patient will exhibit improved Timed Up and Go time to </= 14.8 seconds indicating improved safety with functional mobility tasks. Met 12/10/2020 Declined - Not Met 12/30/2020  6. Patient will exhibit improved Functional Gait Assessment score to >/= 21 /30 indicating improved dynamic balance for increased safety ambulatory tasks Not Met     Long Term Goals: 8 weeks   1.  Pt to ambulate 1000' mod I with proper AD to include change in surface to include carpet, grass, loose gravel with no LOB in open environment to include open conversation without LOB. Discontinued  2. Patient will exhibit improved 30 Second Chair Rise to >/= 14 reps, indicating improved functional LE strength. Not Met  3. Patient will exhibit improved Functional Gait Assessment score to >/= 23 /30 indicating improved dynamic balance for increased safety ambulatory tasks Not Met  4. Pt will improve Self Selected Walking Speed to at least 0.81 m/s for more normalized community gait and decreased fall risk.  Not Met  5. Patient will maintain balance in MCTSIB condition # 2 for a duration of 30 seconds indicating improved static postural balance. Met 12/10/2020  6. Patient will maintain balance in MCTSIB condition # 4 for a duration of 30 seconds indicating improved static postural balance. Not Met      Discharge reason: End of plan of care, no benefit from PT noted, patient expressed desire to discharge    PLAN   This patient is discharged from [facility].     Aviva Hughes, PT  12/30/2020

## 2021-01-11 ENCOUNTER — TELEPHONE (OUTPATIENT)
Dept: INTERNAL MEDICINE | Facility: CLINIC | Age: 86
End: 2021-01-11

## 2021-01-12 ENCOUNTER — OFFICE VISIT (OUTPATIENT)
Dept: URGENT CARE | Facility: CLINIC | Age: 86
End: 2021-01-12
Payer: MEDICARE

## 2021-01-12 VITALS
HEIGHT: 69 IN | SYSTOLIC BLOOD PRESSURE: 144 MMHG | RESPIRATION RATE: 16 BRPM | WEIGHT: 180 LBS | OXYGEN SATURATION: 95 % | DIASTOLIC BLOOD PRESSURE: 90 MMHG | TEMPERATURE: 97 F | HEART RATE: 52 BPM | BODY MASS INDEX: 26.66 KG/M2

## 2021-01-12 DIAGNOSIS — U07.1 COVID-19: ICD-10-CM

## 2021-01-12 DIAGNOSIS — R53.83 FATIGUE, UNSPECIFIED TYPE: Primary | ICD-10-CM

## 2021-01-12 LAB
CTP QC/QA: YES
SARS-COV-2 RDRP RESP QL NAA+PROBE: POSITIVE

## 2021-01-12 PROCEDURE — U0002 COVID-19 LAB TEST NON-CDC: HCPCS | Mod: QW,S$GLB,, | Performed by: NURSE PRACTITIONER

## 2021-01-12 PROCEDURE — 99213 PR OFFICE/OUTPT VISIT, EST, LEVL III, 20-29 MIN: ICD-10-PCS | Mod: S$GLB,,, | Performed by: NURSE PRACTITIONER

## 2021-01-12 PROCEDURE — U0002: ICD-10-PCS | Mod: QW,S$GLB,, | Performed by: NURSE PRACTITIONER

## 2021-01-12 PROCEDURE — 99213 OFFICE O/P EST LOW 20 MIN: CPT | Mod: S$GLB,,, | Performed by: NURSE PRACTITIONER

## 2021-01-14 ENCOUNTER — TELEPHONE (OUTPATIENT)
Dept: ADMINISTRATIVE | Facility: CLINIC | Age: 86
End: 2021-01-14

## 2021-01-25 ENCOUNTER — OFFICE VISIT (OUTPATIENT)
Dept: CARDIOLOGY | Facility: CLINIC | Age: 86
End: 2021-01-25
Payer: MEDICARE

## 2021-01-25 VITALS
HEIGHT: 68 IN | SYSTOLIC BLOOD PRESSURE: 119 MMHG | DIASTOLIC BLOOD PRESSURE: 70 MMHG | BODY MASS INDEX: 22.22 KG/M2 | HEART RATE: 102 BPM | WEIGHT: 146.63 LBS

## 2021-01-25 DIAGNOSIS — I70.0 AORTIC ATHEROSCLEROSIS: ICD-10-CM

## 2021-01-25 DIAGNOSIS — Z79.01 LONG TERM CURRENT USE OF ANTICOAGULANT: ICD-10-CM

## 2021-01-25 DIAGNOSIS — Z95.2 S/P TAVR (TRANSCATHETER AORTIC VALVE REPLACEMENT): ICD-10-CM

## 2021-01-25 DIAGNOSIS — I48.11 LONGSTANDING PERSISTENT ATRIAL FIBRILLATION: ICD-10-CM

## 2021-01-25 DIAGNOSIS — Z95.2 S/P AORTIC VALVE REPLACEMENT: ICD-10-CM

## 2021-01-25 DIAGNOSIS — I50.32 CHRONIC DIASTOLIC CONGESTIVE HEART FAILURE: ICD-10-CM

## 2021-01-25 DIAGNOSIS — U07.1 COVID-19 VIRUS INFECTION: Primary | ICD-10-CM

## 2021-01-25 PROCEDURE — 1159F MED LIST DOCD IN RCRD: CPT | Mod: S$GLB,,, | Performed by: INTERNAL MEDICINE

## 2021-01-25 PROCEDURE — 99213 PR OFFICE/OUTPT VISIT, EST, LEVL III, 20-29 MIN: ICD-10-PCS | Mod: S$GLB,,, | Performed by: INTERNAL MEDICINE

## 2021-01-25 PROCEDURE — 99999 PR PBB SHADOW E&M-EST. PATIENT-LVL IV: ICD-10-PCS | Mod: PBBFAC,,, | Performed by: INTERNAL MEDICINE

## 2021-01-25 PROCEDURE — 99999 PR PBB SHADOW E&M-EST. PATIENT-LVL IV: CPT | Mod: PBBFAC,,, | Performed by: INTERNAL MEDICINE

## 2021-01-25 PROCEDURE — 99213 OFFICE O/P EST LOW 20 MIN: CPT | Mod: S$GLB,,, | Performed by: INTERNAL MEDICINE

## 2021-01-25 PROCEDURE — 1126F PR PAIN SEVERITY QUANTIFIED, NO PAIN PRESENT: ICD-10-PCS | Mod: S$GLB,,, | Performed by: INTERNAL MEDICINE

## 2021-01-25 PROCEDURE — 1126F AMNT PAIN NOTED NONE PRSNT: CPT | Mod: S$GLB,,, | Performed by: INTERNAL MEDICINE

## 2021-01-25 PROCEDURE — 1159F PR MEDICATION LIST DOCUMENTED IN MEDICAL RECORD: ICD-10-PCS | Mod: S$GLB,,, | Performed by: INTERNAL MEDICINE

## 2021-03-16 ENCOUNTER — PES CALL (OUTPATIENT)
Dept: ADMINISTRATIVE | Facility: CLINIC | Age: 86
End: 2021-03-16

## 2021-04-12 PROBLEM — Z91.81 RISK FOR FALLS: Status: ACTIVE | Noted: 2021-04-12

## 2021-04-12 PROBLEM — U07.1 COVID-19 VIRUS DETECTED: Status: ACTIVE | Noted: 2021-04-12

## 2021-04-12 PROBLEM — R54 FRAIL ELDERLY: Status: ACTIVE | Noted: 2021-04-12

## 2021-04-12 PROBLEM — Z79.01 LONG TERM (CURRENT) USE OF ANTICOAGULANTS: Status: ACTIVE | Noted: 2021-04-12

## 2021-04-12 PROBLEM — I77.1 TORTUOUS AORTA: Status: ACTIVE | Noted: 2021-04-12

## 2021-04-13 ENCOUNTER — OFFICE VISIT (OUTPATIENT)
Dept: INTERNAL MEDICINE | Facility: CLINIC | Age: 86
End: 2021-04-13
Payer: MEDICARE

## 2021-04-13 VITALS
OXYGEN SATURATION: 99 % | BODY MASS INDEX: 24.43 KG/M2 | HEART RATE: 81 BPM | RESPIRATION RATE: 14 BRPM | WEIGHT: 152 LBS | HEIGHT: 66 IN | SYSTOLIC BLOOD PRESSURE: 110 MMHG | DIASTOLIC BLOOD PRESSURE: 60 MMHG

## 2021-04-13 DIAGNOSIS — M46.96 UNSPECIFIED INFLAMMATORY SPONDYLOPATHY, LUMBAR REGION: ICD-10-CM

## 2021-04-13 DIAGNOSIS — M47.816 FACET ARTHRITIS, DEGENERATIVE, LUMBAR SPINE: ICD-10-CM

## 2021-04-13 DIAGNOSIS — Z74.09 OTHER REDUCED MOBILITY: ICD-10-CM

## 2021-04-13 DIAGNOSIS — M19.011 PRIMARY OSTEOARTHRITIS OF RIGHT SHOULDER: ICD-10-CM

## 2021-04-13 DIAGNOSIS — I77.1 TORTUOUS AORTA: ICD-10-CM

## 2021-04-13 DIAGNOSIS — R54 FRAIL ELDERLY: ICD-10-CM

## 2021-04-13 DIAGNOSIS — Z79.01 LONG TERM (CURRENT) USE OF ANTICOAGULANTS: ICD-10-CM

## 2021-04-13 DIAGNOSIS — M17.0 OSTEOARTHRITIS OF BOTH KNEES, UNSPECIFIED OSTEOARTHRITIS TYPE: ICD-10-CM

## 2021-04-13 DIAGNOSIS — N40.1 BPH WITH URINARY OBSTRUCTION: ICD-10-CM

## 2021-04-13 DIAGNOSIS — I48.11 LONGSTANDING PERSISTENT ATRIAL FIBRILLATION: ICD-10-CM

## 2021-04-13 DIAGNOSIS — R74.8 ELEVATED ALKALINE PHOSPHATASE LEVEL: ICD-10-CM

## 2021-04-13 DIAGNOSIS — Z00.00 ENCOUNTER FOR PREVENTIVE HEALTH EXAMINATION: Primary | ICD-10-CM

## 2021-04-13 DIAGNOSIS — I50.32 CHRONIC DIASTOLIC CONGESTIVE HEART FAILURE: ICD-10-CM

## 2021-04-13 DIAGNOSIS — N13.8 BPH WITH URINARY OBSTRUCTION: ICD-10-CM

## 2021-04-13 DIAGNOSIS — U07.1 COVID-19 VIRUS DETECTED: ICD-10-CM

## 2021-04-13 DIAGNOSIS — I87.2 VENOUS STASIS DERMATITIS OF BOTH LOWER EXTREMITIES: ICD-10-CM

## 2021-04-13 DIAGNOSIS — G47.33 OSA (OBSTRUCTIVE SLEEP APNEA): ICD-10-CM

## 2021-04-13 DIAGNOSIS — M54.50 LUMBAR PAIN: ICD-10-CM

## 2021-04-13 DIAGNOSIS — R26.9 GAIT DISORDER: ICD-10-CM

## 2021-04-13 DIAGNOSIS — F03.90 DEMENTIA WITHOUT BEHAVIORAL DISTURBANCE, UNSPECIFIED DEMENTIA TYPE: ICD-10-CM

## 2021-04-13 DIAGNOSIS — Z91.81 RISK FOR FALLS: ICD-10-CM

## 2021-04-13 DIAGNOSIS — I73.9 PVD (PERIPHERAL VASCULAR DISEASE): ICD-10-CM

## 2021-04-13 DIAGNOSIS — F02.80 DEMENTIA IN HYDROCEPHALUS: ICD-10-CM

## 2021-04-13 DIAGNOSIS — E83.39 HYPOPHOSPHATEMIA: ICD-10-CM

## 2021-04-13 DIAGNOSIS — Z79.01 CHRONIC ANTICOAGULATION: ICD-10-CM

## 2021-04-13 DIAGNOSIS — M47.816 OSTEOARTHRITIS OF LUMBAR SPINE, UNSPECIFIED SPINAL OSTEOARTHRITIS COMPLICATION STATUS: ICD-10-CM

## 2021-04-13 DIAGNOSIS — D69.6 THROMBOCYTOPENIA, UNSPECIFIED: ICD-10-CM

## 2021-04-13 DIAGNOSIS — I42.9 CARDIOMYOPATHY, IDIOPATHIC: ICD-10-CM

## 2021-04-13 DIAGNOSIS — I77.9 BILATERAL CAROTID ARTERY DISEASE, UNSPECIFIED TYPE: ICD-10-CM

## 2021-04-13 DIAGNOSIS — I70.0 AORTIC ATHEROSCLEROSIS: ICD-10-CM

## 2021-04-13 DIAGNOSIS — I10 HYPERTENSION, ESSENTIAL: ICD-10-CM

## 2021-04-13 DIAGNOSIS — G91.9 DEMENTIA IN HYDROCEPHALUS: ICD-10-CM

## 2021-04-13 DIAGNOSIS — Z95.2 S/P AORTIC VALVE REPLACEMENT: ICD-10-CM

## 2021-04-13 DIAGNOSIS — N18.30 STAGE 3 CHRONIC KIDNEY DISEASE, UNSPECIFIED WHETHER STAGE 3A OR 3B CKD: ICD-10-CM

## 2021-04-13 DIAGNOSIS — D64.9 ANEMIA, UNSPECIFIED TYPE: ICD-10-CM

## 2021-04-13 PROCEDURE — 1100F PTFALLS ASSESS-DOCD GE2>/YR: CPT | Mod: CPTII,S$GLB,, | Performed by: NURSE PRACTITIONER

## 2021-04-13 PROCEDURE — 1158F ADVNC CARE PLAN TLK DOCD: CPT | Mod: S$GLB,,, | Performed by: NURSE PRACTITIONER

## 2021-04-13 PROCEDURE — 99999 PR PBB SHADOW E&M-EST. PATIENT-LVL IV: CPT | Mod: PBBFAC,,, | Performed by: NURSE PRACTITIONER

## 2021-04-13 PROCEDURE — 99499 UNLISTED E&M SERVICE: CPT | Mod: S$GLB,,, | Performed by: NURSE PRACTITIONER

## 2021-04-13 PROCEDURE — 99499 RISK ADDL DX/OHS AUDIT: ICD-10-PCS | Mod: S$GLB,,, | Performed by: NURSE PRACTITIONER

## 2021-04-13 PROCEDURE — 3288F PR FALLS RISK ASSESSMENT DOCUMENTED: ICD-10-PCS | Mod: CPTII,S$GLB,, | Performed by: NURSE PRACTITIONER

## 2021-04-13 PROCEDURE — G0439 PR MEDICARE ANNUAL WELLNESS SUBSEQUENT VISIT: ICD-10-PCS | Mod: S$GLB,,, | Performed by: NURSE PRACTITIONER

## 2021-04-13 PROCEDURE — 1100F PR PT FALLS ASSESS DOC 2+ FALLS/FALL W/INJURY/YR: ICD-10-PCS | Mod: CPTII,S$GLB,, | Performed by: NURSE PRACTITIONER

## 2021-04-13 PROCEDURE — G0439 PPPS, SUBSEQ VISIT: HCPCS | Mod: S$GLB,,, | Performed by: NURSE PRACTITIONER

## 2021-04-13 PROCEDURE — 3288F FALL RISK ASSESSMENT DOCD: CPT | Mod: CPTII,S$GLB,, | Performed by: NURSE PRACTITIONER

## 2021-04-13 PROCEDURE — 1158F PR ADVANCE CARE PLANNING DISCUSS DOCUMENTED IN MEDICAL RECORD: ICD-10-PCS | Mod: S$GLB,,, | Performed by: NURSE PRACTITIONER

## 2021-04-13 PROCEDURE — 99999 PR PBB SHADOW E&M-EST. PATIENT-LVL IV: ICD-10-PCS | Mod: PBBFAC,,, | Performed by: NURSE PRACTITIONER

## 2021-06-14 RX ORDER — AMLODIPINE BESYLATE 5 MG/1
TABLET ORAL
Qty: 30 TABLET | Refills: 6 | Status: SHIPPED | OUTPATIENT
Start: 2021-06-14 | End: 2022-01-01

## 2021-07-20 ENCOUNTER — LAB VISIT (OUTPATIENT)
Dept: LAB | Facility: HOSPITAL | Age: 86
End: 2021-07-20
Attending: INTERNAL MEDICINE
Payer: MEDICARE

## 2021-07-20 DIAGNOSIS — Z95.2 S/P AORTIC VALVE REPLACEMENT: ICD-10-CM

## 2021-07-20 DIAGNOSIS — I50.32 CHRONIC DIASTOLIC CONGESTIVE HEART FAILURE: ICD-10-CM

## 2021-07-20 DIAGNOSIS — Z79.01 LONG TERM CURRENT USE OF ANTICOAGULANT: ICD-10-CM

## 2021-07-20 DIAGNOSIS — I10 HYPERTENSION, ESSENTIAL: ICD-10-CM

## 2021-07-20 LAB
ALBUMIN SERPL BCP-MCNC: 3.3 G/DL (ref 3.5–5.2)
ALP SERPL-CCNC: 163 U/L (ref 55–135)
ALT SERPL W/O P-5'-P-CCNC: 14 U/L (ref 10–44)
ANION GAP SERPL CALC-SCNC: 10 MMOL/L (ref 8–16)
ANION GAP SERPL CALC-SCNC: 10 MMOL/L (ref 8–16)
AST SERPL-CCNC: 23 U/L (ref 10–40)
BASOPHILS # BLD AUTO: 0.08 K/UL (ref 0–0.2)
BASOPHILS NFR BLD: 1.1 % (ref 0–1.9)
BILIRUB SERPL-MCNC: 0.9 MG/DL (ref 0.1–1)
BUN SERPL-MCNC: 22 MG/DL (ref 10–30)
BUN SERPL-MCNC: 22 MG/DL (ref 10–30)
CALCIUM SERPL-MCNC: 9.3 MG/DL (ref 8.7–10.5)
CALCIUM SERPL-MCNC: 9.3 MG/DL (ref 8.7–10.5)
CHLORIDE SERPL-SCNC: 103 MMOL/L (ref 95–110)
CHLORIDE SERPL-SCNC: 103 MMOL/L (ref 95–110)
CO2 SERPL-SCNC: 25 MMOL/L (ref 23–29)
CO2 SERPL-SCNC: 25 MMOL/L (ref 23–29)
CREAT SERPL-MCNC: 1.5 MG/DL (ref 0.5–1.4)
CREAT SERPL-MCNC: 1.5 MG/DL (ref 0.5–1.4)
DIFFERENTIAL METHOD: ABNORMAL
EOSINOPHIL # BLD AUTO: 0.3 K/UL (ref 0–0.5)
EOSINOPHIL NFR BLD: 4 % (ref 0–8)
ERYTHROCYTE [DISTWIDTH] IN BLOOD BY AUTOMATED COUNT: 13.8 % (ref 11.5–14.5)
EST. GFR  (AFRICAN AMERICAN): 45.4 ML/MIN/1.73 M^2
EST. GFR  (AFRICAN AMERICAN): 45.4 ML/MIN/1.73 M^2
EST. GFR  (NON AFRICAN AMERICAN): 39.3 ML/MIN/1.73 M^2
EST. GFR  (NON AFRICAN AMERICAN): 39.3 ML/MIN/1.73 M^2
GLUCOSE SERPL-MCNC: 96 MG/DL (ref 70–110)
GLUCOSE SERPL-MCNC: 96 MG/DL (ref 70–110)
HCT VFR BLD AUTO: 36.4 % (ref 40–54)
HGB BLD-MCNC: 11.6 G/DL (ref 14–18)
IMM GRANULOCYTES # BLD AUTO: 0.03 K/UL (ref 0–0.04)
IMM GRANULOCYTES NFR BLD AUTO: 0.4 % (ref 0–0.5)
LYMPHOCYTES # BLD AUTO: 2.5 K/UL (ref 1–4.8)
LYMPHOCYTES NFR BLD: 34.9 % (ref 18–48)
MCH RBC QN AUTO: 31.7 PG (ref 27–31)
MCHC RBC AUTO-ENTMCNC: 31.9 G/DL (ref 32–36)
MCV RBC AUTO: 100 FL (ref 82–98)
MONOCYTES # BLD AUTO: 1.7 K/UL (ref 0.3–1)
MONOCYTES NFR BLD: 23.5 % (ref 4–15)
NEUTROPHILS # BLD AUTO: 2.6 K/UL (ref 1.8–7.7)
NEUTROPHILS NFR BLD: 36.1 % (ref 38–73)
NRBC BLD-RTO: 0 /100 WBC
PLATELET # BLD AUTO: 127 K/UL (ref 150–450)
PLATELET BLD QL SMEAR: ABNORMAL
PMV BLD AUTO: 10.7 FL (ref 9.2–12.9)
POTASSIUM SERPL-SCNC: 5.2 MMOL/L (ref 3.5–5.1)
POTASSIUM SERPL-SCNC: 5.2 MMOL/L (ref 3.5–5.1)
PROT SERPL-MCNC: 8.8 G/DL (ref 6–8.4)
RBC # BLD AUTO: 3.66 M/UL (ref 4.6–6.2)
SMUDGE CELLS BLD QL SMEAR: PRESENT
SODIUM SERPL-SCNC: 138 MMOL/L (ref 136–145)
SODIUM SERPL-SCNC: 138 MMOL/L (ref 136–145)
WBC # BLD AUTO: 7.23 K/UL (ref 3.9–12.7)

## 2021-07-20 PROCEDURE — 36415 COLL VENOUS BLD VENIPUNCTURE: CPT | Mod: PN | Performed by: INTERNAL MEDICINE

## 2021-07-20 PROCEDURE — 85025 COMPLETE CBC W/AUTO DIFF WBC: CPT | Performed by: INTERNAL MEDICINE

## 2021-07-20 PROCEDURE — 80053 COMPREHEN METABOLIC PANEL: CPT | Performed by: INTERNAL MEDICINE

## 2021-07-27 ENCOUNTER — PATIENT OUTREACH (OUTPATIENT)
Dept: ADMINISTRATIVE | Facility: OTHER | Age: 86
End: 2021-07-27

## 2021-08-26 ENCOUNTER — OFFICE VISIT (OUTPATIENT)
Dept: CARDIOLOGY | Facility: CLINIC | Age: 86
End: 2021-08-26
Payer: MEDICARE

## 2021-08-26 VITALS
HEART RATE: 84 BPM | BODY MASS INDEX: 22.96 KG/M2 | DIASTOLIC BLOOD PRESSURE: 60 MMHG | WEIGHT: 155 LBS | HEIGHT: 69 IN | OXYGEN SATURATION: 99 % | SYSTOLIC BLOOD PRESSURE: 110 MMHG

## 2021-08-26 DIAGNOSIS — I10 HYPERTENSION, ESSENTIAL: ICD-10-CM

## 2021-08-26 DIAGNOSIS — Z95.2 S/P TAVR (TRANSCATHETER AORTIC VALVE REPLACEMENT): ICD-10-CM

## 2021-08-26 DIAGNOSIS — F03.90 DEMENTIA WITHOUT BEHAVIORAL DISTURBANCE, UNSPECIFIED DEMENTIA TYPE: ICD-10-CM

## 2021-08-26 DIAGNOSIS — Z79.01 LONG TERM CURRENT USE OF ANTICOAGULANT: ICD-10-CM

## 2021-08-26 DIAGNOSIS — I35.0 NONRHEUMATIC AORTIC VALVE STENOSIS: ICD-10-CM

## 2021-08-26 DIAGNOSIS — I50.32 CHRONIC DIASTOLIC CONGESTIVE HEART FAILURE: Primary | ICD-10-CM

## 2021-08-26 DIAGNOSIS — I48.11 LONGSTANDING PERSISTENT ATRIAL FIBRILLATION: ICD-10-CM

## 2021-08-26 PROCEDURE — 1159F MED LIST DOCD IN RCRD: CPT | Mod: CPTII,S$GLB,, | Performed by: INTERNAL MEDICINE

## 2021-08-26 PROCEDURE — 3288F PR FALLS RISK ASSESSMENT DOCUMENTED: ICD-10-PCS | Mod: CPTII,S$GLB,, | Performed by: INTERNAL MEDICINE

## 2021-08-26 PROCEDURE — 99999 PR PBB SHADOW E&M-EST. PATIENT-LVL V: ICD-10-PCS | Mod: PBBFAC,,, | Performed by: INTERNAL MEDICINE

## 2021-08-26 PROCEDURE — 3288F FALL RISK ASSESSMENT DOCD: CPT | Mod: CPTII,S$GLB,, | Performed by: INTERNAL MEDICINE

## 2021-08-26 PROCEDURE — 99213 OFFICE O/P EST LOW 20 MIN: CPT | Mod: S$GLB,,, | Performed by: INTERNAL MEDICINE

## 2021-08-26 PROCEDURE — 99499 RISK ADDL DX/OHS AUDIT: ICD-10-PCS | Mod: S$GLB,,, | Performed by: INTERNAL MEDICINE

## 2021-08-26 PROCEDURE — 99499 UNLISTED E&M SERVICE: CPT | Mod: S$GLB,,, | Performed by: INTERNAL MEDICINE

## 2021-08-26 PROCEDURE — 1126F PR PAIN SEVERITY QUANTIFIED, NO PAIN PRESENT: ICD-10-PCS | Mod: CPTII,S$GLB,, | Performed by: INTERNAL MEDICINE

## 2021-08-26 PROCEDURE — 99213 PR OFFICE/OUTPT VISIT, EST, LEVL III, 20-29 MIN: ICD-10-PCS | Mod: S$GLB,,, | Performed by: INTERNAL MEDICINE

## 2021-08-26 PROCEDURE — 1126F AMNT PAIN NOTED NONE PRSNT: CPT | Mod: CPTII,S$GLB,, | Performed by: INTERNAL MEDICINE

## 2021-08-26 PROCEDURE — 1101F PR PT FALLS ASSESS DOC 0-1 FALLS W/OUT INJ PAST YR: ICD-10-PCS | Mod: CPTII,S$GLB,, | Performed by: INTERNAL MEDICINE

## 2021-08-26 PROCEDURE — 1159F PR MEDICATION LIST DOCUMENTED IN MEDICAL RECORD: ICD-10-PCS | Mod: CPTII,S$GLB,, | Performed by: INTERNAL MEDICINE

## 2021-08-26 PROCEDURE — 1101F PT FALLS ASSESS-DOCD LE1/YR: CPT | Mod: CPTII,S$GLB,, | Performed by: INTERNAL MEDICINE

## 2021-08-26 PROCEDURE — 99999 PR PBB SHADOW E&M-EST. PATIENT-LVL V: CPT | Mod: PBBFAC,,, | Performed by: INTERNAL MEDICINE

## 2021-12-14 NOTE — PROGRESS NOTES
"  Physical Therapy Daily Treatment Note     Name: Randall Strickland Jr.  Clinic Number: [id number]    Therapy Diagnosis:   Encounter Diagnoses   Name Primary?    Muscle weakness of lower extremity     Postural imbalance     Decreased range of motion of lumbar spine      Physician: Swapna Delarosa MD    Visit Date: 2/5/2019    Evaluation Date: 1/17/2019  Authorization Period Expiration: 12/31/19  Plan of Care Expiration: 3/17/19  Visit # / Visits authorized: 6/ 20   Time In: 9:05  Time Out:10:00  Treatment time: 55  Total Billable Time: 55 minutes       Precautions: Standard and wears hearing aids, B LE edema    Subjective     Pt reports: no new complaints.  He reports some lower back pain primarily in the mornings but none presently. States that he has some trouble walking longer distances due to fatigue otherwise, states he is doing well and feels like the ex's have been helpful. States that he rides the ex bike for 1 hour at the [employer].   Pain: 0/10     Objective     Bp prior to Tx =  128/55  Hr =58  BP post Tx = 101/58Hr =  93    Randall received therapeutic exercises to develop strength, endurance, ROM, flexibility and posture for 56 minutes including:     Treadmill @ 1.5 mph x 5 min in beginning of session--NP   Treadmill @ 1.5 mph x 5 min at end of session--NP    Mat ex's:    LTR 5 sec hold x 10 ea  Supine hamstring stretch w strap 3 x 20 sec ea  Hooklying clams w BTB   x 20 ea  Bridges + iso hip abd BTB 2 x 10  SLR with 1# x 20 ea  Hooklying hip add iso 2 x 10- NP  PROM hamstring stretch with therapist assist      Seated ex's    Sit<->stand from mat @ 20" height + overhead press with 2# ball x 10  Standing ex's      Squats w UE support on foam x 20--NP  Scap retract.Row with green TB 2 x 10  Shoulder ext with GTB 2 x 10  Wall push ups x 20  Serratus wall slides 2 x 10-   Brueggers ex ( scap retract with ER) orange TB 2 x 10   Heel raises on foam 2 x 10--NP  Hip flex/ext and abd with 2# x 20 each  Forward step " ups (6'') x 10 each  Lateral band walk with GTB 10' x 4 laps with light UE support  Corner pec stretch 3 x 30 sec--NP  Forward and lateral step over hurdles with light UE support on counter top   Lateral step and reachx 10 each ( CGA)-   Forward rock and reach ( reciprocal arm swing) x 10 each ( CGA)    Written Home Exercises Provided: Patient educated to continue with previously issued HEP to tolerance .   Exercises were reviewed and Randall was able to demonstrate them prior to the end of the session.  Randall demonstrated good  understanding of the education provided.     Assessment   Patient alphonso TX fairly well. He was progressed slightly with ex;s/activities within tolerable muscular fatigue but without c/o pain. Improving ability to perform forward and lateral step over hurdles to encourage hip /knee flex during gait. Patient able to improve posture and gait quality slightly with cuing and ex's.He remains challenged with dynamic balance/coordination ex's of lateral step and reach and forward rock and reach requiring CGA and cues for technique  Hamstrings remain tight. Overall, he does well for his age and is pleasant and motivated with Tx efforts.   Pt will continue to benefit from skilled outpatient physical therapy to address the deficits listed in the problem list box on initial evaluation, provide pt/family education and to maximize pt's level of independence in the home and community environment.    Pt will continue to benefit from skilled outpatient physical therapy to address the deficits listed in the problem list box on initial evaluation, provide pt/family education and to maximize pt's level of independence in the home and community environment.      Goals from eval   GOALS: Short Term Goals: 4 weeks  1. Pt will be able to demonstrate proper upright posture without verbal cueing to decrease abnormal spinal stresses.   2. Pt will be able to tolerate multi-directional LE strengthening in order to improve  ability to perform household chores.  3. Pt will report 50% improvement in ability to walk long distances since start of care to indicate improved functional mobility.   4. Pt to increase B popliteal angle to -35 degrees in order to improve flexibility and posture.   5. Pt to tolerate HEP to improve ROM and independence with ADL's     Long Term Goals: 8 weeks  1. Pt will be able to perform 2 x 10 multi-directional LE strengthening without fatigue in order to improve ability to perform household chores.  2. Pt will report 80% improvement in ability to walk long distances since start of care to indicate improved functional mobility.   3. Pt to increase B popliteal angle to -30 degrees in order to improve flexibility and posture.   4. Pt to be Independent with HEP to improve ROM and independence with ADL's    Plan     Continue PT towards established plan of care and goals with focus on walking endurance and gait.     Juan Long, PTA    14-Dec-2021 14:11

## 2022-01-01 ENCOUNTER — HOSPITAL ENCOUNTER (INPATIENT)
Facility: HOSPITAL | Age: 87
LOS: 2 days | Discharge: HOSPICE | DRG: 178 | End: 2022-10-04
Attending: EMERGENCY MEDICINE | Admitting: STUDENT IN AN ORGANIZED HEALTH CARE EDUCATION/TRAINING PROGRAM
Payer: MEDICARE

## 2022-01-01 ENCOUNTER — OFFICE VISIT (OUTPATIENT)
Dept: CARDIOLOGY | Facility: CLINIC | Age: 87
End: 2022-01-01
Payer: MEDICARE

## 2022-01-01 ENCOUNTER — OFFICE VISIT (OUTPATIENT)
Dept: HOME HEALTH SERVICES | Facility: CLINIC | Age: 87
End: 2022-01-01
Payer: MEDICARE

## 2022-01-01 ENCOUNTER — OFFICE VISIT (OUTPATIENT)
Dept: INTERNAL MEDICINE | Facility: CLINIC | Age: 87
End: 2022-01-01
Payer: MEDICARE

## 2022-01-01 ENCOUNTER — TELEPHONE (OUTPATIENT)
Dept: INTERNAL MEDICINE | Facility: CLINIC | Age: 87
End: 2022-01-01
Payer: MEDICARE

## 2022-01-01 ENCOUNTER — HOSPITAL ENCOUNTER (OUTPATIENT)
Dept: RADIOLOGY | Facility: HOSPITAL | Age: 87
Discharge: HOME | End: 2022-03-29
Attending: NURSE PRACTITIONER
Payer: MEDICARE

## 2022-01-01 ENCOUNTER — PATIENT OUTREACH (OUTPATIENT)
Dept: ADMINISTRATIVE | Facility: OTHER | Age: 87
End: 2022-01-01
Payer: MEDICARE

## 2022-01-01 ENCOUNTER — TELEPHONE (OUTPATIENT)
Dept: INTERNAL MEDICINE | Facility: CLINIC | Age: 87
End: 2022-01-01

## 2022-01-01 ENCOUNTER — PES CALL (OUTPATIENT)
Dept: ADMINISTRATIVE | Facility: CLINIC | Age: 87
End: 2022-01-01
Payer: MEDICARE

## 2022-01-01 ENCOUNTER — OFFICE VISIT (OUTPATIENT)
Dept: INTERNAL MEDICINE | Facility: CLINIC | Age: 87
End: 2022-01-01
Attending: FAMILY MEDICINE
Payer: MEDICARE

## 2022-01-01 ENCOUNTER — LAB VISIT (OUTPATIENT)
Dept: LAB | Facility: HOSPITAL | Age: 87
End: 2022-01-01
Attending: INTERNAL MEDICINE
Payer: MEDICARE

## 2022-01-01 VITALS
DIASTOLIC BLOOD PRESSURE: 50 MMHG | TEMPERATURE: 97 F | HEIGHT: 69 IN | RESPIRATION RATE: 16 BRPM | OXYGEN SATURATION: 99 % | WEIGHT: 109 LBS | SYSTOLIC BLOOD PRESSURE: 110 MMHG | BODY MASS INDEX: 16.14 KG/M2 | HEART RATE: 84 BPM

## 2022-01-01 VITALS
HEIGHT: 69 IN | DIASTOLIC BLOOD PRESSURE: 60 MMHG | HEART RATE: 107 BPM | SYSTOLIC BLOOD PRESSURE: 132 MMHG | BODY MASS INDEX: 22.6 KG/M2 | WEIGHT: 152.56 LBS | OXYGEN SATURATION: 99 %

## 2022-01-01 VITALS
SYSTOLIC BLOOD PRESSURE: 130 MMHG | WEIGHT: 151.44 LBS | DIASTOLIC BLOOD PRESSURE: 70 MMHG | TEMPERATURE: 98 F | OXYGEN SATURATION: 100 % | HEIGHT: 69 IN | HEART RATE: 67 BPM | BODY MASS INDEX: 22.43 KG/M2

## 2022-01-01 VITALS
HEART RATE: 83 BPM | OXYGEN SATURATION: 98 % | BODY MASS INDEX: 22.15 KG/M2 | HEIGHT: 69 IN | WEIGHT: 149.56 LBS | SYSTOLIC BLOOD PRESSURE: 112 MMHG | DIASTOLIC BLOOD PRESSURE: 62 MMHG

## 2022-01-01 VITALS
SYSTOLIC BLOOD PRESSURE: 131 MMHG | OXYGEN SATURATION: 100 % | HEART RATE: 63 BPM | WEIGHT: 108.69 LBS | TEMPERATURE: 97 F | HEIGHT: 69 IN | RESPIRATION RATE: 16 BRPM | BODY MASS INDEX: 16.1 KG/M2 | DIASTOLIC BLOOD PRESSURE: 76 MMHG

## 2022-01-01 DIAGNOSIS — I10 HYPERTENSION, ESSENTIAL: ICD-10-CM

## 2022-01-01 DIAGNOSIS — I50.32 CHRONIC DIASTOLIC CONGESTIVE HEART FAILURE: ICD-10-CM

## 2022-01-01 DIAGNOSIS — J18.9 PNEUMONIA OF BOTH LUNGS DUE TO INFECTIOUS ORGANISM, UNSPECIFIED PART OF LUNG: Primary | ICD-10-CM

## 2022-01-01 DIAGNOSIS — I42.9 CARDIOMYOPATHY, IDIOPATHIC: ICD-10-CM

## 2022-01-01 DIAGNOSIS — R63.6 UNDERWEIGHT: ICD-10-CM

## 2022-01-01 DIAGNOSIS — I35.0 NONRHEUMATIC AORTIC VALVE STENOSIS: ICD-10-CM

## 2022-01-01 DIAGNOSIS — Z99.81 DEPENDENCE ON SUPPLEMENTAL OXYGEN: ICD-10-CM

## 2022-01-01 DIAGNOSIS — M54.9 ACUTE BACK PAIN, UNSPECIFIED BACK LOCATION, UNSPECIFIED BACK PAIN LATERALITY: ICD-10-CM

## 2022-01-01 DIAGNOSIS — M47.816 FACET ARTHRITIS, DEGENERATIVE, LUMBAR SPINE: ICD-10-CM

## 2022-01-01 DIAGNOSIS — N40.1 BPH WITH URINARY OBSTRUCTION: ICD-10-CM

## 2022-01-01 DIAGNOSIS — M47.816 OSTEOARTHRITIS OF LUMBAR SPINE, UNSPECIFIED SPINAL OSTEOARTHRITIS COMPLICATION STATUS: ICD-10-CM

## 2022-01-01 DIAGNOSIS — Z91.81 RISK FOR FALLS: ICD-10-CM

## 2022-01-01 DIAGNOSIS — I73.9 PVD (PERIPHERAL VASCULAR DISEASE): ICD-10-CM

## 2022-01-01 DIAGNOSIS — Z99.89 DEPENDENCE ON OTHER ENABLING MACHINES AND DEVICES: ICD-10-CM

## 2022-01-01 DIAGNOSIS — R54 FRAIL ELDERLY: ICD-10-CM

## 2022-01-01 DIAGNOSIS — I48.11 LONGSTANDING PERSISTENT ATRIAL FIBRILLATION: ICD-10-CM

## 2022-01-01 DIAGNOSIS — Z95.2 S/P AORTIC VALVE REPLACEMENT: ICD-10-CM

## 2022-01-01 DIAGNOSIS — N13.8 BPH WITH URINARY OBSTRUCTION: ICD-10-CM

## 2022-01-01 DIAGNOSIS — Z00.00 ENCOUNTER FOR PREVENTIVE HEALTH EXAMINATION: Primary | ICD-10-CM

## 2022-01-01 DIAGNOSIS — I48.21 PERMANENT ATRIAL FIBRILLATION: ICD-10-CM

## 2022-01-01 DIAGNOSIS — D69.6 THROMBOCYTOPENIA, UNSPECIFIED: ICD-10-CM

## 2022-01-01 DIAGNOSIS — I48.0 PAROXYSMAL ATRIAL FIBRILLATION: ICD-10-CM

## 2022-01-01 DIAGNOSIS — N18.31 STAGE 3A CHRONIC KIDNEY DISEASE: ICD-10-CM

## 2022-01-01 DIAGNOSIS — Z79.01 CHRONIC ANTICOAGULATION: ICD-10-CM

## 2022-01-01 DIAGNOSIS — F03.90 DEMENTIA WITHOUT BEHAVIORAL DISTURBANCE, UNSPECIFIED DEMENTIA TYPE: ICD-10-CM

## 2022-01-01 DIAGNOSIS — I25.110 ATHEROSCLEROSIS OF NATIVE CORONARY ARTERY OF NATIVE HEART WITH UNSTABLE ANGINA PECTORIS: ICD-10-CM

## 2022-01-01 DIAGNOSIS — J69.0 ASPIRATION PNEUMONIA, UNSPECIFIED ASPIRATION PNEUMONIA TYPE, UNSPECIFIED LATERALITY, UNSPECIFIED PART OF LUNG: ICD-10-CM

## 2022-01-01 DIAGNOSIS — I70.0 AORTIC ATHEROSCLEROSIS: ICD-10-CM

## 2022-01-01 DIAGNOSIS — R06.02 SHORTNESS OF BREATH: ICD-10-CM

## 2022-01-01 DIAGNOSIS — M54.50 LOW BACK PAIN, UNSPECIFIED BACK PAIN LATERALITY, UNSPECIFIED CHRONICITY, UNSPECIFIED WHETHER SCIATICA PRESENT: Primary | ICD-10-CM

## 2022-01-01 DIAGNOSIS — D72.829 LEUKOCYTOSIS, UNSPECIFIED TYPE: ICD-10-CM

## 2022-01-01 DIAGNOSIS — Z79.01 LONG TERM CURRENT USE OF ANTICOAGULANT: ICD-10-CM

## 2022-01-01 DIAGNOSIS — G47.33 OSA (OBSTRUCTIVE SLEEP APNEA): ICD-10-CM

## 2022-01-01 DIAGNOSIS — Z79.01 LONG TERM (CURRENT) USE OF ANTICOAGULANTS: ICD-10-CM

## 2022-01-01 DIAGNOSIS — R63.4 WEIGHT LOSS, ABNORMAL: ICD-10-CM

## 2022-01-01 DIAGNOSIS — I77.1 TORTUOUS AORTA: ICD-10-CM

## 2022-01-01 DIAGNOSIS — D69.6 THROMBOCYTOPENIA: ICD-10-CM

## 2022-01-01 DIAGNOSIS — Z51.5 COMFORT MEASURES ONLY STATUS: ICD-10-CM

## 2022-01-01 DIAGNOSIS — F02.80 DEMENTIA IN HYDROCEPHALUS: ICD-10-CM

## 2022-01-01 DIAGNOSIS — I25.110 ATHEROSCLEROSIS OF NATIVE CORONARY ARTERY OF NATIVE HEART WITH UNSTABLE ANGINA PECTORIS: Primary | ICD-10-CM

## 2022-01-01 DIAGNOSIS — L89.106 PRESSURE INJURY OF DEEP TISSUE OF BACK: Primary | ICD-10-CM

## 2022-01-01 DIAGNOSIS — G91.9 DEMENTIA IN HYDROCEPHALUS: ICD-10-CM

## 2022-01-01 DIAGNOSIS — M46.96 UNSPECIFIED INFLAMMATORY SPONDYLOPATHY, LUMBAR REGION: ICD-10-CM

## 2022-01-01 DIAGNOSIS — D64.9 ANEMIA, UNSPECIFIED TYPE: ICD-10-CM

## 2022-01-01 LAB
ALBUMIN SERPL BCP-MCNC: 1.8 G/DL (ref 3.5–5.2)
ALBUMIN SERPL BCP-MCNC: 1.9 G/DL (ref 3.5–5.2)
ALBUMIN SERPL BCP-MCNC: 2.1 G/DL (ref 3.5–5.2)
ALP SERPL-CCNC: 126 U/L (ref 55–135)
ALP SERPL-CCNC: 128 U/L (ref 55–135)
ALP SERPL-CCNC: 151 U/L (ref 55–135)
ALT SERPL W/O P-5'-P-CCNC: 11 U/L (ref 10–44)
ALT SERPL W/O P-5'-P-CCNC: 11 U/L (ref 10–44)
ALT SERPL W/O P-5'-P-CCNC: 12 U/L (ref 10–44)
ANION GAP SERPL CALC-SCNC: 11 MMOL/L (ref 8–16)
ANION GAP SERPL CALC-SCNC: 12 MMOL/L (ref 8–16)
ANION GAP SERPL CALC-SCNC: 14 MMOL/L (ref 8–16)
ANION GAP SERPL CALC-SCNC: 8 MMOL/L (ref 8–16)
ANISOCYTOSIS BLD QL SMEAR: SLIGHT
AST SERPL-CCNC: 16 U/L (ref 10–40)
AST SERPL-CCNC: 22 U/L (ref 10–40)
AST SERPL-CCNC: 23 U/L (ref 10–40)
BACTERIA #/AREA URNS AUTO: ABNORMAL /HPF
BACTERIA BLD CULT: NORMAL
BACTERIA BLD CULT: NORMAL
BACTERIA UR CULT: NORMAL
BASOPHILS # BLD AUTO: 0.01 K/UL (ref 0–0.2)
BASOPHILS # BLD AUTO: 0.01 K/UL (ref 0–0.2)
BASOPHILS # BLD AUTO: 0.02 K/UL (ref 0–0.2)
BASOPHILS # BLD AUTO: 0.08 K/UL (ref 0–0.2)
BASOPHILS NFR BLD: 0.1 % (ref 0–1.9)
BASOPHILS NFR BLD: 0.9 % (ref 0–1.9)
BILIRUB SERPL-MCNC: 0.8 MG/DL (ref 0.1–1)
BILIRUB SERPL-MCNC: 0.9 MG/DL (ref 0.1–1)
BILIRUB SERPL-MCNC: 1.3 MG/DL (ref 0.1–1)
BILIRUB UR QL STRIP: NEGATIVE
BNP SERPL-MCNC: 69 PG/ML (ref 0–99)
BUN SERPL-MCNC: 24 MG/DL (ref 10–30)
BUN SERPL-MCNC: 74 MG/DL (ref 10–30)
BUN SERPL-MCNC: 86 MG/DL (ref 10–30)
BUN SERPL-MCNC: 87 MG/DL (ref 10–30)
CALCIUM SERPL-MCNC: 8.5 MG/DL (ref 8.7–10.5)
CALCIUM SERPL-MCNC: 8.7 MG/DL (ref 8.7–10.5)
CALCIUM SERPL-MCNC: 9.4 MG/DL (ref 8.7–10.5)
CALCIUM SERPL-MCNC: 9.6 MG/DL (ref 8.7–10.5)
CHLORIDE SERPL-SCNC: 103 MMOL/L (ref 95–110)
CHLORIDE SERPL-SCNC: 105 MMOL/L (ref 95–110)
CHLORIDE SERPL-SCNC: 107 MMOL/L (ref 95–110)
CHLORIDE SERPL-SCNC: 109 MMOL/L (ref 95–110)
CLARITY UR REFRACT.AUTO: ABNORMAL
CO2 SERPL-SCNC: 18 MMOL/L (ref 23–29)
CO2 SERPL-SCNC: 20 MMOL/L (ref 23–29)
CO2 SERPL-SCNC: 23 MMOL/L (ref 23–29)
CO2 SERPL-SCNC: 24 MMOL/L (ref 23–29)
COLOR UR AUTO: YELLOW
CREAT SERPL-MCNC: 0.8 MG/DL (ref 0.5–1.4)
CREAT SERPL-MCNC: 1.2 MG/DL (ref 0.5–1.4)
CREAT SERPL-MCNC: 1.3 MG/DL (ref 0.5–1.4)
CREAT SERPL-MCNC: 1.4 MG/DL (ref 0.5–1.4)
DIFFERENTIAL METHOD: ABNORMAL
EOSINOPHIL # BLD AUTO: 0 K/UL (ref 0–0.5)
EOSINOPHIL # BLD AUTO: 0.3 K/UL (ref 0–0.5)
EOSINOPHIL NFR BLD: 0 % (ref 0–8)
EOSINOPHIL NFR BLD: 0 % (ref 0–8)
EOSINOPHIL NFR BLD: 0.2 % (ref 0–8)
EOSINOPHIL NFR BLD: 2.9 % (ref 0–8)
ERYTHROCYTE [DISTWIDTH] IN BLOOD BY AUTOMATED COUNT: 12.9 % (ref 11.5–14.5)
ERYTHROCYTE [DISTWIDTH] IN BLOOD BY AUTOMATED COUNT: 14.3 % (ref 11.5–14.5)
ERYTHROCYTE [DISTWIDTH] IN BLOOD BY AUTOMATED COUNT: 14.4 % (ref 11.5–14.5)
ERYTHROCYTE [DISTWIDTH] IN BLOOD BY AUTOMATED COUNT: 14.5 % (ref 11.5–14.5)
EST. GFR  (AFRICAN AMERICAN): 54 ML/MIN/1.73 M^2
EST. GFR  (NO RACE VARIABLE): 46.3 ML/MIN/1.73 M^2
EST. GFR  (NO RACE VARIABLE): 55.7 ML/MIN/1.73 M^2
EST. GFR  (NO RACE VARIABLE): >60 ML/MIN/1.73 M^2
EST. GFR  (NON AFRICAN AMERICAN): 46.7 ML/MIN/1.73 M^2
GLUCOSE SERPL-MCNC: 107 MG/DL (ref 70–110)
GLUCOSE SERPL-MCNC: 110 MG/DL (ref 70–110)
GLUCOSE SERPL-MCNC: 121 MG/DL (ref 70–110)
GLUCOSE SERPL-MCNC: 137 MG/DL (ref 70–110)
GLUCOSE UR QL STRIP: NEGATIVE
HCT VFR BLD AUTO: 30.3 % (ref 40–54)
HCT VFR BLD AUTO: 32 % (ref 40–54)
HCT VFR BLD AUTO: 37.1 % (ref 40–54)
HCT VFR BLD AUTO: 39.8 % (ref 40–54)
HGB BLD-MCNC: 10.1 G/DL (ref 14–18)
HGB BLD-MCNC: 12.4 G/DL (ref 14–18)
HGB BLD-MCNC: 12.6 G/DL (ref 14–18)
HGB BLD-MCNC: 9.7 G/DL (ref 14–18)
HGB UR QL STRIP: ABNORMAL
HYALINE CASTS UR QL AUTO: 10 /LPF
HYPOCHROMIA BLD QL SMEAR: ABNORMAL
IMM GRANULOCYTES # BLD AUTO: 0.04 K/UL (ref 0–0.04)
IMM GRANULOCYTES # BLD AUTO: 0.09 K/UL (ref 0–0.04)
IMM GRANULOCYTES # BLD AUTO: 0.12 K/UL (ref 0–0.04)
IMM GRANULOCYTES # BLD AUTO: 0.12 K/UL (ref 0–0.04)
IMM GRANULOCYTES NFR BLD AUTO: 0.5 % (ref 0–0.5)
IMM GRANULOCYTES NFR BLD AUTO: 0.7 % (ref 0–0.5)
IMM GRANULOCYTES NFR BLD AUTO: 0.8 % (ref 0–0.5)
IMM GRANULOCYTES NFR BLD AUTO: 0.8 % (ref 0–0.5)
KETONES UR QL STRIP: ABNORMAL
LACTATE SERPL-SCNC: 1.5 MMOL/L (ref 0.5–2.2)
LEUKOCYTE ESTERASE UR QL STRIP: ABNORMAL
LYMPHOCYTES # BLD AUTO: 1 K/UL (ref 1–4.8)
LYMPHOCYTES # BLD AUTO: 1 K/UL (ref 1–4.8)
LYMPHOCYTES # BLD AUTO: 1.1 K/UL (ref 1–4.8)
LYMPHOCYTES # BLD AUTO: 3.1 K/UL (ref 1–4.8)
LYMPHOCYTES NFR BLD: 36.1 % (ref 18–48)
LYMPHOCYTES NFR BLD: 6.4 % (ref 18–48)
LYMPHOCYTES NFR BLD: 6.4 % (ref 18–48)
LYMPHOCYTES NFR BLD: 9.2 % (ref 18–48)
MAGNESIUM SERPL-MCNC: 1.8 MG/DL (ref 1.6–2.6)
MAGNESIUM SERPL-MCNC: 1.8 MG/DL (ref 1.6–2.6)
MAGNESIUM SERPL-MCNC: 1.9 MG/DL (ref 1.6–2.6)
MCH RBC QN AUTO: 30.9 PG (ref 27–31)
MCH RBC QN AUTO: 32.9 PG (ref 27–31)
MCH RBC QN AUTO: 33.2 PG (ref 27–31)
MCH RBC QN AUTO: 34.3 PG (ref 27–31)
MCHC RBC AUTO-ENTMCNC: 31.6 G/DL (ref 32–36)
MCHC RBC AUTO-ENTMCNC: 31.7 G/DL (ref 32–36)
MCHC RBC AUTO-ENTMCNC: 32 G/DL (ref 32–36)
MCHC RBC AUTO-ENTMCNC: 33.4 G/DL (ref 32–36)
MCV RBC AUTO: 103 FL (ref 82–98)
MCV RBC AUTO: 104 FL (ref 82–98)
MCV RBC AUTO: 104 FL (ref 82–98)
MCV RBC AUTO: 98 FL (ref 82–98)
MICROSCOPIC COMMENT: ABNORMAL
MONOCYTES # BLD AUTO: 0.9 K/UL (ref 0.3–1)
MONOCYTES # BLD AUTO: 1 K/UL (ref 0.3–1)
MONOCYTES # BLD AUTO: 1.1 K/UL (ref 0.3–1)
MONOCYTES # BLD AUTO: 1.8 K/UL (ref 0.3–1)
MONOCYTES NFR BLD: 20.7 % (ref 4–15)
MONOCYTES NFR BLD: 6.4 % (ref 4–15)
MONOCYTES NFR BLD: 6.6 % (ref 4–15)
MONOCYTES NFR BLD: 8.4 % (ref 4–15)
NEUTROPHILS # BLD AUTO: 13.6 K/UL (ref 1.8–7.7)
NEUTROPHILS # BLD AUTO: 14.7 K/UL (ref 1.8–7.7)
NEUTROPHILS # BLD AUTO: 3.4 K/UL (ref 1.8–7.7)
NEUTROPHILS # BLD AUTO: 9.1 K/UL (ref 1.8–7.7)
NEUTROPHILS NFR BLD: 38.9 % (ref 38–73)
NEUTROPHILS NFR BLD: 81.3 % (ref 38–73)
NEUTROPHILS NFR BLD: 86.2 % (ref 38–73)
NEUTROPHILS NFR BLD: 86.3 % (ref 38–73)
NITRITE UR QL STRIP: NEGATIVE
NRBC BLD-RTO: 0 /100 WBC
OVALOCYTES BLD QL SMEAR: ABNORMAL
PH UR STRIP: >8 [PH] (ref 5–8)
PHOSPHATE SERPL-MCNC: 1.8 MG/DL (ref 2.7–4.5)
PHOSPHATE SERPL-MCNC: 2.3 MG/DL (ref 2.7–4.5)
PLATELET # BLD AUTO: 181 K/UL (ref 150–450)
PLATELET # BLD AUTO: 30 K/UL (ref 150–450)
PLATELET # BLD AUTO: 36 K/UL (ref 150–450)
PLATELET # BLD AUTO: 41 K/UL (ref 150–450)
PLATELET BLD QL SMEAR: ABNORMAL
PMV BLD AUTO: 10.7 FL (ref 9.2–12.9)
PMV BLD AUTO: 12.5 FL (ref 9.2–12.9)
PMV BLD AUTO: 12.9 FL (ref 9.2–12.9)
PMV BLD AUTO: 12.9 FL (ref 9.2–12.9)
POCT GLUCOSE: 113 MG/DL (ref 70–110)
POCT GLUCOSE: 118 MG/DL (ref 70–110)
POCT GLUCOSE: 136 MG/DL (ref 70–110)
POCT GLUCOSE: 140 MG/DL (ref 70–110)
POCT GLUCOSE: 149 MG/DL (ref 70–110)
POCT GLUCOSE: 160 MG/DL (ref 70–110)
POCT GLUCOSE: 162 MG/DL (ref 70–110)
POIKILOCYTOSIS BLD QL SMEAR: SLIGHT
POLYCHROMASIA BLD QL SMEAR: ABNORMAL
POTASSIUM SERPL-SCNC: 3.8 MMOL/L (ref 3.5–5.1)
POTASSIUM SERPL-SCNC: 4.2 MMOL/L (ref 3.5–5.1)
POTASSIUM SERPL-SCNC: 5 MMOL/L (ref 3.5–5.1)
POTASSIUM SERPL-SCNC: 5.3 MMOL/L (ref 3.5–5.1)
PROT SERPL-MCNC: 5.1 G/DL (ref 6–8.4)
PROT SERPL-MCNC: 5.3 G/DL (ref 6–8.4)
PROT SERPL-MCNC: 6.2 G/DL (ref 6–8.4)
PROT UR QL STRIP: ABNORMAL
RBC # BLD AUTO: 2.92 M/UL (ref 4.6–6.2)
RBC # BLD AUTO: 3.07 M/UL (ref 4.6–6.2)
RBC # BLD AUTO: 3.62 M/UL (ref 4.6–6.2)
RBC # BLD AUTO: 4.08 M/UL (ref 4.6–6.2)
RBC #/AREA URNS AUTO: 7 /HPF (ref 0–4)
SARS-COV-2 RDRP RESP QL NAA+PROBE: NEGATIVE
SODIUM SERPL-SCNC: 137 MMOL/L (ref 136–145)
SODIUM SERPL-SCNC: 138 MMOL/L (ref 136–145)
SODIUM SERPL-SCNC: 139 MMOL/L (ref 136–145)
SODIUM SERPL-SCNC: 140 MMOL/L (ref 136–145)
SP GR UR STRIP: 1.02 (ref 1–1.03)
SPHEROCYTES BLD QL SMEAR: ABNORMAL
SQUAMOUS #/AREA URNS AUTO: 3 /HPF
TRI-PHOS CRY UR QL COMP ASSIST: ABNORMAL
TROPONIN I SERPL DL<=0.01 NG/ML-MCNC: 0.07 NG/ML (ref 0–0.03)
TROPONIN I SERPL DL<=0.01 NG/ML-MCNC: 0.12 NG/ML (ref 0–0.03)
TSH SERPL DL<=0.005 MIU/L-ACNC: 1.32 UIU/ML (ref 0.4–4)
URN SPEC COLLECT METH UR: ABNORMAL
WBC # BLD AUTO: 11.13 K/UL (ref 3.9–12.7)
WBC # BLD AUTO: 15.72 K/UL (ref 3.9–12.7)
WBC # BLD AUTO: 17.1 K/UL (ref 3.9–12.7)
WBC # BLD AUTO: 8.71 K/UL (ref 3.9–12.7)
WBC #/AREA URNS AUTO: >100 /HPF (ref 0–5)

## 2022-01-01 PROCEDURE — 1160F PR REVIEW ALL MEDS BY PRESCRIBER/CLIN PHARMACIST DOCUMENTED: ICD-10-PCS | Mod: CPTII,S$GLB,, | Performed by: FAMILY MEDICINE

## 2022-01-01 PROCEDURE — 94761 N-INVAS EAR/PLS OXIMETRY MLT: CPT

## 2022-01-01 PROCEDURE — 25000003 PHARM REV CODE 250: Performed by: STUDENT IN AN ORGANIZED HEALTH CARE EDUCATION/TRAINING PROGRAM

## 2022-01-01 PROCEDURE — 72070 X-RAY EXAM THORAC SPINE 2VWS: CPT | Mod: TC,PO

## 2022-01-01 PROCEDURE — 99497 ADVNCD CARE PLAN 30 MIN: CPT | Mod: 25,,, | Performed by: NURSE PRACTITIONER

## 2022-01-01 PROCEDURE — 72110 X-RAY EXAM L-2 SPINE 4/>VWS: CPT | Mod: 26,,, | Performed by: RADIOLOGY

## 2022-01-01 PROCEDURE — 99213 PR OFFICE/OUTPT VISIT, EST, LEVL III, 20-29 MIN: ICD-10-PCS | Mod: S$GLB,,, | Performed by: NURSE PRACTITIONER

## 2022-01-01 PROCEDURE — 99213 OFFICE O/P EST LOW 20 MIN: CPT | Mod: S$GLB,,, | Performed by: NURSE PRACTITIONER

## 2022-01-01 PROCEDURE — 3288F PR FALLS RISK ASSESSMENT DOCUMENTED: ICD-10-PCS | Mod: CPTII,S$GLB,, | Performed by: FAMILY MEDICINE

## 2022-01-01 PROCEDURE — 99285 EMERGENCY DEPT VISIT HI MDM: CPT | Mod: 25

## 2022-01-01 PROCEDURE — 1125F PR PAIN SEVERITY QUANTIFIED, PAIN PRESENT: ICD-10-PCS | Mod: CPTII,S$GLB,, | Performed by: NURSE PRACTITIONER

## 2022-01-01 PROCEDURE — 1100F PR PT FALLS ASSESS DOC 2+ FALLS/FALL W/INJURY/YR: ICD-10-PCS | Mod: CPTII,S$GLB,,

## 2022-01-01 PROCEDURE — 83735 ASSAY OF MAGNESIUM: CPT | Performed by: STUDENT IN AN ORGANIZED HEALTH CARE EDUCATION/TRAINING PROGRAM

## 2022-01-01 PROCEDURE — 99232 PR SUBSEQUENT HOSPITAL CARE,LEVL II: ICD-10-PCS | Mod: ,,, | Performed by: STUDENT IN AN ORGANIZED HEALTH CARE EDUCATION/TRAINING PROGRAM

## 2022-01-01 PROCEDURE — 1126F AMNT PAIN NOTED NONE PRSNT: CPT | Mod: HCNC,CPTII,S$GLB, | Performed by: INTERNAL MEDICINE

## 2022-01-01 PROCEDURE — 1126F AMNT PAIN NOTED NONE PRSNT: CPT | Mod: CPTII,S$GLB,,

## 2022-01-01 PROCEDURE — 1159F PR MEDICATION LIST DOCUMENTED IN MEDICAL RECORD: ICD-10-PCS | Mod: CPTII,S$GLB,,

## 2022-01-01 PROCEDURE — 99900035 HC TECH TIME PER 15 MIN (STAT)

## 2022-01-01 PROCEDURE — 1126F PR PAIN SEVERITY QUANTIFIED, NO PAIN PRESENT: ICD-10-PCS | Mod: CPTII,S$GLB,, | Performed by: FAMILY MEDICINE

## 2022-01-01 PROCEDURE — 99999 PR PBB SHADOW E&M-EST. PATIENT-LVL V: ICD-10-PCS | Mod: PBBFAC,,, | Performed by: NURSE PRACTITIONER

## 2022-01-01 PROCEDURE — 1111F PR DISCHARGE MEDS RECONCILED W/ CURRENT OUTPATIENT MED LIST: ICD-10-PCS | Mod: CPTII,,, | Performed by: STUDENT IN AN ORGANIZED HEALTH CARE EDUCATION/TRAINING PROGRAM

## 2022-01-01 PROCEDURE — 92610 EVALUATE SWALLOWING FUNCTION: CPT

## 2022-01-01 PROCEDURE — 3288F FALL RISK ASSESSMENT DOCD: CPT | Mod: CPTII,S$GLB,, | Performed by: NURSE PRACTITIONER

## 2022-01-01 PROCEDURE — 97165 OT EVAL LOW COMPLEX 30 MIN: CPT

## 2022-01-01 PROCEDURE — 1170F PR FUNCTIONAL STATUS ASSESSED: ICD-10-PCS | Mod: CPTII,S$GLB,,

## 2022-01-01 PROCEDURE — 99499 RISK ADDL DX/OHS AUDIT: ICD-10-PCS | Mod: S$GLB,,, | Performed by: FAMILY MEDICINE

## 2022-01-01 PROCEDURE — 84484 ASSAY OF TROPONIN QUANT: CPT | Performed by: EMERGENCY MEDICINE

## 2022-01-01 PROCEDURE — 63600175 PHARM REV CODE 636 W HCPCS: Performed by: EMERGENCY MEDICINE

## 2022-01-01 PROCEDURE — 63600175 PHARM REV CODE 636 W HCPCS: Performed by: STUDENT IN AN ORGANIZED HEALTH CARE EDUCATION/TRAINING PROGRAM

## 2022-01-01 PROCEDURE — 36415 COLL VENOUS BLD VENIPUNCTURE: CPT | Performed by: STUDENT IN AN ORGANIZED HEALTH CARE EDUCATION/TRAINING PROGRAM

## 2022-01-01 PROCEDURE — 85025 COMPLETE CBC W/AUTO DIFF WBC: CPT | Performed by: STUDENT IN AN ORGANIZED HEALTH CARE EDUCATION/TRAINING PROGRAM

## 2022-01-01 PROCEDURE — 72070 X-RAY EXAM THORAC SPINE 2VWS: CPT | Mod: 26,,, | Performed by: RADIOLOGY

## 2022-01-01 PROCEDURE — 1126F AMNT PAIN NOTED NONE PRSNT: CPT | Mod: CPTII,S$GLB,, | Performed by: FAMILY MEDICINE

## 2022-01-01 PROCEDURE — 1101F PT FALLS ASSESS-DOCD LE1/YR: CPT | Mod: CPTII,S$GLB,, | Performed by: NURSE PRACTITIONER

## 2022-01-01 PROCEDURE — 99285 EMERGENCY DEPT VISIT HI MDM: CPT | Mod: CS,,, | Performed by: EMERGENCY MEDICINE

## 2022-01-01 PROCEDURE — 36415 COLL VENOUS BLD VENIPUNCTURE: CPT | Mod: HCNC,PN | Performed by: INTERNAL MEDICINE

## 2022-01-01 PROCEDURE — U0002 COVID-19 LAB TEST NON-CDC: HCPCS | Performed by: EMERGENCY MEDICINE

## 2022-01-01 PROCEDURE — 1100F PTFALLS ASSESS-DOCD GE2>/YR: CPT | Mod: CPTII,S$GLB,,

## 2022-01-01 PROCEDURE — 85025 COMPLETE CBC W/AUTO DIFF WBC: CPT | Performed by: EMERGENCY MEDICINE

## 2022-01-01 PROCEDURE — 99999 PR PBB SHADOW E&M-EST. PATIENT-LVL IV: CPT | Mod: PBBFAC,HCNC,, | Performed by: INTERNAL MEDICINE

## 2022-01-01 PROCEDURE — 25000003 PHARM REV CODE 250: Performed by: EMERGENCY MEDICINE

## 2022-01-01 PROCEDURE — 80053 COMPREHEN METABOLIC PANEL: CPT | Performed by: STUDENT IN AN ORGANIZED HEALTH CARE EDUCATION/TRAINING PROGRAM

## 2022-01-01 PROCEDURE — 3288F FALL RISK ASSESSMENT DOCD: CPT | Mod: CPTII,S$GLB,, | Performed by: FAMILY MEDICINE

## 2022-01-01 PROCEDURE — 87040 BLOOD CULTURE FOR BACTERIA: CPT | Mod: 59 | Performed by: EMERGENCY MEDICINE

## 2022-01-01 PROCEDURE — 1126F PR PAIN SEVERITY QUANTIFIED, NO PAIN PRESENT: ICD-10-PCS | Mod: HCNC,CPTII,S$GLB, | Performed by: INTERNAL MEDICINE

## 2022-01-01 PROCEDURE — 93010 ELECTROCARDIOGRAM REPORT: CPT | Mod: ,,, | Performed by: INTERNAL MEDICINE

## 2022-01-01 PROCEDURE — 83880 ASSAY OF NATRIURETIC PEPTIDE: CPT | Performed by: EMERGENCY MEDICINE

## 2022-01-01 PROCEDURE — 81001 URINALYSIS AUTO W/SCOPE: CPT | Performed by: EMERGENCY MEDICINE

## 2022-01-01 PROCEDURE — 3288F FALL RISK ASSESSMENT DOCD: CPT | Mod: CPTII,S$GLB,,

## 2022-01-01 PROCEDURE — 27000221 HC OXYGEN, UP TO 24 HOURS

## 2022-01-01 PROCEDURE — 99233 SBSQ HOSP IP/OBS HIGH 50: CPT | Mod: GV,,, | Performed by: NURSE PRACTITIONER

## 2022-01-01 PROCEDURE — 99214 OFFICE O/P EST MOD 30 MIN: CPT | Mod: S$GLB,,, | Performed by: FAMILY MEDICINE

## 2022-01-01 PROCEDURE — 99999 PR PBB SHADOW E&M-EST. PATIENT-LVL V: CPT | Mod: PBBFAC,,, | Performed by: NURSE PRACTITIONER

## 2022-01-01 PROCEDURE — 83605 ASSAY OF LACTIC ACID: CPT | Performed by: EMERGENCY MEDICINE

## 2022-01-01 PROCEDURE — 84443 ASSAY THYROID STIM HORMONE: CPT | Performed by: EMERGENCY MEDICINE

## 2022-01-01 PROCEDURE — 3288F FALL RISK ASSESSMENT DOCD: CPT | Mod: HCNC,CPTII,S$GLB, | Performed by: INTERNAL MEDICINE

## 2022-01-01 PROCEDURE — 99499 UNLISTED E&M SERVICE: CPT | Mod: S$GLB,,, | Performed by: FAMILY MEDICINE

## 2022-01-01 PROCEDURE — 1126F PR PAIN SEVERITY QUANTIFIED, NO PAIN PRESENT: ICD-10-PCS | Mod: CPTII,S$GLB,,

## 2022-01-01 PROCEDURE — 12000002 HC ACUTE/MED SURGE SEMI-PRIVATE ROOM

## 2022-01-01 PROCEDURE — 72110 X-RAY EXAM L-2 SPINE 4/>VWS: CPT | Mod: TC,PO

## 2022-01-01 PROCEDURE — 1111F DSCHRG MED/CURRENT MED MERGE: CPT | Mod: CPTII,,, | Performed by: STUDENT IN AN ORGANIZED HEALTH CARE EDUCATION/TRAINING PROGRAM

## 2022-01-01 PROCEDURE — 3288F PR FALLS RISK ASSESSMENT DOCUMENTED: ICD-10-PCS | Mod: CPTII,S$GLB,,

## 2022-01-01 PROCEDURE — 87086 URINE CULTURE/COLONY COUNT: CPT | Performed by: EMERGENCY MEDICINE

## 2022-01-01 PROCEDURE — 99232 SBSQ HOSP IP/OBS MODERATE 35: CPT | Mod: ,,, | Performed by: STUDENT IN AN ORGANIZED HEALTH CARE EDUCATION/TRAINING PROGRAM

## 2022-01-01 PROCEDURE — 97535 SELF CARE MNGMENT TRAINING: CPT

## 2022-01-01 PROCEDURE — 1159F PR MEDICATION LIST DOCUMENTED IN MEDICAL RECORD: ICD-10-PCS | Mod: CPTII,S$GLB,, | Performed by: FAMILY MEDICINE

## 2022-01-01 PROCEDURE — 99214 PR OFFICE/OUTPT VISIT, EST, LEVL IV, 30-39 MIN: ICD-10-PCS | Mod: S$GLB,,, | Performed by: FAMILY MEDICINE

## 2022-01-01 PROCEDURE — 1159F PR MEDICATION LIST DOCUMENTED IN MEDICAL RECORD: ICD-10-PCS | Mod: CPTII,S$GLB,, | Performed by: NURSE PRACTITIONER

## 2022-01-01 PROCEDURE — 1159F MED LIST DOCD IN RCRD: CPT | Mod: CPTII,S$GLB,, | Performed by: NURSE PRACTITIONER

## 2022-01-01 PROCEDURE — 80048 BASIC METABOLIC PNL TOTAL CA: CPT | Mod: HCNC | Performed by: INTERNAL MEDICINE

## 2022-01-01 PROCEDURE — 99213 PR OFFICE/OUTPT VISIT, EST, LEVL III, 20-29 MIN: ICD-10-PCS | Mod: HCNC,S$GLB,, | Performed by: INTERNAL MEDICINE

## 2022-01-01 PROCEDURE — 1160F RVW MEDS BY RX/DR IN RCRD: CPT | Mod: CPTII,S$GLB,,

## 2022-01-01 PROCEDURE — 99223 PR INITIAL HOSPITAL CARE,LEVL III: ICD-10-PCS | Mod: ,,, | Performed by: NURSE PRACTITIONER

## 2022-01-01 PROCEDURE — 99499 RISK ADDL DX/OHS AUDIT: ICD-10-PCS | Mod: S$GLB,,,

## 2022-01-01 PROCEDURE — 1160F PR REVIEW ALL MEDS BY PRESCRIBER/CLIN PHARMACIST DOCUMENTED: ICD-10-PCS | Mod: CPTII,S$GLB,,

## 2022-01-01 PROCEDURE — 1101F PR PT FALLS ASSESS DOC 0-1 FALLS W/OUT INJ PAST YR: ICD-10-PCS | Mod: HCNC,CPTII,S$GLB, | Performed by: INTERNAL MEDICINE

## 2022-01-01 PROCEDURE — 99497 PR ADVNCD CARE PLAN 30 MIN: ICD-10-PCS | Mod: 25,,, | Performed by: NURSE PRACTITIONER

## 2022-01-01 PROCEDURE — 80053 COMPREHEN METABOLIC PANEL: CPT | Performed by: EMERGENCY MEDICINE

## 2022-01-01 PROCEDURE — 96360 HYDRATION IV INFUSION INIT: CPT

## 2022-01-01 PROCEDURE — 84100 ASSAY OF PHOSPHORUS: CPT | Performed by: STUDENT IN AN ORGANIZED HEALTH CARE EDUCATION/TRAINING PROGRAM

## 2022-01-01 PROCEDURE — 99223 PR INITIAL HOSPITAL CARE,LEVL III: ICD-10-PCS | Mod: AI,,, | Performed by: STUDENT IN AN ORGANIZED HEALTH CARE EDUCATION/TRAINING PROGRAM

## 2022-01-01 PROCEDURE — G0439 PR MEDICARE ANNUAL WELLNESS SUBSEQUENT VISIT: ICD-10-PCS | Mod: S$GLB,,,

## 2022-01-01 PROCEDURE — 93005 ELECTROCARDIOGRAM TRACING: CPT

## 2022-01-01 PROCEDURE — 1125F AMNT PAIN NOTED PAIN PRSNT: CPT | Mod: CPTII,S$GLB,, | Performed by: NURSE PRACTITIONER

## 2022-01-01 PROCEDURE — 1159F MED LIST DOCD IN RCRD: CPT | Mod: CPTII,S$GLB,,

## 2022-01-01 PROCEDURE — 72110 XR LUMBAR SPINE COMPLETE 5 VIEW: ICD-10-PCS | Mod: 26,,, | Performed by: RADIOLOGY

## 2022-01-01 PROCEDURE — 97161 PT EVAL LOW COMPLEX 20 MIN: CPT

## 2022-01-01 PROCEDURE — 97530 THERAPEUTIC ACTIVITIES: CPT

## 2022-01-01 PROCEDURE — 1159F MED LIST DOCD IN RCRD: CPT | Mod: CPTII,S$GLB,, | Performed by: FAMILY MEDICINE

## 2022-01-01 PROCEDURE — 99999 PR PBB SHADOW E&M-EST. PATIENT-LVL IV: ICD-10-PCS | Mod: PBBFAC,HCNC,, | Performed by: INTERNAL MEDICINE

## 2022-01-01 PROCEDURE — 99239 PR HOSPITAL DISCHARGE DAY,>30 MIN: ICD-10-PCS | Mod: ,,, | Performed by: STUDENT IN AN ORGANIZED HEALTH CARE EDUCATION/TRAINING PROGRAM

## 2022-01-01 PROCEDURE — 99213 OFFICE O/P EST LOW 20 MIN: CPT | Mod: HCNC,S$GLB,, | Performed by: INTERNAL MEDICINE

## 2022-01-01 PROCEDURE — 99223 1ST HOSP IP/OBS HIGH 75: CPT | Mod: AI,,, | Performed by: STUDENT IN AN ORGANIZED HEALTH CARE EDUCATION/TRAINING PROGRAM

## 2022-01-01 PROCEDURE — 99999 PR PBB SHADOW E&M-EST. PATIENT-LVL IV: CPT | Mod: PBBFAC,,, | Performed by: FAMILY MEDICINE

## 2022-01-01 PROCEDURE — 1100F PTFALLS ASSESS-DOCD GE2>/YR: CPT | Mod: CPTII,S$GLB,, | Performed by: FAMILY MEDICINE

## 2022-01-01 PROCEDURE — S5010 5% DEXTROSE AND 0.45% SALINE: HCPCS | Performed by: STUDENT IN AN ORGANIZED HEALTH CARE EDUCATION/TRAINING PROGRAM

## 2022-01-01 PROCEDURE — 1100F PR PT FALLS ASSESS DOC 2+ FALLS/FALL W/INJURY/YR: ICD-10-PCS | Mod: CPTII,S$GLB,, | Performed by: FAMILY MEDICINE

## 2022-01-01 PROCEDURE — 99285 PR EMERGENCY DEPT VISIT,LEVEL V: ICD-10-PCS | Mod: CS,,, | Performed by: EMERGENCY MEDICINE

## 2022-01-01 PROCEDURE — 1101F PR PT FALLS ASSESS DOC 0-1 FALLS W/OUT INJ PAST YR: ICD-10-PCS | Mod: CPTII,S$GLB,, | Performed by: NURSE PRACTITIONER

## 2022-01-01 PROCEDURE — 99239 HOSP IP/OBS DSCHRG MGMT >30: CPT | Mod: ,,, | Performed by: STUDENT IN AN ORGANIZED HEALTH CARE EDUCATION/TRAINING PROGRAM

## 2022-01-01 PROCEDURE — 93010 EKG 12-LEAD: ICD-10-PCS | Mod: ,,, | Performed by: INTERNAL MEDICINE

## 2022-01-01 PROCEDURE — 1170F FXNL STATUS ASSESSED: CPT | Mod: CPTII,S$GLB,,

## 2022-01-01 PROCEDURE — 83735 ASSAY OF MAGNESIUM: CPT | Performed by: EMERGENCY MEDICINE

## 2022-01-01 PROCEDURE — 99499 UNLISTED E&M SERVICE: CPT | Mod: S$GLB,,,

## 2022-01-01 PROCEDURE — 85025 COMPLETE CBC W/AUTO DIFF WBC: CPT | Mod: HCNC | Performed by: INTERNAL MEDICINE

## 2022-01-01 PROCEDURE — 1159F MED LIST DOCD IN RCRD: CPT | Mod: HCNC,CPTII,S$GLB, | Performed by: INTERNAL MEDICINE

## 2022-01-01 PROCEDURE — 99497 ADVNCD CARE PLAN 30 MIN: CPT | Mod: 25,,, | Performed by: STUDENT IN AN ORGANIZED HEALTH CARE EDUCATION/TRAINING PROGRAM

## 2022-01-01 PROCEDURE — 99497 PR ADVNCD CARE PLAN 30 MIN: ICD-10-PCS | Mod: 25,,, | Performed by: STUDENT IN AN ORGANIZED HEALTH CARE EDUCATION/TRAINING PROGRAM

## 2022-01-01 PROCEDURE — 1160F RVW MEDS BY RX/DR IN RCRD: CPT | Mod: CPTII,S$GLB,, | Performed by: FAMILY MEDICINE

## 2022-01-01 PROCEDURE — G0439 PPPS, SUBSEQ VISIT: HCPCS | Mod: S$GLB,,,

## 2022-01-01 PROCEDURE — 3288F PR FALLS RISK ASSESSMENT DOCUMENTED: ICD-10-PCS | Mod: HCNC,CPTII,S$GLB, | Performed by: INTERNAL MEDICINE

## 2022-01-01 PROCEDURE — 99233 PR SUBSEQUENT HOSPITAL CARE,LEVL III: ICD-10-PCS | Mod: GV,,, | Performed by: NURSE PRACTITIONER

## 2022-01-01 PROCEDURE — 1159F PR MEDICATION LIST DOCUMENTED IN MEDICAL RECORD: ICD-10-PCS | Mod: HCNC,CPTII,S$GLB, | Performed by: INTERNAL MEDICINE

## 2022-01-01 PROCEDURE — 99999 PR PBB SHADOW E&M-EST. PATIENT-LVL IV: ICD-10-PCS | Mod: PBBFAC,,, | Performed by: FAMILY MEDICINE

## 2022-01-01 PROCEDURE — 72070 XR THORACIC SPINE AP LATERAL: ICD-10-PCS | Mod: 26,,, | Performed by: RADIOLOGY

## 2022-01-01 PROCEDURE — 1101F PT FALLS ASSESS-DOCD LE1/YR: CPT | Mod: HCNC,CPTII,S$GLB, | Performed by: INTERNAL MEDICINE

## 2022-01-01 PROCEDURE — 3288F PR FALLS RISK ASSESSMENT DOCUMENTED: ICD-10-PCS | Mod: CPTII,S$GLB,, | Performed by: NURSE PRACTITIONER

## 2022-01-01 PROCEDURE — 99223 1ST HOSP IP/OBS HIGH 75: CPT | Mod: ,,, | Performed by: NURSE PRACTITIONER

## 2022-01-01 RX ORDER — ACETAMINOPHEN 325 MG/1
650 TABLET ORAL EVERY 8 HOURS PRN
Status: DISCONTINUED | OUTPATIENT
Start: 2022-01-01 | End: 2022-01-01 | Stop reason: HOSPADM

## 2022-01-01 RX ORDER — PROCHLORPERAZINE EDISYLATE 5 MG/ML
5 INJECTION INTRAMUSCULAR; INTRAVENOUS EVERY 6 HOURS PRN
Status: DISCONTINUED | OUTPATIENT
Start: 2022-01-01 | End: 2022-01-01 | Stop reason: HOSPADM

## 2022-01-01 RX ORDER — TALC
6 POWDER (GRAM) TOPICAL NIGHTLY PRN
Status: CANCELLED | OUTPATIENT
Start: 2022-01-01

## 2022-01-01 RX ORDER — MUPIROCIN 20 MG/G
OINTMENT TOPICAL 2 TIMES DAILY
Status: DISCONTINUED | OUTPATIENT
Start: 2022-01-01 | End: 2022-01-01 | Stop reason: HOSPADM

## 2022-01-01 RX ORDER — SODIUM CHLORIDE 0.9 % (FLUSH) 0.9 %
10 SYRINGE (ML) INJECTION
Status: CANCELLED | OUTPATIENT
Start: 2022-01-01

## 2022-01-01 RX ORDER — SODIUM,POTASSIUM PHOSPHATES 280-250MG
2 POWDER IN PACKET (EA) ORAL ONCE
Status: COMPLETED | OUTPATIENT
Start: 2022-01-01 | End: 2022-01-01

## 2022-01-01 RX ORDER — DEXTROSE MONOHYDRATE 50 MG/ML
INJECTION, SOLUTION INTRAVENOUS CONTINUOUS
Status: DISCONTINUED | OUTPATIENT
Start: 2022-01-01 | End: 2022-01-01 | Stop reason: HOSPADM

## 2022-01-01 RX ORDER — SODIUM CHLORIDE, SODIUM LACTATE, POTASSIUM CHLORIDE, CALCIUM CHLORIDE 600; 310; 30; 20 MG/100ML; MG/100ML; MG/100ML; MG/100ML
INJECTION, SOLUTION INTRAVENOUS CONTINUOUS
Status: DISCONTINUED | OUTPATIENT
Start: 2022-01-01 | End: 2022-01-01 | Stop reason: HOSPADM

## 2022-01-01 RX ORDER — POLYETHYLENE GLYCOL 3350 17 G/17G
17 POWDER, FOR SOLUTION ORAL DAILY PRN
Status: DISCONTINUED | OUTPATIENT
Start: 2022-01-01 | End: 2022-01-01 | Stop reason: HOSPADM

## 2022-01-01 RX ORDER — DEXTROSE MONOHYDRATE AND SODIUM CHLORIDE 5; .45 G/100ML; G/100ML
INJECTION, SOLUTION INTRAVENOUS CONTINUOUS
Status: DISCONTINUED | OUTPATIENT
Start: 2022-01-01 | End: 2022-01-01

## 2022-01-01 RX ORDER — SODIUM CHLORIDE 0.9 % (FLUSH) 0.9 %
10 SYRINGE (ML) INJECTION EVERY 12 HOURS PRN
Status: DISCONTINUED | OUTPATIENT
Start: 2022-01-01 | End: 2022-01-01 | Stop reason: HOSPADM

## 2022-01-01 RX ORDER — ALBUTEROL SULFATE 2.5 MG/.5ML
2.5 SOLUTION RESPIRATORY (INHALATION) EVERY 4 HOURS PRN
Status: DISCONTINUED | OUTPATIENT
Start: 2022-01-01 | End: 2022-01-01 | Stop reason: HOSPADM

## 2022-01-01 RX ORDER — ALBUTEROL SULFATE 2.5 MG/.5ML
2.5 SOLUTION RESPIRATORY (INHALATION) EVERY 4 HOURS PRN
Qty: 1 EACH | Refills: 0 | Status: SHIPPED | OUTPATIENT
Start: 2022-01-01 | End: 2023-10-04

## 2022-01-01 RX ORDER — ONDANSETRON 8 MG/1
8 TABLET, ORALLY DISINTEGRATING ORAL EVERY 8 HOURS PRN
Status: DISCONTINUED | OUTPATIENT
Start: 2022-01-01 | End: 2022-01-01 | Stop reason: HOSPADM

## 2022-01-01 RX ORDER — GLUCAGON 1 MG
1 KIT INJECTION
Status: DISCONTINUED | OUTPATIENT
Start: 2022-01-01 | End: 2022-01-01 | Stop reason: HOSPADM

## 2022-01-01 RX ORDER — SODIUM CHLORIDE 9 MG/ML
INJECTION, SOLUTION INTRAVENOUS CONTINUOUS
Status: DISCONTINUED | OUTPATIENT
Start: 2022-01-01 | End: 2022-01-01

## 2022-01-01 RX ORDER — TALC
6 POWDER (GRAM) TOPICAL NIGHTLY PRN
Status: DISCONTINUED | OUTPATIENT
Start: 2022-01-01 | End: 2022-01-01 | Stop reason: HOSPADM

## 2022-01-01 RX ADMIN — POTASSIUM & SODIUM PHOSPHATES POWDER PACK 280-160-250 MG 2 PACKET: 280-160-250 PACK at 08:10

## 2022-01-01 RX ADMIN — AZITHROMYCIN 500 MG: 500 INJECTION, POWDER, LYOPHILIZED, FOR SOLUTION INTRAVENOUS at 05:10

## 2022-01-01 RX ADMIN — DEXTROSE: 5 SOLUTION INTRAVENOUS at 03:10

## 2022-01-01 RX ADMIN — CEFTRIAXONE 1 G: 1 INJECTION, SOLUTION INTRAVENOUS at 10:10

## 2022-01-01 RX ADMIN — DEXTROSE AND SODIUM CHLORIDE: 5; .45 INJECTION, SOLUTION INTRAVENOUS at 09:10

## 2022-01-01 RX ADMIN — MUPIROCIN: 20 OINTMENT TOPICAL at 08:10

## 2022-01-01 RX ADMIN — CEFTRIAXONE 2 G: 2 INJECTION, SOLUTION INTRAVENOUS at 04:10

## 2022-01-01 RX ADMIN — DEXTROSE AND SODIUM CHLORIDE: 5; .45 INJECTION, SOLUTION INTRAVENOUS at 02:10

## 2022-01-01 RX ADMIN — CEFTRIAXONE 2 G: 2 INJECTION, SOLUTION INTRAVENOUS at 12:10

## 2022-01-01 RX ADMIN — SODIUM CHLORIDE 1000 ML: 0.9 INJECTION, SOLUTION INTRAVENOUS at 09:10

## 2022-02-24 NOTE — PROGRESS NOTES
Subjective:    Patient ID:  Randall Strickland Jr. is a 89 y.o. 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 He has remained stable and denies SOB or edema. He has not been walking during the cold weather. His wife reports that he refuses his meds. for most of a year. His lab being satifactory and good continues to look well at his age, he was strongly urged to at least take his eliquis to prevent CVA due to AF. His wife hallie assist  Lab Results   Component Value Date     02/18/2022    K 5.0 02/18/2022     02/18/2022    CO2 24 02/18/2022    BUN 24 02/18/2022    CREATININE 1.3 02/18/2022     02/18/2022    HGBA1C 5.7 04/10/2014    MG 1.9 12/17/2019    AST 23 07/20/2021    ALT 14 07/20/2021    ALBUMIN 3.3 (L) 07/20/2021    PROT 8.8 (H) 07/20/2021    BILITOT 0.9 07/20/2021    WBC 8.71 02/18/2022    HGB 12.6 (L) 02/18/2022    HCT 39.8 (L) 02/18/2022    HCT 32 (L) 12/16/2019    MCV 98 02/18/2022     02/18/2022    INR 1.0 08/29/2016    PSA 0.6 03/07/2005    TSH 0.993 12/03/2019         Lab Results   Component Value Date    CHOL 124 07/08/2020    HDL 31 (L) 07/08/2020    TRIG 104 07/08/2020       Lab Results   Component Value Date    LDLCALC 72.2 07/08/2020       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    Postsurgical dumping syndrome 4/10/2014    PVD (peripheral vascular disease) 8/22/2018    Squamous cell carcinoma of skin 07/21/2014    right zygomatic cheek/temple     Stenosis of aortic and mitral valves     aortic DAHLIA 1.1 CM       Current Outpatient Medications:     amitriptyline (ELAVIL) 25 MG tablet, TAKE 1 TABLET(25 MG) BY  MOUTH EVERY EVENING (Patient not taking: Reported on 2/24/2022), Disp: 90 tablet, Rfl: 0    amLODIPine (NORVASC) 5 MG tablet, TAKE 1 TABLET(5 MG) BY MOUTH EVERY DAY (Patient not taking: Reported on 2/24/2022), Disp: 30 tablet, Rfl: 6    apixaban (ELIQUIS) 2.5 mg Tab, Take 1 tablet (2.5 mg total) by mouth 2 (two) times daily. (Patient not taking: Reported on 2/24/2022), Disp: 60 tablet, Rfl: 11    cholestyramine, with sugar, 4 gram Powd, Take by mouth., Disp: , Rfl:     clopidogreL (PLAVIX) 75 mg tablet, , Disp: , Rfl:     finasteride (PROSCAR) 5 mg tablet, Take 1 tablet (5 mg total) by mouth once daily. (Patient not taking: Reported on 2/24/2022), Disp: 90 tablet, Rfl: 3    gabapentin (NEURONTIN) 300 MG capsule, TAKE 1 CAPSULE(300 MG) BY MOUTH EVERY EVENING (Patient not taking: Reported on 2/24/2022), Disp: 90 capsule, Rfl: 0    omeprazole (PRILOSEC) 20 MG capsule, TAKE 1 CAPSULE(20 MG) BY MOUTH EVERY DAY (Patient not taking: Reported on 2/24/2022), Disp: 90 capsule, Rfl: 1    polycarbophil (FIBERCON) 625 mg tablet, Take 625 mg by mouth once daily., Disp: , Rfl:     potassium chloride (MICRO-K) 10 MEQ CpSR, , Disp: , Rfl:     pulse oximeter (PULSE OXIMETER) device, Use twice daily at 8 AM and 3 PM and record the value in MyChart as directed. (Patient not taking: No sig reported), Disp: 1 each, Rfl: 0    rivastigmine tartrate (EXELON) 3 MG capsule, TAKE 1 CAPSULE(3 MG) BY MOUTH TWICE DAILY (Patient not taking: Reported on 2/24/2022), Disp: 60 capsule, Rfl: 11    tamsulosin (FLOMAX) 0.4 mg Cap, Take 1 capsule (0.4 mg total) by mouth once daily. (Patient not taking: Reported on 2/24/2022), Disp: 90 capsule, Rfl: 3    thiamine 100 MG tablet, Take 100 mg by mouth once daily., Disp: , Rfl:     tolterodine (DETROL LA) 4 MG 24 hr capsule, Take 1 capsule (4 mg total) by mouth once daily. (Patient not taking: Reported on 2/24/2022), Disp: 30 capsule, Rfl: 11    torsemide (DEMADEX) 20 MG Tab, TAKE 1 TABLET(20  "MG) BY MOUTH EVERY DAY (Patient not taking: Reported on 2/24/2022), Disp: 30 tablet, Rfl: 11          Review of Systems   Constitutional: Negative for decreased appetite, diaphoresis, fever, malaise/fatigue, weight gain and weight loss.   HENT: Negative for congestion, ear discharge, ear pain and nosebleeds.    Eyes: Negative for blurred vision, double vision and visual disturbance.   Cardiovascular: Negative for chest pain, claudication, cyanosis, dyspnea on exertion, irregular heartbeat, leg swelling, near-syncope, orthopnea, palpitations, paroxysmal nocturnal dyspnea and syncope.   Respiratory: Negative for cough, hemoptysis, shortness of breath, sleep disturbances due to breathing, snoring, sputum production and wheezing.    Endocrine: Negative for polydipsia, polyphagia and polyuria.   Hematologic/Lymphatic: Negative for adenopathy and bleeding problem. Does not bruise/bleed easily.   Skin: Negative for color change, nail changes, poor wound healing and rash.   Musculoskeletal: Negative for muscle cramps and muscle weakness.   Gastrointestinal: Negative for abdominal pain, anorexia, change in bowel habit, hematochezia, nausea and vomiting.   Genitourinary: Negative for dysuria, frequency and hematuria.   Neurological: Negative for brief paralysis, difficulty with concentration, excessive daytime sleepiness, dizziness, focal weakness, headaches, light-headedness, seizures, vertigo and weakness.   Psychiatric/Behavioral: Positive for memory loss. Negative for altered mental status and depression.   Allergic/Immunologic: Negative for persistent infections.        Objective:/60   Pulse 107   Ht 5' 9" (1.753 m)   Wt 69.2 kg (152 lb 8.9 oz)   SpO2 99%   BMI 22.53 kg/m²             Physical Exam  Vitals reviewed.   Constitutional:       Appearance: Normal appearance. He is well-developed and normal weight.   HENT:      Head: Normocephalic.      Right Ear: External ear normal.      Left Ear: External ear " normal.      Nose: Nose normal.   Eyes:      General: No scleral icterus.     Pupils: Pupils are equal, round, and reactive to light.   Neck:      Thyroid: No thyromegaly.      Vascular: No JVD.      Trachea: No tracheal deviation.   Cardiovascular:      Rate and Rhythm: Normal rate. Rhythm irregularly irregular.      Pulses: Intact distal pulses.      Heart sounds: Murmur heard.    Harsh midsystolic murmur is present with a grade of 2/6 at the upper right sternal border and upper left sternal border.    No friction rub. No gallop.      Comments: No edema  JVP normal  Pulmonary:      Effort: Pulmonary effort is normal.      Breath sounds: Normal breath sounds.   Abdominal:      General: Bowel sounds are normal. There is no distension.      Tenderness: There is no abdominal tenderness. There is no guarding.   Musculoskeletal:         General: No tenderness. Normal range of motion.      Cervical back: Normal range of motion and neck supple.   Lymphadenopathy:      Comments: Palpation of neck and groin lymph nodes normal   Skin:     General: Skin is dry.      Comments: Palpation of skin normal   Neurological:      Mental Status: He is alert and oriented to person, place, and time.      Cranial Nerves: No cranial nerve deficit.      Motor: No abnormal muscle tone.      Coordination: Coordination normal.   Psychiatric:         Behavior: Behavior normal.         Thought Content: Thought content normal.         Judgment: Judgment normal.           Assessment:       No diagnosis found.     Plan:       There are no diagnoses linked to this encounter.

## 2022-03-29 NOTE — Clinical Note
Dr. Borja for your review.   Figueroa can we call patient wife and let her know that there is nothing acutely changed on xray. Pain is likely from pressure injury but if it continues to please reach out.

## 2022-03-29 NOTE — PROGRESS NOTES
"Subjective:       Patient ID: aRndall Strickland Jr. is a 89 y.o. male.    Chief Complaint: Back Pain    Information Provided by Wife secondary to Dementia    Patient is a 89 y.o. male who traditionally follows with Tiago Delaney MD presenting today for:    Back Pain  Patient presents for evaluation of back pain. Has history of back issues but notes they had resolved. This new episode of back pain has been present for about two weeks and seems to her to be getting worse. States this morning his pain was "excrutiating" so she gave him OTC Aleve with some relief.   She denies falls.   She reports he spends a lot of time on his back "sleeping".     Review of patient's allergies indicates:  No Known Allergies    Medication List with Changes/Refills   Current Medications    AMITRIPTYLINE (ELAVIL) 25 MG TABLET    TAKE 1 TABLET(25 MG) BY MOUTH EVERY EVENING    AMLODIPINE (NORVASC) 5 MG TABLET    TAKE 1 TABLET(5 MG) BY MOUTH EVERY DAY    APIXABAN (ELIQUIS) 2.5 MG TAB    Take 1 tablet (2.5 mg total) by mouth 2 (two) times daily.    CHOLESTYRAMINE, WITH SUGAR, 4 GRAM POWD    Take by mouth.    CLOPIDOGREL (PLAVIX) 75 MG TABLET        FINASTERIDE (PROSCAR) 5 MG TABLET    Take 1 tablet (5 mg total) by mouth once daily.    GABAPENTIN (NEURONTIN) 300 MG CAPSULE    TAKE 1 CAPSULE(300 MG) BY MOUTH EVERY EVENING    OMEPRAZOLE (PRILOSEC) 20 MG CAPSULE    TAKE 1 CAPSULE(20 MG) BY MOUTH EVERY DAY    POLYCARBOPHIL (FIBERCON) 625 MG TABLET    Take 625 mg by mouth once daily.    POTASSIUM CHLORIDE (MICRO-K) 10 MEQ CPSR        PULSE OXIMETER (PULSE OXIMETER) DEVICE    Use twice daily at 8 AM and 3 PM and record the value in MyChart as directed.    RIVASTIGMINE TARTRATE (EXELON) 3 MG CAPSULE    TAKE 1 CAPSULE(3 MG) BY MOUTH TWICE DAILY    TAMSULOSIN (FLOMAX) 0.4 MG CAP    Take 1 capsule (0.4 mg total) by mouth once daily.    THIAMINE 100 MG TABLET    Take 100 mg by mouth once daily.    TOLTERODINE (DETROL LA) 4 MG 24 HR CAPSULE    Take 1 " "capsule (4 mg total) by mouth once daily.    TORSEMIDE (DEMADEX) 20 MG TAB    TAKE 1 TABLET(20 MG) BY MOUTH EVERY DAY     Medical, social and surgical history has been reviewed with the patient.      Review of Systems   Unable to perform ROS: Dementia   Musculoskeletal: Positive for back pain.       Objective:   /70 (BP Location: Right arm, Patient Position: Sitting, BP Method: Medium (Manual))   Pulse 67   Temp 97.7 °F (36.5 °C) (Temporal)   Ht 5' 9" (1.753 m)   Wt 68.7 kg (151 lb 7.3 oz)   SpO2 100%   BMI 22.37 kg/m²     Physical Exam  Vitals reviewed.   Constitutional:       Appearance: Normal appearance.   Cardiovascular:      Pulses:           Radial pulses are 2+ on the right side and 2+ on the left side.   Pulmonary:      Effort: Pulmonary effort is normal.      Breath sounds: Normal breath sounds. No wheezing.   Musculoskeletal:      Right lower leg: No edema.      Left lower leg: No edema.   Skin:     Comments: Midline area over body projections non-blanchable. Area just to the right of spine is also an area of skin that does not suyapa. Skin tear noted to bony prominence of spine. No warmth or drainage noted.   Neurological:      Mental Status: He is alert.           Last Labs:  Glucose   Date Value Ref Range Status   02/18/2022 110 70 - 110 mg/dL Final   07/20/2021 96 70 - 110 mg/dL Final   07/20/2021 96 70 - 110 mg/dL Final     BUN   Date Value Ref Range Status   02/18/2022 24 10 - 30 mg/dL Final   07/20/2021 22 10 - 30 mg/dL Final   07/20/2021 22 10 - 30 mg/dL Final     Creatinine   Date Value Ref Range Status   02/18/2022 1.3 0.5 - 1.4 mg/dL Final   07/20/2021 1.5 (H) 0.5 - 1.4 mg/dL Final   07/20/2021 1.5 (H) 0.5 - 1.4 mg/dL Final     Potassium   Date Value Ref Range Status   02/18/2022 5.0 3.5 - 5.1 mmol/L Final   07/20/2021 5.2 (H) 3.5 - 5.1 mmol/L Final   07/20/2021 5.2 (H) 3.5 - 5.1 mmol/L Final     Cholesterol   Date Value Ref Range Status   07/08/2020 124 120 - 199 mg/dL Final     " Comment:     The National Cholesterol Education Program (NCEP) has set the  following guidelines (reference ranges) for Cholesterol:  Optimal.....................<200 mg/dL  Borderline High.............200-239 mg/dL  High........................> or = 240 mg/dL     05/30/2018 135 120 - 199 mg/dL Final     Comment:     The National Cholesterol Education Program (NCEP) has set the  following guidelines (reference ranges) for Cholesterol:  Optimal.....................<200 mg/dL  Borderline High.............200-239 mg/dL  High........................> or = 240 mg/dL       Hemoglobin A1C   Date Value Ref Range Status   04/10/2014 5.7 4.5 - 6.2 % Final   04/04/2012 5.8 4.0 - 6.2 % Final     Hemoglobin   Date Value Ref Range Status   02/18/2022 12.6 (L) 14.0 - 18.0 g/dL Final   07/20/2021 11.6 (L) 14.0 - 18.0 g/dL Final     POC Hematocrit   Date Value Ref Range Status   12/16/2019 32 (L) 36 - 54 %PCV Final     Hematocrit   Date Value Ref Range Status   02/18/2022 39.8 (L) 40.0 - 54.0 % Final   07/20/2021 36.4 (L) 40.0 - 54.0 % Final     Vit D, 25-Hydroxy   Date Value Ref Range Status   06/17/2009 25 25 - 80 ng/mL Final     Comment:     Vitamin D, 25-Hydroxy:   -- EXPECTED VALUES --   25-HYDROXY D TOTAL (D2+D3)   Optimum levels in the normal   population are 25-80        I have reviewed the following:     Details / Date    [x]   Labs     []   Micro     []   Pathology     [x]   Imaging 03/29/2022      []   Cardiology Procedures     []   Other      X-Ray Lumbar Spine 5 View    Mild scoliosis can be seen.  Multilevel degenerative changes seen in the lumbar spine with some loss of disc spaces.  Mild compression of the superior endplate of the L4 vertebral body can be seen.  No obvious bony destruction is noted    X-Ray Thoracic Spine AP Lateral    Vertebral bodies are intact mild degenerative changes can be seen particularly in the lumbar spine.  A valvular prosthesis is seen in the chest.    Assessment and Plan:     1.  Pressure injury of deep tissue of back    New, referral placed.     - Ambulatory referral/consult to Home Health; Future  - Discussed ways to reduce pressure through positioning.     2. Acute back pain, unspecified back location, unspecified back pain laterality    New, likely secondary to pressure injury, advised wife she could continue Aleve as needed.     - X-Ray Lumbar Spine 5 View; Future  - X-Ray Thoracic Spine AP Lateral; Future    3. Facet arthritis, degenerative, lumbar spine  4. Dementia without behavioral disturbance, unspecified dementia type  5. Frail elderly  6. Risk for falls    Chronic, stable, to follow up with PCP

## 2022-04-29 NOTE — TELEPHONE ENCOUNTER
----- Message from Casi Jin sent at 4/29/2022 12:10 PM CDT -----  Contact: Sade(wife) [phone]  Pt is no longing home visit. Pt's condition has improved. Wife would like to discuss rx for arthritis for pt instead. Please advise

## 2022-04-29 NOTE — TELEPHONE ENCOUNTER
----- Message from Mi Clement sent at 4/29/2022 12:01 PM CDT -----  Contact: [facility]  [phone]  Cherie is calling for Mr. Strickland cause he wants to be discharged  from home health and Kelly wants you to know she discharged him today.

## 2022-05-02 NOTE — TELEPHONE ENCOUNTER
Called pt and spoke to Mrs. Strickland. Pt need something more for Arthritis. Pt wife said what was previously discussed was Celebrex or Humira. Pt prefer one of these options over Tylenol OTC.

## 2022-05-02 NOTE — TELEPHONE ENCOUNTER
I would recommend OTC Tylenol 500 mg BID as needed for pain. If they have already been taking this then I recah out to patient PCP Dr. Borja.

## 2022-05-04 PROBLEM — Z79.01 LONG TERM (CURRENT) USE OF ANTICOAGULANTS: Status: RESOLVED | Noted: 2021-04-12 | Resolved: 2022-01-01

## 2022-05-04 PROBLEM — I87.2 VENOUS STASIS DERMATITIS OF BOTH LOWER EXTREMITIES: Status: RESOLVED | Noted: 2018-08-22 | Resolved: 2022-01-01

## 2022-05-04 PROBLEM — U07.1 COVID-19 VIRUS DETECTED: Status: RESOLVED | Noted: 2021-04-12 | Resolved: 2022-01-01

## 2022-05-04 NOTE — PROGRESS NOTES
Subjective:       Patient ID: Randall Strickland Jr. is a 89 y.o. male.    Chief Complaint: Back Pain    Established patient follows up for management of chronic medical illnesses with complaints today. Please see dictation and ROS for interval problems, specific complaints and disease management discussion.    P, S, Fm, Soc Hx's; Meds, allergies reviewed and reconciled.  Health maintenance file reviewed and addressed items due. Recent applicable lab, imaging and cardiovascular results reviewed.  Problem list items reviewed and modified or added entries (in the overview section) may not be transcribed into this encounter note due to note writer format.    Unscheduled follow-up, patient was recently seen in urgent care for back pain.  Had a wound to the back.  Had apparently been sleeping lengthy and developed a superficial decubitus.  Presents with wife today.  She provides history, he does not have his hearing aids in and has had progressive cognitive decline.  He sees a cardiologist and neurologist.  Is overdue for follow-up to some degree.  Wife states that he is only taking his blood thinner for the time being and stopped all of his other medications.  They declined to take anything further than the blood thinner at this time.  He has lost some weight.  Appetite is somewhat poor.  Eats 2 meals a day and does like soup.  Wife was giving him Advil, apparently.  I pointed out that he is on a blood thinner and cannot take anti-inflammatory medication.  The other provider suggested Celebrex, I would not recommend that.  Would stay with Tylenol alone.  Currently he is expressing no pain.  X-rays recently were reviewed.  He has fallen at home but denies any significant injury now.  They require the assistance of a neighbor to pick him up when this occurs.  He is somewhat lost to follow-up with me.  He had a brief home health.  They asked to stop that.    Review of Systems   Unable to perform ROS: Dementia   Constitutional:  Positive for appetite change and unexpected weight change.   Respiratory: Negative for shortness of breath.    Cardiovascular: Negative for chest pain.   Gastrointestinal: Negative for abdominal pain.   Musculoskeletal: Positive for arthralgias, back pain and gait problem.   Neurological: Positive for weakness.   Psychiatric/Behavioral: Positive for confusion and decreased concentration.       Objective:      Physical Exam  Vitals and nursing note reviewed.   Constitutional:       Appearance: He is well-developed. He is not diaphoretic.   HENT:      Head: Normocephalic and atraumatic.   Eyes:      General: No scleral icterus.     Conjunctiva/sclera: Conjunctivae normal.   Neck:      Vascular: No carotid bruit.   Cardiovascular:      Rate and Rhythm: Normal rate. Rhythm irregular.      Heart sounds: Heart sounds are distant. No murmur heard.    No friction rub. No gallop.   Pulmonary:      Effort: Pulmonary effort is normal. No respiratory distress.      Breath sounds: Normal breath sounds. No wheezing or rales.   Abdominal:      General: There is no distension.      Tenderness: There is no abdominal tenderness.   Musculoskeletal:         General: No deformity.      Cervical back: Normal range of motion and neck supple.   Skin:     General: Skin is warm and dry.      Findings: No erythema or rash.   Neurological:      Mental Status: He is alert.      Cranial Nerves: No cranial nerve deficit.      Motor: No tremor.      Coordination: Coordination abnormal.      Gait: Gait abnormal.         Examination of the back reveals no tenderness to percussion.  He does have some kyphosis.  There a couple of prominent spinous processes to the upper lumbar area and lower thoracic area with no open sore.  This appears to be healing nicely at this time with no signs of infection.      Assessment:       1. Low back pain, unspecified back pain laterality, unspecified chronicity, unspecified whether sciatica present    2.  Osteoarthritis of lumbar spine, unspecified spinal osteoarthritis complication status    3. Facet arthritis, degenerative, lumbar spine    4. Dementia without behavioral disturbance, unspecified dementia type    5. Longstanding persistent atrial fibrillation    6. Chronic anticoagulation    7. Frail elderly    8. Cardiomyopathy, idiopathic    9. Chronic diastolic congestive heart failure    10. Hypertension, essential    11. S/P aortic valve replacement    12. Stage 3a chronic kidney disease    13. PVD (peripheral vascular disease)    14. Risk for falls        Plan:     Medication List with Changes/Refills   Current Medications    APIXABAN (ELIQUIS) 2.5 MG TAB    Take 1 tablet (2.5 mg total) by mouth 2 (two) times daily.    POLYCARBOPHIL (FIBERCON) 625 MG TABLET    Take 625 mg by mouth once daily.    PULSE OXIMETER (PULSE OXIMETER) DEVICE    Use twice daily at 8 AM and 3 PM and record the value in MyChart as directed.    RIVASTIGMINE TARTRATE (EXELON) 3 MG CAPSULE    TAKE 1 CAPSULE(3 MG) BY MOUTH TWICE DAILY    THIAMINE 100 MG TABLET    Take 100 mg by mouth once daily.   Discontinued Medications    AMITRIPTYLINE (ELAVIL) 25 MG TABLET    TAKE 1 TABLET(25 MG) BY MOUTH EVERY EVENING    AMLODIPINE (NORVASC) 5 MG TABLET    TAKE 1 TABLET(5 MG) BY MOUTH EVERY DAY    CHOLESTYRAMINE, WITH SUGAR, 4 GRAM POWD    Take by mouth.    CLOPIDOGREL (PLAVIX) 75 MG TABLET        FINASTERIDE (PROSCAR) 5 MG TABLET    Take 1 tablet (5 mg total) by mouth once daily.    GABAPENTIN (NEURONTIN) 300 MG CAPSULE    TAKE 1 CAPSULE(300 MG) BY MOUTH EVERY EVENING    OMEPRAZOLE (PRILOSEC) 20 MG CAPSULE    TAKE 1 CAPSULE(20 MG) BY MOUTH EVERY DAY    POTASSIUM CHLORIDE (MICRO-K) 10 MEQ CPSR        TAMSULOSIN (FLOMAX) 0.4 MG CAP    Take 1 capsule (0.4 mg total) by mouth once daily.    TOLTERODINE (DETROL LA) 4 MG 24 HR CAPSULE    Take 1 capsule (4 mg total) by mouth once daily.    TORSEMIDE (DEMADEX) 20 MG TAB    TAKE 1 TABLET(20 MG) BY MOUTH EVERY DAY      Randall was seen today for back pain.    Diagnoses and all orders for this visit:    Low back pain, unspecified back pain laterality, unspecified chronicity, unspecified whether sciatica present    Osteoarthritis of lumbar spine, unspecified spinal osteoarthritis complication status    Facet arthritis, degenerative, lumbar spine    Dementia without behavioral disturbance, unspecified dementia type  -     Ambulatory referral/consult to Neurology; Future    Longstanding persistent atrial fibrillation    Chronic anticoagulation    Frail elderly    Cardiomyopathy, idiopathic    Chronic diastolic congestive heart failure    Hypertension, essential    S/P aortic valve replacement    Stage 3a chronic kidney disease    PVD (peripheral vascular disease)    Risk for falls      See meds, orders, follow up, routing and instructions sections of encounter and AVS. Discussed with patient and provided on AVS.    Lab Results   Component Value Date     02/18/2022    K 5.0 02/18/2022     02/18/2022    BUN 24 02/18/2022    CREATININE 1.3 02/18/2022     02/18/2022    HGBA1C 5.7 04/10/2014    MG 1.9 12/17/2019    AST 23 07/20/2021    ALT 14 07/20/2021    ALBUMIN 3.3 (L) 07/20/2021    PROT 8.8 (H) 07/20/2021    BILITOT 0.9 07/20/2021    CHOL 124 07/08/2020    HDL 31 (L) 07/08/2020    LDLCALC 72.2 07/08/2020    TRIG 104 07/08/2020    WBC 8.71 02/18/2022    HGB 12.6 (L) 02/18/2022    HCT 39.8 (L) 02/18/2022    HCT 32 (L) 12/16/2019     02/18/2022    PSA 0.6 03/07/2005    PSADIAG 0.25 02/04/2020    TSH 0.993 12/03/2019     (H) 12/16/2019       Wife is elderly as well though she seems to be in pretty good shape for the moment.  I offered them several options including home medical care, ordering laboratory today, clinical  to discuss home resources.  Wife declined any home care and also declined checking laboratory today.  I advised that she should stop Advil and Aleve over-the-counter, only  taking Tylenol at label instruction dosing.  Follow-up with Neurology, they did agree to this.  Follow-up with cardiology when appropriate.  Routine follow-up with me in a few months.    Patient had some other medical conditions we were looking at in the past including equivocal alkaline phosphatase and mild anemia.  They indicated they do not want to do any further workup at this time.    Recommend to wife that patient not sleep in the same position for lengthy periods of time.  Also discussed more frequent hygiene though his appearance appeared pretty good today.

## 2022-10-02 PROBLEM — R62.7 FAILURE TO THRIVE IN ADULT: Status: ACTIVE | Noted: 2022-01-01

## 2022-10-02 PROBLEM — J69.0 ASPIRATION PNEUMONIA: Status: ACTIVE | Noted: 2019-12-16

## 2022-10-02 PROBLEM — Z71.89 ACP (ADVANCE CARE PLANNING): Status: ACTIVE | Noted: 2022-01-01

## 2022-10-02 NOTE — ED NOTES
"Patient identifiers for Randall Strickland Jr. 89 y.o. male checked and correct.  Chief Complaint   Patient presents with    Failure To Thrive     Wife called EMS and stated "I think he's just dying"     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    Postsurgical dumping syndrome 4/10/2014    PVD (peripheral vascular disease) 8/22/2018    Squamous cell carcinoma of skin 07/21/2014    right zygomatic cheek/temple     Stenosis of aortic and mitral valves     aortic DAHLIA 1.1 CM    Venous stasis dermatitis of both lower extremities 8/22/2018     Allergies reported: Review of patient's allergies indicates:  No Known Allergies      LOC: Patient is awake, alert, and aware of environment with an appropriate affect. Patient is oriented x 2, pt is oriented to self and place. Pt is speaking appropriately.  APPEARANCE: Patient resting comfortably and in no acute distress. Patient is clean and well groomed, patient's clothing is properly fastened.  HEENT: Pt presents with surgical mask on.   SKIN: The skin is cool and dry. Patient has sluggish skin turgor and dry mucus membranes. Right arm abrasion  MUSKULOSKELETAL: Patient is moving upper extremities well, pt is able to dorsiflex feet. Pt unable to follow command to raise legs. No obvious deformities noted. Pulses intact.   RESPIRATORY: Airway is open and patent. Respirations are spontaneous and non-labored with normal effort and rate. Pt has an intermittent productive cough, no secretions present.   CARDIAC: Patient has a normal rate and rhythm. 70 on cardiac monitor. No peripheral edema noted.   ABDOMEN: No distention noted. Soft and non-tender upon palpation.  NEUROLOGICAL: Facial expression is symmetrical. Hand grasps are equal bilaterally. Normal sensation in all " extremities when touched with finger.

## 2022-10-02 NOTE — SUBJECTIVE & OBJECTIVE
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    Postsurgical dumping syndrome 4/10/2014    PVD (peripheral vascular disease) 8/22/2018    Squamous cell carcinoma of skin 07/21/2014    right zygomatic cheek/temple     Stenosis of aortic and mitral valves     aortic DAHLIA 1.1 CM    Venous stasis dermatitis of both lower extremities 8/22/2018       Past Surgical History:   Procedure Laterality Date    APPENDECTOMY      cararact  car    CARDIAC CATHETERIZATION      CARDIAC VALVE SURGERY      CATARACT EXTRACTION BILATERAL W/ ANTERIOR VITRECTOMY      bilaterial    CATARACT EXTRACTION W/  INTRAOCULAR LENS IMPLANT Bilateral     CHOLECYSTECTOMY      EYE SURGERY      JOINT REPLACEMENT      bilateral hip    KNEE SURGERY      Bilateral    TOTAL HIP ARTHROPLASTY      Bilaterial       Review of patient's allergies indicates:  No Known Allergies    No current facility-administered medications on file prior to encounter.     Current Outpatient Medications on File Prior to Encounter   Medication Sig    apixaban (ELIQUIS) 2.5 mg Tab Take 1 tablet (2.5 mg total) by mouth 2 (two) times daily.    polycarbophil (FIBERCON) 625 mg tablet Take 625 mg by mouth once daily.    pulse oximeter (PULSE OXIMETER) device Use twice daily at 8 AM and 3 PM and record the value in Language System[facility] as directed.    rivastigmine tartrate (EXELON) 3 MG capsule TAKE 1 CAPSULE(3 MG) BY MOUTH TWICE DAILY    thiamine 100 MG tablet Take 100 mg by mouth once daily.     Family History       Problem Relation (Age of Onset)    No Known Problems Father, Mother, Sister, Brother, Maternal Aunt, Maternal Uncle, Paternal Aunt, Paternal Uncle, Maternal Grandmother, Maternal Grandfather, Paternal Grandmother, Paternal Grandfather, Daughter, Son, Daughter, Son           Tobacco Use    Smoking status: Never    Smokeless tobacco: Never   Substance and Sexual Activity    Alcohol use: Yes     Comment: less than one  drink weekly    Drug use: No    Sexual activity: Not on file     Review of Systems   Constitutional:  Positive for activity change, appetite change and unexpected weight change. Negative for chills, diaphoresis, fatigue and fever.   HENT:  Positive for trouble swallowing. Negative for rhinorrhea and sore throat.    Respiratory:  Positive for cough and shortness of breath. Negative for chest tightness.    Cardiovascular:  Negative for chest pain and palpitations.   Gastrointestinal:  Negative for abdominal distention, abdominal pain, constipation, diarrhea, nausea and vomiting.   Endocrine: Negative for cold intolerance.   Genitourinary:  Negative for decreased urine volume and dysuria.   Musculoskeletal:  Negative for arthralgias and myalgias.   Skin:  Negative for rash and wound.   Neurological:  Positive for weakness. Negative for dizziness, syncope, light-headedness, numbness and headaches.   Psychiatric/Behavioral:  Negative for agitation, behavioral problems and confusion.    Objective:     Vital Signs (Most Recent):  Temp: 97.6 °F (36.4 °C) (10/02/22 1129)  Pulse: 70 (10/02/22 1333)  Resp: (!) 25 (10/02/22 1333)  BP: (!) 141/77 (10/02/22 1333)  SpO2: 100 % (10/02/22 1333)   Vital Signs (24h Range):  Temp:  [97.6 °F (36.4 °C)] 97.6 °F (36.4 °C)  Pulse:  [69-83] 70  Resp:  [18-26] 25  SpO2:  [89 %-100 %] 100 %  BP: (134-170)/(65-77) 141/77        There is no height or weight on file to calculate BMI.    Physical Exam  Constitutional:       Appearance: Normal appearance. He is cachectic. He is toxic-appearing. He is not ill-appearing.   HENT:      Head: Normocephalic and atraumatic.      Mouth/Throat:      Mouth: Mucous membranes are moist.      Dentition: Abnormal dentition.   Eyes:      Extraocular Movements: Extraocular movements intact.      Conjunctiva/sclera:  Conjunctivae normal.   Cardiovascular:      Rate and Rhythm: Normal rate. Rhythm irregularly irregular.      Heart sounds: No murmur heard.  Pulmonary:      Effort: Pulmonary effort is normal. No respiratory distress.      Breath sounds: Decreased air movement present. Rhonchi present. No wheezing or rales.   Abdominal:      General: Abdomen is scaphoid. There is no distension.      Palpations: Abdomen is soft.      Tenderness: There is no abdominal tenderness. There is no guarding.   Musculoskeletal:         General: No swelling, tenderness or deformity.   Skin:     Findings: No rash.   Neurological:      General: No focal deficit present.      Mental Status: He is alert.      Motor: Weakness present.   Psychiatric:         Mood and Affect: Mood normal.         Behavior: Behavior normal.           Significant Labs: CBC:   Recent Labs   Lab 10/02/22  0732   WBC 17.10*   HGB 12.4*   HCT 37.1*   PLT 41*     CMP:   Recent Labs   Lab 10/02/22  0732      K 5.3*      CO2 20*   *   BUN 87*   CREATININE 1.4   CALCIUM 9.4   PROT 6.2   ALBUMIN 2.1*   BILITOT 1.3*   ALKPHOS 151*   AST 23   ALT 11   ANIONGAP 14     Lactic Acid:   Recent Labs   Lab 10/02/22  1029   LACTATE 1.5     Troponin:   Recent Labs   Lab 10/02/22  0732 10/02/22  1029   TROPONINI 0.121* 0.065*     TSH:   Recent Labs   Lab 10/02/22  0732   TSH 1.316       Significant Imaging: I have reviewed all pertinent imaging results/findings within the past 24 hours.

## 2022-10-02 NOTE — ED NOTES
Assumed care of the patient. Report received from Martha. Pt on continuous cardiac monitoring, continuous pulse oximetry, and automatic BP cuff cycling Q15min. Pt in hospital gown, side rails up X2, bed low and locked, and call light is placed within reach. Wife at bedside at this time. Pt denies any complaints or needs.

## 2022-10-02 NOTE — ED NOTES
Telebox [id number] received and applied to pt. War room states able to see pt on monitor, afib rhythm with HR 70.

## 2022-10-02 NOTE — ED NOTES
Requested to give report to charge, spoke to charge, charge immediately transferred call to receiving RN that was on lunch. Explained to receiving RN that ER was trying to give report to charge and unsure why there was a transfer to her while she was on lunch. Will try again

## 2022-10-02 NOTE — ED PROVIDER NOTES
"Encounter Date: 10/2/2022       History     Chief Complaint   Patient presents with    Failure To Thrive     Wife called EMS and stated "I think he's just dying"     HPI  89 y.o. male with history congestive heart failure, AFib on Eliquis, hypertension, aortic valve replacement, CKD, dementia, who presents to the ED for failure to thrive.  His wife reports that patient's health has been declining for the last few months.  Patient her poor appetite, even though he is still able to take sips of water, unable to walk anymore, she has difficult time to assist his basic ADLs. This morning, she thinks that patient wasn't breathing normal, trying to clearing his throat. She thinks it's not safe for him to be at home because she doesn't have any help. She discussed home health with her PCP before but nothing has set up yet. Denies recent illness, fever, short of breath, chest pain, nausea, vomiting, or diarrhea, no recent fall.   Review of patient's allergies indicates:  No Known Allergies  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    Postsurgical dumping syndrome 4/10/2014    PVD (peripheral vascular disease) 8/22/2018    Squamous cell carcinoma of skin 07/21/2014    right zygomatic cheek/temple     Stenosis of aortic and mitral valves     aortic DAHLIA 1.1 CM    Venous stasis dermatitis of both lower extremities 8/22/2018     Past Surgical History:   Procedure Laterality Date    APPENDECTOMY      cararact  car    CARDIAC CATHETERIZATION      CARDIAC VALVE SURGERY      CATARACT EXTRACTION BILATERAL W/ ANTERIOR VITRECTOMY      bilaterial    CATARACT EXTRACTION W/  INTRAOCULAR LENS IMPLANT Bilateral     CHOLECYSTECTOMY      EYE SURGERY      JOINT REPLACEMENT      bilateral hip    KNEE SURGERY      Bilateral    " TOTAL HIP ARTHROPLASTY      Bilaterial     Family History   Problem Relation Age of Onset    No Known Problems Father     No Known Problems Mother     No Known Problems Sister     No Known Problems Brother     No Known Problems Maternal Aunt     No Known Problems Maternal Uncle     No Known Problems Paternal Aunt     No Known Problems Paternal Uncle     No Known Problems Maternal Grandmother     No Known Problems Maternal Grandfather     No Known Problems Paternal Grandmother     No Known Problems Paternal Grandfather     No Known Problems Daughter     No Known Problems Son     No Known Problems Daughter     No Known Problems Son     Amblyopia Neg Hx     Blindness Neg Hx     Cancer Neg Hx     Cataracts Neg Hx     Diabetes Neg Hx     Glaucoma Neg Hx     Hypertension Neg Hx     Macular degeneration Neg Hx     Retinal detachment Neg Hx     Strabismus Neg Hx     Stroke Neg Hx     Thyroid disease Neg Hx     Anemia Neg Hx     Arrhythmia Neg Hx     Asthma Neg Hx     Clotting disorder Neg Hx     Fainting Neg Hx     Heart attack Neg Hx     Heart disease Neg Hx     Heart failure Neg Hx     Hyperlipidemia Neg Hx     Atrial Septal Defect Neg Hx      Social History     Tobacco Use    Smoking status: Never    Smokeless tobacco: Never   Substance Use Topics    Alcohol use: Yes     Comment: less than one  drink weekly    Drug use: No     Review of Systems   Constitutional:  Positive for fatigue. Negative for chills and fever.   HENT:  Negative for congestion and sore throat.    Eyes:  Negative for pain, discharge, redness and visual disturbance.   Respiratory:  Negative for cough and shortness of breath.    Cardiovascular:  Negative for chest pain and leg swelling.   Gastrointestinal:  Negative for abdominal pain, nausea and vomiting.   Genitourinary:  Negative for dysuria and flank pain.   Musculoskeletal:  Negative for back pain and neck pain.   Skin:  Negative for rash.   Neurological:  Positive for weakness and headaches.  Negative for numbness.   Psychiatric/Behavioral:  Negative for behavioral problems, confusion and decreased concentration.      Physical Exam     Initial Vitals [10/02/22 0644]   BP Pulse Resp Temp SpO2   (!) 141/76 83 18 97.6 °F (36.4 °C) 100 %      MAP       --         Physical Exam    Nursing note and vitals reviewed.  Constitutional: No distress.   Frail appearance    HENT:   Head: Normocephalic and atraumatic.   Nose: Nose normal.   Dried mucosal membrane    Eyes: Conjunctivae and EOM are normal. Pupils are equal, round, and reactive to light.   Neck: No JVD present.   Normal range of motion.  Cardiovascular:  Normal rate, regular rhythm and normal heart sounds.           Pulmonary/Chest: Breath sounds normal. No respiratory distress.   Abdominal: Abdomen is soft. He exhibits no distension. There is no abdominal tenderness.   Musculoskeletal:         General: No tenderness or edema. Normal range of motion.      Cervical back: Normal range of motion.     Neurological: He is alert and oriented to person, place, and time. He has normal strength. No cranial nerve deficit.   Skin: Skin is warm and dry. Capillary refill takes less than 2 seconds.       ED Course   Procedures  Labs Reviewed   CBC W/ AUTO DIFFERENTIAL - Abnormal; Notable for the following components:       Result Value    WBC 17.10 (*)     RBC 3.62 (*)     Hemoglobin 12.4 (*)     Hematocrit 37.1 (*)      (*)     MCH 34.3 (*)     Platelets 41 (*)     Immature Granulocytes 0.7 (*)     Gran # (ANC) 14.7 (*)     Immature Grans (Abs) 0.12 (*)     Mono # 1.1 (*)     Gran % 86.2 (*)     Lymph % 6.4 (*)     All other components within normal limits   COMPREHENSIVE METABOLIC PANEL - Abnormal; Notable for the following components:    Potassium 5.3 (*)     CO2 20 (*)     Glucose 121 (*)     BUN 87 (*)     Albumin 2.1 (*)     Total Bilirubin 1.3 (*)     Alkaline Phosphatase 151 (*)     eGFR 46.3 (*)     All other components within normal limits   TROPONIN I  - Abnormal; Notable for the following components:    Troponin I 0.121 (*)     All other components within normal limits   TROPONIN I - Abnormal; Notable for the following components:    Troponin I 0.065 (*)     All other components within normal limits   POCT GLUCOSE - Abnormal; Notable for the following components:    POCT Glucose 113 (*)     All other components within normal limits   CULTURE, BLOOD   CULTURE, BLOOD   TSH   MAGNESIUM   B-TYPE NATRIURETIC PEPTIDE   SARS-COV-2 RNA AMPLIFICATION, QUAL   LACTIC ACID, PLASMA   URINALYSIS, REFLEX TO URINE CULTURE   POCT GLUCOSE MONITORING CONTINUOUS     EKG Readings: (Independently Interpreted)   Initial Reading: No STEMI. Rhythm: Atrial Fibrillation. Heart Rate: 89. Ectopy: PVCs. Axis: Right Axis Deviation. Clinical Impression: Atrial Fibrillation with PVCs   ECG Results              EKG 12-lead (Final result)  Result time 10/02/22 08:23:29      Final result by Interface, Lab [facility] (10/02/22 08:23:29)                   Narrative:    Test Reason :     Vent. Rate : 089 BPM     Atrial Rate : 066 BPM     P-R Int : 000 ms          QRS Dur : 112 ms      QT Int : 402 ms       P-R-T Axes : 000 270 104 degrees     QTc Int : 489 ms    Atrial fibrillation with premature ventricular or aberrantly conducted  complexes  Right superior axis deviation  Nonspecific T wave abnormality  Abnormal ECG  When compared with ECG of 13-FEB-2020 14:55,  Atrial fibrillation has replaced Sinus rhythm  Confirmed by Ishaan Sierra MD ([id number]) on 10/2/2022 8:23:17 AM    Referred By: [email or web address]   SELF           Confirmed By:Ishaan Sierra MD                                  Imaging Results              X-Ray Chest AP Portable (Final result)  Result time 10/02/22 08:49:04      Final result by Hugo Springer MD (10/02/22 08:49:04)                   Impression:      Patchy ground-glass interstitial opacities prominently in the perihilar region and left lung base.  Findings could be related to  "infectious/inflammatory process or pulmonary edema in this patient with shortness of breath.      Electronically signed by: Hugo Springer MD  Date:    10/02/2022  Time:    08:49               Narrative:    EXAMINATION:  XR CHEST AP PORTABLE    CLINICAL HISTORY:  Provided history is "  Shortness of breath".    TECHNIQUE:  One view of the chest.    COMPARISON:  10/19/2020.    FINDINGS:  Exam quality is limited by portable technique and cardiac wires overlying the chest.  Cardiomediastinal silhouette is stable.  Atherosclerotic calcifications overlie the aortic arch.  Postoperative changes of TAVR.  There are patchy interstitial and ground-glass opacities in the perihilar and left greater than right lung bases.  No confluent area of consolidation.  No large pleural effusion.  Probable skin fold artifact overlying the right hemithorax.                                       Medications   sodium chloride 0.9% flush 10 mL (has no administration in time range)   melatonin tablet 6 mg (has no administration in time range)   ondansetron disintegrating tablet 8 mg (has no administration in time range)   prochlorperazine injection Soln 5 mg (has no administration in time range)   polyethylene glycol packet 17 g (has no administration in time range)   acetaminophen tablet 650 mg (has no administration in time range)   glucagon (human recombinant) injection 1 mg (has no administration in time range)   dextrose 10% bolus 125 mL (has no administration in time range)   dextrose 10% bolus 250 mL (has no administration in time range)   cefTRIAXone (ROCEPHIN) 2 g/50 mL D5W IVPB (has no administration in time range)   dextrose 5 % and 0.45 % NaCl infusion (has no administration in time range)   albuterol sulfate nebulizer solution 2.5 mg (has no administration in time range)   sodium chloride 0.9% bolus 1,000 mL (0 mLs Intravenous Stopped 10/2/22 1130)   cefTRIAXone (ROCEPHIN) 1 g/50 mL D5W IVPB (0 g Intravenous Stopped 10/2/22 1130) "     Medical Decision Making:   History:   Old Medical Records: I decided to obtain old medical records.  Initial Assessment:   89 y.o. male with history congestive heart failure, AFib on Eliquis, hypertension, aortic valve replacement, CKD, dementia, who presents to the ED for failure to thrive. Patient is frail appearance, no respiratory distress, but he is hypoxic on arrival, on 2L NC now. No neurological deficit.  Differential Diagnosis:   Dehydration, ALICIA, malnourish, electrolyte derangement, aspiration pneumonia, fluid overload.   Clinical Tests:   Lab Tests: Ordered and Reviewed  Radiological Study: Ordered and Reviewed  Medical Tests: Ordered and Reviewed  ED Management:  Discuss with him and his wife about goal of care, they decided to keep full code status, they wish to have home health to help with his care, do not want hospice or nursing home placement at this point. Will consult  for home health and other options.           Attending Attestation:   Physician Attestation Statement for Resident:  As the supervising MD   Physician Attestation Statement: I have personally seen and examined this patient.   I agree with the above history.  -:   As the supervising MD I agree with the above PE.     As the supervising MD I agree with the above treatment, course, plan, and disposition.                  ED Course as of 10/02/22 1340   Sun Oct 02, 2022   0821 WBC(!): 17.10  Leukocytosis  [NC]   0821 Hemoglobin(!): 12.4 [NC]   0821 Platelets(!): 41  Thrombocytopenia  [NC]   0822 Sodium: 139 [NC]   0822 Potassium(!): 5.3  Hyperkalemia  [NC]   0822 BUN(!): 87 [NC]   0822 Creatinine: 1.4 [NC]   0926 X-Ray Chest AP Portable  Independent interpretation:  Bilateral hilar opacity, and left lower lobe, recent clinical correlation, likely pneumonia, will treat with antibiotic. [NC]   1339  Discussed with hospice in, we will admit patient for pneumonia, continue to trend his troponin, less likely ACS.  Patient  will be followed with  for discharge planning. [NC]      ED Course User Index  [NC] Endy Jansen MD                   Clinical Impression:   Final diagnoses:  [R06.02] Shortness of breath  [J18.9] Pneumonia of both lungs due to infectious organism, unspecified part of lung (Primary)  [D69.6] Thrombocytopenia  [D72.829] Leukocytosis, unspecified type        ED Disposition Condition    Admit Stable                Endy Jansen MD  Resident  10/02/22 1340       Noe oTledo MD  10/03/22 7931

## 2022-10-02 NOTE — ASSESSMENT & PLAN NOTE
- Baseline Cr 1.3 - 1.5  - Admit Cr 1.4, BUN 87  - Elevated BUN likely 2/2 poor PO intake  - mIVF

## 2022-10-02 NOTE — ASSESSMENT & PLAN NOTE
- DNR after conversation with wife on admission  - Nutrition consult  - PT/OT  - SLP  -- NPO  - D5 1/2 NS

## 2022-10-02 NOTE — ED TRIAGE NOTES
Pt sent by EMS for failure to thrive. Wife reports decline in pt health over the last couple of days. Also reports pt clearing throat more often. Upon assessment, pt 89% on room air. Pt placed on 2L of oxygen nasal cannula. Pt AAOx2.

## 2022-10-02 NOTE — ED NOTES
Patient resting in stretcher and is in NAD at this time. Pt is awake alert and oriented x2, VSS, respirations even and unlabored. Pt updated on plan of care. Bed low and locked with side rails up x2, call bell in pt reach. Pt voices no needs at this time.  Will continue to monitor.

## 2022-10-02 NOTE — ASSESSMENT & PLAN NOTE
- Present with decreased ability to tolerate PO with concerns for aspiration  - Admit CXR with patchy ground-glass interstitial opacities prominently in the perihilar region and left lung base  - Continue CTX x7 d, sot 10/2  - Continue Azithromycin x3 d, sot 10/2  - Scheduled and PRN albuterol nebs  - Wean O2 as tolerated

## 2022-10-02 NOTE — ASSESSMENT & PLAN NOTE
Advance Care Planning     Date: 10/02/2022    Code Status  In light of the patients advanced and life limiting illness,I engaged the the patient and family in a conversation about the patient's preferences for care  at the very end of life. The patient wishes to have a natural, peaceful death.  Along those lines, the patient does not wish to have CPR or other invasive treatments performed when his heart and/or breathing stops. I communicated to the patient and family that a DNR order would be placed in his medical record to reflect this preference.  I spent a total of 20 minutes engaging the patient in this advance care planning discussion.

## 2022-10-02 NOTE — H&P
"[facility] - Emergency Dept  [facility]  History & Physical    Patient Name: Randall Strickland Jr.  MRN: [id number]  Patient Class: IP- Inpatient  Admission Date: 10/2/2022  Attending Physician: Cecilio Celeste MD   Primary Care Provider: Tiago Delaney MD         Patient information was obtained from spouse/SO and ER records.     Subjective:     Principal Problem:Aspiration pneumonia    Chief Complaint:   Chief Complaint   Patient presents with    Failure To Thrive     Wife called EMS and stated "I think he's just dying"        HPI: Mr. Strickland is a 89 yo M with a h/o Afib on AC, CHF, hypertension, CKD, and dementia who presents from home with concern with decreased appetite, decreased mobility, falls, and dyspnea over the past week. History obtained from wife. Patient with decreased mobility over the past several months that progressed prompting presentation. Wife states that patient was has gotten too weak to use his walker with occasional falls, unable to eat full meals (now only taking sips of water/juice), and coughing associated with dyspnea. EMS was called and brought patient to ED.    In the ED, patient 89% on RA, /78, HR 62, and afeb. Labs notable for WBC 17, Plt 41, K 5.3, Creatinine 1.4, Troponin 0.121, and TSH 1.316. EKG Afib with HR 89. CXR with patchy ground-glass interstitial opacities in the perihilar region and left lung base. Given CTX in the ED.       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    Postsurgical dumping syndrome 4/10/2014    PVD (peripheral vascular disease) 8/22/2018    Squamous cell carcinoma of skin 07/21/2014    right zygomatic cheek/temple     Stenosis of aortic and mitral valves     aortic DAHLIA 1.1 CM    Venous stasis dermatitis of both lower " extremities 8/22/2018       Past Surgical History:   Procedure Laterality Date    APPENDECTOMY      cararact  car    CARDIAC CATHETERIZATION      CARDIAC VALVE SURGERY      CATARACT EXTRACTION BILATERAL W/ ANTERIOR VITRECTOMY      bilaterial    CATARACT EXTRACTION W/  INTRAOCULAR LENS IMPLANT Bilateral     CHOLECYSTECTOMY      EYE SURGERY      JOINT REPLACEMENT      bilateral hip    KNEE SURGERY      Bilateral    TOTAL HIP ARTHROPLASTY      Bilaterial       Review of patient's allergies indicates:  No Known Allergies    No current facility-administered medications on file prior to encounter.     Current Outpatient Medications on File Prior to Encounter   Medication Sig    apixaban (ELIQUIS) 2.5 mg Tab Take 1 tablet (2.5 mg total) by mouth 2 (two) times daily.    polycarbophil (FIBERCON) 625 mg tablet Take 625 mg by mouth once daily.    pulse oximeter (PULSE OXIMETER) device Use twice daily at 8 AM and 3 PM and record the value in Susot as directed.    rivastigmine tartrate (EXELON) 3 MG capsule TAKE 1 CAPSULE(3 MG) BY MOUTH TWICE DAILY    thiamine 100 MG tablet Take 100 mg by mouth once daily.     Family History       Problem Relation (Age of Onset)    No Known Problems Father, Mother, Sister, Brother, Maternal Aunt, Maternal Uncle, Paternal Aunt, Paternal Uncle, Maternal Grandmother, Maternal Grandfather, Paternal Grandmother, Paternal Grandfather, Daughter, Son, Daughter, Son          Tobacco Use    Smoking status: Never    Smokeless tobacco: Never   Substance and Sexual Activity    Alcohol use: Yes     Comment: less than one  drink weekly    Drug use: No    Sexual activity: Not on file     Review of Systems   Constitutional:  Positive for activity change, appetite change and unexpected weight change. Negative for chills, diaphoresis, fatigue and fever.   HENT:  Positive for trouble swallowing. Negative for rhinorrhea and sore throat.    Respiratory:  Positive for cough and shortness of  breath. Negative for chest tightness.    Cardiovascular:  Negative for chest pain and palpitations.   Gastrointestinal:  Negative for abdominal distention, abdominal pain, constipation, diarrhea, nausea and vomiting.   Endocrine: Negative for cold intolerance.   Genitourinary:  Negative for decreased urine volume and dysuria.   Musculoskeletal:  Negative for arthralgias and myalgias.   Skin:  Negative for rash and wound.   Neurological:  Positive for weakness. Negative for dizziness, syncope, light-headedness, numbness and headaches.   Psychiatric/Behavioral:  Negative for agitation, behavioral problems and confusion.    Objective:     Vital Signs (Most Recent):  Temp: 97.6 °F (36.4 °C) (10/02/22 1129)  Pulse: 70 (10/02/22 1333)  Resp: (!) 25 (10/02/22 1333)  BP: (!) 141/77 (10/02/22 1333)  SpO2: 100 % (10/02/22 1333)   Vital Signs (24h Range):  Temp:  [97.6 °F (36.4 °C)] 97.6 °F (36.4 °C)  Pulse:  [69-83] 70  Resp:  [18-26] 25  SpO2:  [89 %-100 %] 100 %  BP: (134-170)/(65-77) 141/77        There is no height or weight on file to calculate BMI.    Physical Exam  Constitutional:       Appearance: Normal appearance. He is cachectic. He is toxic-appearing. He is not ill-appearing.   HENT:      Head: Normocephalic and atraumatic.      Mouth/Throat:      Mouth: Mucous membranes are moist.      Dentition: Abnormal dentition.   Eyes:      Extraocular Movements: Extraocular movements intact.      Conjunctiva/sclera: Conjunctivae normal.   Cardiovascular:      Rate and Rhythm: Normal rate. Rhythm irregularly irregular.      Heart sounds: No murmur heard.  Pulmonary:      Effort: Pulmonary effort is normal. No respiratory distress.      Breath sounds: Decreased air movement present. Rhonchi present. No wheezing or rales.   Abdominal:      General: Abdomen is scaphoid. There is no distension.      Palpations: Abdomen is soft.      Tenderness: There is no abdominal tenderness. There is no guarding.   Musculoskeletal:          General: No swelling, tenderness or deformity.   Skin:     Findings: No rash.   Neurological:      General: No focal deficit present.      Mental Status: He is alert.      Motor: Weakness present.   Psychiatric:         Mood and Affect: Mood normal.         Behavior: Behavior normal.           Significant Labs: CBC:   Recent Labs   Lab 10/02/22  0732   WBC 17.10*   HGB 12.4*   HCT 37.1*   PLT 41*     CMP:   Recent Labs   Lab 10/02/22  0732      K 5.3*      CO2 20*   *   BUN 87*   CREATININE 1.4   CALCIUM 9.4   PROT 6.2   ALBUMIN 2.1*   BILITOT 1.3*   ALKPHOS 151*   AST 23   ALT 11   ANIONGAP 14     Lactic Acid:   Recent Labs   Lab 10/02/22  1029   LACTATE 1.5     Troponin:   Recent Labs   Lab 10/02/22  0732 10/02/22  1029   TROPONINI 0.121* 0.065*     TSH:   Recent Labs   Lab 10/02/22  0732   TSH 1.316       Significant Imaging: I have reviewed all pertinent imaging results/findings within the past 24 hours.    Assessment/Plan:     * Aspiration pneumonia  - Present with decreased ability to tolerate PO with concerns for aspiration  - Admit CXR with patchy ground-glass interstitial opacities prominently in the perihilar region and left lung base  - Continue CTX x7 d, sot 10/2  - Continue Azithromycin x3 d, sot 10/2  - Scheduled and PRN albuterol nebs  - Wean O2 as tolerated      Failure to thrive in adult  - DNR after conversation with wife on admission  - Nutrition consult  - PT/OT  - SLP  -- NPO  - D5 1/2 NS      Longstanding persistent atrial fibrillation  Chronic Afib rate controlled on AC.  - Holding Apixaban given thrombocytopenia   - Trend CBC        Chronic kidney disease, stage III (moderate)  - Baseline Cr 1.3 - 1.5  - Admit Cr 1.4, BUN 87  - Elevated BUN likely 2/2 poor PO intake  - mIVF      ACP (advance care planning)  Advance Care Planning     Date: 10/02/2022    Code Status  In light of the patients advanced and life limiting illness,I engaged the the patient and family in a  conversation about the patient's preferences for care  at the very end of life. The patient wishes to have a natural, peaceful death.  Along those lines, the patient does not wish to have CPR or other invasive treatments performed when his heart and/or breathing stops. I communicated to the patient and family that a DNR order would be placed in his medical record to reflect this preference.  I spent a total of 20 minutes engaging the patient in this advance care planning discussion.             Dementia  - Need to review home medications  - Holding Rivastigmine      S/P aortic valve replacement  - In situ        VTE Risk Mitigation (From admission, onward)         Ordered     IP VTE HIGH RISK PATIENT  Once         10/02/22 1023     Place sequential compression device  Until discontinued         10/02/22 1023                   Cecilio Celeste MD  Department of Hospital Medicine   [facility] - Emergency Dept

## 2022-10-03 PROBLEM — Z51.5 ENCOUNTER FOR PALLIATIVE CARE: Status: ACTIVE | Noted: 2022-01-01

## 2022-10-03 NOTE — PLAN OF CARE
Recommendations    1. When medically able, ADAT Regular diet + Boost Plus TID with texture per SLP      2. If TF warranted: Novasource renal @ 45 ml/kcal to provide 1620 kcal, 73g protein and 825ml fluid. Require additional 795ml FW.   - Initiate TF at low rate to prevent refeeding syndrome   - monitor serum potassium, magnesium and phosphorus before initiation of nutrition     - supplement thiamine before refeeding or before initiating dextrose       3. RD to monitor and follow    Goals: Meet % EEN, EPN by RD f/u  Nutrition Goal Status: new  Communication of RD Recs:  (POC)

## 2022-10-03 NOTE — SUBJECTIVE & OBJECTIVE
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    Postsurgical dumping syndrome 4/10/2014    PVD (peripheral vascular disease) 8/22/2018    Squamous cell carcinoma of skin 07/21/2014    right zygomatic cheek/temple     Stenosis of aortic and mitral valves     aortic DAHLIA 1.1 CM    Venous stasis dermatitis of both lower extremities 8/22/2018       Past Surgical History:   Procedure Laterality Date    APPENDECTOMY      cararact  car    CARDIAC CATHETERIZATION      CARDIAC VALVE SURGERY      CATARACT EXTRACTION BILATERAL W/ ANTERIOR VITRECTOMY      bilaterial    CATARACT EXTRACTION W/  INTRAOCULAR LENS IMPLANT Bilateral     CHOLECYSTECTOMY      EYE SURGERY      JOINT REPLACEMENT      bilateral hip    KNEE SURGERY      Bilateral    TOTAL HIP ARTHROPLASTY      Bilaterial       Review of patient's allergies indicates:  No Known Allergies  Current Facility-Administered Medications   Medication Frequency    acetaminophen tablet 650 mg Q8H PRN    albuterol sulfate nebulizer solution 2.5 mg Q4H PRN    azithromycin 500 mg in dextrose 5 % 250 mL IVPB (ready to mix system) Q24H    cefTRIAXone (ROCEPHIN) 2 g/50 mL D5W IVPB Q24H    dextrose 10% bolus 125 mL PRN    dextrose 10% bolus 250 mL PRN    dextrose 5 % and 0.45 % NaCl infusion Continuous    glucagon (human recombinant) injection 1 mg PRN    melatonin tablet 6 mg Nightly PRN    ondansetron disintegrating tablet 8 mg Q8H PRN    polyethylene glycol packet 17 g Daily PRN    prochlorperazine injection Soln 5 mg Q6H PRN    sodium chloride 0.9% flush 10 mL Q12H PRN     Family History       Problem Relation (Age of Onset)    No Known Problems Father, Mother, Sister, Brother, Maternal Aunt, Maternal Uncle, Paternal Aunt, Paternal Uncle, Maternal Grandmother, Maternal  Grandfather, Paternal Grandmother, Paternal Grandfather, Daughter, Son, Daughter, Son          Tobacco Use    Smoking status: Never    Smokeless tobacco: Never   Substance and Sexual Activity    Alcohol use: Yes     Comment: less than one  drink weekly    Drug use: No    Sexual activity: Not on file     Review of Systems   Reason unable to perform ROS: ROS limited, pt nonverbal however nods yes/no to some questions.   HENT:  Negative for congestion, sneezing and sore throat.    Cardiovascular:  Negative for chest pain and palpitations.   Gastrointestinal:  Positive for abdominal pain.   Neurological:  Negative for headaches.   Objective:     Vital Signs (Most Recent):  Temp: 97.7 °F (36.5 °C) (10/03/22 0747)  Pulse: 69 (10/03/22 0747)  Resp: 16 (10/03/22 0747)  BP: 119/68 (10/03/22 0747)  SpO2: 98 % (10/03/22 0747)  O2 Device (Oxygen Therapy): nasal cannula (10/02/22 1430) Vital Signs (24h Range):  Temp:  [97.6 °F (36.4 °C)-98.4 °F (36.9 °C)] 97.7 °F (36.5 °C)  Pulse:  [67-80] 69  Resp:  [16-26] 16  SpO2:  [94 %-100 %] 98 %  BP: (119-170)/(59-77) 119/68     Weight: 49.3 kg (108 lb 11 oz) (10/02/22 1430)  Body mass index is 16.05 kg/m².  Body surface area is 1.55 meters squared.    I/O last 3 completed shifts:  In: 1050 [IV Piggyback:1050]  Out: 280 [Urine:280]    Physical Exam  Constitutional:       Appearance: He is ill-appearing.      Comments: Frail, elderly man    HENT:      Head: Normocephalic and atraumatic.      Mouth/Throat:      Mouth: Mucous membranes are moist.   Eyes:      Extraocular Movements: Extraocular movements intact.      Conjunctiva/sclera: Conjunctivae normal.   Cardiovascular:      Rate and Rhythm: Normal rate and regular rhythm.      Pulses: Normal pulses.      Heart sounds: Normal heart sounds.   Pulmonary:      Effort: Pulmonary effort is normal.      Breath sounds: Normal breath sounds.   Abdominal:      General: Abdomen is flat. Bowel sounds are normal. There is no distension.       Palpations: Abdomen is soft. There is no mass.      Tenderness: There is no abdominal tenderness. There is no right CVA tenderness or left CVA tenderness.      Hernia: No hernia is present.   Musculoskeletal:         General: Normal range of motion.      Cervical back: Neck supple. No tenderness.   Skin:     General: Skin is warm and dry.      Capillary Refill: Capillary refill takes more than 3 seconds.   Neurological:      Mental Status: Mental status is at baseline. He is disoriented.       Significant Labs:  CBC:   Recent Labs   Lab 10/03/22  0212   WBC 15.72*   RBC 3.07*   HGB 10.1*   HCT 32.0*   PLT 36*   *   MCH 32.9*   MCHC 31.6*     CMP:   Recent Labs   Lab 10/03/22  0212   *   CALCIUM 8.5*   ALBUMIN 1.9*   PROT 5.3*      K 4.2   CO2 18*      BUN 86*   CREATININE 1.2   ALKPHOS 126   ALT 11   AST 16   BILITOT 0.9     Recent Labs   Lab 10/02/22  1326   COLORU Yellow   SPECGRAV 1.020   PHUR >8.0*   PROTEINUA 2+*   BACTERIA None   NITRITE Negative   LEUKOCYTESUR 3+*   HYALINECASTS 10*     All labs within the past 24 hours have been reviewed.    Significant Imaging:  Labs: Reviewed

## 2022-10-03 NOTE — PLAN OF CARE
Problem: Adult Inpatient Plan of Care  Goal: Plan of Care Review  Outcome: Ongoing, Progressing     Problem: Impaired Wound Healing  Goal: Optimal Wound Healing  Outcome: Ongoing, Progressing     Problem: Skin Injury Risk Increased  Goal: Skin Health and Integrity  Outcome: Ongoing, Progressing     Pt AO to self. No acute events noted during shift. Vitals remained stable. No c/o pain or discomfort noted, scheduled meds given per MD orders. Q1-2hr safety checks and turns completed. Bed remained low, locked, with all personal items in reach.

## 2022-10-03 NOTE — PLAN OF CARE
[facility] - Intensive Care ([place] [id number])  Initial Discharge Assessment       Primary Care Provider: Tiago Delaney MD    Admission Diagnosis: Shortness of breath [R06.02]  Thrombocytopenia [D69.6]  Pneumonia of both lungs due to infectious organism, unspecified part of lung [J18.9]  Leukocytosis, unspecified type [D72.829]    Admission Date: 10/2/2022  Expected Discharge Date: 10/7/2022         Payor: [employer] MEDICARE / Plan: [employer] MEDICARE HMO / Product Type: Capitation /     Extended Emergency Contact Information  Primary Emergency Contact: Sade Strickland  Address: [address] [place] [address] [facility]  Home Phone: [phone]  Relation: Spouse    Discharge Plan A: (P) Home Health  Discharge Plan B: (P) Hospice/home      [id number] [place], [place] - [address] AT [address]  [address]  [facility] [address]  Phone: [phone] Fax: [phone]      Initial Assessment (most recent)       Adult Discharge Assessment - 10/03/22 1346          Discharge Assessment    Assessment Type Discharge Planning Assessment     Confirmed/corrected address, phone number and insurance Yes     Source of Information family   spouse    Reason For Admission ftt (P)      Lives With significant other (P)      Facility Arrived From: home (P)      Do you expect to return to your current living situation? Yes (P)      Do you have help at home or someone to help you manage your care at home? Yes (P)      Who are your caregiver(s) and their phone number(s)? spouse (P)      Prior to hospitilization cognitive status: Not Oriented to Place;Not Oriented to Time (P)      Current cognitive status: Not Oriented to Place;Not Oriented to Time (P)      Walking or Climbing Stairs Difficulty other (see comments) (P)    bedridden    Dressing/Bathing Difficulty bathing difficulty, dependent (P)      Equipment Currently Used at Home  wheelchair;walker, rolling (P)      Readmission within 30 days? No (P)      Patient currently being followed by outpatient case management? No (P)      Do you currently have service(s) that help you manage your care at home? No (P)      Do you take prescription medications? Yes (P)      Do you have prescription coverage? Yes (P)      Coverage [employer] (P)      Are you on dialysis? No (P)      Do you take coumadin? No (P)      Discharge Plan A Home Health (P)      Discharge Plan B Hospice/home (P)      DME Needed Upon Discharge  hospital bed (P)      Discharge Plan discussed with: Spouse/sig other (P)         Relationship/Environment    Name(s) of Who Lives With Patient wife sydnie (P)                           Spoke with pt wife pt is in /s and has dementia, they live alone there is a daughter in [place] and a daughter who just moved back from [place] that lives locally, wife states she does not want pt to go to snf, she will take him home she would like HH so he could have some home PT would also like assistance with bathing pt, discussed difference between hospice and hh she states she wants hh and maybe hospice in the future, she states pt does not want to eat just wants to drink water, discussed ensure or boost, also discussed feeding tube she states should not want feeding tube that his mind is so bad he does not remember the grandchildren and only remembers the children if he sees them dcp will be home with hh and a hospital bed with plans to transition to hospice if he no longer qualifies for hh discussed transport home via ambulance she is agreeable

## 2022-10-03 NOTE — SUBJECTIVE & OBJECTIVE
Interval History: Patient is arousable but drowsy on exam today. Wife Sade at bedside.    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    Postsurgical dumping syndrome 4/10/2014    PVD (peripheral vascular disease) 8/22/2018    Squamous cell carcinoma of skin 07/21/2014    right zygomatic cheek/temple     Stenosis of aortic and mitral valves     aortic DAHLIA 1.1 CM    Venous stasis dermatitis of both lower extremities 8/22/2018       Past Surgical History:   Procedure Laterality Date    APPENDECTOMY      cararact  car    CARDIAC CATHETERIZATION      CARDIAC VALVE SURGERY      CATARACT EXTRACTION BILATERAL W/ ANTERIOR VITRECTOMY      bilaterial    CATARACT EXTRACTION W/  INTRAOCULAR LENS IMPLANT Bilateral     CHOLECYSTECTOMY      EYE SURGERY      JOINT REPLACEMENT      bilateral hip    KNEE SURGERY      Bilateral    TOTAL HIP ARTHROPLASTY      Bilaterial       Review of patient's allergies indicates:  No Known Allergies    Medications:  Continuous Infusions:   dextrose 5 % 50 mL/hr at 10/03/22 1506    lactated ringers       Scheduled Meds:   azithromycin  500 mg Intravenous Q24H    cefTRIAXone (ROCEPHIN) IVPB  2 g Intravenous Q24H    mupirocin   Nasal BID     PRN Meds:acetaminophen, albuterol sulfate, dextrose 10%, dextrose 10%, glucagon (human recombinant), melatonin, ondansetron, polyethylene glycol, prochlorperazine, sodium chloride 0.9%    Family History       Problem Relation (Age of Onset)    No Known Problems Father, Mother, Sister, Brother, Maternal Aunt, Maternal Uncle, Paternal Aunt, Paternal Uncle, Maternal Grandmother, Maternal Grandfather, Paternal Grandmother, Paternal Grandfather, Daughter, Son, Daughter, Son          Tobacco Use    Smoking status: Never    Smokeless tobacco: Never    Substance and Sexual Activity    Alcohol use: Yes     Comment: less than one  drink weekly    Drug use: No    Sexual activity: Not on file       Review of Systems   Unable to perform ROS: Dementia   Objective:     Vital Signs (Most Recent):  Temp: 98 °F (36.7 °C) (10/03/22 1618)  Pulse: 76 (10/03/22 1618)  Resp: 16 (10/03/22 1618)  BP: 134/68 (10/03/22 1618)  SpO2: 96 % (10/03/22 1618) Vital Signs (24h Range):  Temp:  [97.7 °F (36.5 °C)-98.3 °F (36.8 °C)] 98 °F (36.7 °C)  Pulse:  [58-83] 76  Resp:  [16-20] 16  SpO2:  [94 %-98 %] 96 %  BP: (119-153)/(59-78) 134/68     Weight: 49.3 kg (108 lb 11 oz)  Body mass index is 16.05 kg/m².    Physical Exam  Vitals and nursing note reviewed.   Constitutional:       General: He is not in acute distress.     Appearance: He is cachectic. He is ill-appearing.   HENT:      Head: Normocephalic and atraumatic.   Eyes:      Extraocular Movements: Extraocular movements intact.      Conjunctiva/sclera: Conjunctivae normal.   Cardiovascular:      Rate and Rhythm: Normal rate. Rhythm irregular.   Pulmonary:      Effort: Pulmonary effort is normal. No respiratory distress.      Comments: On 3L O2 via NC  Abdominal:      General: Abdomen is flat.      Tenderness: There is no abdominal tenderness.   Musculoskeletal:         General: No swelling or deformity.   Skin:     General: Skin is warm and dry.   Neurological:      Mental Status: He is lethargic.      Motor: Weakness present.      Comments: Arousable but drowsy       Review of Symptoms      Symptom Assessment (ESAS 0-10 Scale)  Pain:  0  Dyspnea:  0  Anxiety:  0  Nausea:  0  Depression:  0  Anorexia:  0  Fatigue:  0  Insomnia:  0  Restlessness:  0  Agitation:  0  Unable to complete assessment due to Dementia         Pain Assessment:  OME in 24 hours:  0  Location(s):      Pain Assessment in Advanced Demential Scale (PAINAD)   Breathing - Independent of vocalization:  0  Negative vocalization:  0  Facial expression:  0  Body language:  " 0  Consolability:  0  Total:  0    Performance Status:  30    Living Arrangements:  Lives with spouse    Psychosocial/Cultural: Patient and his wife have been  for 65 years. They have two daughters, two sons, six grandchildren, and four great-grandchildren. The patient's only brother is . He is retired from the insurance industry. He used to enjoy tennis and golf and going out to dinner. His wife's goal is for him to go home and to "be content until he's gone."    Spiritual:  F - Kylah and Belief:  Taoist  I - Importance:  Yes  C - Community:  Belongs to [facility]  A - Address in Care:   visits      Advance Care Planning   Advance Directives:   Living Will: No    LaPOST: No    Do Not Resuscitate Status: Yes    Medical Power of : No      Decision Making:  Family answered questions and Patient unable to communicate due to disease severity/cognitive impairment  Goals of Care: What is most important right now is to focus on improvement in condition but with limits to invasive therapies. Accordingly, we have decided that the best plan to meet the patient's goals includes continuing with conservative treatment.       Significant Labs: All pertinent labs within the past 24 hours have been reviewed.  CBC:   Recent Labs   Lab 10/03/22  0212   WBC 15.72*   HGB 10.1*   HCT 32.0*   *   PLT 36*     BMP:  Recent Labs   Lab 10/03/22  0212   *      K 4.2      CO2 18*   BUN 86*   CREATININE 1.2   CALCIUM 8.5*   MG 1.8     LFT:  Lab Results   Component Value Date    AST 16 10/03/2022    ALKPHOS 126 10/03/2022    BILITOT 0.9 10/03/2022     Albumin:   Albumin   Date Value Ref Range Status   10/03/2022 1.9 (L) 3.5 - 5.2 g/dL Final     Protein:   Total Protein   Date Value Ref Range Status   10/03/2022 5.3 (L) 6.0 - 8.4 g/dL Final     Lactic acid:   Lab Results   Component Value Date    LACTATE 1.5 10/02/2022    LACTATE 1.3 2019       Significant Imaging: I " have reviewed all pertinent imaging results/findings within the past 24 hours.

## 2022-10-03 NOTE — ASSESSMENT & PLAN NOTE
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 Pt admitted for failure to thrive, dec PO intake, AMS. Crystals present in urine likely 2/2 dehydration     - Recommend inc hydration via PO intake and IV fluids

## 2022-10-03 NOTE — SUBJECTIVE & OBJECTIVE
Interval History:   No events overnight. Patient denies pain and dyspnea. Wife at bedside and updated. Discuss with wife care plan and concerns of patient's progressive physical decline. Wife agreeable to Palliative Care consult for end of life planning and discussion of hospice.      Review of Systems   Respiratory:  Negative for shortness of breath.    Cardiovascular:  Negative for chest pain.   Gastrointestinal:  Negative for abdominal pain.   Objective:     Vital Signs (Most Recent):  Temp: 98 °F (36.7 °C) (10/03/22 1143)  Pulse: 74 (10/03/22 1205)  Resp: 16 (10/03/22 1143)  BP: 130/78 (10/03/22 1143)  SpO2: 96 % (10/03/22 1143) Vital Signs (24h Range):  Temp:  [97.7 °F (36.5 °C)-98.3 °F (36.8 °C)] 98 °F (36.7 °C)  Pulse:  [67-83] 74  Resp:  [16-20] 16  SpO2:  [94 %-98 %] 96 %  BP: (119-153)/(59-78) 130/78     Weight: 49.3 kg (108 lb 11 oz)  Body mass index is 16.05 kg/m².    Intake/Output Summary (Last 24 hours) at 10/3/2022 1425  Last data filed at 10/3/2022 0517  Gross per 24 hour   Intake --   Output 250 ml   Net -250 ml      Physical Exam  Constitutional:       Appearance: Normal appearance. He is cachectic. He is ill-appearing.   HENT:      Head: Normocephalic and atraumatic.      Mouth/Throat:      Mouth: Mucous membranes are moist.      Dentition: Abnormal dentition.   Eyes:      Extraocular Movements: Extraocular movements intact.      Conjunctiva/sclera: Conjunctivae normal.   Cardiovascular:      Rate and Rhythm: Normal rate. Rhythm irregularly irregular.      Heart sounds: No murmur heard.  Pulmonary:      Effort: Pulmonary effort is normal. No respiratory distress.      Breath sounds: Decreased air movement present. Rhonchi present. No wheezing or rales.   Abdominal:      General: Abdomen is scaphoid. There is no distension.      Palpations: Abdomen is soft.      Tenderness: There is no abdominal tenderness. There is no guarding.   Musculoskeletal:         General: No swelling, tenderness or  deformity.   Skin:     Findings: No rash.   Neurological:      General: No focal deficit present.      Mental Status: He is alert.      Motor: Weakness present.   Psychiatric:         Mood and Affect: Mood normal.         Behavior: Behavior normal.       Significant Labs: CBC:   Recent Labs   Lab 10/02/22  0732 10/03/22  0212   WBC 17.10* 15.72*   HGB 12.4* 10.1*   HCT 37.1* 32.0*   PLT 41* 36*     CMP:   Recent Labs   Lab 10/02/22  0732 10/03/22  0212    137   K 5.3* 4.2    107   CO2 20* 18*   * 137*   BUN 87* 86*   CREATININE 1.4 1.2   CALCIUM 9.4 8.5*   PROT 6.2 5.3*   ALBUMIN 2.1* 1.9*   BILITOT 1.3* 0.9   ALKPHOS 151* 126   AST 23 16   ALT 11 11   ANIONGAP 14 12       Significant Imaging: I have reviewed all pertinent imaging results/findings within the past 24 hours.

## 2022-10-03 NOTE — PROGRESS NOTES
[facility] ([address] Medicine  Progress Note    Patient Name: Randall Strickland Jr.  MRN: [id number]  Patient Class: IP- Inpatient   Admission Date: 10/2/2022  Length of Stay: 1 days  Attending Physician: Cecilio Celeste MD  Primary Care Provider: Tiago Delaney MD        Subjective:     Principal Problem:Aspiration pneumonia        HPI:  Mr. Strickland is a 89 yo M with a h/o Afib on AC, CHF, hypertension, CKD, and dementia who presents from home with concern with decreased appetite, decreased mobility, falls, and dyspnea over the past week. History obtained from wife. Patient with decreased mobility over the past several months that progressed prompting presentation. Wife states that patient was has gotten too weak to use his walker with occasional falls, unable to eat full meals (now only taking sips of water/juice), and coughing associated with dyspnea. EMS was called and brought patient to ED.    In the ED, patient 89% on RA, /78, HR 62, and afeb. Labs notable for WBC 17, Plt 41, K 5.3, Creatinine 1.4, Troponin 0.121, and TSH 1.316. EKG Afib with HR 89. CXR with patchy ground-glass interstitial opacities in the perihilar region and left lung base. Given CTX in the ED.       Overview/Hospital Course:  No notes on file    Interval History:   No events overnight. Patient denies pain and dyspnea. Wife at bedside and updated. Discuss with wife care plan and concerns of patient's progressive physical decline. Wife agreeable to Palliative Care consult for end of life planning and discussion of hospice.      Review of Systems   Respiratory:  Negative for shortness of breath.    Cardiovascular:  Negative for chest pain.   Gastrointestinal:  Negative for abdominal pain.   Objective:     Vital Signs (Most Recent):  Temp: 98 °F (36.7 °C) (10/03/22 1143)  Pulse: 74 (10/03/22 1205)  Resp: 16 (10/03/22 1143)  BP: 130/78 (10/03/22 1143)  SpO2: 96 % (10/03/22 1143) Vital Signs (24h Range):  Temp:   [97.7 °F (36.5 °C)-98.3 °F (36.8 °C)] 98 °F (36.7 °C)  Pulse:  [67-83] 74  Resp:  [16-20] 16  SpO2:  [94 %-98 %] 96 %  BP: (119-153)/(59-78) 130/78     Weight: 49.3 kg (108 lb 11 oz)  Body mass index is 16.05 kg/m².    Intake/Output Summary (Last 24 hours) at 10/3/2022 1425  Last data filed at 10/3/2022 0517  Gross per 24 hour   Intake --   Output 250 ml   Net -250 ml      Physical Exam  Constitutional:       Appearance: Normal appearance. He is cachectic. He is ill-appearing.   HENT:      Head: Normocephalic and atraumatic.      Mouth/Throat:      Mouth: Mucous membranes are moist.      Dentition: Abnormal dentition.   Eyes:      Extraocular Movements: Extraocular movements intact.      Conjunctiva/sclera: Conjunctivae normal.   Cardiovascular:      Rate and Rhythm: Normal rate. Rhythm irregularly irregular.      Heart sounds: No murmur heard.  Pulmonary:      Effort: Pulmonary effort is normal. No respiratory distress.      Breath sounds: Decreased air movement present. Rhonchi present. No wheezing or rales.   Abdominal:      General: Abdomen is scaphoid. There is no distension.      Palpations: Abdomen is soft.      Tenderness: There is no abdominal tenderness. There is no guarding.   Musculoskeletal:         General: No swelling, tenderness or deformity.   Skin:     Findings: No rash.   Neurological:      General: No focal deficit present.      Mental Status: He is alert.      Motor: Weakness present.   Psychiatric:         Mood and Affect: Mood normal.         Behavior: Behavior normal.       Significant Labs: CBC:   Recent Labs   Lab 10/02/22  0732 10/03/22  0212   WBC 17.10* 15.72*   HGB 12.4* 10.1*   HCT 37.1* 32.0*   PLT 41* 36*     CMP:   Recent Labs   Lab 10/02/22  0732 10/03/22  0212    137   K 5.3* 4.2    107   CO2 20* 18*   * 137*   BUN 87* 86*   CREATININE 1.4 1.2   CALCIUM 9.4 8.5*   PROT 6.2 5.3*   ALBUMIN 2.1* 1.9*   BILITOT 1.3* 0.9   ALKPHOS 151* 126   AST 23 16   ALT 11 11    ANIONGAP 14 12       Significant Imaging: I have reviewed all pertinent imaging results/findings within the past 24 hours.      Assessment/Plan:      * Aspiration pneumonia  - Present with decreased ability to tolerate PO with concerns for aspiration  - Admit CXR with patchy ground-glass interstitial opacities prominently in the perihilar region and left lung base  - Continue CTX x7 d, sot 10/2  - Continue Azithromycin x3 d, sot 10/2  - Scheduled and PRN albuterol nebs  - Incentive lane  - SLP consult  - Wean O2 as tolerated      Failure to thrive in adult  - DNR after conversation with wife on admission  - Palliative care consult  - Nutrition consult  - PT/OT  - SLP  -- NPO  - mIVF      Chronic kidney disease, stage III (moderate)  - Baseline Cr 1.3 - 1.5  - Admit Cr 1.4, BUN 87  - Elevated BUN likely 2/2 poor PO intake  - mIVF      Longstanding persistent atrial fibrillation  Chronic Afib rate controlled on AC.  - Holding Apixaban given thrombocytopenia   - Trend CBC        ACP (advance care planning)  Advance Care Planning     Date: 10/02/2022    Code Status  In light of the patients advanced and life limiting illness,I engaged the the patient and family in a conversation about the patient's preferences for care  at the very end of life. The patient wishes to have a natural, peaceful death.  Along those lines, the patient does not wish to have CPR or other invasive treatments performed when his heart and/or breathing stops. I communicated to the patient and family that a DNR order would be placed in his medical record to reflect this preference.  I spent a total of 20 minutes engaging the patient in this advance care planning discussion.             Dementia  - Need to review home medications  - Holding Rivastigmine      S/P aortic valve replacement  - In situ        VTE Risk Mitigation (From admission, onward)         Ordered     IP VTE HIGH RISK PATIENT  Once         10/02/22 1023     Place sequential  compression device  Until discontinued         10/02/22 1023                Discharge Planning   GUCCI: 10/7/2022     Code Status: DNR   Is the patient medically ready for discharge?: No    Reason for patient still in hospital (select all that apply): Patient trending condition, Laboratory test, Treatment, Consult recommendations, PT / OT recommendations and Pending disposition  Discharge Plan A: Home Health                  Cecilio Celeste MD  Department of Hospital Medicine   [facility] - Intensive Care ([address]

## 2022-10-03 NOTE — PT/OT/SLP EVAL
Physical Therapy Co-Evaluation and Treat    Patient Name:  Randall Strickland Jr.   MRN:  [id number]    Recommendations:     Discharge Recommendations:  home health PT   Discharge Equipment Recommendations:  (TBD)   Barriers to discharge: Pt currently requiring increased level of assistance with mobility      Assessment:     Randall Strickland Jr. is a 89 y.o. male admitted with a medical diagnosis of Aspiration pneumonia.  He presents with the following impairments/functional limitations:  weakness, impaired endurance, impaired functional mobility, gait instability, impaired balance, impaired cognition, decreased coordination, decreased lower extremity function, decreased safety awareness, impaired cardiopulmonary response to activity. Pleasantly confused, follows one-step commands. Pt demonstrated bed mob Allegra, STS RW Allegra, and amb 3 side steps RW Allegra. Pt will benefit from skilled PT services while in house in order to address the aforementioned deficits.    Rehab Prognosis: Fair; patient would benefit from acute skilled PT services to address these deficits and reach maximum level of function.    Recent Surgery: * No surgery found *      Plan:     During this hospitalization, patient to be seen 3 x/week to address the identified rehab impairments via gait training, therapeutic activities, therapeutic exercises, neuromuscular re-education and progress toward the following goals:    Plan of Care Expires:  11/02/22    Subjective     Chief Complaint: Pleasantly confused, mostly nonverbal throughout  Patient/Family Comments/goals: improve mobility  Pain/Comfort:  Pain Rating 1: 0/10  Pain Rating Post-Intervention 1: 0/10    Patients cultural, spiritual, Ep[employer] conflicts given the current situation: no    Living Environment:  Per spouse, pt and spouse live in [facility] with [address]. 1 month ago, pt was able to amb with RW however had significant decline in mobility. Pt has been bed bound with spouse being the primary caregiver. Per  spouse, pt has been using briefs in bed and soils the bed every time. Pt has not taken a shower for more than 1 month due to increased physical assistance.   Equipment used at home: wheelchair, rollator.  Upon discharge, patient will have assistance from spouse.    Objective:     Communicated with RN prior to session.  Patient found HOB elevated with bed alarm, peripheral IV, oxygen  upon PT entry to room.    General Precautions: Standard, fall   Orthopedic Precautions:N/A   Braces: N/A  Respiratory Status: Nasal cannula, flow 3 L/min    Exams:  RLE/LLE ROM: WFL  RLE/LLE Strength: Grossly 3-/5    Functional Mobility:  Bed Mobility:     Rolling Right: minimum assistance  Supine to Sit: minimum assistance  Sit to Supine: minimum assistance  Transfers:     Sit to Stand:    1 rep STS no AD modA  1 rep STS RW Allegra  Gait: Pt amb 3 side steps with RW Allegra and presented with decreased madisyn, fair RW management, B shortened step.  Balance:   Fair sitting balance  Fair standing balance    Therapeutic Activities and Exercises:  Discussed SNF vs HH. Per spouse, family not interested in sending to facility and would prefer going home.    Educated pt on PT role/POC  Educated pt on importance of OOB activity and daily ambulation   Pt educated on proper body mechanics, safety techniques, and energy conservation with PT facilitation and cueing throughout session   Pt verbalized understanding      AM-PAC 6 CLICK MOBILITY  Total Score:16     Patient left HOB elevated with all lines intact, call button in reach, bed alarm on, RN notified, and OT and spouse present.    GOALS:   Multidisciplinary Problems       Physical Therapy Goals          Problem: Physical Therapy    Goal Priority Disciplines Outcome Goal Variances Interventions   Physical Therapy Goal     PT, PT/OT Ongoing, Progressing     Description: Goals to be met by: 10/13/2022     Patient will increase functional independence with mobility by performin. Supine to sit  with Contact Guard Assistance  2. Sit to supine with Contact Guard Assistance  3. Sit to stand transfer with Stand-by Assistance LRAD  4. Bed to chair transfer with Stand-by Assistance using LRAD  5. Gait  x 5 feet with Stand-by Assistance using LRAD.   6. Wheelchair propulsion x 5 feet with Minimal Assistance using B LE or B UE propulsion                         History:     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    Postsurgical dumping syndrome 4/10/2014    PVD (peripheral vascular disease) 8/22/2018    Squamous cell carcinoma of skin 07/21/2014    right zygomatic cheek/temple     Stenosis of aortic and mitral valves     aortic DAHLIA 1.1 CM    Venous stasis dermatitis of both lower extremities 8/22/2018       Past Surgical History:   Procedure Laterality Date    APPENDECTOMY      cararact  car    CARDIAC CATHETERIZATION      CARDIAC VALVE SURGERY      CATARACT EXTRACTION BILATERAL W/ ANTERIOR VITRECTOMY      bilaterial    CATARACT EXTRACTION W/  INTRAOCULAR LENS IMPLANT Bilateral     CHOLECYSTECTOMY      EYE SURGERY      JOINT REPLACEMENT      bilateral hip    KNEE SURGERY      Bilateral    TOTAL HIP ARTHROPLASTY      Bilaterial       Time Tracking:     PT Received On: 10/03/22  PT Start Time: 1105     PT Stop Time: 1135  PT Total Time (min): 30 min     Billable Minutes: Evaluation 10 and Therapeutic Activity 20    Co-treatment performed due to patient's multiple deficits requiring two skilled therapists to appropriately and safely assess patient's strength and endurance while facilitating functional tasks in addition to accommodating for patient's activity tolerance.          10/03/2022

## 2022-10-03 NOTE — CONSULTS
[facility] ([place] [address])  Nephrology  Consult Note    Patient Name: Randall Strickland Jr.  MRN: [id number]  Admission Date: 10/2/2022  Hospital Length of Stay: 1 days  Attending Provider: Cecilio Celeste MD   Primary Care Physician: Tiago Delaney MD  Principal Problem:Aspiration pneumonia    Inpatient consult to Nephrology  Consult performed by: Mile Matos MD  Consult ordered by: Cecilio Celeste MD        Subjective:     HPI: Pt is a 89 y.o. male with PMH of CKD w/ baseline Cr of 1.3-1.5, Afib on AC, CHF, HTN, dementia who presented to ED yesterday for decreased mobility, decreased PO intake, failure to thrive. Pt was found to have aspiration PNA and many triple phos crystals on UA. Nephrology consulted for recs regarding crystals in UA.         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    Postsurgical dumping syndrome 4/10/2014    PVD (peripheral vascular disease) 8/22/2018    Squamous cell carcinoma of skin 07/21/2014    right zygomatic cheek/temple     Stenosis of aortic and mitral valves     aortic DAHLIA 1.1 CM    Venous stasis dermatitis of both lower extremities 8/22/2018       Past Surgical History:   Procedure Laterality Date    APPENDECTOMY      cararact  car    CARDIAC CATHETERIZATION      CARDIAC VALVE SURGERY      CATARACT EXTRACTION BILATERAL W/ ANTERIOR VITRECTOMY      bilaterial    CATARACT EXTRACTION W/  INTRAOCULAR LENS IMPLANT Bilateral     CHOLECYSTECTOMY      EYE SURGERY      JOINT REPLACEMENT      bilateral hip    KNEE SURGERY      Bilateral    TOTAL HIP ARTHROPLASTY      Bilaterial       Review of patient's allergies indicates:  No Known Allergies  Current Facility-Administered Medications   Medication Frequency    acetaminophen  tablet 650 mg Q8H PRN    albuterol sulfate nebulizer solution 2.5 mg Q4H PRN    azithromycin 500 mg in dextrose 5 % 250 mL IVPB (ready to mix system) Q24H    cefTRIAXone (ROCEPHIN) 2 g/50 mL D5W IVPB Q24H    dextrose 10% bolus 125 mL PRN    dextrose 10% bolus 250 mL PRN    dextrose 5 % and 0.45 % NaCl infusion Continuous    glucagon (human recombinant) injection 1 mg PRN    melatonin tablet 6 mg Nightly PRN    ondansetron disintegrating tablet 8 mg Q8H PRN    polyethylene glycol packet 17 g Daily PRN    prochlorperazine injection Soln 5 mg Q6H PRN    sodium chloride 0.9% flush 10 mL Q12H PRN     Family History       Problem Relation (Age of Onset)    No Known Problems Father, Mother, Sister, Brother, Maternal Aunt, Maternal Uncle, Paternal Aunt, Paternal Uncle, Maternal Grandmother, Maternal Grandfather, Paternal Grandmother, Paternal Grandfather, Daughter, Son, Daughter, Son          Tobacco Use    Smoking status: Never    Smokeless tobacco: Never   Substance and Sexual Activity    Alcohol use: Yes     Comment: less than one  drink weekly    Drug use: No    Sexual activity: Not on file     Review of Systems   Reason unable to perform ROS: ROS limited, pt nonverbal however nods yes/no to some questions.   HENT:  Negative for congestion, sneezing and sore throat.    Cardiovascular:  Negative for chest pain and palpitations.   Gastrointestinal:  Positive for abdominal pain.   Neurological:  Negative for headaches.   Objective:     Vital Signs (Most Recent):  Temp: 97.7 °F (36.5 °C) (10/03/22 0747)  Pulse: 69 (10/03/22 0747)  Resp: 16 (10/03/22 0747)  BP: 119/68 (10/03/22 0747)  SpO2: 98 % (10/03/22 0747)  O2 Device (Oxygen Therapy): nasal cannula (10/02/22 1430) Vital Signs (24h Range):  Temp:  [97.6 °F (36.4 °C)-98.4 °F (36.9 °C)] 97.7 °F (36.5 °C)  Pulse:  [67-80] 69  Resp:  [16-26] 16  SpO2:  [94 %-100 %] 98 %  BP: (119-170)/(59-77) 119/68     Weight: 49.3 kg (108 lb 11 oz) (10/02/22 1430)  Body  mass index is 16.05 kg/m².  Body surface area is 1.55 meters squared.    I/O last 3 completed shifts:  In: 1050 [IV Piggyback:1050]  Out: 280 [Urine:280]    Physical Exam  Constitutional:       Appearance: He is ill-appearing.      Comments: Frail, elderly man    HENT:      Head: Normocephalic and atraumatic.      Mouth/Throat:      Mouth: Mucous membranes are moist.   Eyes:      Extraocular Movements: Extraocular movements intact.      Conjunctiva/sclera: Conjunctivae normal.   Cardiovascular:      Rate and Rhythm: Normal rate and regular rhythm.      Pulses: Normal pulses.      Heart sounds: Normal heart sounds.   Pulmonary:      Effort: Pulmonary effort is normal.      Breath sounds: Normal breath sounds.   Abdominal:      General: Abdomen is flat. Bowel sounds are normal. There is no distension.      Palpations: Abdomen is soft. There is no mass.      Tenderness: There is no abdominal tenderness. There is no right CVA tenderness or left CVA tenderness.      Hernia: No hernia is present.   Musculoskeletal:         General: Normal range of motion.      Cervical back: Neck supple. No tenderness.   Skin:     General: Skin is warm and dry.      Capillary Refill: Capillary refill takes more than 3 seconds.   Neurological:      Mental Status: Mental status is at baseline. He is disoriented.       Significant Labs:  CBC:   Recent Labs   Lab 10/03/22  0212   WBC 15.72*   RBC 3.07*   HGB 10.1*   HCT 32.0*   PLT 36*   *   MCH 32.9*   MCHC 31.6*     CMP:   Recent Labs   Lab 10/03/22  0212   *   CALCIUM 8.5*   ALBUMIN 1.9*   PROT 5.3*      K 4.2   CO2 18*      BUN 86*   CREATININE 1.2   ALKPHOS 126   ALT 11   AST 16   BILITOT 0.9     Recent Labs   Lab 10/02/22  1326   COLORU Yellow   SPECGRAV 1.020   PHUR >8.0*   PROTEINUA 2+*   BACTERIA None   NITRITE Negative   LEUKOCYTESUR 3+*   HYALINECASTS 10*     All labs within the past 24 hours have been reviewed.    Significant Imaging:  Labs:  Reviewed      Assessment/Plan:     Chronic kidney disease, stage III (moderate)  Pt w/ PMH of CKD baseline Cr of 1.3 to 1.5 found to have many triple phosphate crystals on UA. Pt admitted for failure to thrive, dec PO intake, AMS. Crystals present in urine likely 2/2 dehydration     - Recommend inc hydration via PO intake and IV fluids           Thank you for your consult. I will sign off. Please contact us if you have any additional questions.    Mile Matos MD  Nephrology  [facility] ([place])

## 2022-10-03 NOTE — ASSESSMENT & PLAN NOTE
- Present with decreased ability to tolerate PO with concerns for aspiration  - Admit CXR with patchy ground-glass interstitial opacities prominently in the perihilar region and left lung base  - Continue CTX x7 d, sot 10/2  - Continue Azithromycin x3 d, sot 10/2  - Scheduled and PRN albuterol nebs  - Incentive lane  - SLP consult  - Wean O2 as tolerated     Never smoker

## 2022-10-03 NOTE — PLAN OF CARE
Problem: Occupational Therapy  Goal: Occupational Therapy Goal  Description: Goals to be met by: 10/17     Patient will increase functional independence with ADLs by performing:    UE Dressing with Modified [place].  Grooming while seated with Modified [place].  Toileting from bedside commode with Modified [place] for hygiene and clothing management.   Bathing from  shower chair/bench with Minimal Assistance.    Outcome: Ongoing, Progressing

## 2022-10-03 NOTE — PT/OT/SLP EVAL
"Occupational Therapy   Co-Evaluation and treatment  Co-treat with PT due to medical complexity of pt and need for skilled hands for safe intervention.      Name: Randall Strickland Jr.  MRN: [id number]  Admitting Diagnosis:  Aspiration pneumonia  Recent Surgery: * No surgery found *      Recommendations:     Discharge Recommendations: home health OT  Discharge Equipment Recommendations:  none  Barriers to discharge:  Other (Comment) (Pt and pt's wife who is 91 lives alone together, daughter lives close by. pt's wife presents w/ dec insight to spouses health and unaware of how to care for him)    Assessment:     Randall Strickland Jr. is a 89 y.o. male with a medical diagnosis of Aspiration pneumonia.   Performance deficits affecting function: weakness, impaired endurance, impaired cognition, abnormal tone, impaired sensation, impaired joint extensibility, decreased coordination, impaired self care skills, decreased upper extremity function, impaired functional mobility, impaired coordination, gait instability, decreased ROM, decreased safety awareness, impaired balance.      Pt pleasantly confused, follows one-step commands. Pt demonstrated bed mob min A, STS w/ RW min A, and amb 3 side steps w/ RW min A. Pt req'd Mod A x 1 w/ 1 VC to initiate EOB grooming task.     Rehab Prognosis: Fair; patient would benefit from acute skilled OT services to address these deficits and reach maximum level of function.       Plan:     Patient to be seen 3 x/week to address the above listed problems via self-care/home management, therapeutic activities, therapeutic exercises, neuromuscular re-education, sensory integration  Plan of Care Expires: 10/31/22  Plan of Care Reviewed with:      Subjective   Patient/Family Comments/goals: "I genuinely thought he was dying but right now he actually looks a little better than he has in a month"    Occupational Profile:  Living Environment: [facility] w/ 1 [address].  Previous level of function: Per pt's wife reports, pt " was ambulating w/ rollator and was independent PLOF up until a month ago. pt has been bed bound for 1 month and has been using depends, no bathing, grooming, or encouragement for OOB activity.   Roles and Routines: bedbound  Equipment Used at Home:  wheelchair, walker, rolling  Assistance upon Discharge: wife and daughter who lives close by    Pain/Comfort:  Pain Rating 1: 0/10    Patients cultural, spiritual, Baptist conflicts given the current situation: no    Objective:     Communicated with: nurse prior to session.  Patient found supine with bed alarm, peripheral IV, oxygen upon OT entry to room.    General Precautions: Standard, fall   Orthopedic Precautions:N/A   Braces: N/A  Respiratory Status: Nasal cannula, flow 3 L/min    Occupational Performance: Deconditioned; bed bound for the past month and pt presents w/ absent self-care    Bed Mobility:    Patient completed Rolling/Turning to Left with  minimum assistance  Patient completed Rolling/Turning to Right with minimum assistance  Patient completed Sit to Supine with minimum assistance    Functional Mobility/Transfers: refer to PT note for further detail.  Sit to Stand:    1 rep STS no AD modA  1 rep STS RW Allegra  Gait: Pt amb 3 side steps with RW Allegra and presented with decreased madisyn, fair RW management, B shortened step.  Balance:   Fair sitting balance    Activities of Daily Living:  Feeding:  max assistance   Grooming: maximal assistance   Bathing: maximal assistance   Upper Body Dressing: maximal assistance   Lower Body Dressing: maximal assistance   Toileting: maximal assistance     Cognitive/Visual Perceptual:  Cognitive/Psychosocial Skills:     -       Follows Commands/attention:Follows one-step commands    Physical Exam:  Upper Extremity Range of Motion:     -       Right Upper Extremity: WFL  -       Left Upper Extremity: WFL  Upper Extremity Strength:    -       Right Upper Extremity: Deficits: 3/5  -       Left Upper Extremity: Deficits:  3/5   Strength:    -       Right Upper Extremity: Deficits: 3/5  -       Left Upper Extremity: Deficits: 3/5    AMPAC 6 Click ADL:  AMPAC Total Score: 6    Treatment & Education:  Pt educated on role of OT, POC, and goals for therapy.    POC was dicussed with patient/caregiver, who was included in its development and is in agreement with the identified goals and treatment plan.   Patient and family aware of patient's deficits and therapy progression.   Time provided for therapeutic counseling and discussion of health disposition.   Educated on importance of EOB/OOB mobility, maintaining routine, sitting up in chair, and maximizing independence with ADLs during admission   Pt completed ADLs and functional mobility for treatment session as noted above   Pt/caregiver verbalized understanding and expressed no further concerns/questions.      Patient left supine with all lines intact, call button in reach, and bed alarm on    GOALS:   Multidisciplinary Problems       Occupational Therapy Goals          Problem: Occupational Therapy    Goal Priority Disciplines Outcome Interventions   Occupational Therapy Goal     OT, PT/OT Ongoing, Progressing    Description: Goals to be met by: 10/17     Patient will increase functional independence with ADLs by performing:    UE Dressing with Modified Angelina.  Grooming while seated with Modified Angelina.  Toileting from bedside commode with Modified Angelina for hygiene and clothing management.   Bathing from  shower chair/bench with Minimal Assistance.                         History:     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    Postsurgical dumping syndrome 4/10/2014    PVD (peripheral vascular disease) 8/22/2018    Squamous cell carcinoma of  skin 07/21/2014    right zygomatic cheek/temple     Stenosis of aortic and mitral valves     aortic DAHLIA 1.1 CM    Venous stasis dermatitis of both lower extremities 8/22/2018         Past Surgical History:   Procedure Laterality Date    APPENDECTOMY      cararact  car    CARDIAC CATHETERIZATION      CARDIAC VALVE SURGERY      CATARACT EXTRACTION BILATERAL W/ ANTERIOR VITRECTOMY      bilaterial    CATARACT EXTRACTION W/  INTRAOCULAR LENS IMPLANT Bilateral     CHOLECYSTECTOMY      EYE SURGERY      JOINT REPLACEMENT      bilateral hip    KNEE SURGERY      Bilateral    TOTAL HIP ARTHROPLASTY      Bilaterial       Time Tracking:     OT Date of Treatment: 10/03/22  OT Start Time: 1105  OT Stop Time: 1135  OT Total Time (min): 30 min    Billable Minutes:Evaluation 10  Self Care/Home Management 20    10/3/2022

## 2022-10-03 NOTE — ASSESSMENT & PLAN NOTE
- DNR after conversation with wife on admission  - Palliative care consult  - Nutrition consult  - PT/OT  - SLP  -- NPO  - mIVF

## 2022-10-03 NOTE — HPI
Pt is a 89 y.o. male with PMH of CKD w/ baseline Cr of 1.3-1.5, Afib on AC, CHF, HTN, dementia who presented to ED yesterday for decreased mobility, decreased PO intake, failure to thrive. Pt was found to have aspiration PNA and many triple phos crystals on UA. Nephrology consulted for recs regarding crystals in UA.

## 2022-10-03 NOTE — PLAN OF CARE
Advance Care Planning   [facility] ([place] [address])  Palliative Care   Psychosocial Assessment    Patient Name: Randall Strickland Jr.  MRN: [id number]  Admission Date: 10/2/2022  Hospital Length of Stay: 1 days  Code Status: DNR   Attending Provider: Cecilio Celeste MD  Palliative Care Provider: Britta Javed NP  Primary Care Physician: Tiago Delaney MD  Principal Problem:Aspiration pneumonia    Reason for Referral: assistance with clarification of goals of care    Present during Interview: patient and spouse/SO.      Primary Language:English   Needed: no      Past Medical Situation:   PMH:   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    Postsurgical dumping syndrome 4/10/2014    PVD (peripheral vascular disease) 8/22/2018    Squamous cell carcinoma of skin 07/21/2014    right zygomatic cheek/temple     Stenosis of aortic and mitral valves     aortic DAHLIA 1.1 CM    Venous stasis dermatitis of both lower extremities 8/22/2018     Mental Health/Substance Use History: none identified   Risk of Abuse, neglect or exploitation: none identified   Current or Previous Trauma and/or evidence of PTSD: none identified   Non-traditional Health practices: none identified    Understanding of diagnosis and prognosis: Mrs. Strickland has good understanding of diagnosis and prognosis   Experience/Comfort level with health care system: patient has minimal experience within the healthcare system     Patients Mental Status: patient oriented to self    Socio-Economic Factors/Resources:  Address: [address] [place] [address]  Phone Number: [phone] (home) [phone] (work)    Marital Status:   Household composition: patient lives with Mrs. Strickland  Children: four  children, six grandchildren, and four great grandchildren     Patient/Family perceptions about Caregiving Needs; availability and capacity: Mrs. Strickland has been caring for patient, she is going to have additional help when patient goes home from home health    Patient/Family Strengths/Resilience: Mrs. 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 Strickland hopes to get patient home  Fears/Anxiety/Concerns: none stated         Preferences about EOL Environment: (own bed, family nearby, pets, music, etc) tbd      Complicated Bereavement Risk Assessment Tool (CBRAT)  Reference:  [facility] Clinical Practice Group (May 2016). Bereavement Risk Screening and Management Guidelines.  Retrieved from: [email or web address]-content/[email or web address]-[email or web address]-[email or web address].pdf      Bereaved Client Characteristics   Under 18      no  Was a Twin   no  Young Spouse   no  Elderly Spouse    no  Isolated    no  Lacks Meaningful Social Support   no  Dissatisfied with help available during illness   no  New to Financial [place] no  New to Decision-Making    no    Illness  Inherited Disorder   no  Stigmatized Disease in the family/community   no  Lengthy/Burdensome   no     Bereaved Client's History of Loss   Cumulative Multiple Losses   no  Previous Mental Health Illnesses   no  Current Mental Health Illness   no  Other Significant Health Issues   no   Migrant/Refugee   no Will the Death be:  Sudden or Unexpected   no  Traumatic Circumstances Associated with Death   no  Significant Cultural/Social Burdens as a result of Death   no   Relationship with   Profound Lifelong Partner   yes  Highly Dependent    no  Antagonistic   no  Ambivalent   no  Deeply Connected   yes  Culturally Defined   no   Risk Factors Scores  0-2  Low  3-5  Moderate  5+  High  All persons scoring moderate to high presume to be at risk**    (** It is acknowledged that protective factors and resilience may outweigh apparent risk factors.      Total Risk Factors Score:   low      Discharge Planning Needs/Plan of Care:   Palliative APRN and this  visited patient and Mrs. Strickland in room. Patient off the floor for ultrasound. Mrs. Strickland stated being very pleased with patient's care in the hospital. Mrs. Strickland stated patient will have home health upon discharge. Hospice was discussed and Mrs. Strickland stated a preference for home health. Mrs. Strickland stated knowing patient will not live much longer. Mrs. Strickland stated she is at peace with this as patient does not have a quality of life. Mrs. Strickland described patient will be cremated and if he dies before Thanksgi they will wait until the holiday to celebrate patient's life. Mrs. Strickland expressed appreciation for the care patient has received at the hospital.  will follow for support.     Meir Acevedo, FERN  Palliative Medicine

## 2022-10-03 NOTE — PLAN OF CARE
PT Evaluation completed and PT POC established.    Problem: Physical Therapy  Goal: Physical Therapy Goal  Description: Goals to be met by: 10/13/2022     Patient will increase functional independence with mobility by performin. Supine to sit with Contact Guard Assistance  2. Sit to supine with Contact Guard Assistance  3. Sit to stand transfer with Stand-by Assistance LRAD  4. Bed to chair transfer with Stand-by Assistance using LRAD  5. Gait  x 5 feet with Stand-by Assistance using LRAD.   6. Wheelchair propulsion x 5 feet with Minimal Assistance using B LE or B UE propulsion    Outcome: Ongoing, Progressing      No

## 2022-10-03 NOTE — CONSULTS
[facility] ([place] [address])  Adult Nutrition  Consult Note    SUMMARY     Recommendations    1. When medically able, ADAT Regular diet + Boost Plus TID with texture per SLP      2. If TF warranted: Novasource renal @ 45 ml/kcal to provide 1620 kcal, 73g protein and 825ml fluid. Require additional 795ml FW.   - Initiate TF at low rate to prevent refeeding syndrome   - monitor serum potassium, magnesium and phosphorus before initiation of nutrition     - supplement thiamine before refeeding or before initiating dextrose       3. RD to monitor and follow    Goals: Meet % EEN, EPN by RD f/u  Nutrition Goal Status: new  Communication of RD Recs:  (POC)    Assessment and Plan    Nutrition Problem:  Severe Protein-Calorie Malnutrition  Malnutrition in the context of Chronic Illness/Injury    Related to (etiology):  Inadequate nutrient intake    Signs and Symptoms (as evidenced by):  Body Fat Depletion: severe depletion of orbitals and triceps   Muscle Mass Depletion: severe depletion of temples, clavicle region, interosseous muscle, and lower extremities   Weight Loss: 28% x 7 months     Interventions(treatment strategy):  Collaboration with other providers  TF    Nutrition Diagnosis Status:  New      Malnutrition Assessment  Weight Loss (Malnutrition):  (28% x 7 months)   Orbital Region (Subcutaneous Fat Loss): severe depletion  Upper Arm Region (Subcutaneous Fat Loss): severe depletion   [place] Region (Muscle Loss): severe depletion  Clavicle Bone Region (Muscle Loss): severe depletion  Clavicle and Acromion Bone Region (Muscle Loss): severe depletion  Dorsal Hand (Muscle Loss): severe depletion  Anterior Thigh Region (Muscle Loss): severe depletion  Posterior Calf Region (Muscle Loss): severe depletion     Reason for Assessment    Reason For Assessment: consult  Diagnosis:  (aspiration pneumonia)  Relevant Medical History: s/p aortic valve placement (2016), CKD stage 3, dementia,  "FTT  Interdisciplinary Rounds: did not attend    General Information Comments:   89 yo M with a h/o Afib on AC, CHF, hypertension, CKD, and dementia who presents from home with concern with decreased appetite, decreased mobility, falls, and dyspnea over the past week. Per H&P, wife reports stated that pt was unable to tolerate full meals, only taking sips of water/juice. Pt was asleep during RD visits. Per wt hx,  lb, noted significant wt loss of 43lb (28%) x 7 months. Visual NFPE completed today, noted severe fat/muscle depletion. Pt meet the criteria for severe malnutrition in the context of chronic illness.     Nutrition Discharge Planning: Pending medical course    Nutrition Risk Screen    Nutrition Risk Screen: no indicators present    Anthropometrics    Temp: 98 °F (36.7 °C)  Height: 5' 9" (175.3 cm)  Height (inches): 69 in  Weight Method: Bed Scale  Weight: 49.3 kg (108 lb 11 oz)  Weight (lb): 108.69 lb  Ideal Body Weight (IBW), Male: 160 lb  % Ideal Body Weight, Male (lb): 67.93 %  BMI (Calculated): 16     Lab/Procedures/Meds    Pertinent Labs Reviewed: reviewed  Pertinent Labs Comments: glucose 137, BUN 86, eGFR 55.7  Pertinent Medications Reviewed: reviewed  Pertinent Medications Comments: lactated ringers    Estimated/Assessed Needs    Weight Used For Calorie Calculations: 49.3 kg (108 lb 11 oz)  Energy Calorie Requirements (kcal): 6467-9642 kcal  Energy Need Method: Kcal/kg (30-35)  Protein Requirements: 59-74 kcal (1.2-1.5g/kg)  Weight Used For Protein Calculations: 49.3 kg (108 lb 11 oz)  Fluid Requirements (mL): 1ml/kcal or per MD  Estimated Fluid Requirement Method: RDA Method  RDA Method (mL): 1479     Nutrition Prescription Ordered    Current Diet Order: NPO    Evaluation of Received Nutrient/Fluid Intake    I/O: + 0.7L since admit  Comments: LBM 10/2    Nutrition Risk    Level of Risk/Frequency of Follow-up: low     Monitor and Evaluation    Food and Nutrient Intake: energy intake, food and " beverage intake, enteral nutrition intake  Food and Nutrient Adminstration: diet order, enteral and parenteral nutrition administration  Physical Activity and Function: nutrition-related ADLs and IADLs  Anthropometric Measurements: height/length, weight, weight change, body mass index  Biochemical Data, Medical Tests and Procedures: gastrointestinal profile, electrolyte and renal panel, glucose/endocrine profile, inflammatory profile, lipid profile  Nutrition-Focused Physical Findings: overall appearance     Nutrition Follow-Up    RD Follow-up?: Yes    Timbo PROCTOR

## 2022-10-04 PROBLEM — Z51.5 COMFORT MEASURES ONLY STATUS: Status: ACTIVE | Noted: 2022-01-01

## 2022-10-04 NOTE — PLAN OF CARE
Spoke with wife at bedside after speaking with the attending this morning she has decided to go home with hospice and not home health, referral sent to [facility] spoke with Oz lira with [facility] regarding referral

## 2022-10-04 NOTE — CARE UPDATE
Goals of Care Discussion  Patient wife, medical decision maker, at bedside this morning and discussed given SLP NPO recommendations regarding patient being unable to safely eat without aspiration, his poor appetite, and her wishes not to have PEG/tube feeding; that has patient poor prognosis to strengthen even with the aid of therapy services. Wife understanding and she noted the patient does not seem to enjoy life anymore for some time now. Discussed again the role of hospice and transitioning to focus more on patient comfort going forward. Wife understanding and agreeable to comfort care. Palliative care to see patient and wife again today. Changes made to reflect conversation.

## 2022-10-04 NOTE — PLAN OF CARE
Spoke with hospice nurse she is ordering equipment to be delivered to the house this afternoon, within 1-3 hours, pt wife has gone home to meet them, daughter is at the bedside, pt will be dc'd home with [facility] this evening when equipment has arrived, pt will be transported by ambulance,  cm will setup transport when home is ready

## 2022-10-04 NOTE — PLAN OF CARE
[facility]  Department of Hospital Medicine  [address] [place] [address]  [phone] after hours  [phone] fax    HOSPICE  ORDERS    10/04/2022    Admit to Hospice:  Home Service     Diagnoses:   Active Hospital Problems    Diagnosis  POA    *Aspiration pneumonia [J69.0]  Unknown     Priority: 1 - High    Failure to thrive in adult [R62.7]  Unknown     Priority: 2     Chronic kidney disease, stage III (moderate) [N18.30]  Yes     Priority: 2     Longstanding persistent atrial fibrillation [I48.11]  Yes     Priority: 3     Encounter for palliative care [Z51.5]  Not Applicable    ACP (advance care planning) [Z71.89]  Not Applicable    Thrombocytopenia, unspecified [D69.6]  Yes    Dementia [F03.90]  Yes     Mini-Cog score 2 (2/22/17)  [place] Cognitive Assessment:   20/30 (12/2017)      S/P aortic valve replacement [Z95.2]  Not Applicable      Resolved Hospital Problems   No resolved problems to display.       Hospice Qualifying Diagnoses:        Patient has a life expectancy < 6 months due to:  Primary Hospice Diagnosis:  Aspiration Pneumonia  Comorbid Conditions Contributing to Decline: dysphagia, refeeding syndrome, dementia    Vital Signs: Routine per Hospice Protocol.    Code Status: DNR    Allergies: Review of patient's allergies indicates:  No Known Allergies    Diet: Pleasure Feedings    Activities: As tolerated    Goals of Care Treatment Preferences:  Code Status: DNR          What is most important right now is to focus on improvement in condition but with limits to invasive therapies.  Accordingly, we have decided that the best plan to meet the patient's goals includes comfort.      Nursing: Per Hospice Routine.    Routine Skin for Bedridden Patients: Apply moisture barrier cream to all skin folds and   wet areas in perineal area daily and after baths and all bowel movements.      Oxygen: 3L NC      Other Miscellaneous Care:       WOUND CARE:  Wound  spray or saline for wound cleaning with all dressing changes.    All wounds to be measured with first dressing changes and every week.      Pressure Ulcer(s) Stage III   Location: Sacrum, Left Hip            Consult ET nurse        Apply the following to wound:          -  Collagenase (Santyl) daily, cover with telfa, wrap with with kerlix dressing      Medications:        Medication List        START taking these medications      albuterol sulfate 2.5 mg/0.5 mL Nebu  Take 2.5 mg by nebulization every 4 (four) hours as needed (shortness of breath). Rescue            STOP taking these medications      apixaban 2.5 mg Tab  Commonly known as: ELIQUIS     polycarbophil 625 mg tablet  Commonly known as: FIBERCON     pulse oximeter device  Commonly known as: pulse oximeter     rivastigmine tartrate 3 MG capsule  Commonly known as: EXELON     thiamine 100 MG tablet                Future Orders:  Hospice Medical Director may dictate new orders for comfortable care measures & sign death certificate.        _________________________________  Cecilio Celeste MD  10/04/2022

## 2022-10-04 NOTE — PT/OT/SLP EVAL
"Speech Language Pathology Evaluation  Bedside Swallow    Patient Name:  Randall Strickland Jr.   MRN:  [id number]  Admitting Diagnosis: Aspiration pneumonia    Recommendations:                 General Recommendations:   ongoing swallow assessment  Diet recommendations:  NPO, NPO (ice chips sparingly for pleasure)   Aspiration Precautions: Frequent oral care, Ice chips sparingly, and Strict aspiration precautions   General Precautions: Standard, aspiration, fall  Communication strategies:  provide increased time to answer and go to room if call light pushed    History:     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    Postsurgical dumping syndrome 4/10/2014    PVD (peripheral vascular disease) 8/22/2018    Squamous cell carcinoma of skin 07/21/2014    right zygomatic cheek/temple     Stenosis of aortic and mitral valves     aortic DAHLIA 1.1 CM    Venous stasis dermatitis of both lower extremities 8/22/2018       Past Surgical History:   Procedure Laterality Date    APPENDECTOMY      cararact  car    CARDIAC CATHETERIZATION      CARDIAC VALVE SURGERY      CATARACT EXTRACTION BILATERAL W/ ANTERIOR VITRECTOMY      bilaterial    CATARACT EXTRACTION W/  INTRAOCULAR LENS IMPLANT Bilateral     CHOLECYSTECTOMY      EYE SURGERY      JOINT REPLACEMENT      bilateral hip    KNEE SURGERY      Bilateral    TOTAL HIP ARTHROPLASTY      Bilaterial     Principal Problem:Aspiration pneumonia     Chief Complaint:        Chief Complaint   Patient presents with    Failure To Thrive       Wife called EMS and stated "I think he's just dying"      HPI: "Mr. Strickland is a 89 yo M with a h/o Afib on AC, CHF, hypertension, CKD, and dementia who presents from home with concern with decreased appetite, decreased mobility, falls, and dyspnea over " "the past week. History obtained from wife. Patient with decreased mobility over the past several months that progressed prompting presentation. Wife states that patient was has gotten too weak to use his walker with occasional falls, unable to eat full meals (now only taking sips of water/juice), and coughing associated with dyspnea. EMS was called and brought patient to ED.     In the ED, patient 89% on RA, /78, HR 62, and afeb. Labs notable for WBC 17, Plt 41, K 5.3, Creatinine 1.4, Troponin 0.121, and TSH 1.316. EKG Afib with HR 89. CXR with patchy ground-glass interstitial opacities in the perihilar region and left lung base. Given CTX in the ED. "    Social History: Patient lives with spouse at home.     Prior Intubation HX:  n/a    Modified Barium Swallow: n/a    Chest X-Rays: 10/2: "Patchy ground-glass interstitial opacities prominently in the perihilar region and left lung base.  Findings could be related to infectious/inflammatory process or pulmonary edema in this patient with shortness of breath."    Prior diet: liquid/puree past couple months per spouse's report     Subjective     Spoke with RN prior to session. Pt asleep upon entry to room with spouse at bedside. Spouse reports pt has refused to eat anything recently and was only eating things such as soup at home for the past 3 months or so. Pt able to rouse slightly to verbal and tactile stimulation, though unable to sustain across session ultimately leading to cessation of activity.     Pain/Comfort:  Pain Rating 1: other (see comments) (pt unable to report)    Respiratory Status: Room air    Objective:     Oral Musculature Evaluation  Oral Musculature: general weakness, unable to assess due to poor participation/comprehension  Dentition: present and adequate  Secretion Management: problems swallowing secretions  Mucosal Quality: dry  Mandibular Strength and Mobility: impaired  Oral Labial Strength and Mobility: impaired retraction, impaired " pursing, impaired seal, impaired coordination  Lingual Strength and Mobility: impaired strength, impaired anterior elevation, impaired left lateral movement, impaired right lateral movement, impaired protrusion  Velar Elevation: impaired  Buccal Strength and Mobility: impaired  Volitional Cough: present, weak  Volitional Swallow: unable to elicit dry swallow  Voice Prior to PO Intake: minimally verball, diminished, weak    Bedside Swallow Eval:   Consistencies Assessed:  Thin liquids ice chip x1, tsp thin x1      Oral Phase:   Anterior loss  Decreased closure around utensil  Dry mouth  Poor oral acceptance  Slow oral transit time    Pharyngeal Phase:   coughing/choking  decreased hyolaryngeal excursion to palpation  delayed swallow initation    Compensatory Strategies  Pt unable to implement     Treatment: Education provided re: role of SLP, diet recs, swallow precs, s/s aspiration and POC.  Pt verbalized understanding and agreement.     Assessment:     Randall Strickland JrEllis is a 89 y.o. male with an SLP diagnosis of Dysphagia.  He presents with reduced levels of wakefulness/alertness and ongoing reduced appetite.    Goals:   Multidisciplinary Problems       SLP Goals          Problem: SLP    Goal Priority Disciplines Outcome   SLP Goal     SLP    Description: Speech Language Pathology Goals  Goals expected to be met by 10/11    1. Pt will participate in ongoing swallow assessment to determine safest and least restrictive PO diet.                                Plan:     Patient to be seen:  4 x/week   Plan of Care expires:  11/03/22  Plan of Care reviewed with:  patient, spouse   SLP Follow-Up:  Yes       Discharge recommendations:  home health speech therapy   Barriers to Discharge:  Level of Skilled Assistance Needed      Time Tracking:     SLP Treatment Date:   10/04/22  Speech Start Time:  0846  Speech Stop Time:  0901     Speech Total Time (min):  15 min    Billable Minutes: Eval Swallow and Oral Function 7 and Self  Care/Home Management Training 8    10/04/2022

## 2022-10-04 NOTE — PLAN OF CARE
Problem: Adult Inpatient Plan of Care  Goal: Plan of Care Review  Outcome: Ongoing, Progressing  Goal: Patient-Specific Goal (Individualized)  Outcome: Ongoing, Progressing  Goal: Absence of Hospital-Acquired Illness or Injury  Outcome: Ongoing, Progressing  Goal: Optimal Comfort and Wellbeing  Outcome: Ongoing, Progressing  Goal: Readiness for Transition of Care  Outcome: Ongoing, Not Progressing     Problem: Impaired Wound Healing  Goal: Optimal Wound Healing  Outcome: Ongoing, Progressing     Problem: Fall Injury Risk  Goal: Absence of Fall and Fall-Related Injury  Outcome: Ongoing, Progressing     Problem: Fall Injury Risk  Goal: Absence of Fall and Fall-Related Injury  Outcome: Ongoing, Progressing     Problem: Skin Injury Risk Increased  Goal: Skin Health and Integrity  Outcome: Ongoing, Progressing     Problem: Coping Ineffective  Goal: Effective Coping  Outcome: Ongoing, Not Progressing

## 2022-10-04 NOTE — PROGRESS NOTES
[facility] - Intensive Care ([place] [address])  Wound Care    Patient Name:  Randall Strickland Jr.   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    Postsurgical dumping syndrome 4/10/2014    PVD (peripheral vascular disease) 8/22/2018    Squamous cell carcinoma of skin 07/21/2014    right zygomatic cheek/temple     Stenosis of aortic and mitral valves     aortic DAHLIA 1.1 CM    Venous stasis dermatitis of both lower extremities 8/22/2018       Social History     Socioeconomic History    Marital status:      Spouse name: Sade   Occupational History    Occupation: Retired     Employer: retired 1992   Tobacco Use    Smoking status: Never    Smokeless tobacco: Never   Substance and Sexual Activity    Alcohol use: Yes     Comment: less than one  drink weekly    Drug use: No       Precautions:     Allergies as of 10/02/2022    (No Known Allergies)       WOC Assessment Details/Treatment   (Add Subjective Assessment as Narrative)  (Insert flowsheet data here)  Patient consult for wound care to sacral left hip and right forearm. Patient sacral and left hip that is moisture associated, both sites are cleanse with normal saline apply triad paste daily and prn  do not cover with a dressing leave open to air dry.The right forearm has a dry scab, leave open to air dry.   Recommendations made to primary team for treatment Orders placed.    10/04/2022

## 2022-10-04 NOTE — PLAN OF CARE
Bedside swallow evaluation completed. Recommend pt remain NPO at this time with frequent oral care/suctioning. Pt demonstrated significant lethargy, reduced levels of wakefulness/alertness, reduced appetite and overt s/sx of airway compromise with ice chips and thin liquids. ST services will continue to follow for ongoing swallow assessment.

## 2022-10-04 NOTE — CONSULTS
"Palliative Medicine Consult.    Consult received and case discussed with Dr. Celeste. Met with patient's wife at bedside today along with MOON Acevedo LCSW. Patient was initially off the floor for ultrasound and upon return he was drowsy and unable to participate in conversation. Wife reports that the patient is not eating and she believes that he will not live much longer. She has elected DNR code status and would not want him to have a feeding tube. Her goal is for him to go home and to "be content until he's gone." She would like to have him set up with home health including PT so they can work with him on getting out of bed.    Discussed the alternative option of hospice care which would promote comfort and quality of life while allowing the patient to avoid repeated hospitalizations. Explained that some hospice agencies may offer a little PT although not as much as with home health. Patient's wife does not find hospitalization to be burdensome and actually enjoys being here. She does not think the patient needs hospice yet but would be open to hospice care in the future. I explained she could also consider home-based palliative care or virtual palliative clinic visits, although hospice would provide them with the most support at home. I provided her with some literature to review about hospice vs palliative care. Will discuss further at our next visit.    Thank you for allowing us to be a part of the care of this patient. Full consult note to follow.    HASEEB Mosley, FNP-C  Palliative Medicine ext. [phone]   "

## 2022-10-06 PROBLEM — F03.90 DEMENTIA: Status: RESOLVED | Noted: 2017-02-22 | Resolved: 2022-01-01

## 2022-10-06 PROBLEM — N18.30 CHRONIC KIDNEY DISEASE, STAGE III (MODERATE): Status: RESOLVED | Noted: 2017-02-09 | Resolved: 2022-01-01

## 2022-10-06 PROBLEM — I25.110 ATHEROSCLEROSIS OF NATIVE CORONARY ARTERY OF NATIVE HEART WITH UNSTABLE ANGINA PECTORIS: Status: ACTIVE | Noted: 2022-01-01

## 2022-10-06 NOTE — PROGRESS NOTES
"Randall Strickland presented for a  Medicare AWV and comprehensive Health Risk Assessment today. The following components were reviewed and updated:    Medical history  Family History  Social history  Allergies and Current Medications  Health Risk Assessment  Health Maintenance  Care Team         ** See Completed Assessments for Annual Wellness Visit within the encounter summary.**         The following assessments were completed:  Living Situation  CAGE  Depression Screening  Timed Get Up and Go: Deferred, bed bound  Whisper Test  Cognitive Function Screening: Deferred, dementia  Nutrition Screening  ADL Screening  PAQ Screening        Vitals:    10/06/22 0956   BP: (!) 110/50   BP Location: Right arm   Patient Position: Sitting   Pulse: 84   Resp: 16   Temp: 97 °F (36.1 °C)   SpO2: 99%   Weight: 49.4 kg (109 lb)   Height: 5' 9" (1.753 m)     Body mass index is 16.1 kg/m².    Physical Exam  Constitutional:       General: He is not in acute distress.  Cardiovascular:      Rate and Rhythm: Normal rate and regular rhythm.      Pulses: Normal pulses.      Heart sounds: No murmur heard.  Pulmonary:      Effort: Pulmonary effort is normal. No respiratory distress.      Breath sounds: Normal breath sounds.   Abdominal:      General: Bowel sounds are normal.   Musculoskeletal:      Right lower leg: No edema.      Left lower leg: No edema.   Neurological:      Mental Status: He is alert.             Diagnoses and health risks identified today and associated recommendations/orders:    1. Encounter for preventive health examination  Assessments completed.   recommendations reviewed. F/u with Hospice as instructed.     2. Aspiration pneumonia, unspecified aspiration pneumonia type, unspecified laterality, unspecified part of lung  Chronic, stable on current nebulizer regimen. Followed by Hospice.     3. PVD (peripheral vascular disease)  Chronic, stable. Discontinued medication. Followed by Hospice.    4. Aortic " atherosclerosis  Chronic, stable. Discontinued medication. Followed by Hospice.    5. Tortuous aorta  Chronic, stable. Discontinued medication. Followed by Hospice.    6. Longstanding persistent atrial fibrillation  Chronic, stable. Discontinued medication. Followed by Hospice.    7. Atherosclerosis of native coronary artery of native heart with unstable angina pectoris  Chronic, stable. Discontinued medication. Followed by Hospice.    8. Chronic diastolic congestive heart failure  Chronic, stable. Discontinued medication. Followed by Hospice.    9. Cardiomyopathy, idiopathic  Chronic, stable. Discontinued medication. Followed by Hospice.    10. Dementia in hydrocephalus  Chronic, stable. Discontinued medication. Followed by Hospice.    11. Thrombocytopenia, unspecified  Chronic, stable. Discontinued medication. Followed by Hospice.    12. Unspecified inflammatory spondylopathy, lumbar region  Chronic, stable. Discontinued medication. Followed by Hospice.    13. Hypertension, essential  Chronic, stable. Discontinued medication. Followed by Hospice.    14. BPH with urinary obstruction  Chronic, stable. Discontinued medication. Followed by Hospice.    15. Chronic anticoagulation  Chronic, stable. Discontinued medication. Followed by Hospice.    16. Anemia, unspecified type  Chronic, stable. Discontinued medication. Followed by Hospice.    17. ADEBAYO (obstructive sleep apnea)  Chronic, stable. Discontinued medication. Followed by Hospice.    18. Comfort measures only status  Chronic, stable. Discontinued medication. Followed by Hospice.    19. Dependence on other enabling machines and devices  Chronic, stable. Bed bound. Home oxygen as needed. Followed by Hospice.    20. Dependence on supplemental oxygen  Chronic, stable. Home oxygen as needed. . Followed by Hospice.    21. Weight loss, abnormal  Chronic, stable. Discontinued medication. Followed by Hospice.    22. Underweight  Chronic, stable. Discontinued medication.  Followed by Hospice.      Provided Randall with a 5-10 year written screening schedule and personal prevention plan. Recommendations were developed using the USPSTF age appropriate recommendations. Education, counseling, and referrals were provided as needed. After Visit Summary printed and given to patient which includes a list of additional screenings\tests needed.    Follow up in about 1 year (around 10/6/2023) for Medicare AWV.      Patricia Miller NP  I offered to discuss advanced care planning, including how to pick a person who would make decisions for you if you were unable to make them for yourself, called a health care power of , and what kind of decisions you might make such as use of life sustaining treatments such as ventilators and tube feeding when faced with a life limiting illness recorded on a living will that they will need to know. (How you want to be cared for as you near the end of your natural life)     X  Patient has advanced directives written and agrees to provide copies to the institution.

## 2022-10-06 NOTE — PATIENT INSTRUCTIONS
Counseling and Referral of Other Preventative  (Italic type indicates deductible and co-insurance are waived)    Patient Name: Randall Strickland  Today's Date: 10/6/2022    Health Maintenance       Date Due Completion Date    Shingles Vaccine (2 of 3) 05/20/2015 3/25/2015    COVID-19 Vaccine (3 - Booster for Pfizer series) 06/17/2021 4/22/2021    Lipid Panel 07/08/2021 7/8/2020    Influenza Vaccine (1) 09/01/2022 10/10/2020    TETANUS VACCINE 05/05/2026 5/5/2016    Colonoscopy 02/22/2027 2/22/2017 (ClinicallyNA)    Override on 2/22/2017: Not Clinically Appropriate    Override on 1/18/2011: Done        No orders of the defined types were placed in this encounter.      The following information is provided to all patients.  This information is to help you find resources for any of the problems found today that may be affecting your health:                Living healthy guide: www.[facility].[place].[facility]      Understanding Diabetes: www.diabetes.org      Eating healthy: www.cdc.gov/healthyweight      CDC home safety checklist: www.cdc.gov/steadi/patient.html      Agency on Aging: www.goea.[place].[facility]      Alcoholics anonymous (AA): www.aa.org      Physical Activity: www.reji.nih.gov/rz0enub      Tobacco use: www.quitwithusla.org

## 2022-10-07 NOTE — DISCHARGE SUMMARY
[facility] - Intensive Care ([place] [address])  [facility] Medicine  Discharge Summary      Patient Name: Randall Strickland Jr.  MRN: [id number]  Patient Class: IP- Inpatient  Admission Date: 10/2/2022  Hospital Length of Stay: 2 days  Discharge Date and Time: 10/4/2022  4:33 PM  Attending Physician: No att. providers found   Discharging Provider: Cecilio Celeste MD  Primary Care Provider: Tiago Delaney MD  [facility] Medicine Team: [facility] HOSP MED  Cecilio Celeste MD    HPI:   Mr. Strickland is a 89 yo M with a h/o Afib on AC, CHF, hypertension, CKD, and dementia who presents from home with concern with decreased appetite, decreased mobility, falls, and dyspnea over the past week. History obtained from wife. Patient with decreased mobility over the past several months that progressed prompting presentation. Wife states that patient was has gotten too weak to use his walker with occasional falls, unable to eat full meals (now only taking sips of water/juice), and coughing associated with dyspnea. EMS was called and brought patient to ED.    In the ED, patient 89% on RA, /78, HR 62, and afeb. Labs notable for WBC 17, Plt 41, K 5.3, Creatinine 1.4, Troponin 0.121, and TSH 1.316. EKG Afib with HR 89. CXR with patchy ground-glass interstitial opacities in the perihilar region and left lung base. Given CTX in the ED.       * No surgery found *      Hospital Course:   Patient admitted with failure to thrive. Palliative consulted to aid with goal of care conversation. Patient wife decided on hospice as patient unable to make decisions, poor functional status, and poor quality of life. Patient discharge home with home hospice.        Goals of Care Treatment Preferences:  Code Status: DNR          What is most important right now is to focus on improvement in condition but with limits to invasive therapies.  Accordingly, we have decided that the best plan to meet the patient's goals includes comfort care.      Consults:   Consults  "(From admission, onward)        Status Ordering Provider     Inpatient consult to Palliative Care  Once        Provider:  (Not yet assigned)    Completed TRANG PADGETT     Inpatient consult to Nephrology  Once        Provider:  (Not yet assigned)    Completed TRANG PADGETT     Inpatient consult to Registered Dietitian/Nutritionist  Once        Provider:  (Not yet assigned)    Completed TRANG PADGETT     Inpatient consult to Social Work  Once        Provider:  (Not yet assigned)    Completed WEST JONES          No new Assessment & Plan notes have been filed under this hospital service since the last note was generated.  Service: Hospital Medicine    Final Active Diagnoses:    Diagnosis Date Noted POA    PRINCIPAL PROBLEM:  Aspiration pneumonia [J69.0] 12/16/2019 Unknown    Failure to thrive in adult [R62.7] 10/02/2022 Unknown    Longstanding persistent atrial fibrillation [I48.11] 03/13/2020 Yes    Comfort measures only status [Z51.5] 10/04/2022 Not Applicable    Encounter for palliative care [Z51.5] 10/03/2022 Not Applicable    ACP (advance care planning) [Z71.89] 10/02/2022 Not Applicable    Thrombocytopenia, unspecified [D69.6] 04/13/2021 Yes    S/P aortic valve replacement [Z95.2] 10/11/2016 Not Applicable      Problems Resolved During this Admission:    Diagnosis Date Noted Date Resolved POA    Chronic kidney disease, stage III (moderate) [N18.30] 02/09/2017 10/06/2022 Yes    Dementia [F03.90] 02/22/2017 10/06/2022 Yes       Discharged Condition: good    Disposition: Hospice/Home    Follow Up:    Patient Instructions:      HOSPITAL BED FOR HOME USE     Order Specific Question Answer Comments   Type: Semi-electric    Length of need (1-99 months): 12    Does patient have medical equipment at home? wheelchair    Does patient have medical equipment at home? walker, rolling    Height: 5' 9" (1.753 m)    Weight: 49.3 kg (108 lb 11 oz)    Please check all that apply: Patient requires " positioning of the body in ways not feasible in an ordinary bed due to a medical condition which is expected to last at least one month.    Please check all that apply: Patient requires, for the alleviation of pain, positioning of the body in ways not feasible in an ordinary bed.    Please check all that apply: Patient requires the head of bed to be elevated more than 30 degrees most of the time due to congestive heart failure, chronic pulmonary disease, or aspiration.  Pillows and wedges have been considered and ruled out.      Diet Adult Regular     Notify your health care provider if you experience any of the following:  temperature >100.4     Notify your health care provider if you experience any of the following:  increased confusion or weakness     Notify your health care provider if you experience any of the following:  persistent dizziness, light-headedness, or visual disturbances     Notify your health care provider if you experience any of the following:  worsening rash     Notify your health care provider if you experience any of the following:  severe persistent headache     Notify your health care provider if you experience any of the following:  difficulty breathing or increased cough     Notify your health care provider if you experience any of the following:  redness, tenderness, or signs of infection (pain, swelling, redness, odor or green/yellow discharge around incision site)     Notify your health care provider if you experience any of the following:  severe uncontrolled pain     Notify your health care provider if you experience any of the following:  persistent nausea and vomiting or diarrhea     Activity as tolerated       Significant Diagnostic Studies: Labs: All labs within the past 24 hours have been reviewed    Pending Diagnostic Studies:     None         Medications:  Reconciled Home Medications:      Medication List      START taking these medications    albuterol sulfate 2.5 mg/0.5 mL  Nebu  Take 2.5 mg by nebulization every 4 (four) hours as needed (shortness of breath). Rescue        STOP taking these medications    apixaban 2.5 mg Tab  Commonly known as: ELIQUIS     polycarbophil 625 mg tablet  Commonly known as: FIBERCON     pulse oximeter device  Commonly known as: pulse oximeter     rivastigmine tartrate 3 MG capsule  Commonly known as: EXELON     thiamine 100 MG tablet            Indwelling Lines/Drains at time of discharge:   Lines/Drains/Airways     Drain  Duration           Male External Urinary Catheter 10/02/22 1059 4 days                Time spent on the discharge of patient: 35 minutes         Cecilio Celeste MD  Department of Hospital Medicine  [facility] - Intensive Care ([place])

## 2022-10-07 NOTE — PLAN OF CARE
[facility] - Intensive Care ([facility])  Discharge Final Note    Primary Care Provider: Tiago Delaney MD    Expected Discharge Date: 10/4/2022    Final Discharge Note (most recent)       Final Note - 10/07/22 1532          Final Note    Assessment Type Final Discharge Note (P)      Anticipated Discharge Disposition Hospice/Home (P)         Post-Acute Status    Discharge Delays None known at this time (P)                      Important Message from Medicare           [facility]

## 2022-10-07 NOTE — HOSPITAL COURSE
Patient admitted with failure to thrive. Palliative consulted to aid with goal of care conversation. Patient wife decided on hospice as patient unable to make decisions, poor functional status, and poor quality of life. Patient discharge home with home hospice.

## 2022-10-14 ENCOUNTER — TELEPHONE (OUTPATIENT)
Dept: INTERNAL MEDICINE | Facility: CLINIC | Age: 87
End: 2022-10-14

## 2022-10-14 NOTE — TELEPHONE ENCOUNTER
Called and spoke to wife. Pt passed away on Sunday. Sent my condolences to Mrs. Marco A and family.

## 2022-10-14 NOTE — TELEPHONE ENCOUNTER
Last care at home note indicates patient is in hospice care. Please call and confirm.    Thank you.

## 2022-10-14 NOTE — TELEPHONE ENCOUNTER
----- Message from Bruce Escalante MA sent at 9/26/2022  4:16 PM CDT -----  Contact: [phone]    ----- Message -----  From: Jeannette Bartlett  Sent: 9/26/2022  11:33 AM CDT  To: Jonh GONZALEZ Staff    Mrs Strickland, called, would like a call back from Dr Borja's office to find out if is possible for her to have home health help for patient. Please call and advise. Thank you

## 2022-10-19 NOTE — SUBJECTIVE & OBJECTIVE
Interval History: Patient is arousable but drowsy on exam today. Non-verbal but smiles when spoken to. Wife Sade at bedside reports that she has decided to take him home with hospice.    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    PVD (peripheral vascular disease) 8/22/2018    Squamous cell carcinoma of skin 07/21/2014    right zygomatic cheek/temple     Stenosis of aortic and mitral valves     aortic DAHLIA 1.1 CM    Venous stasis dermatitis of both lower extremities 8/22/2018       Past Surgical History:   Procedure Laterality Date    APPENDECTOMY      cararact  car    CARDIAC CATHETERIZATION      CARDIAC VALVE SURGERY      CATARACT EXTRACTION BILATERAL W/ ANTERIOR VITRECTOMY      bilaterial    CATARACT EXTRACTION W/  INTRAOCULAR LENS IMPLANT Bilateral     CHOLECYSTECTOMY      EYE SURGERY      JOINT REPLACEMENT      bilateral hip    KNEE SURGERY      Bilateral    TOTAL HIP ARTHROPLASTY      Bilaterial       Review of patient's allergies indicates:  No Known Allergies    Medications:  Continuous Infusions:   dextrose 5 % 50 mL/hr at 10/03/22 1506    lactated ringers       Scheduled Meds:   azithromycin  500 mg Intravenous Q24H    cefTRIAXone (ROCEPHIN) IVPB  2 g Intravenous Q24H    mupirocin   Nasal BID     PRN Meds:acetaminophen, albuterol sulfate, dextrose 10%, dextrose 10%, glucagon (human recombinant), melatonin, ondansetron, polyethylene glycol, prochlorperazine, sodium chloride 0.9%    Family History       Problem Relation (Age of Onset)    No Known Problems Father, Mother, Sister, Brother, Maternal Aunt, Maternal Uncle, Paternal Aunt, Paternal  Uncle, Maternal Grandmother, Maternal Grandfather, Paternal Grandmother, Paternal Grandfather, Daughter, Son, Daughter, Son          Tobacco Use    Smoking status: Never    Smokeless tobacco: Never   Substance and Sexual Activity    Alcohol use: Yes     Comment: less than one  drink weekly    Drug use: No    Sexual activity: Not on file       Review of Systems   Unable to perform ROS: Dementia   Objective:     Vital Signs (Most Recent):  Temp: 97.4 °F (36.3 °C) (10/04/22 1133)  Pulse: 63 (10/04/22 1521)  Resp: 16 (10/04/22 1133)  BP: 131/76 (10/04/22 1133)  SpO2: 100 % (10/04/22 1409) Vital Signs (24h Range):  Temp:  [97.4 °F (36.3 °C)-98.3 °F (36.8 °C)] 98 °F (36.7 °C)  Pulse:  [58-83] 76  Resp:  [16-20] 16  SpO2:  [94 %-100 %] 96 %  BP: (119-153)/(59-78) 134/68      Weight: 49.3 kg (108 lb 11 oz)  Body mass index is 16.05 kg/m².    Physical Exam  Vitals and nursing note reviewed.   Constitutional:       General: He is not in acute distress.     Appearance: He is cachectic. He is ill-appearing.   HENT:      Head: Normocephalic and atraumatic.   Eyes:      Extraocular Movements: Extraocular movements intact.      Conjunctiva/sclera: Conjunctivae normal.   Cardiovascular:      Rate and Rhythm: Normal rate. Rhythm irregular.   Pulmonary:      Effort: Pulmonary effort is normal. No respiratory distress.      Comments: On 3L O2 via NC  Abdominal:      General: Abdomen is flat.      Tenderness: There is no abdominal tenderness.   Musculoskeletal:         General: No swelling or deformity.   Skin:     General: Skin is warm and dry.   Neurological:      Mental Status: He is lethargic.      Motor: Weakness present.      Comments: Arousable but drowsy       Review of Symptoms      Symptom Assessment (ESAS 0-10 Scale)  Unable to complete assessment due to Dementia         Pain Assessment:  OME in 24 hours:  0  Location(s):      Pain Assessment in Advanced Demential Scale (PAINAD)   Breathing - Independent of vocalization:   "0  Negative vocalization:  0  Facial expression:  0  Body language:  0  Consolability:  0  Total:  0    Performance Status:  20    Living Arrangements:  Lives with spouse    Psychosocial/Cultural: Patient and his wife have been  for 65 years. They have two daughters, two sons, six grandchildren, and four great-grandchildren. The patient's only brother is . He is retired from the insurance industry. He used to enjoy tennis and golf and going out to dinner. His wife's goal is for him to go home and to "be content until he's gone."    Spiritual:  F - Kylah and Belief:  Yazidi  I - Importance:  Yes  C - Community:  Belongs to [facility]  A - Address in Care:   visits      Advance Care Planning   Advance Directives:   Living Will: No    LaPOST: No    Do Not Resuscitate Status: Yes    Medical Power of : No      Decision Making:  Family answered questions and Patient unable to communicate due to disease severity/cognitive impairment       Significant Labs: All pertinent labs within the past 24 hours have been reviewed.  CBC:   Recent Labs   Lab 10/03/22  0212   WBC 15.72*   HGB 10.1*   HCT 32.0*   *   PLT 36*     BMP:  Recent Labs   Lab 10/03/22  0212   *      K 4.2      CO2 18*   BUN 86*   CREATININE 1.2   CALCIUM 8.5*   MG 1.8     LFT:  Lab Results   Component Value Date    AST 16 10/03/2022    ALKPHOS 126 10/03/2022    BILITOT 0.9 10/03/2022     Albumin:   Albumin   Date Value Ref Range Status   10/03/2022 1.9 (L) 3.5 - 5.2 g/dL Final     Protein:   Total Protein   Date Value Ref Range Status   10/03/2022 5.3 (L) 6.0 - 8.4 g/dL Final     Lactic acid:   Lab Results   Component Value Date    LACTATE 1.5 10/02/2022    LACTATE 1.3 2019       Significant Imaging: I have reviewed all pertinent imaging results/findings within the past 24 hours.    "

## 2022-10-19 NOTE — CONSULTS
[facility]  Palliative Medicine  Consult Note  10/3/2022    Patient Name: Randall Strickland Jr.  MRN: [id number]  Admission Date: 10/2/2022  Hospital Length of Stay: 1 days  Code Status: DNR   Attending Provider: Cecilio Celeste MD  Consulting Provider: Britta Javed NP  Primary Care Physician: Tiago Delaney MD  Principal Problem:Aspiration pneumonia    Patient information was obtained from spouse/SO, past medical records and primary team.      Consults  Assessment/Plan:     Encounter for palliative care  Impression:  Palliative Care consulted for end of life/hospice discussion for this 89 y/o male being followed by Hospital Medicine and Nephrology for Aspiration pneumonia, Chronic kidney disease, Atrial fibrillation, Dementia, and Failure to thrive. He presented to the ED accompanied by his wife on 10/2 for decreased appetite, decreased mobility, falls, and dyspnea. On arrival, his O2 sats were 89% on RA. Labs notable for WBC 17, Plt 41, K 5.3, Creatinine 1.4, Troponin 0.121, and TSH 1.316. CXR with patchy ground-glass interstitial opacities in the perihilar region and left lung base. He was started on IV abx and admitted to Hospital Medicine. Patient had retroperitoneal ultrasound performed today and on return to his room, he was drowsy and unable to participate in conversation. Wife Sade at bedside.    Plan/Recommendations:  1. See goals of care discussion below.  2. Code status DNR.  3. Wife considering options of home health and palliative care vs home hospice.  4. Palliative care will follow up tomorrow.    Aspiration pneumonia/Chronic kidney disease/Atrial fibrillation/Dementia/Failure to thrive - management per primary team and other consultants    Goals of Care/Advance Care Planning:  10/3 Discussion held by this NP and MOON Acevedo LCSW with patients wife to discuss his current clinical status, goals of care, code status, long term expected outcomes and prognostic awareness. Patient is unable to  "participate in meaningful conversation due to dementia. The patient does not have any advanced directives and his wife is his legal next of kin and surrogate medical decision maker. After a discussion concerning the patients values and wishes, she verbalized good understanding and insight as it relates to her prognostic awareness. She reports that the patient is not eating and she believes that he will not live much longer. She has elected DNR code status and does not think he would want a feeding tube. Her goal is for him to go home and to "be content until he's gone." She would like to have him set up with home health including PT so they can work with him on getting out of bed. She reports that he has been bedbound for the last three weeks.    Discussed the alternative option of hospice care which would promote comfort and quality of life while allowing the patient to avoid repeated hospitalizations. Explained that some hospice agencies may offer a little PT although not as much as with home health. Patient's wife does not find hospitalization to be burdensome and actually enjoys being here. She is unsure whether the patient needs hospice yet but says she would be open to hospice care in the future. I explained she could also consider home-based palliative care or virtual palliative clinic visits, although hospice would provide the patient with the most support at home. I provided her with some literature to review about hospice vs palliative care. Will discuss further at our next visit.    Case discussed today with Dr. Celeste.        Thank you for your consult. I will follow-up with patient. Please contact us if you have any additional questions.    Subjective:     HPI:   Mr. Strickland is a 89 y/o male with PMHx of CHF, afib, hypertension, CKD, and dementia who presented to the ED at [facility] on 10/2 with decreased appetite, decreased mobility, falls, and dyspnea. Per wife, patient with decreased mobility that has " progressed over the past several months. He has gotten too weak to use his walker and has had occasional falls. He is only taking sips of water/juice and is unable to eat full meals. His wife called EMS due to concern for dyspnea and cough. On arrival to the ED, his O2 sats were 89% on RA. Labs notable for WBC 17, Plt 41, K 5.3, Creatinine 1.4, Troponin 0.121, and TSH 1.316. CXR with patchy ground-glass interstitial opacities in the perihilar region and left lung base. He was started on IV abx and admitted to Hospital Medicine.      Hospital Course:  No notes on file    Interval History: Patient is arousable but drowsy on exam today. Wife Sade at bedside.    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    Postsurgical dumping syndrome 4/10/2014    PVD (peripheral vascular disease) 8/22/2018    Squamous cell carcinoma of skin 07/21/2014    right zygomatic cheek/temple     Stenosis of aortic and mitral valves     aortic DAHLIA 1.1 CM    Venous stasis dermatitis of both lower extremities 8/22/2018       Past Surgical History:   Procedure Laterality Date    APPENDECTOMY      cararact  car    CARDIAC CATHETERIZATION      CARDIAC VALVE SURGERY      CATARACT EXTRACTION BILATERAL W/ ANTERIOR VITRECTOMY      bilaterial    CATARACT EXTRACTION W/  INTRAOCULAR LENS IMPLANT Bilateral     CHOLECYSTECTOMY      EYE SURGERY      JOINT REPLACEMENT      bilateral hip    KNEE SURGERY      Bilateral    TOTAL HIP ARTHROPLASTY      Bilaterial       Review of patient's allergies indicates:  No Known Allergies    Medications:  Continuous Infusions:   dextrose 5 % 50 mL/hr at 10/03/22 1506    lactated ringers       Scheduled Meds:   azithromycin  500 mg Intravenous Q24H    cefTRIAXone (ROCEPHIN)  IVPB  2 g Intravenous Q24H    mupirocin   Nasal BID     PRN Meds:acetaminophen, albuterol sulfate, dextrose 10%, dextrose 10%, glucagon (human recombinant), melatonin, ondansetron, polyethylene glycol, prochlorperazine, sodium chloride 0.9%    Family History       Problem Relation (Age of Onset)    No Known Problems Father, Mother, Sister, Brother, Maternal Aunt, Maternal Uncle, Paternal Aunt, Paternal Uncle, Maternal Grandmother, Maternal Grandfather, Paternal Grandmother, Paternal Grandfather, Daughter, Son, Daughter, Son          Tobacco Use    Smoking status: Never    Smokeless tobacco: Never   Substance and Sexual Activity    Alcohol use: Yes     Comment: less than one  drink weekly    Drug use: No    Sexual activity: Not on file       Review of Systems   Unable to perform ROS: Dementia   Objective:     Vital Signs (Most Recent):  Temp: 98 °F (36.7 °C) (10/03/22 1618)  Pulse: 76 (10/03/22 1618)  Resp: 16 (10/03/22 1618)  BP: 134/68 (10/03/22 1618)  SpO2: 96 % (10/03/22 1618) Vital Signs (24h Range):  Temp:  [97.7 °F (36.5 °C)-98.3 °F (36.8 °C)] 98 °F (36.7 °C)  Pulse:  [58-83] 76  Resp:  [16-20] 16  SpO2:  [94 %-98 %] 96 %  BP: (119-153)/(59-78) 134/68     Weight: 49.3 kg (108 lb 11 oz)  Body mass index is 16.05 kg/m².    Physical Exam  Vitals and nursing note reviewed.   Constitutional:       General: He is not in acute distress.     Appearance: He is cachectic. He is ill-appearing.   HENT:      Head: Normocephalic and atraumatic.   Eyes:      Extraocular Movements: Extraocular movements intact.      Conjunctiva/sclera: Conjunctivae normal.   Cardiovascular:      Rate and Rhythm: Normal rate. Rhythm irregular.   Pulmonary:      Effort: Pulmonary effort is normal. No respiratory distress.      Comments: On 3L O2 via NC  Abdominal:      General: Abdomen is flat.      Tenderness: There is no abdominal tenderness.   Musculoskeletal:         General: No swelling or deformity.   Skin:     General: Skin is warm  "and dry.   Neurological:      Mental Status: He is lethargic.      Motor: Weakness present.      Comments: Arousable but drowsy       Review of Symptoms      Symptom Assessment (ESAS 0-10 Scale)  Unable to complete assessment due to Dementia         Pain Assessment:  OME in 24 hours:  0  Location(s):      Pain Assessment in Advanced Demential Scale (PAINAD)   Breathing - Independent of vocalization:  0  Negative vocalization:  0  Facial expression:  0  Body language:  0  Consolability:  0  Total:  0    Performance Status:  30    Living Arrangements:  Lives with spouse    Psychosocial/Cultural: Patient and his wife have been  for 65 years. They have two daughters, two sons, six grandchildren, and four great-grandchildren. The patient's only brother is . He is retired from the insurance industry. He used to enjoy tennis and golf and going out to dinner. His wife's goal is for him to go home and to "be content until he's gone."    Spiritual:  F - Kylah and Belief:  Restorationism  I - Importance:  Yes  C - Community:  Belongs to [facility]  A - Address in Care:   visits      Advance Care Planning   Advance Directives:   Living Will: No    LaPOST: No    Do Not Resuscitate Status: Yes    Medical Power of : No      Decision Making:  Family answered questions and Patient unable to communicate due to disease severity/cognitive impairment  Goals of Care: What is most important right now is to focus on improvement in condition but with limits to invasive therapies. Accordingly, we have decided that the best plan to meet the patient's goals includes continuing with conservative treatment.       Significant Labs: All pertinent labs within the past 24 hours have been reviewed.  CBC:   Recent Labs   Lab 10/03/22  0212   WBC 15.72*   HGB 10.1*   HCT 32.0*   *   PLT 36*     BMP:  Recent Labs   Lab 10/03/22  0212   *      K 4.2      CO2 18*   BUN 86*   CREATININE 1.2 "   CALCIUM 8.5*   MG 1.8     LFT:  Lab Results   Component Value Date    AST 16 10/03/2022    ALKPHOS 126 10/03/2022    BILITOT 0.9 10/03/2022     Albumin:   Albumin   Date Value Ref Range Status   10/03/2022 1.9 (L) 3.5 - 5.2 g/dL Final     Protein:   Total Protein   Date Value Ref Range Status   10/03/2022 5.3 (L) 6.0 - 8.4 g/dL Final     Lactic acid:   Lab Results   Component Value Date    LACTATE 1.5 10/02/2022    LACTATE 1.3 12/16/2019       Significant Imaging: I have reviewed all pertinent imaging results/findings within the past 24 hours.        20 minutes spent in advanced care planning    Britta Javed NP  Palliative Medicine  [facility]

## 2022-10-19 NOTE — ASSESSMENT & PLAN NOTE
Impression:  Palliative Care consulted for end of life/hospice discussion for this 89 y/o male being followed by Hospital Medicine and Nephrology for Aspiration pneumonia, Chronic kidney disease, Atrial fibrillation, Dementia, and Failure to thrive. He presented to the ED accompanied by his wife on 10/2 for decreased appetite, decreased mobility, falls, and dyspnea. On arrival, his O2 sats were 89% on RA. Labs notable for WBC 17, Plt 41, K 5.3, Creatinine 1.4, Troponin 0.121, and TSH 1.316. CXR with patchy ground-glass interstitial opacities in the perihilar region and left lung base. He was started on IV abx and admitted to Hospital Medicine. Patient had retroperitoneal ultrasound performed today and on return to his room, he was drowsy and unable to participate in conversation. Wife Sade at bedside.    Plan/Recommendations:  1. See goals of care discussion below.  2. Code status DNR.  3. Wife considering options of home health and palliative care vs home hospice.  4. Palliative care will follow up tomorrow.    Aspiration pneumonia/Chronic kidney disease/Atrial fibrillation/Dementia/Failure to thrive - management per primary team and other consultants    Goals of Care/Advance Care Planning:  10/3 Discussion held by this NP and MOON Acevedo LCSW with patients wife to discuss his current clinical status, goals of care, code status, long term expected outcomes and prognostic awareness. Patient is unable to participate in meaningful conversation due to dementia. The patient does not have any advanced directives and his wife is his legal next of kin and surrogate medical decision maker. After a discussion concerning the patients values and wishes, she verbalized good understanding and insight as it relates to her prognostic awareness. She reports that the patient is not eating and she believes that he will not live much longer. She has elected DNR code status and does not think he would want a feeding tube. Her goal  "is for him to go home and to "be content until he's gone." She would like to have him set up with home health including PT so they can work with him on getting out of bed. She reports that he has been bedbound for the last three weeks.    Discussed the alternative option of hospice care which would promote comfort and quality of life while allowing the patient to avoid repeated hospitalizations. Explained that some hospice agencies may offer a little PT although not as much as with home health. Patient's wife does not find hospitalization to be burdensome and actually enjoys being here. She is unsure whether the patient needs hospice yet but says she would be open to hospice care in the future. I explained she could also consider home-based palliative care or virtual palliative clinic visits, although hospice would provide the patient with the most support at home. I provided her with some literature to review about hospice vs palliative care. Will discuss further at our next visit.    Case discussed today with Dr. Celeste.  "

## 2022-10-19 NOTE — HPI
Mr. Strickland is a 89 y/o male with PMHx of CHF, afib, hypertension, CKD, and dementia who presented to the ED at [facility] on 10/2 with decreased appetite, decreased mobility, falls, and dyspnea. Per wife, patient with decreased mobility that has progressed over the past several months. He has gotten too weak to use his walker and has had occasional falls. He is only taking sips of water/juice and is unable to eat full meals. His wife called EMS due to concern for dyspnea and cough. On arrival to the ED, his O2 sats were 89% on RA. Labs notable for WBC 17, Plt 41, K 5.3, Creatinine 1.4, Troponin 0.121, and TSH 1.316. CXR with patchy ground-glass interstitial opacities in the perihilar region and left lung base. He was started on IV abx and admitted to Hospital Medicine.

## 2022-11-02 NOTE — PROGRESS NOTES
[facility]  Palliative Medicine  Progress Note  10/4/2022    Patient Name: Randall Strickland Jr.  MRN: [id number]  Admission Date: 10/2/2022  Hospital Length of Stay: 2 days  Code Status: DNR   Attending Provider: Cecilio Celeste MD  Consulting Provider: Britta Javed NP  Primary Care Physician: Tiago Delaney MD  Principal Problem:Aspiration pneumonia    Patient information was obtained from spouse/SO, past medical records and primary team.      Assessment/Plan:     Encounter for palliative care  Impression:  Palliative Care consulted for end of life/hospice discussion for this 89 y/o male being followed by Hospital Medicine and Nephrology for Aspiration pneumonia, Chronic kidney disease, Atrial fibrillation, Dementia, and Failure to thrive. He presented to the ED accompanied by his wife on 10/2 for decreased appetite, decreased mobility, falls, and dyspnea. On arrival, his O2 sats were 89% on RA. Labs notable for WBC 17, Plt 41, K 5.3, Creatinine 1.4, Troponin 0.121, and TSH 1.316. CXR with patchy ground-glass interstitial opacities in the perihilar region and left lung base. He was started on IV abx and admitted to Hospital Medicine. He was seen by SLP today but did not pass bedside swallow study. He is arousable but drowsy on exam today. Non-verbal but smiles when spoken to. Wife Sade at bedside reports that she has decided to take him home with hospice.    Plan/Recommendations:  1. See goals of care discussion below.  2. Code status DNR.  3. Wife has decided to pursue home hospice. Case management to coordinate arrangements.  4. Palliative Care will be available for follow up if needed.    Aspiration pneumonia/Chronic kidney disease/Atrial fibrillation/Dementia/Failure to thrive - management per primary team and other consultants    Goals of Care/Advance Care Planning:  10/4 Follow up visit held with patient's wife today. Since we met yesterday, she has continued to think about her options and has now  "decided to pursue home hospice. She reports understanding that he is not going to start walking again and has decided "what's the point" of trying to push him to work with PT. She says that she just wants him to be comfortable. We talked about offering him pleasure feeds if he is interested but she says that he does not seem to be interested in eating. She states that "I don't think he has much time, it's just hard to say goodbye." Expressed appreciation for the care she has provided to the patient and explained that hospice will be there to support both her and the patient as he nears end of life.  __________________________________    10/3 Discussion held by this NP and MOON Acevedo LCSW with patients wife to discuss his current clinical status, goals of care, code status, long term expected outcomes and prognostic awareness. Patient is unable to participate in meaningful conversation due to dementia. The patient does not have any advanced directives and his wife is his legal next of kin and surrogate medical decision maker. After a discussion concerning the patients values and wishes, she verbalized good understanding and insight as it relates to her prognostic awareness. She reports that the patient is not eating and she believes that he will not live much longer. She has elected DNR code status and does not think he would want a feeding tube. Her goal is for him to go home and to "be content until he's gone." She would like to have him set up with home health including PT so they can work with him on getting out of bed. She reports that he has been bedbound for the last three weeks.    Discussed the alternative option of hospice care which would promote comfort and quality of life while allowing the patient to avoid repeated hospitalizations. Explained that some hospice agencies may offer a little PT although not as much as with home health. Patient's wife does not find hospitalization to be burdensome and " "actually enjoys being here. She is unsure whether the patient needs hospice yet but says she would be open to hospice care in the future. I explained she could also consider home-based palliative care or virtual palliative clinic visits, although hospice would provide the patient with the most support at home. I provided her with some literature to review about hospice vs palliative care. Will discuss further at our next visit.    Case discussed today with Dr. Celeste.        I will follow-up with patient. Please contact us if you have any additional questions.    Subjective:     Chief Complaint:   Chief Complaint   Patient presents with    Failure To Thrive     Wife called EMS and stated "I think he's just dying"       HPI:   Mr. Strickland is a 89 y/o male with PMHx of CHF, afib, hypertension, CKD, and dementia who presented to the [place] at [facility] [place] on [place]2 with decreased appetite, decreased mobility, falls, and dyspnea. Per wife, patient with decreased mobility that has progressed over the past several months. He has gotten too weak to use his walker and has had occasional falls. He is only taking sips of water/juice and is unable to eat full meals. His wife called EMS due to concern for dyspnea and cough. On arrival to the ED, his O2 sats were 89% on RA. Labs notable for WBC 17, Plt 41, K 5.3, Creatinine 1.4, Troponin 0.121, and TSH 1.316. CXR with patchy ground-glass interstitial opacities in the perihilar region and left lung base. He was started on IV abx and admitted to Hospital Medicine.      Hospital Course:  No notes on file    Interval History: Patient is arousable but drowsy on exam today. Non-verbal but smiles when spoken to. Wife Sade at bedside reports that she has decided to take him home with hospice.    Past Medical History:   Diagnosis Date    Atherosclerosis of native coronary artery of native heart with unstable angina pectoris 10/6/2022    Blood transfusion     BPH (benign prostatic hypertrophy)     " Cataract     Chronic lower back pain 4/8/2013    Congestive heart failure     Hypertension     Macular degeneration     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    Postsurgical dumping syndrome 4/10/2014    PVD (peripheral vascular disease) 8/22/2018    Squamous cell carcinoma of skin 07/21/2014    right zygomatic cheek/temple     Stenosis of aortic and mitral valves     aortic DAHLIA 1.1 CM    Venous stasis dermatitis of both lower extremities 8/22/2018       Past Surgical History:   Procedure Laterality Date    APPENDECTOMY      cararact  car    CARDIAC CATHETERIZATION      CARDIAC VALVE SURGERY      CATARACT EXTRACTION BILATERAL W/ ANTERIOR VITRECTOMY      bilaterial    CATARACT EXTRACTION W/  INTRAOCULAR LENS IMPLANT Bilateral     CHOLECYSTECTOMY      EYE SURGERY      JOINT REPLACEMENT      bilateral hip    KNEE SURGERY      Bilateral    TOTAL HIP ARTHROPLASTY      Bilaterial       Review of patient's allergies indicates:  No Known Allergies    Medications:  Continuous Infusions:   dextrose 5 % 50 mL/hr at 10/03/22 1506    lactated ringers       Scheduled Meds:   azithromycin  500 mg Intravenous Q24H    cefTRIAXone (ROCEPHIN) IVPB  2 g Intravenous Q24H    mupirocin   Nasal BID     PRN Meds:acetaminophen, albuterol sulfate, dextrose 10%, dextrose 10%, glucagon (human recombinant), melatonin, ondansetron, polyethylene glycol, prochlorperazine, sodium chloride 0.9%    Family History       Problem Relation (Age of Onset)    No Known Problems Father, Mother, Sister, Brother, Maternal Aunt, Maternal Uncle, Paternal Aunt, Paternal Uncle, Maternal Grandmother, Maternal Grandfather, Paternal Grandmother, Paternal Grandfather, Daughter, Son, Daughter, Son          Tobacco Use    Smoking status: Never    Smokeless tobacco: Never   Substance and Sexual Activity    Alcohol use: Yes     Comment: less than one  drink weekly    Drug use: No    Sexual activity: Not on file       Review of Systems   Unable to perform ROS: Dementia    Objective:     Vital Signs (Most Recent):  Temp: 97.4 °F (36.3 °C) (10/04/22 1133)  Pulse: 63 (10/04/22 1521)  Resp: 16 (10/04/22 1133)  BP: 131/76 (10/04/22 1133)  SpO2: 100 % (10/04/22 1409) Vital Signs (24h Range):  Temp:  [97.4 °F (36.3 °C)-98.3 °F (36.8 °C)] 98 °F (36.7 °C)  Pulse:  [58-83] 76  Resp:  [16-20] 16  SpO2:  [94 %-100 %] 96 %  BP: (119-153)/(59-78) 134/68      Weight: 49.3 kg (108 lb 11 oz)  Body mass index is 16.05 kg/m².    Physical Exam  Vitals and nursing note reviewed.   Constitutional:       General: He is not in acute distress.     Appearance: He is cachectic. He is ill-appearing.   HENT:      Head: Normocephalic and atraumatic.   Eyes:      Extraocular Movements: Extraocular movements intact.      Conjunctiva/sclera: Conjunctivae normal.   Cardiovascular:      Rate and Rhythm: Normal rate. Rhythm irregular.   Pulmonary:      Effort: Pulmonary effort is normal. No respiratory distress.      Comments: On 3L O2 via NC  Abdominal:      General: Abdomen is flat.      Tenderness: There is no abdominal tenderness.   Musculoskeletal:         General: No swelling or deformity.   Skin:     General: Skin is warm and dry.   Neurological:      Mental Status: He is lethargic.      Motor: Weakness present.      Comments: Arousable but drowsy       Review of Symptoms      Symptom Assessment (ESAS 0-10 Scale)  Unable to complete assessment due to Dementia         Pain Assessment:  OME in 24 hours:  0  Location(s):      Pain Assessment in Advanced Demential Scale (PAINAD)   Breathing - Independent of vocalization:  0  Negative vocalization:  0  Facial expression:  0  Body language:  0  Consolability:  0  Total:  0    Performance Status:  20    Living Arrangements:  Lives with spouse    Psychosocial/Cultural: Patient and his wife have been  for 65 years. They have two daughters, two sons, six grandchildren, and four great-grandchildren. The patient's only brother is . He is retired from the  "insurance industry. He used to enjoy tennis and golf and going out to dinner. His wife's goal is for him to go home and to "be content until he's gone."    Spiritual:  F - Kylah and Belief:  Christian  I - Importance:  Yes  C - Community:  Belongs to [facility]  A - Address in Care:   visits      Advance Care Planning   Advance Directives:   Living Will: No    LaPOST: No    Do Not Resuscitate Status: Yes    Medical Power of : No      Decision Making:  Family answered questions and Patient unable to communicate due to disease severity/cognitive impairment       Significant Labs: All pertinent labs within the past 24 hours have been reviewed.  CBC:   Recent Labs   Lab 10/03/22  0212   WBC 15.72*   HGB 10.1*   HCT 32.0*   *   PLT 36*     BMP:  Recent Labs   Lab 10/03/22  0212   *      K 4.2      CO2 18*   BUN 86*   CREATININE 1.2   CALCIUM 8.5*   MG 1.8     LFT:  Lab Results   Component Value Date    AST 16 10/03/2022    ALKPHOS 126 10/03/2022    BILITOT 0.9 10/03/2022     Albumin:   Albumin   Date Value Ref Range Status   10/03/2022 1.9 (L) 3.5 - 5.2 g/dL Final     Protein:   Total Protein   Date Value Ref Range Status   10/03/2022 5.3 (L) 6.0 - 8.4 g/dL Final     Lactic acid:   Lab Results   Component Value Date    LACTATE 1.5 10/02/2022    LACTATE 1.3 12/16/2019       Significant Imaging: I have reviewed all pertinent imaging results/findings within the past 24 hours.        Britta Javed NP  Palliative Medicine  [facility]  "

## 2022-11-02 NOTE — ASSESSMENT & PLAN NOTE
"Impression:  Palliative Care consulted for end of life/hospice discussion for this 89 y/o male being followed by Hospital Medicine and Nephrology for Aspiration pneumonia, Chronic kidney disease, Atrial fibrillation, Dementia, and Failure to thrive. He presented to the ED accompanied by his wife on 10/2 for decreased appetite, decreased mobility, falls, and dyspnea. On arrival, his O2 sats were 89% on RA. Labs notable for WBC 17, Plt 41, K 5.3, Creatinine 1.4, Troponin 0.121, and TSH 1.316. CXR with patchy ground-glass interstitial opacities in the perihilar region and left lung base. He was started on IV abx and admitted to Hospital Medicine. He was seen by SLP today but did not pass bedside swallow study. He is arousable but drowsy on exam today. Non-verbal but smiles when spoken to. Wife Sade at bedside reports that she has decided to take him home with hospice.    Plan/Recommendations:  1. See goals of care discussion below.  2. Code status DNR.  3. Wife has decided to pursue home hospice. Case management to coordinate arrangements.  4. Palliative Care will be available for follow up if needed.    Aspiration pneumonia/Chronic kidney disease/Atrial fibrillation/Dementia/Failure to thrive - management per primary team and other consultants    Goals of Care/Advance Care Planning:  10/4 Follow up visit held with patient's wife today. Since we met yesterday, she has continued to think about her options and has now decided to pursue home hospice. She reports understanding that he is not going to start walking again and has decided "what's the point" of trying to push him to work with PT. She says that she just wants him to be comfortable. We talked about offering him pleasure feeds if he is interested but she says that he does not seem to be interested in eating. She states that "I don't think he has much time, it's just hard to say goodbye." Expressed appreciation for the care she has provided to the patient and " "explained that hospice will be there to support both her and the patient as he nears end of life.  __________________________________    10/3 Discussion held by this NP and MOON Acevedo LCSW with patients wife to discuss his current clinical status, goals of care, code status, long term expected outcomes and prognostic awareness. Patient is unable to participate in meaningful conversation due to dementia. The patient does not have any advanced directives and his wife is his legal next of kin and surrogate medical decision maker. After a discussion concerning the patients values and wishes, she verbalized good understanding and insight as it relates to her prognostic awareness. She reports that the patient is not eating and she believes that he will not live much longer. She has elected DNR code status and does not think he would want a feeding tube. Her goal is for him to go home and to "be content until he's gone." She would like to have him set up with home health including PT so they can work with him on getting out of bed. She reports that he has been bedbound for the last three weeks.    Discussed the alternative option of hospice care which would promote comfort and quality of life while allowing the patient to avoid repeated hospitalizations. Explained that some hospice agencies may offer a little PT although not as much as with home health. Patient's wife does not find hospitalization to be burdensome and actually enjoys being here. She is unsure whether the patient needs hospice yet but says she would be open to hospice care in the future. I explained she could also consider home-based palliative care or virtual palliative clinic visits, although hospice would provide the patient with the most support at home. I provided her with some literature to review about hospice vs palliative care. Will discuss further at our next visit.    Case discussed today with Dr. Celeste.  "

## 2022-12-31 ENCOUNTER — TELEPHONE (OUTPATIENT)
Dept: INTERNAL MEDICINE | Facility: CLINIC | Age: 87
End: 2022-12-31

## 2022-12-31 NOTE — TELEPHONE ENCOUNTER
Please set up a condolences card for us to sign and send out to patient's family.    Thank you.

## 2023-05-16 NOTE — TELEPHONE ENCOUNTER
Reason for Disposition   Difficult to awaken or acting confused (e.g., disoriented, slurred speech)    Additional Information   Negative: Breathing stopped and hasn't returned   Negative: Choking on something   Negative: SEVERE difficulty breathing (e.g., struggling for each breath, speaks in single words, pulse > 120)   Negative: Bluish (or gray) lips or face    Protocols used: BREATHING DIFFICULTY-A-OH    Spoke with Pt's wife, she stated Pt is having shortness of breath, confused and slurred speech and she wants to know if Pt should go to ER. Advised to hang up and dial 911.    fever, sepsis, hypotensive shock, requiring presser, ICU consult and admission, as well as abx and fluid resuscitation. Differential Diagnosis

## (undated) DEVICE — DRESSING LEUKOPLAST FLEX 1X3IN

## (undated) DEVICE — BANDAGE ADHESIVE